# Patient Record
Sex: MALE | Race: WHITE | NOT HISPANIC OR LATINO | Employment: FULL TIME | URBAN - METROPOLITAN AREA
[De-identification: names, ages, dates, MRNs, and addresses within clinical notes are randomized per-mention and may not be internally consistent; named-entity substitution may affect disease eponyms.]

---

## 2017-01-06 ENCOUNTER — OFFICE VISIT (OUTPATIENT)
Dept: PSYCHIATRY | Facility: CLINIC | Age: 62
End: 2017-01-06
Payer: COMMERCIAL

## 2017-01-06 DIAGNOSIS — F33.2 SEVERE EPISODE OF RECURRENT MAJOR DEPRESSIVE DISORDER, WITHOUT PSYCHOTIC FEATURES: Primary | ICD-10-CM

## 2017-01-06 PROCEDURE — 90791 PSYCH DIAGNOSTIC EVALUATION: CPT | Mod: S$GLB,,, | Performed by: SOCIAL WORKER

## 2017-01-06 NOTE — PROGRESS NOTES
"Psychiatry Initial Visit (PhD/LCSW)  Diagnostic Interview - CPT 03306    Date: 1/6/2017    Site: [facility]    [place] [facility] [place]     Clinical status of patient: Outpatient    Yosef Rowe, a 61 y.o. male, for initial evaluation visit.  Met with patient.    Chief complaint/reason for encounter: depression    History of present illness: he reports most of his life he has been a content person.  Never worried about things and "type A personality"; but now unhappy for a number of years.  The BMU did not help.       Pain: in back     Symptoms:   · Mood: sad no energy does not want leave the house.   Oct 1 he lost his job per lack of work.  He did construction management.    That the market currently in his career is "almost nil".      He has placed some applications out but no interviews.   He feels he could not pass the interview to start with.  Finds it difficult to concentrate and make decisions.   He has thought of suicide "and don't dwell on it or plan on it and I wouldn't do that to my family".   He has never attempted suicide in his life".   He owns firearms.  He does not want to get rid of them.  I did suggest he did just as an extra measure since he is depressed but he does not agree or view it as necessary.  Weight is stable, self critical ( feels lost and worthless).  Sleep is of and on.  Takes sleeping agents.  He denies periods of elevated mood.  He and wife are not sexually active or at least not much.      That 3 years ago he had to start working part time.      U notes describes his circumstances as follows:   Pt reports depression and anxiety off and on for 10 yrs. When he turned 49 he realized he had been working all of his life and had nothing to show for it. He had no job that was secure and he was worried about the future. Over the 10 yrs it got much better at times when he had steady work. In the last 3.5 yrs it has been worse because he has been working part time. He has done " "construction management. He has been unemployed since October 1st. Symtoms have gotten steadily worse in the last year and worse since he is unemployed. He was able to do his work when he was last working but there is no work currently. He is not expecting this to change.       · Anxiety: worries  Restless irritable,  No muscle tension,  No full blown panic attack "close" to it he tells me, and provoked by anticipatory anxiety.     · Substance abuse: denied  · Cognitive functioning: denied  · Health behaviors: noncontributory    Psychiatric history: seeing a psychiatrist on and off for 11 years.  when he turned 49.  and he was unemployed at the time.  that at the time he got on a medication and found a job.      Medical history: see epic     Family history of psychiatric illness: not known    Social history (marriage, employment, etc.):   He has been  for a long time.  He has two children.   Raised by parents in [facility] [place].    Parents stayed together.  He has one brother two sisters.   youngest of all is pt.   Father was a calzada at the end.   He also was an .   He has two years of college.  Business. At [employer].  He did not do well.  He did well in school earlier in his life.   Wife is a manager for [employer].       Substance use:   Alcohol: one glass twice a week and has never been a problem.     Drugs: none   Tobacco: none   Caffeine: none    Current medications and drug reactions (include OTC, herbal): see medication list     Strengths and liabilities: Strength: Patient is expressive/articulate., Strength: Patient has positive support network., Strength: Patient has reasonable judgment.    Current Evaluation:     Mental Status Exam:  General Appearance:  obese   Speech: normal tone, normal pitch, normal volume      Level of Cooperation: cooperative      Thought Processes: normal and logical   Mood: depressed      Thought Content: delusions: no, hallucinations: (auditory: no, visual: " no), suicidal thoughts: (active-no, passive-yes), homicidal thoughts: (active-no, passive-no)   Affect: blunted   Orientation: Oriented x3   Memory: recent >  intact   Attention Span & Concentration: intact   Fund of General Knowledge: intact and appropriate to age and level of education   Abstract Reasoning: interpretation of similarities was abstract, interpretation of proverbs was abstract   Judgment & Insight: good, fair     Language  intact     Diagnostic Impression - Plan:       ICD-10-CM ICD-9-CM   1. Severe episode of recurrent major depressive disorder, without psychotic features F33.2 296.33       Plan:individual psychotherapy  Suggest he consider downsizing.  He lives in [place] and tells me his neighborhood is the only safe neighborhood in the city.  Consider sitting down and make a list with precise calculations of expenses and earnings.      Return to Clinic: 1 week    Length of Service (minutes): 45

## 2017-01-11 ENCOUNTER — OFFICE VISIT (OUTPATIENT)
Dept: PSYCHIATRY | Facility: CLINIC | Age: 62
End: 2017-01-11
Payer: COMMERCIAL

## 2017-01-11 VITALS
WEIGHT: 280 LBS | BODY MASS INDEX: 43.95 KG/M2 | SYSTOLIC BLOOD PRESSURE: 155 MMHG | DIASTOLIC BLOOD PRESSURE: 87 MMHG | HEIGHT: 67 IN | HEART RATE: 77 BPM

## 2017-01-11 DIAGNOSIS — G47.00 INSOMNIA, UNSPECIFIED TYPE: Primary | ICD-10-CM

## 2017-01-11 DIAGNOSIS — F41.1 GENERALIZED ANXIETY DISORDER: ICD-10-CM

## 2017-01-11 DIAGNOSIS — F33.2 SEVERE EPISODE OF RECURRENT MAJOR DEPRESSIVE DISORDER, WITHOUT PSYCHOTIC FEATURES: ICD-10-CM

## 2017-01-11 PROCEDURE — 1159F MED LIST DOCD IN RCRD: CPT | Mod: S$GLB,,, | Performed by: INTERNAL MEDICINE

## 2017-01-11 PROCEDURE — 99214 OFFICE O/P EST MOD 30 MIN: CPT | Mod: S$GLB,,, | Performed by: INTERNAL MEDICINE

## 2017-01-11 PROCEDURE — 99999 PR PBB SHADOW E&M-EST. PATIENT-LVL III: CPT | Mod: PBBFAC,,, | Performed by: INTERNAL MEDICINE

## 2017-01-11 RX ORDER — TEMAZEPAM 30 MG/1
30 CAPSULE ORAL NIGHTLY PRN
Qty: 30 CAPSULE | Refills: 0 | Status: SHIPPED | OUTPATIENT
Start: 2017-01-11 | End: 2017-02-01 | Stop reason: SDUPTHER

## 2017-01-11 NOTE — MR AVS SNAPSHOT
[facility]  [address]  [place] [address]  Phone: [phone]  Fax: [phone]                  Yosef Rowe   2017 3:30 PM   Office Visit    Description:  Male : 1955   Provider:  Hedy Dempsey MD   Department:  [facility]           Diagnoses this Visit        Comments    Insomnia, unspecified type    -  Primary            To Do List           Future Appointments        Provider Department Dept Phone    2017 8:00 AM [facility], [facility] [phone]      Goals (5 Years of Data)     None      Follow-Up and Disposition     Return in 3 weeks (on 2017).       These Medications        Disp Refills Start End    temazepam (RESTORIL) 30 mg capsule 30 capsule 0 2017     Take 1 capsule (30 mg total) by mouth nightly as needed for Insomnia. - Oral    Pharmacy: [facility] [address] [place] [address] [place], [place] [address] [address] AT [facility] [address] [address] Ph #: [phone]         [facility]     [facility] Nurse Care Line -  Assistance  Registered nurses in the [facility] provide clinical advisement, health education, appointment booking, and other advisory services.  Call for this free service at [phone].             Medications           Message regarding Medications     Verify the changes and/or additions to your medication regime listed below are the same as discussed with your clinician today.  If any of these changes or additions are incorrect, please notify your healthcare provider.        CHANGE how you are taking these medications     Start Taking Instead of    temazepam (RESTORIL) 30 mg capsule temazepam (RESTORIL) 30 mg capsule    Dosage:  Take 1 capsule (30 mg total) by mouth nightly as needed for Insomnia.     Reason for Change:  Reorder       STOP taking these medications     aripiprazole (ABILIFY) 2 MG Tab Take 1 tablet (2mg) daily x 1 week then discontinue.           Verify that  "the below list of medications is an accurate representation of the medications you are currently taking.  If none reported, the list may be blank. If incorrect, please contact your healthcare provider. Carry this list with you in case of emergency.           Current Medications     ascorbic acid (VITAMIN C) 500 MG tablet Take 500 mg by mouth once daily.    atorvastatin (LIPITOR) 10 MG tablet Take 1 tablet (10 mg total) by mouth once daily.    buPROPion (WELLBUTRIN XL) 150 MG TB24 tablet Take 1 tablet (150 mg total) by mouth once daily.    busPIRone (BUSPAR) 10 MG tablet Take one-half tablet three times daily by mouth for one week then take one tablet three times daily.    clonazePAM (KLONOPIN) 0.5 MG tablet Take 1 tablet by mouth 2 (two) times daily.    duloxetine (CYMBALTA) 60 MG capsule Take one by mouth daily for one week, then take two daily.    temazepam (RESTORIL) 30 mg capsule Take 1 capsule (30 mg total) by mouth nightly as needed for Insomnia.           Clinical Reference Information           Vital Signs - Last Recorded  Most recent update: 1/11/2017  3:25 PM by Justin Maria    BP Pulse Ht Wt BMI    (!) 155/87 77 5' 7" (1.702 m) 127 kg (280 lb) 43.85 kg/m2      Blood Pressure          Most Recent Value    BP  (!)  155/87      Allergies as of 1/11/2017     No Known Drug Allergies      Immunizations Administered on Date of Encounter - 1/11/2017     None      [facility] Sign-Up     Activating your [facility] account is as easy as 1-2-3!     1) Visit [email or web address], select Sign Up Now, enter this activation code and your date of birth, then select Next.  [email or web address]  Expires: 2/3/2017 10:31 AM      2) Create a username and password to use when you visit [facility] in the future and select a security question in case you lose your password and select Next.    3) Enter your e-mail address and click Sign Up!    Additional Information  If you have questions, please e-mail [email or web address] or call " [phone] to talk to our [facility] staff. Remember, [facility] is NOT to be used for urgent needs. For medical emergencies, dial 911.

## 2017-01-11 NOTE — PROGRESS NOTES
"OUTPATIENT PSYCHIATRY    ENCOUNTER DATE:  1/12/2017  SITE:  [facility]  REFFERAL SOURCE:  Ruperto Dexter MD  LENGTH OF SESSION:  30 minutes    CHIEF COMPLAINT   Anxiety; Depression; and Insomnia    HISTORY OF PRESENTING ILLNESS:  Yosef Rowe is a 61 y.o. male with history of anxiety and depression who presents for follow up visit after completing the BMU program on 12/20/16 (he did not find this helpful).  At last appointment, patient felt he was on too many medications that were not helping and so Abilify was decreased from 5mg daily with instructions to taper to 2mg daily and discontinue after 1 week.  Today he states that today was his last Abilify 2mg tablet.  He states that mood has been unchanged with decreased dose.  He continues to report his mood is "really bad."  He states he is "so bored to death" while he is at home during the day.  He states he just lays in bed and does not know what to do.  He describes frequent worrying (already worried about the traffic on his way home today) and catasophrophizing.  His wife leaves him chores while she is at work, but he states he struggles to complete these.  He states he can not bring himself to look for a job because he is too fearful that he would not be able to do the job.  He states he fears going places but can not explain what he is fearful of.  He does report he enjoys cooking and watching TV with his wife when she gets home from work.  The only place he can think of where he would like to go during the day is the local club where his friends are playing cards (he states he does not drink) - last went on Saturday.  He says his wife does not want him to go there.  He also does not like being around people who knew him before he was depressed because he says he is a different person now - previously "confident, outgoing, type A."  Consequently he is on edge while he is there.  His 40th wedding anniversary is coming up on " 1/21/17 and he is paralyzed with fear about what he will buy his wife.  He reports continued symptoms of severe depression and anxiety.  He denies suicidal ideation because he says he would never do that to his family.  He denies homicidal ideation, AVH, or history of donna.      Current medications:  Cymbalta 60mg daily (Crosstapered with Pristiq during BMU), Buspar 10mg twice daily (started during BMU), Wellbutrin 150mg daily (taking ~8 months), Abilify 2mg daily (on for over 1 year, tapering - last dose was today), Klonopin 0.5mg (taking ~1 year, takes at 6pm), Temazepam 30mg qHS (taking >1 year, takes at 9pm).    REVIEW OF SYSTEMS:  Complete review of systems performed covering Constitutional and Neurologic.  All systems negative.    Psych ROS covered elsewhere in note (HPI)    PAST PSYCHIATRIC, MEDICAL, AND SOCIAL HISTORY REVIEWED    MEDICATIONS:    aripiprazole (ABILIFY) 2 MG Tab, Take 1 tablet (2mg) daily x 1 week then discontinue., Disp: 7 tablet, Rfl: 0    ascorbic acid (VITAMIN C) 500 MG tablet, Take 500 mg by mouth once daily., Disp: , Rfl:     atorvastatin (LIPITOR) 10 MG tablet, Take 1 tablet (10 mg total) by mouth once daily., Disp: 30 tablet, Rfl: 11    buPROPion (WELLBUTRIN XL) 150 MG TB24 tablet, Take 1 tablet (150 mg total) by mouth once daily., Disp: 30 tablet, Rfl: 1    busPIRone (BUSPAR) 10 MG tablet, Take one-half tablet three times daily by mouth for one week then take one tablet three times daily., Disp: 90 tablet, Rfl: 1    clonazePAM (KLONOPIN) 0.5 MG tablet, Take 1 tablet by mouth 2 (two) times daily., Disp: , Rfl: 2    duloxetine (CYMBALTA) 60 MG capsule, Take one by mouth daily for one week, then take two daily., Disp: 30 capsule, Rfl: 1    temazepam (RESTORIL) 30 mg capsule, , Disp: , Rfl:     ALLERGIES:  Review of patient's allergies indicates:   Allergen Reactions    No known drug allergies      PSYCHIATRIC EXAM:  Vitals:    01/11/17 1523   BP: (!) 155/87   Pulse: 77  "  Weight: 127 kg (280 lb)   Height: 5' 7" (1.702 m)   Appearance:  Well groomed, appearing healthy and of stated age  Behavior:  Cooperative, pleasant, + psychomotor retardation  Speech:  Slow, soft speech  Mood:  "Really bad"  Affect:  Congruent with stated mood; Constricted  Thought Process:  Linear, logical, goal directed  Thought Content:  Negative for suicidal ideation, homicidal ideation, delusions or hallucinations.  Associations:  Intact  Memory:  Grossly Intact  Level of Consciousness/Orientation:  Grossly intact  Fund of Knowledge:  Good  Attention:  Good  Language:  Fluent, able to name abstract and concrete objects  Insight:  Poor  Judgment:  Fair  Psychomotor signs:  No involuntary movements or tremor  Gait:  Normal    RELEVANT LABS/STUDIES:  Reviewed 11/2016 labs.    IMPRESSION:    Yosef Rowe is a 61 y.o. male with history of anxiety and depression with continued severe symptoms.      DIAGNOSES:  SOLITARIO  MDD, recurrent, severe  Insomnia    PLAN:  · Major depressive disorder, severe:  Currently taking Cymbalta 60mg daily and Wellbutrin XL 150mg daily.  He is adamant that he does not want to increase these medications.  Today was last day of Abilify and patient has not experienced worsening symptoms - he agrees to let me know if he does.  According to previous psychiatrist, Dr. Tian, patient has been to numerous psychiatrists and tried every medication for depression and nothing has been helpful.  He states he has been severely depressed for many years and feels there is a personality component to his resistance to get better.  He states he was never responsive to behavioral activation and did not participate in therapy.  Discussed the possibility of ECT with patient today (Dr. Tian states he also discussed this with the patient in the past), however patient is resistant to this.  Encouraged him to keep next appointment with Mr. Patino for therapy - continued to reiterate that therapy is the most " important tool for him to get better.  · Generalized anxiety disorder:  Currently taking Cymbalta 60mg daily as above, Buspar 10mg BID, and Klonopin 0.5mg daily (6pm).  Will have long term goal of getting patient off of benzodiazepines and onto less addictive medications - Dr. Tian states he was also trying to reduce his use of benzodiazepines because he was concerned the patient was drinking more than he reports (he states this was told to him by the patient's wife).  Also concerned that his benzodiazepine use could be worsening his depression.  · Insomnia:  Currently taking Restoril 30mg qHS.  Will continue for now as above, however plan to decrease and discontinue over time due to above concerns.  · Alcohol use:  Currently reports he drinks 1 glass of wine per week.  Dr. Tian is concerned that he is drinking several drinks per night.  Will continue to discuss in the future.    · Patient agrees to let me know or go to the ED if symptoms worsen or if he has thoughts of suicide.    *Have faxed release of information to Dr. Tian and he plans to send complete list of medications patient has tried in the past.      RETURN TO CLINIC:  Return in 3 weeks (on 2/1/2017).

## 2017-02-01 ENCOUNTER — OFFICE VISIT (OUTPATIENT)
Dept: PSYCHIATRY | Facility: CLINIC | Age: 62
End: 2017-02-01
Payer: COMMERCIAL

## 2017-02-01 VITALS
HEART RATE: 69 BPM | HEIGHT: 67 IN | BODY MASS INDEX: 44.26 KG/M2 | WEIGHT: 282 LBS | DIASTOLIC BLOOD PRESSURE: 70 MMHG | SYSTOLIC BLOOD PRESSURE: 146 MMHG

## 2017-02-01 DIAGNOSIS — G47.00 INSOMNIA, UNSPECIFIED TYPE: ICD-10-CM

## 2017-02-01 DIAGNOSIS — F33.2 SEVERE EPISODE OF RECURRENT MAJOR DEPRESSIVE DISORDER, WITHOUT PSYCHOTIC FEATURES: ICD-10-CM

## 2017-02-01 DIAGNOSIS — F41.1 GENERALIZED ANXIETY DISORDER: Primary | ICD-10-CM

## 2017-02-01 PROCEDURE — 99214 OFFICE O/P EST MOD 30 MIN: CPT | Mod: S$GLB,,, | Performed by: INTERNAL MEDICINE

## 2017-02-01 PROCEDURE — 99999 PR PBB SHADOW E&M-EST. PATIENT-LVL III: CPT | Mod: PBBFAC,,, | Performed by: INTERNAL MEDICINE

## 2017-02-01 RX ORDER — BUPROPION HYDROCHLORIDE 150 MG/1
150 TABLET ORAL DAILY
Qty: 30 TABLET | Refills: 3 | Status: SHIPPED | OUTPATIENT
Start: 2017-02-01 | End: 2017-03-07 | Stop reason: SDUPTHER

## 2017-02-01 RX ORDER — DULOXETIN HYDROCHLORIDE 60 MG/1
60 CAPSULE, DELAYED RELEASE ORAL DAILY
Qty: 30 CAPSULE | Refills: 3 | Status: SHIPPED | OUTPATIENT
Start: 2017-02-01 | End: 2017-05-09 | Stop reason: SDUPTHER

## 2017-02-01 RX ORDER — TEMAZEPAM 30 MG/1
30 CAPSULE ORAL NIGHTLY PRN
Qty: 30 CAPSULE | Refills: 0 | Status: SHIPPED | OUTPATIENT
Start: 2017-02-01 | End: 2017-03-07 | Stop reason: SDUPTHER

## 2017-02-01 RX ORDER — CLONAZEPAM 0.5 MG/1
0.25 TABLET ORAL DAILY
Qty: 15 TABLET | Refills: 0 | Status: SHIPPED | OUTPATIENT
Start: 2017-02-01 | End: 2017-05-09

## 2017-02-01 NOTE — MR AVS SNAPSHOT
[facility]  [address]  [place] [address]  Phone: [phone]  Fax: [phone]                  Yosef Rowe   2017 10:00 AM   Office Visit    Description:  Male : 1955   Provider:  Hedy Dempsey MD   Department:  [facility]           Reason for Visit     Anxiety     Depression     Insomnia           Diagnoses this Visit        Comments    Insomnia, unspecified type                To Do List           Future Appointments        Provider Department Dept Phone    2017 8:00 AM [facility] [phone]      Goals (5 Years of Data)     None      Follow-Up and Disposition     Return in about 5 weeks (around 3/8/2017).       These Medications        Disp Refills Start End    duloxetine (CYMBALTA) 60 MG capsule 30 capsule 3 2017     Take 1 capsule (60 mg total) by mouth once daily. - Oral    [facility] [address] [facility] Ph #: [phone]       buPROPion (WELLBUTRIN XL) 150 MG TB24 tablet 30 tablet 3 2017     Take 1 tablet (150 mg total) by mouth once daily. - Oral    Pharmacy: [facility] [address] [address] AT [facility] Ph #: [phone]       temazepam (RESTORIL) 30 mg capsule 30 capsule 0 2017     Take 1 capsule (30 mg total) by mouth nightly as needed for Insomnia. - Oral    Pharmacy: [facility] [address] AT [facility] Ph #: [phone]       clonazePAM (KLONOPIN) 0.5 MG tablet 15 tablet 0 2017     Take 0.5 tablets (0.25 mg total) by mouth once daily. - Oral    Pharmacy: [facility] [address] AT [facility] Ph #: [phone]         [facility]     [facility] Care Line -  Assistance  Registered nurses in the [facility] provide clinical advisement,  health education, appointment booking, and other advisory services.  Call for this free service at [phone].             Medications           Message regarding Medications     Verify the changes and/or additions to your medication regime listed below are the same as discussed with your clinician today.  If any of these changes or additions are incorrect, please notify your healthcare provider.        CHANGE how you are taking these medications     Start Taking Instead of    duloxetine (CYMBALTA) 60 MG capsule duloxetine (CYMBALTA) 60 MG capsule    Dosage:  Take 1 capsule (60 mg total) by mouth once daily. Dosage:  Take one by mouth daily for one week, then take two daily.    Reason for Change:  Reorder     clonazePAM (KLONOPIN) 0.5 MG tablet clonazePAM (KLONOPIN) 0.5 MG tablet    Dosage:  Take 0.5 tablets (0.25 mg total) by mouth once daily. Dosage:  Take 1 tablet by mouth 2 (two) times daily.    Reason for Change:  Reorder            Verify that the below list of medications is an accurate representation of the medications you are currently taking.  If none reported, the list may be blank. If incorrect, please contact your healthcare provider. Carry this list with you in case of emergency.           Current Medications     ascorbic acid (VITAMIN C) 500 MG tablet Take 500 mg by mouth once daily.    atorvastatin (LIPITOR) 10 MG tablet Take 1 tablet (10 mg total) by mouth once daily.    buPROPion (WELLBUTRIN XL) 150 MG TB24 tablet Take 1 tablet (150 mg total) by mouth once daily.    busPIRone (BUSPAR) 10 MG tablet Take one-half tablet three times daily by mouth for one week then take one tablet three times daily.    clonazePAM (KLONOPIN) 0.5 MG tablet Take 0.5 tablets (0.25 mg total) by mouth once daily.    duloxetine (CYMBALTA) 60 MG capsule Take 1 capsule (60 mg total) by mouth once daily.    temazepam (RESTORIL) 30 mg capsule Take 1 capsule (30 mg total) by mouth nightly as needed for Insomnia.          "  Clinical Reference Information           Vital Signs - Last Recorded  Most recent update: 2/1/2017  9:58 AM by Justin Maria    BP Pulse Ht Wt BMI    (!) 146/70 69 5' 7" (1.702 m) 127.9 kg (282 lb) 44.17 kg/m2      Blood Pressure          Most Recent Value    BP  (!)  146/70      Allergies as of 2/1/2017     No Known Drug Allergies      Immunizations Administered on Date of Encounter - 2/1/2017     None      [facility] Sign-Up     Activating your [facility] account is as easy as 1-2-3!     1) Visit [email or web address], select Sign Up Now, enter this activation code and your date of birth, then select Next.  [email or web address]  Expires: 2/3/2017 10:31 AM      2) Create a username and password to use when you visit [facility] in the future and select a security question in case you lose your password and select Next.    3) Enter your e-mail address and click Sign Up!    Additional Information  If you have questions, please e-mail [email or web address] or call [phone] to talk to our [facility] staff. Remember, [facility] is NOT to be used for urgent needs. For medical emergencies, dial 911.         Instructions    Current medications:  Cymbalta (duloxetine) 60mg daily - helps depression and anxiety.  Wellbutrin (buproprion) XL 150mg daily - helps with depression.    Buspar (buspirone) 10mg twice daily - helps with anxiety.  Restoril (temazepam) 30mg before bed - treats insomnia but can make you groggy and affect your thinking.     Changes:  Reduce Klonopin (clonazepam) to 1/2 tablet (0.25mg) daily - treats anxiety but can make you groggy and affect your thinking - goal is to come off of this medication completely.       "

## 2017-02-01 NOTE — PATIENT INSTRUCTIONS
Current medications:  Cymbalta (duloxetine) 60mg daily - helps depression and anxiety.  Wellbutrin (buproprion) XL 150mg daily - helps with depression.    Buspar (buspirone) 10mg twice daily - helps with anxiety.  Restoril (temazepam) 30mg before bed - treats insomnia but can make you groggy and affect your thinking.     Changes:  Reduce Klonopin (clonazepam) to 1/2 tablet (0.25mg) daily - treats anxiety but can make you groggy and affect your thinking - goal is to come off of this medication completely.

## 2017-02-07 ENCOUNTER — OFFICE VISIT (OUTPATIENT)
Dept: PSYCHIATRY | Facility: CLINIC | Age: 62
End: 2017-02-07
Payer: COMMERCIAL

## 2017-02-07 DIAGNOSIS — F33.2 SEVERE EPISODE OF RECURRENT MAJOR DEPRESSIVE DISORDER, WITHOUT PSYCHOTIC FEATURES: Primary | ICD-10-CM

## 2017-02-07 DIAGNOSIS — F41.1 GENERALIZED ANXIETY DISORDER: ICD-10-CM

## 2017-02-07 PROCEDURE — 90791 PSYCH DIAGNOSTIC EVALUATION: CPT | Mod: S$GLB,,, | Performed by: PSYCHOLOGIST

## 2017-02-07 PROCEDURE — 99999 PR PBB SHADOW E&M-EST. PATIENT-LVL II: CPT | Mod: PBBFAC,,, | Performed by: PSYCHOLOGIST

## 2017-02-07 NOTE — PROGRESS NOTES
Psychiatry Initial Visit (PhD/LCSW)    CPT Code: 78602    Patient Name:  Yosef Rowe    Date:  02/07/2017    Site:  [facility]    Referral source:  Hedy Dempsey M.D.    Chief complaint/reason for encounter:  Psychological Evaluation    Clinical status of patient:  Outpatient    Met with:  Patient    History of present illness:  Mr. Yosef Rowe is a 61-year-old   male who is pursuing psychotherapy to improve symptoms of depression and anxiety.  Although he has experienced psychiatric problems since 2006, he noted his symptoms worsened over the last few years due to unemployment and resulting stressors.  He received psychiatric treatment for medication management from two psychiatrists until establishing care at [facility] with Dr. Hedy Dempsey.  He has tried many psychotropic medications and is currently taking Cymbalta, Buspar, Wellbutrin, Abilify, Klonopin, and Temazepam.  He reports no psychotherapy during that time but recently received treatment at [facility] in December 2016.  He notes that psychiatric treatment has not been helpful for him, and that his symptoms were most improved when he was working full-time.    He would currently like to focus on improving symptoms of depression so that he can return to full-time employment.  His family is experiencing financial strain without his income, and he is worried about losing his home and being unable to pay bills.  When asked what is motivating him in his life, he notes he is living for his wife and children.  He was provided with a values questionnaire and encouraged to generate his values and potential values-based commitments.  He was also provided with psychoeducation regarding guilt and judgmental thoughts, and was taught to reframe negative thoughts and use validation.  Mr. Rowe requested to schedule sessions every two weeks to reduce financial burden.    Pain scale:  5/10    Symptoms:  Mood:  Depression:   Reports symptoms consistent with Major Depressive Disorder, recurrent, severe.  He has had multiple depressive episodes in the past, and reports the current episode has worsened in the past two years.  In the past two weeks he reports depressed mood, anhedonia, insomnia, feelings of worthlessness and hopelessness, inappropriate and excessive guilt, difficulty concentrating, fatigue, and poor appetite.    Mariia/Hypomania:  Denied.  Anxiety:  Reports symptoms consistent with Generalized Anxiety Disorder.  He endorses excessive worries that are difficult to manage, and states his anxiety occurs nearly every day.  He worries about his financial situation, his wife and children, and inability to find a job.  He also has difficulty leaving the house due to fears that something bad may happen.  His symptoms are associated with hyperarousal.  Sleep:  Reports sleep problems for the last week in which he cannot fall asleep, and only falls asleep in the morning when he should get up.  He sleeps two hours.  Suicidal ideation:  Denied.  Non-suicidal self-injury:  Denied.  Substance abuse: Denied.    Psychiatric history:  He received psychiatric treatment for medication management from two psychiatrists until establishing care at [facility] with Dr. Hedy Dempsey.  He has tried many psychotropic medications and is currently taking Cymbalta, Buspar, Wellbutrin, Abilify, Klonopin, and Temazepam.  He reports no psychotherapy during that time but recently received treatment at [facility] in December 2016.  He denied hospitalizations for psychiatric reasons.  He notes that psychiatric treatment has not been helpful for him, and that his symptoms were most improved when he was working full-time.    Trauma history:  Denied.    Medical history:  Hyperlipidemia; Lumbar radiculopathy, Sleep apnea (treated with CPAP)    Family history of psychiatric illness:  None reported    Social history (marriage, employment, etc.):   Mr. Rowe was raised in [place] by his biological mother and father. He described his upbringing as normal.  He denied experiencing childhood abuse or trauma. Mr. Rowe attended some college for 2 years studying Business.  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  He has been  to his wife for 40 years, and they have two adult sons together. He lives with his wife. Mr. Rowe identifies as Evangelical.  He reports that finances are difficult at this time.    Current psychosocial stressors:  Finances    Report of coping skills:  Distract self with activities (household chores)    Support system:  Wife, kids, a few friends    Substance use:  Alcohol:  Consumes one glass of wine two times per week.  Drugs:  Denied.  Tobacco:  Denied.  Caffeine:  Denied.    Strengths and Liabilities:  Strength: Patient accepts guidance/feedback, Strength: Patient is expressive/articulate., Strength: Patient is motivated for change., Liability: Patient lacks coping skills.    Mental Status Exam:  General appearance:  appears stated age, neatly dressed, well groomed  Speech:  normal rate, normal tone, normal pitch, normal volume  Level of cooperation:  cooperative  Thought processes:  logical, goal-directed  Mood:  depressed  Thought content:  no illusions, no visual hallucinations, no auditory hallucinations, no delusions, no active or passive homicidal thoughts, no active or passive suicidal ideation, no obsessions, no compulsions, no violence  Affect:  appropriate  Orientation:  oriented to person, place, and date  Memory:  Recent memory:  2 of 3 objects after brief delay.    Remote memory - intact  Attention span and concentration:  spelled HOUSE forward and backwards  Fund of general knowledge: 3 of 3 recent presidents  Abstract reasoning:  similarities: abstract.  Proverbs: abstract.  Judgment and insight: fair  Language:  intact    Diagnostic impression:    ICD-10-CM ICD-9-CM   1. Severe  episode of recurrent major depressive disorder, without psychotic features F33.2 296.33   2. Generalized anxiety disorder F41.1 300.02       Plan:  Mr. Rowe will continue in psychotherapy with Carolina Day, Ph.D. to address issues related to depression and anxiety.      Length of Session:  60 minutes

## 2017-02-21 ENCOUNTER — OFFICE VISIT (OUTPATIENT)
Dept: PSYCHIATRY | Facility: CLINIC | Age: 62
End: 2017-02-21
Payer: COMMERCIAL

## 2017-02-21 ENCOUNTER — LAB VISIT (OUTPATIENT)
Dept: LAB | Facility: HOSPITAL | Age: 62
End: 2017-02-21
Attending: INTERNAL MEDICINE
Payer: COMMERCIAL

## 2017-02-21 DIAGNOSIS — E78.5 HYPERLIPIDEMIA, UNSPECIFIED HYPERLIPIDEMIA TYPE: ICD-10-CM

## 2017-02-21 DIAGNOSIS — F33.2 SEVERE EPISODE OF RECURRENT MAJOR DEPRESSIVE DISORDER, WITHOUT PSYCHOTIC FEATURES: Primary | ICD-10-CM

## 2017-02-21 LAB
ALBUMIN SERPL BCP-MCNC: 3.8 G/DL
ALP SERPL-CCNC: 59 U/L
ALT SERPL W/O P-5'-P-CCNC: 21 U/L
ANION GAP SERPL CALC-SCNC: 8 MMOL/L
AST SERPL-CCNC: 21 U/L
BILIRUB SERPL-MCNC: 0.7 MG/DL
BUN SERPL-MCNC: 14 MG/DL
CALCIUM SERPL-MCNC: 9.2 MG/DL
CHLORIDE SERPL-SCNC: 105 MMOL/L
CHOLEST/HDLC SERPL: 5.7 {RATIO}
CO2 SERPL-SCNC: 26 MMOL/L
CREAT SERPL-MCNC: 0.9 MG/DL
EST. GFR  (AFRICAN AMERICAN): >60 ML/MIN/1.73 M^2
EST. GFR  (NON AFRICAN AMERICAN): >60 ML/MIN/1.73 M^2
GLUCOSE SERPL-MCNC: 82 MG/DL
HDL/CHOLESTEROL RATIO: 17.4 %
HDLC SERPL-MCNC: 247 MG/DL
HDLC SERPL-MCNC: 43 MG/DL
LDLC SERPL CALC-MCNC: 163 MG/DL
NONHDLC SERPL-MCNC: 204 MG/DL
POTASSIUM SERPL-SCNC: 4.2 MMOL/L
PROT SERPL-MCNC: 6.8 G/DL
SODIUM SERPL-SCNC: 139 MMOL/L
TRIGL SERPL-MCNC: 205 MG/DL

## 2017-02-21 PROCEDURE — 36415 COLL VENOUS BLD VENIPUNCTURE: CPT | Mod: PO

## 2017-02-21 PROCEDURE — 80053 COMPREHEN METABOLIC PANEL: CPT

## 2017-02-21 PROCEDURE — 80061 LIPID PANEL: CPT

## 2017-02-21 PROCEDURE — 90834 PSYTX W PT 45 MINUTES: CPT | Mod: S$GLB,,, | Performed by: PSYCHOLOGIST

## 2017-02-21 NOTE — PROGRESS NOTES
Individual Psychotherapy (PhD/LCSW)    2/21/2017    Site:  [facility]         Therapeutic Intervention: Met with patient.  Outpatient - Insight oriented psychotherapy 45 min - CPT code 15207    Chief complaint/reason for encounter: depression     Interval history and content of current session:  Mr. Rowe arrived on time for his scheduled session.  He brought back the worksheets on values and commitment to the value of employment.  He ranked his values and prioritized his relationship with his wife, relationship with family, employment, and spirituality.  He engaged in values-based commitments associated with employment; however, he continues to acknowledge guilt.  When further explored, he admitted that he has been catastrophizing a worst-case scenario in which he may lose his home and his marriage in the future.  After working through this problem, he acknowledged that it appears unlikely that all of the problems he is worrying about would happen sequentially to lead to the catastrophe.  He was provided with a chart of unhelpful thoughts and alternative, more helpful thoughts.  He was encouraged to focus on the facts of the situation and what he can control.  In order to contribute to his value of having a good relationship with his wife, he was encouraged to come up with new and different commitments.  Although he was hesitant to engage in strategies outside their routine, he did admit that he would like to increase intimacy and would see what he could do differently.    Treatment plan:  · Target symptoms: depression  · Why chosen therapy is appropriate versus another modality: relevant to diagnosis, evidence based practice  · Outcome monitoring methods: self-report  · Therapeutic intervention type: insight oriented psychotherapy    Risk parameters:  Patient reports no suicidal ideation  Patient reports no homicidal ideation  Patient reports no self-injurious behavior  Patient reports no violent  behavior    Verbal deficits: None    Patient's response to intervention:  The patient's response to intervention is accepting.    Progress toward goals and other mental status changes:  The patient's progress toward goals is fair .    Diagnosis:     ICD-10-CM ICD-9-CM   1. Severe episode of recurrent major depressive disorder, without psychotic features F33.2 296.33       Plan:  individual psychotherapy    Return to clinic: 2 weeks    Length of Service (minutes): 45

## 2017-03-07 ENCOUNTER — OFFICE VISIT (OUTPATIENT)
Dept: PSYCHIATRY | Facility: CLINIC | Age: 62
End: 2017-03-07
Payer: COMMERCIAL

## 2017-03-07 VITALS
BODY MASS INDEX: 44.73 KG/M2 | HEART RATE: 66 BPM | SYSTOLIC BLOOD PRESSURE: 165 MMHG | DIASTOLIC BLOOD PRESSURE: 83 MMHG | WEIGHT: 285 LBS | HEIGHT: 67 IN

## 2017-03-07 DIAGNOSIS — F33.2 SEVERE EPISODE OF RECURRENT MAJOR DEPRESSIVE DISORDER, WITHOUT PSYCHOTIC FEATURES: Primary | ICD-10-CM

## 2017-03-07 DIAGNOSIS — F41.1 GENERALIZED ANXIETY DISORDER: ICD-10-CM

## 2017-03-07 DIAGNOSIS — G47.00 INSOMNIA, UNSPECIFIED TYPE: ICD-10-CM

## 2017-03-07 PROCEDURE — 99999 PR PBB SHADOW E&M-EST. PATIENT-LVL III: CPT | Mod: PBBFAC,,, | Performed by: INTERNAL MEDICINE

## 2017-03-07 PROCEDURE — 1160F RVW MEDS BY RX/DR IN RCRD: CPT | Mod: S$GLB,,, | Performed by: INTERNAL MEDICINE

## 2017-03-07 PROCEDURE — 99214 OFFICE O/P EST MOD 30 MIN: CPT | Mod: S$GLB,,, | Performed by: INTERNAL MEDICINE

## 2017-03-07 RX ORDER — TEMAZEPAM 7.5 MG/1
CAPSULE ORAL
Qty: 30 CAPSULE | Refills: 0 | Status: SHIPPED | OUTPATIENT
Start: 2017-03-07 | End: 2017-04-05

## 2017-03-07 RX ORDER — BUPROPION HYDROCHLORIDE 150 MG/1
150 TABLET ORAL DAILY
Qty: 30 TABLET | Refills: 3 | Status: SHIPPED | OUTPATIENT
Start: 2017-03-07 | End: 2017-07-05 | Stop reason: SDUPTHER

## 2017-03-07 NOTE — PATIENT INSTRUCTIONS
1. Reduce Klonopin to 0.25mg (1/2 tablet) at night x 5 nights, then discontinue.  2. One you are off of Klonopin, then start reducing Restoril - have provided new prescription for 7.5mg tablets of Restoril.  Take 22.5mg (3 tablets) x 5 nights, then 15mg (2 tablets) x 5 nights, then 7.5mg (1 tablet) x 5 nights, then discontinue.  3. Will plan to start Ambien at next appointment in 2 weeks.

## 2017-03-07 NOTE — MR AVS SNAPSHOT
[facility]  [address]  [facility] [address]  Phone: [phone]  Fax: [phone]                  Yosef Rowe   3/7/2017 10:30 AM   Office Visit    Description:  Male : 1955   Provider:  Hedy Dempsey MD   Department:  [facility]           Diagnoses this Visit        Comments    Severe episode of recurrent major depressive disorder, without psychotic features    -  Primary     Insomnia, unspecified type         Generalized anxiety disorder                To Do List           Goals (5 Years of Data)     None       These Medications        Disp Refills Start End    buPROPion (WELLBUTRIN XL) 150 MG TB24 tablet 30 tablet 3 3/7/2017     Take 1 tablet (150 mg total) by mouth once daily. - Oral    [facility] [address] [place] [address] [place], [place] [address] [address] [facility] Ph [facility]: [phone]       temazepam (RESTORIL) 7.5 MG Cap 30 capsule 0 3/7/2017     Take 3 tablets x 5 nights, then 2 tablets x 5 nights, then 1 tablet x 5 nights, then discontinue.    [facility] [address] [place] [address] [place], [place] [address] [facility] Ph #: [phone]         [facility]     [facility] Care [facility] -  Assistance  Registered nurses in the [facility] provide clinical advisement, health education, appointment booking, and other advisory services.  Call for this free service at [phone].             Medications           Message regarding Medications     Verify the changes and/or additions to your medication regime listed below are the same as discussed with your clinician today.  If any of these changes or additions are incorrect, please notify your healthcare provider.        CHANGE how you are taking these medications     Start Taking Instead of    temazepam (RESTORIL) 7.5 MG Cap temazepam (RESTORIL) 30 mg capsule    Dosage:  Take 3 tablets x 5 nights, then 2 tablets x 5 nights, then 1  "tablet x 5 nights, then discontinue. Dosage:  Take 1 capsule (30 mg total) by mouth nightly as needed for Insomnia.    Reason for Change:  Reorder            Verify that the below list of medications is an accurate representation of the medications you are currently taking.  If none reported, the list may be blank. If incorrect, please contact your healthcare provider. Carry this list with you in case of emergency.           Current Medications     ascorbic acid (VITAMIN C) 500 MG tablet Take 500 mg by mouth once daily.    atorvastatin (LIPITOR) 10 MG tablet Take 1 tablet (10 mg total) by mouth once daily.    buPROPion (WELLBUTRIN XL) 150 MG TB24 tablet Take 1 tablet (150 mg total) by mouth once daily.    busPIRone (BUSPAR) 10 MG tablet Take one-half tablet three times daily by mouth for one week then take one tablet three times daily.    clonazePAM (KLONOPIN) 0.5 MG tablet Take 0.5 tablets (0.25 mg total) by mouth once daily.    duloxetine (CYMBALTA) 60 MG capsule Take 1 capsule (60 mg total) by mouth once daily.    temazepam (RESTORIL) 7.5 MG Cap Take 3 tablets x 5 nights, then 2 tablets x 5 nights, then 1 tablet x 5 nights, then discontinue.           Clinical Reference Information           Your Vitals Were     BP Pulse Height Weight BMI    165/83 66 5' 7" (1.702 m) 129.3 kg (285 lb) 44.64 kg/m2      Blood Pressure          Most Recent Value    BP  (!)  165/83      Allergies as of 3/7/2017     No Known Drug Allergies      Immunizations Administered on Date of Encounter - 3/7/2017     None      [facility] Sign-Up     Activating your [facility] account is as easy as 1-2-3!     1) Visit [email or web address], select Sign Up Now, enter this activation code and your date of birth, then select Next.  [email or web address]  Expires: 4/21/2017 11:03 AM      2) Create a username and password to use when you visit [facility] in the future and select a security question in case you lose your password and select Next.    3) Enter your " e-mail address and click Sign Up!    Additional Information  If you have questions, please e-mail [email or web address] or call [phone] to talk to our [facility] staff. Remember, [facility] is NOT to be used for urgent needs. For medical emergencies, dial 911.         Instructions    1. Reduce Klonopin to 0.25mg (1/2 tablet) at night x 5 nights, then discontinue.  2. One you are off of Klonopin, then start reducing Restoril - have provided new prescription for 7.5mg tablets of Restoril.  Take 22.5mg (3 tablets) x 5 nights, then 15mg (2 tablets) x 5 nights, then 7.5mg (1 tablet) x 5 nights, then discontinue.  3. Will plan to start Ambien at next appointment in 2 weeks.         Language Assistance Services     ATTENTION: Language assistance services are available, free of charge. Please call [phone].      ATENCIÓN: Si habla christiano, tiene a mckenzie disposición servicios gratuitos de asistencia lingüística. Llame al [phone].     MORE Ý: N?u b?n nói Ti?ng Vi?t, có các d?ch v? h? tr? ngôn ng? mi?n phí dành cho b?n. G?i s? [phone].         [facility] complies with applicable Federal civil rights laws and does not discriminate on the basis of race, color, national origin, age, disability, or sex.

## 2017-03-07 NOTE — PROGRESS NOTES
"OUTPATIENT PSYCHIATRY RETURN VISIT    ENCOUNTER DATE:  3/7/2017  SITE:  [facility]  LENGTH OF SESSION:  30 minutes    CHIEF COMPLAINT:  Depression and Anxiety    HISTORY OF PRESENTING ILLNESS:  Yosef Rowe is a 61 y.o. male with history of anxiety and depression who presents for follow up appointment.  Patient was last seen 1 month ago, at which time he was continued on Cymbalta 60mg daily, Wellbutrin XL 150mg daily, Buspar 10mg BID, and Restoril 30mg qHS.  Klonopin was decreased from 0.5mg daily to 0.25mg daily.  Today patient states he has been feeling a little bit better.  He says that he is "not down as much of the time."  He states he is still having trouble occupying his time during the day, however he stays relatively busy.  He says he walks the dog, then does some housework, then goes to grab lunch, then goes to the club 3-4 days a week and works out, then cooks dinner, and then he and his wife watch TV together.  He says he has seen Dr. Day twice and feels this has been helpful.  He reports he still has trouble going to sleep.  He was supposed to reduce Klonopin to 0.25mg, however states he did this for awhile and then went back up to 0.5mg because of his trouble sleeping.  He states he could not tell much difference between the 0.25mg and the 0.5mg.  He reports he takes both the Klonopin and Restoril around 9:30pm, goes to bed around 11:30pm, and then does not fall asleep until 2 or 3am.  He has also had restless leg several nights.  He denies suicidal thoughts or psychosis.      REVIEW OF SYSTEMS:  Complete review of systems performed covering Constitutional, Neurologic.  All systems negative except for that covered in HPI.  Psych ROS covered elsewhere in note (HPI)    PAST PSYCHIATRIC, MEDICAL, AND SOCIAL HISTORY REVIEWED    MEDICATIONS:    Current Outpatient Prescriptions:     ascorbic acid (VITAMIN C) 500 MG tablet, Take 500 mg by mouth once daily., Disp: , Rfl:     " "atorvastatin (LIPITOR) 10 MG tablet, Take 1 tablet (10 mg total) by mouth once daily., Disp: 30 tablet, Rfl: 11    buPROPion (WELLBUTRIN XL) 150 MG TB24 tablet, Take 1 tablet (150 mg total) by mouth once daily., Disp: 30 tablet, Rfl: 3    busPIRone (BUSPAR) 10 MG tablet, Take one-half tablet three times daily by mouth for one week then take one tablet three times daily., Disp: 90 tablet, Rfl: 1    clonazePAM (KLONOPIN) 0.5 MG tablet, Take 0.5 tablets (0.25 mg total) by mouth once daily., Disp: 15 tablet, Rfl: 0    duloxetine (CYMBALTA) 60 MG capsule, Take 1 capsule (60 mg total) by mouth once daily., Disp: 30 capsule, Rfl: 3    temazepam (RESTORIL) 7.5 MG Cap, Take 3 tablets x 5 nights, then 2 tablets x 5 nights, then 1 tablet x 5 nights, then discontinue., Disp: 30 capsule, Rfl: 0    ALLERGIES:  Review of patient's allergies indicates:   Allergen Reactions    No known drug allergies        PSYCHIATRIC EXAM:  Vitals:    03/07/17 1040   BP: (!) 165/83   Pulse: 66   Weight: 129.3 kg (285 lb)   Height: 5' 7" (1.702 m)   Appearance:  Well groomed, appearing healthy and of stated age  Behavior:  Cooperative, pleasant, no psychomotor agitation or retardation  Speech:  Normal rate, rhythm, prosody, and volume  Mood:  "Better"  Affect:  Congruent and appropriate  Thought Process:  Linear, logical, goal directed  Thought Content:  Negative for suicidal ideation, homicidal ideation, delusions or hallucinations.  Associations:  Intact  Memory:  Grossly Intact  Level of Consciousness/Orientation:  Grossly intact  Fund of Knowledge:  Good  Attention:  Good  Language:  Fluent, able to name abstract and concrete objects  Insight:  Fair  Judgment:  Fair  Psychomotor signs:  No involuntary movements or tremor  Gait:  Normal    RELEVANT LABS/STUDIES:  Normal CMP 2/21/17.    IMPRESSION:    Yosef Rowe is a 61 y.o. male with history of anxiety and depression who presents for follow up appointment with unstable symptoms but some " improvement in mood since last appointment.    DIAGNOSES:    ICD-10-CM ICD-9-CM   1. Severe episode of recurrent major depressive disorder, without psychotic features F33.2 296.33   2. Generalized anxiety disorder F41.1 300.02   3. Insomnia, unspecified type G47.00 780.52     PLAN:  · Continue Cymbalta 60mg daily, Wellbutrin XL 150mg daily, and Buspar 10mg BID for symptoms of depression and anxiety.   · Patient is having significant insomnia and has likely developed tolerance to Restoril and Klonopin.  Will therefore plan to taper and discontinue over the next few weeks.  Ambien has been helpful in the past, so then will plan to try Ambien instead.  Instructions provided to patient include:   (1) Reduce Klonopin to 0.25mg (1/2 tablet) at night x 5 nights, then discontinue.   (2) One you are off of Klonopin, then start reducing Restoril - have provided new prescription for 7.5mg tablets of Restoril.  Take   22.5mg (3 tablets) x 5 nights, then 15mg (2 tablets) x 5 nights, then 7.5mg (1 tablet) x 5 nights, then discontinue.   (3) Will plan to start Ambien at next appointment in 2 weeks.    · Continue psychotherapy with Dr. Day.      RETURN TO CLINIC:  Return in about 2 weeks (around 3/21/2017).

## 2017-03-10 ENCOUNTER — OFFICE VISIT (OUTPATIENT)
Dept: PSYCHIATRY | Facility: CLINIC | Age: 62
End: 2017-03-10
Payer: COMMERCIAL

## 2017-03-10 DIAGNOSIS — F33.2 SEVERE EPISODE OF RECURRENT MAJOR DEPRESSIVE DISORDER, WITHOUT PSYCHOTIC FEATURES: Primary | ICD-10-CM

## 2017-03-10 DIAGNOSIS — F41.1 GENERALIZED ANXIETY DISORDER: ICD-10-CM

## 2017-03-10 PROCEDURE — 90834 PSYTX W PT 45 MINUTES: CPT | Mod: S$GLB,,, | Performed by: PSYCHOLOGIST

## 2017-03-10 NOTE — PROGRESS NOTES
"Individual Psychotherapy (PhD/LCSW)    3/10/2017    Site:  [facility]         Therapeutic Intervention: Met with patient.  Outpatient - Insight oriented psychotherapy 45 min - CPT code 53302    Chief complaint/reason for encounter: depression     Interval history and content of current session:  Mr. Rowe arrived on time for his scheduled session.  He brought back the worksheets and noted that he has been reading through them every day.  He believes he has been feeling better and other people have noticed changes.  For instance, the men at the club have made remarks that he is actually talking instead of staying silent.  He also believes he feels less burdened by self-criticism and judgments.  The other day his friend contacted him to have lunch, and then came to his house yesterday to upload his resume on three employment websites.  He continues to be worried about finding a job and future issues; however, he notes these are reduced.  He mentioned that his wife "blew up" on him last Saturday because he went to the club and she had nothing to do and no money to go anywhere.  He tried to listen to her and believed she is starting to feel better after a few days.  He reported that she kissed him on the cheek last night, which was different and pleasantly surprising for him.  He generated a list of things he could to proactively for his wife with the goal of being thoughtful (rather than making her happy) in order to increase his contribution to intimacy.  He mentioned having sleep problems and Dr. Dempsey is helping him with medications.  He was provided with education about stimulus control and sleep hygiene strategies.  He will return in three weeks to follow-up.    Treatment plan:  · Target symptoms: depression  · Why chosen therapy is appropriate versus another modality: relevant to diagnosis, evidence based practice  · Outcome monitoring methods: self-report  · Therapeutic intervention type: insight oriented " psychotherapy    Risk parameters:  Patient reports no suicidal ideation  Patient reports no homicidal ideation  Patient reports no self-injurious behavior  Patient reports no violent behavior    Verbal deficits: None    Patient's response to intervention:  The patient's response to intervention is accepting.    Progress toward goals and other mental status changes:  The patient's progress toward goals is good.    Diagnosis:     ICD-10-CM ICD-9-CM   1. Severe episode of recurrent major depressive disorder, without psychotic features F33.2 296.33   2. Generalized anxiety disorder F41.1 300.02       Plan:  individual psychotherapy    Return to clinic: 3 weeks    Length of Service (minutes): 45

## 2017-03-19 ENCOUNTER — TELEPHONE (OUTPATIENT)
Dept: INTERNAL MEDICINE | Facility: CLINIC | Age: 62
End: 2017-03-19

## 2017-03-19 NOTE — LETTER
March 20, 2017    Yosef Rowe  [address] [place] [address]       [facility]  [address] [facility] [place] [address]  Phone: [phone]  Fax: [phone] Dear Mr. Rowe:    As discussed today, labs are enclosed for your reference.  Please  Go ahead and start the rx for lipitor (generic atorvastatin ) 10mg  And work on low fat, low sugar, low alcohol diet.  Three months lab  appt is also enclosed.  Please don't hesitate to call if you need   to change date or time or location of any concerns.    Sincerely,      Petra Cohen MA for Dr Ruperto Dexter  lb

## 2017-03-19 NOTE — TELEPHONE ENCOUNTER
His lipids remain elevated and if he is taking lipitor will need to increase dose to 20mg qpm.  I thought more lab was coming trhough

## 2017-03-20 NOTE — TELEPHONE ENCOUNTER
He hasn't started lipitor yet.  Is going to get off 2 meds this week and willing to start it.  i told him i'd call [facility] to make sure they will had rx on file and ask them to fill.    We went over numbers and i left msg on [facility] recorder for rx.  i told patient i'd mail results with lab appt for 3 mos and call if any problems or concerns.  i also reminded him to do low fat, low sugar, low alcohol diet to help improve cholesterol.    Lab letter mailed with results and lab appt enclosed.  Left msg at [facility] giving rx as given in November to them.

## 2017-03-20 NOTE — TELEPHONE ENCOUNTER
left msg on cell to call us back on test results.  Mentioned dr wondering if taking lipitor regularly and if is we may have to increase.

## 2017-03-20 NOTE — TELEPHONE ENCOUNTER
----- Message from Darshana Leon sent at 3/20/2017  9:40 AM CDT -----  Contact: self/[phone]  Pt called in regards to a missed call from the office.      Please advise

## 2017-03-21 ENCOUNTER — OFFICE VISIT (OUTPATIENT)
Dept: PSYCHIATRY | Facility: CLINIC | Age: 62
End: 2017-03-21
Payer: COMMERCIAL

## 2017-03-21 VITALS
DIASTOLIC BLOOD PRESSURE: 82 MMHG | BODY MASS INDEX: 44.42 KG/M2 | HEIGHT: 67 IN | HEART RATE: 80 BPM | SYSTOLIC BLOOD PRESSURE: 171 MMHG | WEIGHT: 283 LBS

## 2017-03-21 DIAGNOSIS — F41.1 GENERALIZED ANXIETY DISORDER: ICD-10-CM

## 2017-03-21 DIAGNOSIS — F51.01 PRIMARY INSOMNIA: ICD-10-CM

## 2017-03-21 DIAGNOSIS — F33.2 SEVERE EPISODE OF RECURRENT MAJOR DEPRESSIVE DISORDER, WITHOUT PSYCHOTIC FEATURES: Primary | ICD-10-CM

## 2017-03-21 PROCEDURE — 1160F RVW MEDS BY RX/DR IN RCRD: CPT | Mod: S$GLB,,, | Performed by: INTERNAL MEDICINE

## 2017-03-21 PROCEDURE — 99999 PR PBB SHADOW E&M-EST. PATIENT-LVL III: CPT | Mod: PBBFAC,,, | Performed by: INTERNAL MEDICINE

## 2017-03-21 PROCEDURE — 99214 OFFICE O/P EST MOD 30 MIN: CPT | Mod: S$GLB,,, | Performed by: INTERNAL MEDICINE

## 2017-03-21 RX ORDER — ZOLPIDEM TARTRATE 5 MG/1
5 TABLET ORAL NIGHTLY PRN
Qty: 30 TABLET | Refills: 0 | Status: SHIPPED | OUTPATIENT
Start: 2017-03-21 | End: 2017-04-05 | Stop reason: SDUPTHER

## 2017-03-21 RX ORDER — BUSPIRONE HYDROCHLORIDE 10 MG/1
10 TABLET ORAL 2 TIMES DAILY
Qty: 60 TABLET | Refills: 3 | Status: SHIPPED | OUTPATIENT
Start: 2017-03-21 | End: 2017-07-05

## 2017-03-21 NOTE — MR AVS SNAPSHOT
[facility]  [address]  [place] [address]  Phone: [phone]  Fax: [phone]                  Yosef Rowe   3/21/2017 1:00 PM   Office Visit    Description:  Male : 1955   Provider:  Hedy Dempsey MD   Department:  [facility]           Diagnoses this Visit        Comments    Severe episode of recurrent major depressive disorder, without psychotic features    -  Primary     Generalized anxiety disorder         Primary insomnia                To Do List           Future Appointments        Provider Department Dept Phone    2017 8:00 AM [facility] [phone]      Goals (5 Years of Data)     None      Follow-Up and Disposition     Return in 2 weeks (on 2017).       These Medications        Disp Refills Start End    zolpidem (AMBIEN) 5 MG Tab 30 tablet 0 3/21/2017 2017    Take 1 tablet (5 mg total) by mouth nightly as needed. - Oral    Pharmacy: [facility] [address] [place], [place] [address] [address] [place] [facility] AT [facility] Ph #: [phone]       busPIRone (BUSPAR) 10 MG tablet 60 tablet 3 3/21/2017     Take 1 tablet (10 mg total) by mouth 2 (two) times daily. - Oral    Pharmacy: [facility] [address] [place] [address] [place] [address] [facility] AT [facility] Ph #: [phone]         [facility]     [facility] Care [facility] -  Assistance  Registered nurses in the [facility] provide clinical advisement, health education, appointment booking, and other advisory services.  Call for this free service at [phone].             Medications           Message regarding Medications     Verify the changes and/or additions to your medication regime listed below are the same as discussed with your clinician today.  If any of these changes or additions are incorrect, please notify your healthcare provider.        START taking these NEW medications        Refills     "zolpidem (AMBIEN) 5 MG Tab 0    Sig: Take 1 tablet (5 mg total) by mouth nightly as needed.    Class: Normal    Route: Oral      CHANGE how you are taking these medications     Start Taking Instead of    busPIRone (BUSPAR) 10 MG tablet busPIRone (BUSPAR) 10 MG tablet    Dosage:  Take 1 tablet (10 mg total) by mouth 2 (two) times daily. Dosage:  Take one-half tablet three times daily by mouth for one week then take one tablet three times daily.    Reason for Change:  Reorder            Verify that the below list of medications is an accurate representation of the medications you are currently taking.  If none reported, the list may be blank. If incorrect, please contact your healthcare provider. Carry this list with you in case of emergency.           Current Medications     ascorbic acid (VITAMIN C) 500 MG tablet Take 500 mg by mouth once daily.    atorvastatin (LIPITOR) 10 MG tablet Take 1 tablet (10 mg total) by mouth once daily.    buPROPion (WELLBUTRIN XL) 150 MG TB24 tablet Take 1 tablet (150 mg total) by mouth once daily.    busPIRone (BUSPAR) 10 MG tablet Take 1 tablet (10 mg total) by mouth 2 (two) times daily.    clonazePAM (KLONOPIN) 0.5 MG tablet Take 0.5 tablets (0.25 mg total) by mouth once daily.    duloxetine (CYMBALTA) 60 MG capsule Take 1 capsule (60 mg total) by mouth once daily.    temazepam (RESTORIL) 7.5 MG Cap Take 3 tablets x 5 nights, then 2 tablets x 5 nights, then 1 tablet x 5 nights, then discontinue.    zolpidem (AMBIEN) 5 MG Tab Take 1 tablet (5 mg total) by mouth nightly as needed.           Clinical Reference Information           Your Vitals Were     BP Pulse Height Weight BMI    171/82 80 5' 7" (1.702 m) 128.4 kg (283 lb) 44.32 kg/m2      Blood Pressure          Most Recent Value    BP  (!)  171/82      Allergies as of 3/21/2017     No Known Drug Allergies      Immunizations Administered on Date of Encounter - 3/21/2017     None      [facility] Sign-Up     Activating your [facility] " account is as easy as 1-2-3!     1) Visit [email or web address], select Sign Up Now, enter this activation code and your date of birth, then select Next.  [email or web address]  Expires: 4/21/2017 12:03 PM      2) Create a username and password to use when you visit [facility] in the future and select a security question in case you lose your password and select Next.    3) Enter your e-mail address and click Sign Up!    Additional Information  If you have questions, please e-mail [email or web address] or call [phone] to talk to our [facility] staff. Remember, [facility] is NOT to be used for urgent needs. For medical emergencies, dial 911.         Language Assistance Services     ATTENTION: Language assistance services are available, free of charge. Please call [phone].      ATENCIÓN: Si habla español, tiene a mckenzie disposición servicios gratuitos de asistencia lingüística. Llame al [phone].     [facility] Ý: N?u b?n nói Ti?ng Vi?t, có các d?ch v? h? tr? ngôn ng? mi?n phí dành cho b?n. G?i s? [phone].         [facility] complies with applicable Federal civil rights laws and does not discriminate on the basis of race, color, national origin, age, disability, or sex.

## 2017-03-21 NOTE — PROGRESS NOTES
"OUTPATIENT PSYCHIATRY RETURN VISIT    ENCOUNTER DATE:  3/21/2017  SITE:  [facility]  LENGTH OF SESSION:  20 minutes    CHIEF COMPLAINT:  Anxiety; Depression; and Insomnia    HISTORY OF PRESENTING ILLNESS:  Yosef Rowe is a 61 y.o. male with history of anxiety, depression, and insomnia who presents for follow up appointment.  Patient was last seen 1 month ago, at which time he was continued on Cymbalta 60mg daily, Wellbutrin XL 150mg daily, and Buspar 10mg BID.  Restoril and Klonopin were tapered as follows:  Reduce Klonopin to 0.25mg (1/2 tablet) at night x 5 nights, then discontinue.  Then start reducing Restoril to 22.5mg (3 tablets) x 5 nights, then 15mg (2 tablets) x 5 nights, then 7.5mg (1 tablet) x 5 nights, then discontinue.  Patient states the taper has gone well, and he denies symptoms of withdrawal.  He is currently taking 7.5mg qHS for the last 2 nights, so has 3 more left.  He reports both his depression and anxiety are "better."  He states he does not feel down all the time like he did before and it is easier to get out and do things.  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  He continues to have trouble sleeping - states he gets in bed at 11 or 11:30pm and does not fall asleep for several hours.  Once he is asleep, he can sleep for 4-5 hours.  He continues to see Dr. Day every 2-3 weeks and states this is going well.  He denies suicidal ideation at this time.    REVIEW OF SYSTEMS:  Complete review of systems performed covering Constitutional, Respiratory, Neurologic.  All systems negative except for that covered in HPI.  Reports compliance with CPAP.    Psych ROS covered elsewhere in note (HPI)    PAST PSYCHIATRIC, MEDICAL, AND SOCIAL HISTORY REVIEWED    MEDICATIONS:    Current Outpatient Prescriptions:     ascorbic acid (VITAMIN C) 500 MG " "tablet, Take 500 mg by mouth once daily., Disp: , Rfl:     atorvastatin (LIPITOR) 10 MG tablet, Take 1 tablet (10 mg total) by mouth once daily., Disp: 30 tablet, Rfl: 11    buPROPion (WELLBUTRIN XL) 150 MG TB24 tablet, Take 1 tablet (150 mg total) by mouth once daily., Disp: 30 tablet, Rfl: 3    busPIRone (BUSPAR) 10 MG tablet, Take 1 tablet (10 mg total) by mouth 2 (two) times daily., Disp: 60 tablet, Rfl: 3    clonazePAM (KLONOPIN) 0.5 MG tablet, Take 0.5 tablets (0.25 mg total) by mouth once daily., Disp: 15 tablet, Rfl: 0    duloxetine (CYMBALTA) 60 MG capsule, Take 1 capsule (60 mg total) by mouth once daily., Disp: 30 capsule, Rfl: 3    temazepam (RESTORIL) 7.5 MG Cap, Take 3 tablets x 5 nights, then 2 tablets x 5 nights, then 1 tablet x 5 nights, then discontinue., Disp: 30 capsule, Rfl: 0    zolpidem (AMBIEN) 5 MG Tab, Take 1 tablet (5 mg total) by mouth nightly as needed., Disp: 30 tablet, Rfl: 0    ALLERGIES:  Review of patient's allergies indicates:   Allergen Reactions    No known drug allergies      PSYCHIATRIC EXAM:  Vitals:    03/21/17 1308   BP: (!) 171/82   Pulse: 80   Weight: 128.4 kg (283 lb)   Height: 5' 7" (1.702 m)   Appearance:  Well groomed, appearing healthy and of stated age  Behavior:  Cooperative, pleasant, no psychomotor agitation or retardation  Speech:  Normal rate, rhythm, prosody, and volume  Mood:  "Better"  Affect:  Congruent and appropriate  Thought Process:  Linear, logical, goal directed  Thought Content:  Negative for suicidal ideation, homicidal ideation, delusions or hallucinations.  Associations:  Intact  Memory:  Grossly Intact  Level of Consciousness/Orientation:  Grossly intact  Fund of Knowledge:  Good  Attention:  Good  Language:  Fluent, able to name abstract and concrete objects  Insight:  Fair  Judgment:  Intact  Psychomotor signs:  No involuntary movements or tremor  Gait:  Normal    RELEVANT LABS/STUDIES:  Lab Results   Component Value Date    WBC 7.38 " 11/21/2016    HGB 15.6 11/21/2016    HCT 46.9 11/21/2016    MCV 98 11/21/2016     11/21/2016     BMP  Lab Results   Component Value Date     02/21/2017    K 4.2 02/21/2017     02/21/2017    CO2 26 02/21/2017    BUN 14 02/21/2017    CREATININE 0.9 02/21/2017    CALCIUM 9.2 02/21/2017    ANIONGAP 8 02/21/2017    ESTGFRAFRICA >60.0 02/21/2017    EGFRNONAA >60.0 02/21/2017     Lab Results   Component Value Date    TSH 1.360 11/21/2016       IMPRESSION:    Yosef Rowe is a 61 y.o. male with history of anxiety, depression, and insomnia who presents for follow up appointment with some improvements in anxiety and depression but continued severe insomnia.      DIAGNOSES:    ICD-10-CM ICD-9-CM   1. Severe episode of recurrent major depressive disorder, without psychotic features F33.2 296.33   2. Generalized anxiety disorder F41.1 300.02   3. Primary insomnia F51.01 307.42     PLAN:  · Continue Cymbalta 60mg daily, Wellbutrin XL 150mg daily, and Buspar 10mg BID.   · Patient has 3 more days left of Restoril 7.5mg qHS.  Will now start Ambien 5mg qHS.  Will bring back in 2 weeks to follow up on insomnia.   · Continue therapy with Dr. Day.      RETURN TO CLINIC:  Return in 2 weeks (on 4/5/2017).

## 2017-03-28 ENCOUNTER — OFFICE VISIT (OUTPATIENT)
Dept: PSYCHIATRY | Facility: CLINIC | Age: 62
End: 2017-03-28
Payer: COMMERCIAL

## 2017-03-28 DIAGNOSIS — F33.1 MODERATE EPISODE OF RECURRENT MAJOR DEPRESSIVE DISORDER: Primary | ICD-10-CM

## 2017-03-28 DIAGNOSIS — F41.1 GENERALIZED ANXIETY DISORDER: ICD-10-CM

## 2017-03-28 PROCEDURE — 90832 PSYTX W PT 30 MINUTES: CPT | Mod: S$GLB,,, | Performed by: PSYCHOLOGIST

## 2017-03-28 NOTE — PROGRESS NOTES
Individual Psychotherapy (PhD/LCSW)    3/28/2017    Site:  [facility]         Therapeutic Intervention: Met with patient.  Outpatient - Insight oriented psychotherapy 30 min - CPT code 35363    Chief complaint/reason for encounter: depression     Interval history and content of current session:  Mr. Rowe arrived on time for his scheduled session.  He brought back the worksheets and noted that he has been reading through them every day.  He reports that he has continued to feel better and that people continue to notice improvements in his demeanor.  He jokes more at the club and has been more involved with his family.  He did several things for his wife during the past week in order to enhance their relationship including cooking her breakfast, attending [employer] early and going to breakfast afterwards, and buying her a bouquet of flowers.  He believes she appreciated his actions and he notes her attitude is more favorable towards him.  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  He is hopeful about this potential job and his future.  He believes his symptoms are manageable and would like to return to therapy in one month.  When asked about potential challenges in the future, he noted that he will read through his worksheets, engage in activities, and try to think in a flexible and balanced manner.  He was encouraged to make an earlier appointment if he needs anything before his scheduled session.    Treatment plan:  · Target symptoms: depression  · Why chosen therapy is appropriate versus another modality: relevant to diagnosis, evidence based practice  · Outcome monitoring methods: self-report  · Therapeutic intervention type: insight oriented psychotherapy    Risk parameters:  Patient reports no suicidal ideation  Patient reports no homicidal ideation  Patient reports no self-injurious  behavior  Patient reports no violent behavior    Verbal deficits: None    Patient's response to intervention:  The patient's response to intervention is accepting.    Progress toward goals and other mental status changes:  The patient's progress toward goals is good.    Diagnosis:     ICD-10-CM ICD-9-CM   1. Moderate episode of recurrent major depressive disorder F33.1 296.32   2. Generalized anxiety disorder F41.1 300.02       Plan:  individual psychotherapy    Return to clinic: 1 month    Length of Service (minutes): 30

## 2017-04-05 ENCOUNTER — OFFICE VISIT (OUTPATIENT)
Dept: PSYCHIATRY | Facility: CLINIC | Age: 62
End: 2017-04-05
Payer: COMMERCIAL

## 2017-04-05 DIAGNOSIS — F51.01 PRIMARY INSOMNIA: ICD-10-CM

## 2017-04-05 DIAGNOSIS — F33.1 MODERATE EPISODE OF RECURRENT MAJOR DEPRESSIVE DISORDER: Primary | ICD-10-CM

## 2017-04-05 DIAGNOSIS — F41.1 GENERALIZED ANXIETY DISORDER: ICD-10-CM

## 2017-04-05 PROCEDURE — 99999 PR PBB SHADOW E&M-EST. PATIENT-LVL II: CPT | Mod: PBBFAC,,, | Performed by: INTERNAL MEDICINE

## 2017-04-05 PROCEDURE — 1160F RVW MEDS BY RX/DR IN RCRD: CPT | Mod: S$GLB,,, | Performed by: INTERNAL MEDICINE

## 2017-04-05 PROCEDURE — 99213 OFFICE O/P EST LOW 20 MIN: CPT | Mod: S$GLB,,, | Performed by: INTERNAL MEDICINE

## 2017-04-05 RX ORDER — ZOLPIDEM TARTRATE 5 MG/1
5 TABLET ORAL NIGHTLY PRN
Qty: 30 TABLET | Refills: 0 | Status: SHIPPED | OUTPATIENT
Start: 2017-04-05 | End: 2017-05-09 | Stop reason: SDUPTHER

## 2017-04-05 NOTE — PROGRESS NOTES
OUTPATIENT PSYCHIATRY RETURN VISIT    ENCOUNTER DATE:  4/5/2017  SITE:  [facility]  LENGTH OF SESSION:  15 minutes    CHIEF COMPLAINT:  Depression; Anxiety; and Insomnia    HISTORY OF PRESENTING ILLNESS:  Yosef Rowe is a 61 y.o. male with history of anxiety, depression, and insomnia who presents for follow up appointment.  Patient was last seen 2 weeks ago, at which time he was continued on Cymbalta 60mg daily, Wellbutrin XL 150mg daily, and Buspar 10mg BID.  Restoril was tapered and discontinued and he was started on Ambien 5mg qHS for sleep.  Today patient reports he is sleeping better with the Ambien.  He states he gets in bed around 11pm or 11:30pm and does not fall asleep for about 1 hour (previously was several hours).  Once he is asleep, he sleeps for about 3 hours and then the dog barks so he has to get up.  He states he is able to go back to sleep quickly and then gets up around 8am.  He states his mood is 100% better - states his depression and anxiety have significantly improved.  He has not had any suicidal thoughts.  He states people are making comments that he is a different person - laughing and joking again.  He continues to get bored around the house and does not know how to spend his time during the day.  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  They have sent him the contract, but his wife thinks its a scam.  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  Patient continues to see Dr. Day for therapy every few weeks.      REVIEW OF SYSTEMS:  Complete review of systems performed covering Constitutional, Musculoskeletal, Neurologic.  All systems negative except for that covered in HPI.  Psych ROS covered elsewhere in note (HPI)    PAST PSYCHIATRIC, MEDICAL, AND  "SOCIAL HISTORY REVIEWED    MEDICATIONS:    Current Outpatient Prescriptions:     ascorbic acid (VITAMIN C) 500 MG tablet, Take 500 mg by mouth once daily., Disp: , Rfl:     atorvastatin (LIPITOR) 10 MG tablet, Take 1 tablet (10 mg total) by mouth once daily., Disp: 30 tablet, Rfl: 11    buPROPion (WELLBUTRIN XL) 150 MG TB24 tablet, Take 1 tablet (150 mg total) by mouth once daily., Disp: 30 tablet, Rfl: 3    busPIRone (BUSPAR) 10 MG tablet, Take 1 tablet (10 mg total) by mouth 2 (two) times daily., Disp: 60 tablet, Rfl: 3    clonazePAM (KLONOPIN) 0.5 MG tablet, Take 0.5 tablets (0.25 mg total) by mouth once daily., Disp: 15 tablet, Rfl: 0    duloxetine (CYMBALTA) 60 MG capsule, Take 1 capsule (60 mg total) by mouth once daily., Disp: 30 capsule, Rfl: 3    zolpidem (AMBIEN) 5 MG Tab, Take 1 tablet (5 mg total) by mouth nightly as needed., Disp: 30 tablet, Rfl: 0    ALLERGIES:  Review of patient's allergies indicates:   Allergen Reactions    No known drug allergies      PSYCHIATRIC EXAM:  There were no vitals filed for this visit.  Appearance:  Well groomed, appearing healthy and of stated age  Behavior:  Cooperative, pleasant, no psychomotor agitation or retardation  Speech:  Normal rate, rhythm, prosody, and volume  Mood:  "100% better"  Affect:  Congruent, brighter  Thought Process:  Linear, logical, goal directed  Thought Content:  Negative for suicidal ideation, homicidal ideation, delusions or hallucinations.  Associations:  Intact  Memory:  Grossly Intact  Level of Consciousness/Orientation:  Grossly intact  Fund of Knowledge:  Good  Attention:  Good  Language:  Fluent, able to name abstract and concrete objects  Insight:  Good  Judgment:  Intact  Psychomotor signs:  No involuntary movements or tremor  Gait:  Normal    RELEVANT LABS/STUDIES:    Lab Results   Component Value Date    WBC 7.38 11/21/2016    HGB 15.6 11/21/2016    HCT 46.9 11/21/2016    MCV 98 11/21/2016     11/21/2016     BMP  Lab " Results   Component Value Date     02/21/2017    K 4.2 02/21/2017     02/21/2017    CO2 26 02/21/2017    BUN 14 02/21/2017    CREATININE 0.9 02/21/2017    CALCIUM 9.2 02/21/2017    ANIONGAP 8 02/21/2017    ESTGFRAFRICA >60.0 02/21/2017    EGFRNONAA >60.0 02/21/2017     Lab Results   Component Value Date    ALT 21 02/21/2017    AST 21 02/21/2017    ALKPHOS 59 02/21/2017    BILITOT 0.7 02/21/2017     Lab Results   Component Value Date    TSH 1.360 11/21/2016       IMPRESSION:    Yosef Rowe is a 61 y.o. male with history of anxiety, depression, and insomnia who presents for follow up appointment with significant improvements in depression and anxiety and some improvement in sleep.      DIAGNOSES:    ICD-10-CM ICD-9-CM   1. Moderate episode of recurrent major depressive disorder F33.1 296.32   2. Generalized anxiety disorder F41.1 300.02   3. Primary insomnia F51.01 307.42     PLAN:  · Continue Cymbalta 60mg daily, Wellbutrin XL 150mg daily, and Buspar 10mg BID for depression and anxiety.  · Discussed the options of continuing Ambien 5mg qHS versus increasing to 10mg qHS.  Patient's mood has declined in the past with sedating medications (i.e. Klonopin, Restoril), so discussed risks versus benefits.  Patient would like to continue Ambien 5mg qHS for now, however did discuss that he can try taking 2 tablets if sleep remains poor.    · Continue therapy with Dr. Day.     RETURN TO CLINIC:  Return in about 4 weeks (around 5/3/2017).

## 2017-04-05 NOTE — MR AVS SNAPSHOT
[facility]  [address]  [place] [address]  Phone: [phone]  Fax: [phone]                  Yosef Rowe   2017 9:30 AM   Office Visit    Description:  Male : 1955   Provider:  Hedy Dempsey MD   Department:  [facility]           Diagnoses this Visit        Comments    Primary insomnia                To Do List           Future Appointments        Provider Department Dept Phone    2017 8:00 AM [facility] [phone]      Goals (5 Years of Data)     None      Follow-Up and Disposition     Return in about 4 weeks (around 5/3/2017).       These Medications        Disp Refills Start End    zolpidem (AMBIEN) 5 MG Tab 30 tablet 0 2017 10/4/2017    Take 1 tablet (5 mg total) by mouth nightly as needed. - Oral    Pharmacy: [facility] [address] [place] [address] [place] [address] [place] AT [facility] [place] Ph #: [phone]         [facility]     [facility] Care [facility] -  Assistance  Unless otherwise directed by your provider, please contact [facility], our nurse care line that is available for  assistance.     Registered nurses in the [facility] provide: appointment scheduling, clinical advisement, health education, and other advisory services.  Call: [phone] (toll free)               Medications           Message regarding Medications     Verify the changes and/or additions to your medication regime listed below are the same as discussed with your clinician today.  If any of these changes or additions are incorrect, please notify your healthcare provider.        STOP taking these medications     temazepam (RESTORIL) 7.5 MG Cap Take 3 tablets x 5 nights, then 2 tablets x 5 nights, then 1 tablet x 5 nights, then discontinue.           Verify that the below list of medications is an accurate representation of the medications you are currently taking.  If none  reported, the list may be blank. If incorrect, please contact your healthcare provider. Carry this list with you in case of emergency.           Current Medications     ascorbic acid (VITAMIN C) 500 MG tablet Take 500 mg by mouth once daily.    atorvastatin (LIPITOR) 10 MG tablet Take 1 tablet (10 mg total) by mouth once daily.    buPROPion (WELLBUTRIN XL) 150 MG TB24 tablet Take 1 tablet (150 mg total) by mouth once daily.    busPIRone (BUSPAR) 10 MG tablet Take 1 tablet (10 mg total) by mouth 2 (two) times daily.    clonazePAM (KLONOPIN) 0.5 MG tablet Take 0.5 tablets (0.25 mg total) by mouth once daily.    duloxetine (CYMBALTA) 60 MG capsule Take 1 capsule (60 mg total) by mouth once daily.    zolpidem (AMBIEN) 5 MG Tab Take 1 tablet (5 mg total) by mouth nightly as needed.           Clinical Reference Information           Allergies as of 4/5/2017     No Known Drug Allergies      Immunizations Administered on Date of Encounter - 4/5/2017     None      [facility] Sign-Up     Activating your [facility] account is as easy as 1-2-3!     1) Visit [email or web address], select Sign Up Now, enter this activation code and your date of birth, then select Next.  [email or web address]  Expires: 4/21/2017 12:03 PM      2) Create a username and password to use when you visit [facility] in the future and select a security question in case you lose your password and select Next.    3) Enter your e-mail address and click Sign Up!    Additional Information  If you have questions, please e-mail [email or web address] or call [phone] to talk to our [facility] staff. Remember, [facility] is NOT to be used for urgent needs. For medical emergencies, dial 911.         Language Assistance Services     ATTENTION: Language assistance services are available, free of charge. Please call [phone].      ATENCIÓN: Si habla español, tiene a mckenzie disposición servicios gratuitos de asistencia lingüística. Llame al [phone].     [facility] Ý: N?u b?n nói  Ti?ng Vi?t, có các d?ch v? h? tr? ngôn ng? mi?n phí dành cho b?n. G?i s? [phone].         [facility] complies with applicable Federal civil rights laws and does not discriminate on the basis of race, color, national origin, age, disability, or sex.

## 2017-05-02 ENCOUNTER — OFFICE VISIT (OUTPATIENT)
Dept: PSYCHIATRY | Facility: CLINIC | Age: 62
End: 2017-05-02
Payer: COMMERCIAL

## 2017-05-02 DIAGNOSIS — F51.01 PRIMARY INSOMNIA: ICD-10-CM

## 2017-05-02 DIAGNOSIS — F33.42 MAJOR DEPRESSIVE DISORDER, RECURRENT EPISODE, IN FULL REMISSION: Primary | ICD-10-CM

## 2017-05-02 PROCEDURE — 90832 PSYTX W PT 30 MINUTES: CPT | Mod: S$GLB,,, | Performed by: PSYCHOLOGIST

## 2017-05-02 NOTE — PROGRESS NOTES
"Individual Psychotherapy (PhD/LCSW)    5/2/2017    Site:  [facility]         Therapeutic Intervention: Met with patient.  Outpatient - Insight oriented psychotherapy 30 min - CPT code 95100    Chief complaint/reason for encounter: depression     Interval history and content of current session:  Mr. Rowe arrived on time for his scheduled session.  He reports that he feels "excellent."  He was involved in volunteering at the [employer], which he does every year.  He applies for five to six jobs per day and his resume has been viewed by employers and head hunters.  His relationship with his wife has improved, and he plans to surprise her with vale during her birthday dinner with her girlfriends this week.  His friends have noticed that he is more outgoing, talkative, and joking.  He reports improved concentration and attention, and feels more optimistic about the future.  He denied depressed mood, anhedonia, lack of motivation, feelings of worthlessness and guilt, appetite problems, or suicidal ideation.  He does endorse lethargy due to insomnia; however, he hopes that his new mattress will improve his sleep quality.  We planned to terminate therapy after this session due to improved mood.  We discussed relapse prevention and he identified his original problems, what maintained his issues, what he learned, indicators of relapse, and how to cope with future issues.  He was encouraged to call this provider if he would like services in the future.      Treatment plan:  · Target symptoms: depression  · Why chosen therapy is appropriate versus another modality: relevant to diagnosis, evidence based practice  · Outcome monitoring methods: self-report  · Therapeutic intervention type: insight oriented psychotherapy    Risk parameters:  Patient reports no suicidal ideation  Patient reports no homicidal ideation  Patient reports no self-injurious behavior  Patient reports no violent behavior    Verbal deficits: " None    Patient's response to intervention:  The patient's response to intervention is accepting.    Progress toward goals and other mental status changes:  The patient's progress toward goals is good.    Diagnosis:     ICD-10-CM ICD-9-CM   1. Major depressive disorder, recurrent episode, in full remission F33.42 296.36   2. Primary insomnia F51.01 307.42       Plan:  individual psychotherapy    Return to clinic: as needed    Length of Service (minutes): 30

## 2017-05-08 ENCOUNTER — TELEPHONE (OUTPATIENT)
Dept: INTERNAL MEDICINE | Facility: CLINIC | Age: 62
End: 2017-05-08

## 2017-05-08 NOTE — TELEPHONE ENCOUNTER
"Spoke with patient's wife regarding recent behavior.  She reports he has been agitated and angry over the last week - "like he is on speed."  She states he is drinking heavily again and feels this is the culprit.  Last night they went out and he drank heavily and she describes it as extremely embarrassing.  She states she can not take his behavior and told him she is leaving him.  Patient has appointment tomorrow - plan to discuss his drinking and recent behavior.    "

## 2017-05-08 NOTE — TELEPHONE ENCOUNTER
----- Message from Emile Rios sent at 5/8/2017 12:37 PM CDT -----  Contact: Wife [phone]  Wife would like a call back. Wife refuse to give any information regarding a call back.    Wife can be reached at [phone]

## 2017-05-09 ENCOUNTER — OFFICE VISIT (OUTPATIENT)
Dept: PSYCHIATRY | Facility: CLINIC | Age: 62
End: 2017-05-09
Payer: COMMERCIAL

## 2017-05-09 VITALS
DIASTOLIC BLOOD PRESSURE: 87 MMHG | HEIGHT: 67 IN | SYSTOLIC BLOOD PRESSURE: 181 MMHG | WEIGHT: 292 LBS | BODY MASS INDEX: 45.83 KG/M2 | HEART RATE: 70 BPM

## 2017-05-09 DIAGNOSIS — F51.01 PRIMARY INSOMNIA: ICD-10-CM

## 2017-05-09 DIAGNOSIS — F41.1 GENERALIZED ANXIETY DISORDER: ICD-10-CM

## 2017-05-09 DIAGNOSIS — F33.1 MODERATE EPISODE OF RECURRENT MAJOR DEPRESSIVE DISORDER: Primary | ICD-10-CM

## 2017-05-09 PROCEDURE — 99214 OFFICE O/P EST MOD 30 MIN: CPT | Mod: S$GLB,,, | Performed by: INTERNAL MEDICINE

## 2017-05-09 PROCEDURE — 99999 PR PBB SHADOW E&M-EST. PATIENT-LVL III: CPT | Mod: PBBFAC,,, | Performed by: INTERNAL MEDICINE

## 2017-05-09 PROCEDURE — 1160F RVW MEDS BY RX/DR IN RCRD: CPT | Mod: S$GLB,,, | Performed by: INTERNAL MEDICINE

## 2017-05-09 RX ORDER — DULOXETIN HYDROCHLORIDE 60 MG/1
60 CAPSULE, DELAYED RELEASE ORAL DAILY
Qty: 30 CAPSULE | Refills: 3 | Status: SHIPPED | OUTPATIENT
Start: 2017-05-09 | End: 2017-07-05 | Stop reason: SDUPTHER

## 2017-05-09 RX ORDER — ZOLPIDEM TARTRATE 5 MG/1
5 TABLET ORAL NIGHTLY PRN
Qty: 30 TABLET | Refills: 1 | Status: SHIPPED | OUTPATIENT
Start: 2017-05-09 | End: 2017-07-05 | Stop reason: SDUPTHER

## 2017-05-09 NOTE — MR AVS SNAPSHOT
[facility]  [address]  [place] [address]  Phone: [phone]  Fax: [phone]                  Yosef Rowe   2017 10:30 AM   Office Visit    Description:  Male : 1955   Provider:  Hedy Kaufman MD   Department:  [facility]           Diagnoses this Visit        Comments    Moderate episode of recurrent major depressive disorder    -  Primary     Primary insomnia         Generalized anxiety disorder                To Do List           Future Appointments        Provider Department Dept Phone    2017 8:00 AM LAB, [facility] [phone]      Goals (5 Years of Data)     None      Follow-Up and Disposition     Return in about 2 months (around 2017).       These Medications        Disp Refills Start End    duloxetine (CYMBALTA) 60 MG capsule 30 capsule 3 2017     Take 1 capsule (60 mg total) by mouth once daily. - Oral    [facility] [place] [facility] [address] AT [facility] Ph #: [phone]       zolpidem (AMBIEN) 5 MG Tab 30 tablet 1 2017    Take 1 tablet (5 mg total) by mouth nightly as needed. - Oral    Pharmacy: [id number]  [place] - [address] AT [facility] Ph #: [phone]         [facility]     [facility] Nurse Care Line -  Assistance  Unless otherwise directed by your provider, please contact [facility], our nurse care line that is available for  assistance.     Registered nurses in the [facility] provide: appointment scheduling, clinical advisement, health education, and other advisory services.  Call: [phone] (toll free)               Medications           Message regarding Medications     Verify the changes and/or additions to your medication regime listed below are the same as discussed with your clinician today.  If any of these changes or additions  "are incorrect, please notify your healthcare provider.        STOP taking these medications     clonazePAM (KLONOPIN) 0.5 MG tablet Take 0.5 tablets (0.25 mg total) by mouth once daily.           Verify that the below list of medications is an accurate representation of the medications you are currently taking.  If none reported, the list may be blank. If incorrect, please contact your healthcare provider. Carry this list with you in case of emergency.           Current Medications     ascorbic acid (VITAMIN C) 500 MG tablet Take 500 mg by mouth once daily.    atorvastatin (LIPITOR) 10 MG tablet Take 1 tablet (10 mg total) by mouth once daily.    buPROPion (WELLBUTRIN XL) 150 MG TB24 tablet Take 1 tablet (150 mg total) by mouth once daily.    busPIRone (BUSPAR) 10 MG tablet Take 1 tablet (10 mg total) by mouth 2 (two) times daily.    duloxetine (CYMBALTA) 60 MG capsule Take 1 capsule (60 mg total) by mouth once daily.    zolpidem (AMBIEN) 5 MG Tab Take 1 tablet (5 mg total) by mouth nightly as needed.           Clinical Reference Information           Your Vitals Were     BP Pulse Height Weight BMI    181/87 70 5' 7" (1.702 m) 132.5 kg (292 lb) 45.73 kg/m2      Blood Pressure          Most Recent Value    BP  (!)  181/87      Allergies as of 5/9/2017     No Known Drug Allergies      Immunizations Administered on Date of Encounter - 5/9/2017     None      [facility] Sign-Up     Activating your [facility] account is as easy as 1-2-3!     1) Visit [email or web address], select Sign Up Now, enter this activation code and your date of birth, then select Next.  [email or web address]  Expires: 6/23/2017 11:14 AM      2) Create a username and password to use when you visit [facility] in the future and select a security question in case you lose your password and select Next.    3) Enter your e-mail address and click Sign Up!    Additional Information  If you have questions, please e-mail [email or web address] or call [phone] to talk " to our [facility] staff. Remember, [facility] is NOT to be used for urgent needs. For medical emergencies, dial 911.         Instructions    1. Decrease Buspar (buspirone) to 5mg (1/2 tablet) twice daily.  If doing well after 2 weeks, can discontinue medication completely.    2. Continue Cymbalta (duloxetine), Wellbutrin (buproprion), and Ambien.    3. Let me know if you are interested in couples therapy.       Language Assistance Services     ATTENTION: Language assistance services are available, free of charge. Please call [phone].      ATENCIÓN: Si habla español, tiene a mckenzie disposición servicios gratuitos de asistencia lingüística. Llame al [phone].     MORE Ý: N?u b?n nói Ti?ng Vi?t, có các d?ch v? h? tr? ngôn ng? mi?n phí dành cho b?n. G?i s? [phone].         [facility] complies with applicable Federal civil rights laws and does not discriminate on the basis of race, color, national origin, age, disability, or sex.

## 2017-05-09 NOTE — PROGRESS NOTES
"OUTPATIENT PSYCHIATRY RETURN VISIT    ENCOUNTER DATE:  5/10/2017  SITE:  [facility]  LENGTH OF SESSION:  30 minutes    CHIEF COMPLAINT:  Depression; Anxiety; and Insomnia    HISTORY OF PRESENTING ILLNESS:  Yosef Rowe is a 61 y.o. male with history of anxiety, depression, and insomnia who presents for follow up appointment.  Patient was last seen 4/5/17, at which time he was continued on Cymbalta 60mg daily, Wellbutrin XL 150mg daily, Buspar 10mg BID, and Ambien 5mg qHS (last filled 3/21/17 and 4/12/17).      HPI as told by patient:  States he is "better than expected."  "Feeling good."  "Have my old self back."  Still not working but states he has resume on 6 or 7 different job sites.  Says there have been lots of reviews of his resume.  "Seems promising that something will happen pretty soon."  States his confidence is back and friends are making comments at how much better he is looking.  Denies symptoms of depression or anxiety.  No SI.  States he is sleeping much better with Ambien 5mg qHS.  Informed patient that wife called reporting his behavior has changed over the last week - "mean and drinking too much."  He states that his wife tends to "make things up to cover her story."  He says she has "a volatile, horrible temper."  "She wants me to give up everything and have nothing"  He says she does not want him to drink or do fun things.  She only wants him to look for a job 8 hours per day.  He describes recent argument this weekend over her not wanting to have sex.  He states they have not been intimate in 3 years and she turns this around on him.  He surprised her with a special birthday - took her to lunch.  She turned down being intimate and this really hurt his feelings.  Also turned down his offer to take her out to dinner.  States they have been  40 years and have had troubles the last 15 years.  He says he does not drink during the week and then has several drinks on " "the weekend.  This last weekend he reports drinking 1 glass of wine Friday night and then 1 drink + 2 beers on Saturday, which made his wife very angry and she is threatening to leave him.  He says his wife drinks more than he does.  He states they have been to couples counseling 3 times and it was not helpful - he is not interested in trying again.      REVIEW OF SYSTEMS:  Complete review of systems performed covering Constitutional, Musculoskeletal, Neurologic.  All systems negative except for that covered in HPI.    Psych ROS covered elsewhere in note (HPI)    PAST PSYCHIATRIC, MEDICAL, AND SOCIAL HISTORY REVIEWED    MEDICATIONS:    Current Outpatient Prescriptions:     ascorbic acid (VITAMIN C) 500 MG tablet, Take 500 mg by mouth once daily., Disp: , Rfl:     atorvastatin (LIPITOR) 10 MG tablet, Take 1 tablet (10 mg total) by mouth once daily., Disp: 30 tablet, Rfl: 11    buPROPion (WELLBUTRIN XL) 150 MG TB24 tablet, Take 1 tablet (150 mg total) by mouth once daily., Disp: 30 tablet, Rfl: 3    busPIRone (BUSPAR) 10 MG tablet, Take 1 tablet (10 mg total) by mouth 2 (two) times daily., Disp: 60 tablet, Rfl: 3    duloxetine (CYMBALTA) 60 MG capsule, Take 1 capsule (60 mg total) by mouth once daily., Disp: 30 capsule, Rfl: 3    zolpidem (AMBIEN) 5 MG Tab, Take 1 tablet (5 mg total) by mouth nightly as needed., Disp: 30 tablet, Rfl: 1    ALLERGIES:  Review of patient's allergies indicates:   Allergen Reactions    No known drug allergies        PSYCHIATRIC EXAM:  Vitals:    05/09/17 1032   BP: (!) 181/87   Pulse: 70   Weight: 132.5 kg (292 lb)   Height: 5' 7" (1.702 m)   Appearance:  Well groomed, appearing healthy and of stated age  Behavior:  Cooperative, pleasant, no psychomotor agitation or retardation  Speech:  Normal rate, rhythm, prosody, and volume  Mood:  "Great"  Affect:  Congruent  Thought Process:  Linear, logical, goal directed  Thought Content:  Negative for suicidal ideation, homicidal ideation, " delusions or hallucinations.  Associations:  Intact  Memory:  Grossly Intact  Level of Consciousness/Orientation:  Grossly intact  Fund of Knowledge:  Good  Attention:  Good  Language:  Fluent, able to name abstract and concrete objects  Insight:  Fair  Judgment:  Intact  Psychomotor signs:  No involuntary movements or tremor  Gait:  Normal    RELEVANT LABS/STUDIES:    Lab Results   Component Value Date    WBC 7.38 11/21/2016    HGB 15.6 11/21/2016    HCT 46.9 11/21/2016    MCV 98 11/21/2016     11/21/2016     BMP  Lab Results   Component Value Date     02/21/2017    K 4.2 02/21/2017     02/21/2017    CO2 26 02/21/2017    BUN 14 02/21/2017    CREATININE 0.9 02/21/2017    CALCIUM 9.2 02/21/2017    ANIONGAP 8 02/21/2017    ESTGFRAFRICA >60.0 02/21/2017    EGFRNONAA >60.0 02/21/2017     Lab Results   Component Value Date    ALT 21 02/21/2017    AST 21 02/21/2017    ALKPHOS 59 02/21/2017    BILITOT 0.7 02/21/2017     Lab Results   Component Value Date    TSH 1.360 11/21/2016       IMPRESSION:    Yosef Rowe is a 61 y.o. male with history of anxiety, depression, and insomnia who presents for follow up appointment with improvement in symptoms but worsened marital stress.    DIAGNOSES:    ICD-10-CM ICD-9-CM   1. Moderate episode of recurrent major depressive disorder F33.1 296.32   2. Generalized anxiety disorder F41.1 300.02   3. Primary insomnia F51.01 307.42     PLAN:  · Mood significantly improved.  Patient would like to be on less medication.  Will continue Cymbalta 60mg daily, Wellbutrin XL 150mg daily, and Ambien 5mg qHS.  Will try reducing and then discontinuing Buspar 10mg BID - instructed him to reduce Buspar to 5mg BID x 2 weeks and then if still doing well discontinue the medication completely.    · Encouraged patient to start marriage counseling, but patient declining.      RETURN TO CLINIC:  Return in about 2 months (around 7/9/2017).

## 2017-05-09 NOTE — PATIENT INSTRUCTIONS
1. Decrease Buspar (buspirone) to 5mg (1/2 tablet) twice daily.  If doing well after 2 weeks, can discontinue medication completely.    2. Continue Cymbalta (duloxetine), Wellbutrin (buproprion), and Ambien.    3. Let me know if you are interested in couples therapy.

## 2017-06-20 DIAGNOSIS — E78.5 HYPERLIPIDEMIA, UNSPECIFIED HYPERLIPIDEMIA TYPE: Primary | ICD-10-CM

## 2017-06-20 NOTE — PROGRESS NOTES
i re entered orders and linked to todays appt but he has already left.    i re entered orders again and made appt for tomorrow. i apologized to patient for error.

## 2017-06-21 ENCOUNTER — LAB VISIT (OUTPATIENT)
Dept: LAB | Facility: HOSPITAL | Age: 62
End: 2017-06-21
Attending: INTERNAL MEDICINE
Payer: COMMERCIAL

## 2017-06-21 DIAGNOSIS — E78.5 HYPERLIPIDEMIA, UNSPECIFIED HYPERLIPIDEMIA TYPE: ICD-10-CM

## 2017-06-21 LAB
ALBUMIN SERPL BCP-MCNC: 3.6 G/DL
ALP SERPL-CCNC: 64 U/L
ALT SERPL W/O P-5'-P-CCNC: 32 U/L
ANION GAP SERPL CALC-SCNC: 10 MMOL/L
AST SERPL-CCNC: 30 U/L
BILIRUB SERPL-MCNC: 0.7 MG/DL
BUN SERPL-MCNC: 13 MG/DL
CALCIUM SERPL-MCNC: 9.4 MG/DL
CHLORIDE SERPL-SCNC: 105 MMOL/L
CHOLEST/HDLC SERPL: 4 {RATIO}
CO2 SERPL-SCNC: 24 MMOL/L
CREAT SERPL-MCNC: 0.9 MG/DL
EST. GFR  (AFRICAN AMERICAN): >60 ML/MIN/1.73 M^2
EST. GFR  (NON AFRICAN AMERICAN): >60 ML/MIN/1.73 M^2
GLUCOSE SERPL-MCNC: 96 MG/DL
HDL/CHOLESTEROL RATIO: 24.7 %
HDLC SERPL-MCNC: 190 MG/DL
HDLC SERPL-MCNC: 47 MG/DL
LDLC SERPL CALC-MCNC: 101 MG/DL
NONHDLC SERPL-MCNC: 143 MG/DL
POTASSIUM SERPL-SCNC: 4.6 MMOL/L
PROT SERPL-MCNC: 6.7 G/DL
SODIUM SERPL-SCNC: 139 MMOL/L
TRIGL SERPL-MCNC: 210 MG/DL

## 2017-06-21 PROCEDURE — 80053 COMPREHEN METABOLIC PANEL: CPT

## 2017-06-21 PROCEDURE — 36415 COLL VENOUS BLD VENIPUNCTURE: CPT | Mod: PO

## 2017-06-21 PROCEDURE — 80061 LIPID PANEL: CPT

## 2017-07-05 ENCOUNTER — OFFICE VISIT (OUTPATIENT)
Dept: PSYCHIATRY | Facility: CLINIC | Age: 62
End: 2017-07-05
Payer: COMMERCIAL

## 2017-07-05 VITALS
HEART RATE: 80 BPM | BODY MASS INDEX: 46.93 KG/M2 | WEIGHT: 299 LBS | DIASTOLIC BLOOD PRESSURE: 78 MMHG | HEIGHT: 67 IN | SYSTOLIC BLOOD PRESSURE: 168 MMHG

## 2017-07-05 DIAGNOSIS — F51.01 PRIMARY INSOMNIA: ICD-10-CM

## 2017-07-05 DIAGNOSIS — F33.1 MODERATE EPISODE OF RECURRENT MAJOR DEPRESSIVE DISORDER: Primary | ICD-10-CM

## 2017-07-05 DIAGNOSIS — F41.1 GENERALIZED ANXIETY DISORDER: ICD-10-CM

## 2017-07-05 PROCEDURE — 99999 PR PBB SHADOW E&M-EST. PATIENT-LVL III: CPT | Mod: PBBFAC,,, | Performed by: INTERNAL MEDICINE

## 2017-07-05 PROCEDURE — 99214 OFFICE O/P EST MOD 30 MIN: CPT | Mod: S$GLB,,, | Performed by: INTERNAL MEDICINE

## 2017-07-05 RX ORDER — DULOXETIN HYDROCHLORIDE 60 MG/1
60 CAPSULE, DELAYED RELEASE ORAL DAILY
Qty: 30 CAPSULE | Refills: 3 | Status: SHIPPED | OUTPATIENT
Start: 2017-07-05 | End: 2017-11-14 | Stop reason: SDUPTHER

## 2017-07-05 RX ORDER — ZOLPIDEM TARTRATE 5 MG/1
5 TABLET ORAL NIGHTLY PRN
Qty: 30 TABLET | Refills: 2 | Status: SHIPPED | OUTPATIENT
Start: 2017-07-05 | End: 2017-10-20 | Stop reason: SDUPTHER

## 2017-07-05 RX ORDER — BUPROPION HYDROCHLORIDE 150 MG/1
150 TABLET ORAL DAILY
Qty: 30 TABLET | Refills: 3 | Status: SHIPPED | OUTPATIENT
Start: 2017-07-05 | End: 2017-11-14 | Stop reason: SDUPTHER

## 2017-07-05 NOTE — PROGRESS NOTES
"OUTPATIENT PSYCHIATRY RETURN VISIT    ENCOUNTER DATE:  7/5/2017  SITE:  [facility]  LENGTH OF SESSION:  20 minutes    CHIEF COMPLAINT:  Mood    HISTORY OF PRESENTING ILLNESS:  Yosef Rowe is a 61 y.o. male with history of anxiety, depression, and insomnia who presents for follow up appointment.  Patient was last seen 5/9/17, at which time he was continued on Cymbalta 60mg daily, Wellbutrin XL 150mg daily, and Ambien 5mg qHS.  Buspar was reduced to 5mg BID x 2 weeks and then discontinued.      HPI as told by patient:  States he is doing much better.  States he has a job now - commission only but full time.  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  Feels job is going well and enjoying.  Has had job for 1 month now.  Mood has been great.  Now off Buspar completely.  Reports mood is "great."  Denies symptoms of depression, anxiety, or suicidal ideation.  Denies medication side effects.      REVIEW OF SYSTEMS:  Complete review of systems performed covering Constitutional, Musculoskeletal, Neurologic.  All systems negative except for that covered in HPI.    Psych ROS covered elsewhere in note (HPI)    PAST PSYCHIATRIC, MEDICAL, AND SOCIAL HISTORY REVIEWED    MEDICATIONS:    Current Outpatient Prescriptions:     ascorbic acid (VITAMIN C) 500 MG tablet, Take 500 mg by mouth once daily., Disp: , Rfl:     atorvastatin (LIPITOR) 10 MG tablet, Take 1 tablet (10 mg total) by mouth once daily., Disp: 30 tablet, Rfl: 11    buPROPion (WELLBUTRIN XL) 150 MG TB24 tablet, Take 1 tablet (150 mg total) by mouth once daily., Disp: 30 tablet, Rfl: 3    duloxetine (CYMBALTA) 60 MG capsule, Take 1 capsule (60 mg total) by mouth once daily., Disp: 30 capsule, Rfl: 3    zolpidem (AMBIEN) 5 MG Tab, Take 1 tablet (5 mg total) by mouth nightly as needed., Disp: 30 tablet, Rfl: 2    ALLERGIES:  Review of patient's allergies indicates:   Allergen Reactions    No known drug allergies  " "      PSYCHIATRIC EXAM:  Vitals:    07/05/17 1033   BP: (!) 168/78   Pulse: 80   Weight: 135.6 kg (299 lb)   Height: 5' 7" (1.702 m)   Appearance:  Well groomed, appearing healthy and of stated age  Behavior:  Cooperative, pleasant, no psychomotor agitation or retardation  Speech:  Normal rate, rhythm, prosody, and volume  Mood:  "Great"  Affect:  Congruent  Thought Process:  Linear, logical, goal directed  Thought Content:  Negative for suicidal ideation, homicidal ideation, delusions or hallucinations.  Associations:  Intact  Memory:  Grossly Intact  Level of Consciousness/Orientation:  Grossly intact  Fund of Knowledge:  Good  Attention:  Good  Language:  Fluent, able to name abstract and concrete objects  Insight:  Fair  Judgment:  Intact  Psychomotor signs:  No involuntary movements or tremor  Gait:  Normal    RELEVANT LABS/STUDIES:    Lab Results   Component Value Date    WBC 7.38 11/21/2016    HGB 15.6 11/21/2016    HCT 46.9 11/21/2016    MCV 98 11/21/2016     11/21/2016     BMP  Lab Results   Component Value Date     06/21/2017    K 4.6 06/21/2017     06/21/2017    CO2 24 06/21/2017    BUN 13 06/21/2017    CREATININE 0.9 06/21/2017    CALCIUM 9.4 06/21/2017    ANIONGAP 10 06/21/2017    ESTGFRAFRICA >60.0 06/21/2017    EGFRNONAA >60.0 06/21/2017     Lab Results   Component Value Date    ALT 32 06/21/2017    AST 30 06/21/2017    ALKPHOS 64 06/21/2017    BILITOT 0.7 06/21/2017     Lab Results   Component Value Date    TSH 1.360 11/21/2016       IMPRESSION:    Yosef Rowe is a 61 y.o. male with history of anxiety, depression, and insomnia who presents for follow up appointment with improvement in symptoms.    DIAGNOSES:    ICD-10-CM ICD-9-CM   1. Moderate episode of recurrent major depressive disorder F33.1 296.32   2. Generalized anxiety disorder F41.1 300.02   3. Primary insomnia F51.01 307.42     PLAN:  · Continue Cymbalta 60mg daily, Wellbutrin XL 150mg daily, and Ambien 5mg qHS.  " Symptoms well controlled at this time.  Would like patient to have several months of remission before trying to reduce medications any further.  If stable at next appointment, could consider discontinuing Wellbutrin.    · Discussed patient's high blood pressure and that both Cymbalta and Wellbutrin can contribute to this.  Discussed long term risks of hypertension.  Patient plans to schedule appointment with PCP to discuss.      RETURN TO CLINIC:  Return in about 3 months (around 10/5/2017).

## 2017-07-10 ENCOUNTER — OFFICE VISIT (OUTPATIENT)
Dept: INTERNAL MEDICINE | Facility: CLINIC | Age: 62
End: 2017-07-10
Payer: COMMERCIAL

## 2017-07-10 VITALS
BODY MASS INDEX: 45.92 KG/M2 | RESPIRATION RATE: 16 BRPM | HEIGHT: 67 IN | WEIGHT: 292.56 LBS | SYSTOLIC BLOOD PRESSURE: 145 MMHG | DIASTOLIC BLOOD PRESSURE: 76 MMHG | TEMPERATURE: 98 F | HEART RATE: 68 BPM

## 2017-07-10 DIAGNOSIS — F32.4 MAJOR DEPRESSIVE DISORDER WITH SINGLE EPISODE, IN PARTIAL REMISSION: ICD-10-CM

## 2017-07-10 DIAGNOSIS — G89.29 CHRONIC LEFT FLANK PAIN: ICD-10-CM

## 2017-07-10 DIAGNOSIS — I10 ESSENTIAL HYPERTENSION: Primary | ICD-10-CM

## 2017-07-10 DIAGNOSIS — E66.01 MORBID OBESITY DUE TO EXCESS CALORIES: ICD-10-CM

## 2017-07-10 DIAGNOSIS — R10.9 CHRONIC LEFT FLANK PAIN: ICD-10-CM

## 2017-07-10 DIAGNOSIS — F41.1 GENERALIZED ANXIETY DISORDER: ICD-10-CM

## 2017-07-10 DIAGNOSIS — R06.02 SOB (SHORTNESS OF BREATH): ICD-10-CM

## 2017-07-10 PROCEDURE — 99999 PR PBB SHADOW E&M-EST. PATIENT-LVL III: CPT | Mod: PBBFAC,,, | Performed by: INTERNAL MEDICINE

## 2017-07-10 PROCEDURE — 93010 ELECTROCARDIOGRAM REPORT: CPT | Mod: S$GLB,,, | Performed by: INTERNAL MEDICINE

## 2017-07-10 PROCEDURE — 93005 ELECTROCARDIOGRAM TRACING: CPT | Mod: S$GLB,,, | Performed by: INTERNAL MEDICINE

## 2017-07-10 PROCEDURE — 99214 OFFICE O/P EST MOD 30 MIN: CPT | Mod: S$GLB,,, | Performed by: INTERNAL MEDICINE

## 2017-07-12 ENCOUNTER — TELEPHONE (OUTPATIENT)
Dept: INTERNAL MEDICINE | Facility: CLINIC | Age: 62
End: 2017-07-12

## 2017-07-12 NOTE — TELEPHONE ENCOUNTER
----- Message from Violet Verdin sent at 7/12/2017  2:38 PM CDT -----  Contact: Self. [phone]  Patient has paper work to be filled out, would like to know best time to bring it in.   Please advise

## 2017-07-12 NOTE — TELEPHONE ENCOUNTER
Really bad weather out right now.  Told him to drop off sometime Thursday am and i'd try to get completed Thursday when dr here and call him when ready.    We moved his labs and cxr to [facility] in am. He prefers there instead of [facility].

## 2017-07-13 ENCOUNTER — HOSPITAL ENCOUNTER (OUTPATIENT)
Dept: RADIOLOGY | Facility: HOSPITAL | Age: 62
Discharge: HOME | End: 2017-07-13
Attending: INTERNAL MEDICINE
Payer: COMMERCIAL

## 2017-07-13 DIAGNOSIS — E66.01 MORBID OBESITY DUE TO EXCESS CALORIES: ICD-10-CM

## 2017-07-13 DIAGNOSIS — I10 ESSENTIAL HYPERTENSION: ICD-10-CM

## 2017-07-13 DIAGNOSIS — R06.02 SOB (SHORTNESS OF BREATH): ICD-10-CM

## 2017-07-13 PROCEDURE — 71020 XR CHEST PA AND LATERAL: CPT | Mod: 26,,, | Performed by: RADIOLOGY

## 2017-07-13 PROCEDURE — 71020 XR CHEST PA AND LATERAL: CPT | Mod: TC,PO

## 2017-07-13 NOTE — TELEPHONE ENCOUNTER
It was a form to approve him to carry a firearm.    Dr akers says since he see's  A psyciatrist , they would have to fill the form out.    I told patient above and form will be put out front for .

## 2017-07-16 DIAGNOSIS — F51.01 PRIMARY INSOMNIA: ICD-10-CM

## 2017-07-16 NOTE — PROGRESS NOTES
Subjective:       Patient ID: Yosef Rowe is a 61 y.o. male.    Chief Complaint: Follow-up (Discuss Results)  HISTORY OF PRESENT ILLNESS:  This is a 61-year-old white male patient well known   to me, who comes in to in his words discuss results study done recently.  The   patient asked for some lab to be done, which was included chemistries and   lipids, which were basically all normal except for slight elevation in his   triglyceride.  Turns out the patient is not feeling well, he has lost 15 pounds,   not really because he wants to even though he is substantially overweight,   really morbid obesity.  The patient last time I had talked to him was also not   feeling well, but apparently got better.  He is now complaining of one month of   shortness of breath.  He has a constant postnasal drip, change in taste, and has   been seeing a psychiatrist by the name of Mandeep, who is at [facility], who put   him on a change in his psychiatric program, which improved his disposition quite   a bit.  Prior to that, he had been seeing another psychiatrist, who had placed   on quite a few medications that made him indolent and socially interactive.  The   last month, he has also been grinding his teeth.  He has had a weight gain of   15 pounds.  He sweats at rest and over the last month, he has complained of left   flank pain with exercise.  He dose not have it, otherwise, he does have it when   he bends.  It either goes away or is reduced by standing straight.  He is not   having any physical problems with exercise.  The patient denies any chest pain,   cough, leg pain, fever, chills, or problems urinating.  His bowels are working   regularly.    PHYSICAL EXAMINATION:  VITAL SIGNS:  Normal including his blood pressure.  SKIN:  Shows no rash.  He has fair skin.  HEENT:  Clear including his oral cavity.  NECK:  Shows no adenopathy, thyroid enlargement, or bruit.  He has a very thick   neck.  He has large fat pads in his  supraclavicular space and a huge on abdomen.    He has no pain on percussion.  CHEST:  Clear.  There is no dullness or rub.  He has no flank pain to   percussion.  ABDOMEN:  Obese, nontender, no organomegaly.  EXTREMITIES:  On straight leg, he has no pain on his lower back.  He has intact   pulses in his feet.  He does have just trace edema in his legs.  NEUROLOGIC:  Appears normal.    IMPRESSION:  1.  Severe depression.  2.  Severe morbid obesity.  3.  Continues to smoke cigars.  4.  Flank pain, probably muscular.  5.  Hypertension, controlled.  6.  Shortness of breath.  I believe it is probably on the basis of him being   obese, smoking cigars, or being summer.  He has had an evaluation for this in   the past, which was quite extensive, but that should be revisited because he is   at risk of having a number of other problems.  I did an EKG at the time of this   visit, which was normal.    Lab is back showing negative troponin.  His CPK is 111, which is normal.    Urinalysis is normal with no blood.  I also ordered calcium score, chest x-ray,   and the chest x-ray was read out as negative.    My main concern with him is his continued severe obesity and very likely his   problems are mixture of anxiety and depression, on top of that unless some other   disorder has interfered.  I am going to be talking to him about a weight loss   program once again and will probably if he is agreeable get him in the   bariatrics weight loss program.      CARLOS A/SUSAN  dd: 07/16/2017 16:32:15 (CDT)  td: 07/17/2017 03:07:39 (CDT)  Doc ID   #[id number]  Job ID #[id number]    CC:     Dict [id number]  HPI  Review of Systems   Constitutional: Positive for diaphoresis and unexpected weight change. Negative for activity change, appetite change and fatigue.   HENT: Positive for dental problem. Negative for hearing loss, mouth sores, postnasal drip and sinus pressure.    Eyes: Negative for discharge and visual disturbance.   Respiratory: Positive for  shortness of breath. Negative for cough.    Cardiovascular: Negative for chest pain and palpitations.   Gastrointestinal: Negative for abdominal pain, blood in stool, constipation, diarrhea and nausea.   Genitourinary: Positive for flank pain. Negative for dysuria, hematuria and testicular pain.   Musculoskeletal: Positive for back pain. Negative for arthralgias, joint swelling and neck pain.   Skin: Negative for rash.   Neurological: Negative for weakness and headaches.   Psychiatric/Behavioral: Negative for agitation and sleep disturbance.       Objective:      Physical Exam   Constitutional: He is oriented to person, place, and time. He appears well-developed and well-nourished. No distress.   HENT:   Head: Normocephalic.   Right Ear: External ear normal.   Left Ear: External ear normal.   Mouth/Throat: Oropharynx is clear and moist.   Eyes: EOM are normal. Pupils are equal, round, and reactive to light. Right eye exhibits no discharge.   Neck: Normal range of motion. No JVD present. No tracheal deviation present. No thyromegaly present.   Cardiovascular: Normal rate, regular rhythm, normal heart sounds and intact distal pulses.  Exam reveals no gallop.    No murmur heard.  Pulmonary/Chest: Effort normal and breath sounds normal. He has no wheezes. He has no rales.   Abdominal: Soft. Bowel sounds are normal. He exhibits no mass. There is no tenderness.   Genitourinary: Prostate normal and penis normal. Rectal exam shows guaiac negative stool.   Musculoskeletal: Normal range of motion. He exhibits no edema.   Lymphadenopathy:     He has no cervical adenopathy.   Neurological: He is oriented to person, place, and time. He displays normal reflexes. No cranial nerve deficit. Coordination normal.   Skin: Skin is warm. No rash noted. He is not diaphoretic. No pallor.   Psychiatric: He has a normal mood and affect.       Assessment:       1. Essential hypertension    2. Morbid obesity due to excess calories    3. SOB  (shortness of breath)    4. Chronic left flank pain    5. Major depressive disorder with single episode, in partial remission    6. Generalized anxiety disorder        Plan:

## 2017-07-17 ENCOUNTER — TELEPHONE (OUTPATIENT)
Dept: INTERNAL MEDICINE | Facility: CLINIC | Age: 62
End: 2017-07-17

## 2017-07-17 RX ORDER — ZOLPIDEM TARTRATE 5 MG/1
TABLET ORAL
Qty: 30 TABLET | Refills: 0 | OUTPATIENT
Start: 2017-07-17

## 2017-07-17 NOTE — TELEPHONE ENCOUNTER
----- Message from Ruperto Dexter MD sent at 7/15/2017 12:47 AM CDT -----  The lab and cxr and ekg all look ok and perhaps should talk with his counselor and get his meds for depression rearrange to see if that clears his sense of fatigue,sob lithlessness

## 2017-07-17 NOTE — TELEPHONE ENCOUNTER
Pending ct results scheduled 7/19.  Will call him about all results when back.  Chun scoring done 7/19- see results and let me know what to tell him.  I need to call him about all results.    Thanks yanna

## 2017-07-19 ENCOUNTER — HOSPITAL ENCOUNTER (OUTPATIENT)
Dept: RADIOLOGY | Facility: HOSPITAL | Age: 62
Discharge: HOME | End: 2017-07-19
Attending: INTERNAL MEDICINE
Payer: COMMERCIAL

## 2017-07-19 ENCOUNTER — TELEPHONE (OUTPATIENT)
Dept: INTERNAL MEDICINE | Facility: CLINIC | Age: 62
End: 2017-07-19

## 2017-07-19 DIAGNOSIS — E66.01 MORBID OBESITY DUE TO EXCESS CALORIES: ICD-10-CM

## 2017-07-19 DIAGNOSIS — R06.02 SOB (SHORTNESS OF BREATH): ICD-10-CM

## 2017-07-19 DIAGNOSIS — I10 ESSENTIAL HYPERTENSION: ICD-10-CM

## 2017-07-19 PROCEDURE — 75571 CT HRT W/O DYE W/CA TEST: CPT | Mod: 26,,, | Performed by: RADIOLOGY

## 2017-07-19 PROCEDURE — 75571 CT HRT W/O DYE W/CA TEST: CPT | Mod: TC

## 2017-07-19 NOTE — TELEPHONE ENCOUNTER
I would like to see him back after I am back in August.  His calc score shows mild coronary disease and would like to talk with him about getting into bariatric program for wt loss

## 2017-07-24 NOTE — TELEPHONE ENCOUNTER
"Dr akers says  "would like to see him back after I am back in August.  His calc score shows mild coronary disease and would like to talk with him about getting into bariatric program for wt loss  "     "

## 2017-08-02 NOTE — TELEPHONE ENCOUNTER
I told him most of 's comments and we booked appt for 8/8 at 9:40.  I need tomasa to h elp me book because of computer clich.

## 2017-08-02 NOTE — TELEPHONE ENCOUNTER
----- Message from Alla Hall sent at 8/2/2017  1:15 PM CDT -----  Contact: Self Mobile: [phone]   Patient would like to get test results.  Name of test (lab, mammo, etc.):  Ct Scan and Xray  Date of test:  7/13 and  7/19  Ordering provider: Dr. Dexter  Where was the test performed:  [facility]  Comments:

## 2017-08-08 ENCOUNTER — OFFICE VISIT (OUTPATIENT)
Dept: INTERNAL MEDICINE | Facility: CLINIC | Age: 62
End: 2017-08-08
Payer: COMMERCIAL

## 2017-08-08 VITALS
BODY MASS INDEX: 46.17 KG/M2 | WEIGHT: 294.13 LBS | RESPIRATION RATE: 16 BRPM | HEART RATE: 70 BPM | HEIGHT: 67 IN | DIASTOLIC BLOOD PRESSURE: 72 MMHG | TEMPERATURE: 98 F | SYSTOLIC BLOOD PRESSURE: 135 MMHG

## 2017-08-08 DIAGNOSIS — I25.10 CORONARY ATHEROSCLEROSIS DUE TO CALCIFIED CORONARY LESION: ICD-10-CM

## 2017-08-08 DIAGNOSIS — E66.01 MORBID OBESITY DUE TO EXCESS CALORIES: Primary | ICD-10-CM

## 2017-08-08 DIAGNOSIS — I25.84 CORONARY ATHEROSCLEROSIS DUE TO CALCIFIED CORONARY LESION: ICD-10-CM

## 2017-08-08 PROCEDURE — 99214 OFFICE O/P EST MOD 30 MIN: CPT | Mod: S$GLB,,, | Performed by: INTERNAL MEDICINE

## 2017-08-08 PROCEDURE — 99999 PR PBB SHADOW E&M-EST. PATIENT-LVL III: CPT | Mod: PBBFAC,,, | Performed by: INTERNAL MEDICINE

## 2017-08-08 PROCEDURE — 3008F BODY MASS INDEX DOCD: CPT | Mod: S$GLB,,, | Performed by: INTERNAL MEDICINE

## 2017-08-10 NOTE — PROGRESS NOTES
Subjective:       Patient ID: Yosef Rowe is a 61 y.o. male.    Chief Complaint: Follow-up (discuss results)  A 61-year-old white male, well known to me, comes in with followup for poor   energy and shortness of breath.  The patient gets fatigued easily.  He has been   grossly overweight for years, has a history of drinking fairly heavily, smoking   cigars.  He has reduced both.  He also got severely depressed.  He is now much   better seeing a psychiatrist on treatment.  I had evaluated him in the past on   and off for his complaints.  At this time, he had a calcium scoring test that   was 0.  He had lab done, all of which was normal.  I have told him I think his   problem is his weight and inactivity and poor conditioning.  He may have some   lung disease from smoking, so I have advised him that he has agreed to go to the   bariatric weight loss program and if it is not successful, the next step would   be bariatric surgery.  He has not made up his mind in that regard yet.  I had   made no other changes.      CARLOS A/SUSAN  dd: 08/09/2017 22:08:48 (CDT)  td: 08/10/2017 04:50:20 (CDT)  Doc ID   #[id number]  Job ID #[id number]    CC:     [facility] [id number]  [facility]  Review of Systems   Constitutional: Positive for fatigue. Negative for activity change, appetite change and unexpected weight change.   HENT: Negative for dental problem, hearing loss, mouth sores, postnasal drip and sinus pressure.    Eyes: Negative for discharge and visual disturbance.   Respiratory: Positive for shortness of breath. Negative for cough.    Cardiovascular: Negative for chest pain and palpitations.   Gastrointestinal: Negative for abdominal pain, blood in stool, constipation, diarrhea and nausea.   Genitourinary: Negative for dysuria, hematuria and testicular pain.   Musculoskeletal: Negative for arthralgias, back pain, joint swelling and neck pain.   Skin: Negative for rash.   Neurological: Negative for weakness and headaches.   Psychiatric/Behavioral:  Positive for agitation and dysphoric mood. Negative for sleep disturbance. The patient is nervous/anxious.        Objective:      Physical Exam   Constitutional: He is oriented to person, place, and time. He appears well-developed and well-nourished.   HENT:   Head: Normocephalic.   Right Ear: External ear normal.   Left Ear: External ear normal.   Mouth/Throat: Oropharynx is clear and moist.   Eyes: EOM are normal. Pupils are equal, round, and reactive to light. Right eye exhibits no discharge.   Neck: Normal range of motion. No JVD present. No tracheal deviation present. No thyromegaly present.   Cardiovascular: Normal rate, regular rhythm, normal heart sounds and intact distal pulses.  Exam reveals no gallop.    No murmur heard.  Pulmonary/Chest: Effort normal and breath sounds normal. He has no wheezes. He has no rales.   Abdominal: Soft. Bowel sounds are normal. He exhibits no mass. There is no tenderness.   Genitourinary: Prostate normal and penis normal. Rectal exam shows guaiac negative stool.   Musculoskeletal: Normal range of motion. He exhibits no edema.   Lymphadenopathy:     He has no cervical adenopathy.   Neurological: He is oriented to person, place, and time. He displays normal reflexes. No cranial nerve deficit. Coordination normal.   Skin: Skin is warm. No rash noted. No pallor.   Psychiatric: He has a normal mood and affect.       Assessment:       1. Morbid obesity due to excess calories    2. Coronary atherosclerosis due to calcified coronary lesion        Plan:

## 2017-08-17 ENCOUNTER — OFFICE VISIT (OUTPATIENT)
Dept: CARDIOLOGY | Facility: CLINIC | Age: 62
End: 2017-08-17
Payer: COMMERCIAL

## 2017-08-17 VITALS
WEIGHT: 299.06 LBS | HEIGHT: 67 IN | DIASTOLIC BLOOD PRESSURE: 59 MMHG | HEART RATE: 64 BPM | BODY MASS INDEX: 46.94 KG/M2 | OXYGEN SATURATION: 97 % | SYSTOLIC BLOOD PRESSURE: 131 MMHG

## 2017-08-17 DIAGNOSIS — R06.02 SHORTNESS OF BREATH: ICD-10-CM

## 2017-08-17 DIAGNOSIS — Q24.5 ANOMALOUS ORIGIN OF RIGHT CORONARY ARTERY: ICD-10-CM

## 2017-08-17 DIAGNOSIS — I25.10 CORONARY ATHEROSCLEROSIS DUE TO CALCIFIED CORONARY LESION: Primary | ICD-10-CM

## 2017-08-17 DIAGNOSIS — I25.84 CORONARY ATHEROSCLEROSIS DUE TO CALCIFIED CORONARY LESION: Primary | ICD-10-CM

## 2017-08-17 DIAGNOSIS — E66.01 OBESITY, CLASS III, BMI 40-49.9 (MORBID OBESITY): ICD-10-CM

## 2017-08-17 PROBLEM — E66.813 OBESITY, CLASS III, BMI 40-49.9 (MORBID OBESITY): Status: ACTIVE | Noted: 2017-08-17

## 2017-08-17 PROCEDURE — 99999 PR PBB SHADOW E&M-EST. PATIENT-LVL III: CPT | Mod: PBBFAC,,, | Performed by: INTERNAL MEDICINE

## 2017-08-17 PROCEDURE — 99243 OFF/OP CNSLTJ NEW/EST LOW 30: CPT | Mod: S$GLB,,, | Performed by: INTERNAL MEDICINE

## 2017-08-17 RX ORDER — MULTIVITAMIN
1 TABLET ORAL DAILY
Status: ON HOLD | COMMUNITY
End: 2018-07-07

## 2017-08-17 RX ORDER — FUROSEMIDE 40 MG/1
40 TABLET ORAL DAILY
Qty: 60 TABLET | Refills: 6 | Status: SHIPPED | OUTPATIENT
Start: 2017-08-17 | End: 2017-10-12

## 2017-08-17 RX ORDER — ATORVASTATIN CALCIUM 20 MG/1
20 TABLET, FILM COATED ORAL DAILY
Qty: 30 TABLET | Refills: 11 | Status: SHIPPED | OUTPATIENT
Start: 2017-08-17 | End: 2018-12-26

## 2017-08-17 RX ORDER — POTASSIUM CHLORIDE 750 MG/1
10 TABLET, EXTENDED RELEASE ORAL ONCE
Qty: 30 TABLET | Refills: 6 | Status: SHIPPED | OUTPATIENT
Start: 2017-08-17 | End: 2017-08-17

## 2017-08-17 NOTE — PROGRESS NOTES
Subjective:     Patient ID:  Yosef Rowe is a 61 y.o. male who presents for evaluation of coronary atherosclerosis due to calcified coronary lesion      Problem List:  Anomalous coronary artery origin  Obesity  Sleep apnea 1999 - CPAP    HPI:     Yosef Rowe is being referred by Dr. Dexter.  A CT scan revealed coronary calcium score of 23.  The original of the right coronary artery was not clearly seen and appears to be intra-arterial, i.e. between the aorta and pulmonary artery.  He does not report angina.    He reports shortness of breath w exertion that is new x 2-3 months. He sleeps w CPAP.  He does not report orthopnea, PND or edema.  He has noted wheezing.    Lipids have improved markedly since last year despite not taking the atorvastatin that Dr. Dexter prescribed in 11/16. I suspect that the drop in cholesterol may have been temporary due to an illness. He had lost some weight but then gained most of it back.     Review of Systems   Constitution: Positive for malaise/fatigue. Negative for weakness, weight gain and weight loss.   Cardiovascular: Positive for dyspnea on exertion. Negative for chest pain, claudication, irregular heartbeat, leg swelling, orthopnea, palpitations, paroxysmal nocturnal dyspnea and syncope.   Respiratory: Positive for wheezing. Negative for cough and sputum production.    Musculoskeletal: Negative for falls, joint pain, muscle cramps, muscle weakness and myalgias.   Gastrointestinal: Negative for abdominal pain, heartburn and melena.   Genitourinary: Positive for nocturia. Negative for frequency and hematuria.   Neurological: Negative for dizziness, light-headedness, loss of balance and paresthesias.   Psychiatric/Behavioral: Positive for depression. The patient is nervous/anxious.        Current Outpatient Prescriptions   Medication Sig    buPROPion (WELLBUTRIN XL) 150 MG TB24 tablet Take 1 tablet (150 mg total) by mouth once daily.    duloxetine (CYMBALTA) 60 MG capsule  "Take 1 capsule (60 mg total) by mouth once daily.    multivitamin (ONE DAILY MULTIVITAMIN) per tablet Take 1 tablet by mouth once daily.    zolpidem (AMBIEN) 5 MG Tab Take 1 tablet (5 mg total) by mouth nightly as needed.     No current facility-administered medications for this visit.        Past Medical History:   Diagnosis Date    Depression     when turned 49-lost job and mother unwell at that time    Hyperlipidemia     Lumbar radiculopathy     BRETT- 2011    Sleep apnea     Urinary tract infection          Social History   Substance Use Topics    Smoking status: Never Smoker    Smokeless tobacco: Never Used    Alcohol use 6.0 oz/week     10 Glasses of wine per week      Comment: 2 glasses of Red wine with Dinner-No alcohol with drawl         Family History   Problem Relation Age of Onset    Parkinsonism Mother          Objective:     Physical Exam   Constitutional: He is oriented to person, place, and time. He appears well-developed and well-nourished.   BP (!) 131/59   Pulse 64   Ht 5' 7" (1.702 m)   Wt 135.6 kg (299 lb 0.9 oz)   SpO2 97%   BMI 46.84 kg/m²      HENT:   Head: Normocephalic and atraumatic.   Neck: No JVD present. Carotid bruit is not present.   Cardiovascular: Normal rate, regular rhythm, S1 normal and S2 normal.  Exam reveals no gallop.    No murmur heard.  Pulses:       Radial pulses are 2+ on the right side, and 2+ on the left side.        Posterior tibial pulses are 2+ on the right side, and 2+ on the left side.   Pulmonary/Chest: Effort normal. He has no wheezes. He has no rales. Chest wall is not dull to percussion.   Abdominal: Soft. There is no splenomegaly or hepatomegaly. There is no tenderness.   Musculoskeletal:        Right lower leg: He exhibits edema.        Left lower leg: He exhibits edema.   Neurological: He is alert and oriented to person, place, and time. Gait normal.   Skin: Skin is warm and dry. No bruising noted. No cyanosis. Nails show no clubbing. "   Psychiatric: He has a normal mood and affect. His speech is normal and behavior is normal. Judgment and thought content normal. Cognition and memory are normal.         Lab Results   Component Value Date    CHOL 190 06/21/2017    CHOL 247 (H) 02/21/2017    CHOL 244 (H) 11/21/2016     Lab Results   Component Value Date    HDL 47 06/21/2017    HDL 43 02/21/2017    HDL 39 (L) 11/21/2016     Lab Results   Component Value Date    LDLCALC 101.0 06/21/2017    LDLCALC 163.0 (H) 02/21/2017    LDLCALC 170.4 (H) 11/21/2016     Lab Results   Component Value Date    TRIG 210 (H) 06/21/2017    TRIG 205 (H) 02/21/2017    TRIG 173 (H) 11/21/2016     Lab Results   Component Value Date    CHOLHDL 24.7 06/21/2017    CHOLHDL 17.4 (L) 02/21/2017    CHOLHDL 16.0 (L) 11/21/2016     Lab Results   Component Value Date    ALT 32 06/21/2017     Lab Results   Component Value Date    ALT 32 06/21/2017    AST 30 06/21/2017    ALKPHOS 64 06/21/2017    BILITOT 0.7 06/21/2017      Lab Results   Component Value Date    CREATININE 0.9 06/21/2017    BUN 13 06/21/2017     06/21/2017    K 4.6 06/21/2017     06/21/2017    CO2 24 06/21/2017         Assessment:     1. Coronary atherosclerosis due to calcified coronary lesion    2. Shortness of breath    3. Obesity, Class III, BMI 40-49.9 (morbid obesity)    4. Anomalous origin of right coronary artery          Plan:       Shortness of breath  Likely heart failure.  Lasix 40 mg daily  KCl 10 meq daily  Echo.    Anomalous origin of right coronary artery  Will likely obtain a coronary CTA. Will wait until repeat BMP is obtained  In view of body habitus, I don't expect image quality to be very good, but all that needs to be clearly visible are the proximal coronary vessels and aortic root.    Cardiac PET to r/o ischemia.  RTC in 1 mo w BMP

## 2017-08-17 NOTE — Clinical Note
Ruperto, Thank you for referring Yosef Rowe  to the [facility] at [place]. I ordered a PET stress test. Also, he appears to have an anomalous right coronary artery. I don't think that is causing any symptoms.  I will wait a while before scheduling a coronary CTA. I started him on Lasix and want to wait to make sure that his renal function remains normal, before I hit him with a load of IV contrast. Edwin

## 2017-08-17 NOTE — LETTER
August 18, 2017      Ruperto Dexter MD  [address]  [place] [address]           [place] [facility]  [address]  [place] [address]  Phone: [phone]          Patient: Yoesf Rowe   MR Number: [id number]   YOB: 1955   Date of Visit: 8/17/2017       Dear Dr. Ruperto Dexter:    Thank you for referring Yosef Rowe to me for evaluation. Attached you will find relevant portions of my assessment and plan of care.    If you have questions, please do not hesitate to call me. I look forward to following Yosef Rowe along with you.    Sincerely,    Edwin Contreras MD    Enclosure  CC:  No Recipients    If you would like to receive this communication electronically, please contact [email or web address] or [phone] to request more information on [employer] access.    For providers and/or their staff who would like to refer a patient to [facility], please contact us through our one-stop-shop provider referral line, [facility], at [phone].    If you feel you have received this communication in error or would no longer like to receive these types of communications, please e-mail [email or web address]

## 2017-08-18 PROBLEM — Q24.5 ANOMALOUS ORIGIN OF RIGHT CORONARY ARTERY: Status: ACTIVE | Noted: 2017-08-18

## 2017-08-29 ENCOUNTER — CLINICAL SUPPORT (OUTPATIENT)
Dept: CARDIOLOGY | Facility: CLINIC | Age: 62
End: 2017-08-29
Payer: COMMERCIAL

## 2017-08-29 DIAGNOSIS — I25.10 CORONARY ATHEROSCLEROSIS DUE TO CALCIFIED CORONARY LESION: ICD-10-CM

## 2017-08-29 DIAGNOSIS — I25.84 CORONARY ATHEROSCLEROSIS DUE TO CALCIFIED CORONARY LESION: ICD-10-CM

## 2017-08-29 DIAGNOSIS — R06.02 SHORTNESS OF BREATH: ICD-10-CM

## 2017-08-29 LAB
DIASTOLIC DYSFUNCTION: NO
ESTIMATED PA SYSTOLIC PRESSURE: 20.98
RETIRED EF AND QEF - SEE NOTES: 65 (ref 55–65)
TRICUSPID VALVE REGURGITATION: NORMAL

## 2017-08-29 PROCEDURE — 93306 TTE W/DOPPLER COMPLETE: CPT | Mod: S$GLB,,, | Performed by: INTERNAL MEDICINE

## 2017-08-31 ENCOUNTER — TELEPHONE (OUTPATIENT)
Dept: CARDIOLOGY | Facility: CLINIC | Age: 62
End: 2017-08-31

## 2017-08-31 DIAGNOSIS — Q24.5 ANOMALOUS ORIGIN OF RIGHT CORONARY ARTERY: Primary | ICD-10-CM

## 2017-08-31 RX ORDER — METOPROLOL TARTRATE 50 MG/1
TABLET ORAL
Qty: 4 TABLET | Refills: 0 | Status: SHIPPED | OUTPATIENT
Start: 2017-08-31 | End: 2017-10-12

## 2017-08-31 NOTE — TELEPHONE ENCOUNTER
Called pt to schedule coronary CTA as ordered by . Pt states he is not interested in scheduling test at this time. Pt states he will call our office when ready to schedule.

## 2017-09-28 ENCOUNTER — CLINICAL SUPPORT (OUTPATIENT)
Dept: CARDIOLOGY | Facility: CLINIC | Age: 62
End: 2017-09-28
Payer: COMMERCIAL

## 2017-09-28 ENCOUNTER — LAB VISIT (OUTPATIENT)
Dept: LAB | Facility: HOSPITAL | Age: 62
End: 2017-09-28
Attending: INTERNAL MEDICINE
Payer: COMMERCIAL

## 2017-09-28 DIAGNOSIS — I25.10 CORONARY ATHEROSCLEROSIS DUE TO CALCIFIED CORONARY LESION: ICD-10-CM

## 2017-09-28 DIAGNOSIS — R06.02 SHORTNESS OF BREATH: ICD-10-CM

## 2017-09-28 DIAGNOSIS — E66.01 OBESITY, CLASS III, BMI 40-49.9 (MORBID OBESITY): ICD-10-CM

## 2017-09-28 DIAGNOSIS — I25.84 CORONARY ATHEROSCLEROSIS DUE TO CALCIFIED CORONARY LESION: ICD-10-CM

## 2017-09-28 LAB
ANION GAP SERPL CALC-SCNC: 9 MMOL/L
BUN SERPL-MCNC: 19 MG/DL
CALCIUM SERPL-MCNC: 9.2 MG/DL
CHLORIDE SERPL-SCNC: 106 MMOL/L
CO2 SERPL-SCNC: 27 MMOL/L
CREAT SERPL-MCNC: 0.9 MG/DL
DIASTOLIC DYSFUNCTION: NO
EST. GFR  (AFRICAN AMERICAN): >60 ML/MIN/1.73 M^2
EST. GFR  (NON AFRICAN AMERICAN): >60 ML/MIN/1.73 M^2
GLUCOSE SERPL-MCNC: 74 MG/DL
POTASSIUM SERPL-SCNC: 4.5 MMOL/L
SODIUM SERPL-SCNC: 142 MMOL/L

## 2017-09-28 PROCEDURE — 96372 THER/PROPH/DIAG INJ SC/IM: CPT | Mod: S$GLB,,, | Performed by: INTERNAL MEDICINE

## 2017-09-28 PROCEDURE — 36415 COLL VENOUS BLD VENIPUNCTURE: CPT

## 2017-09-28 PROCEDURE — 78492 MYOCRD IMG PET MLT RST&STRS: CPT | Mod: S$GLB,,, | Performed by: INTERNAL MEDICINE

## 2017-09-28 PROCEDURE — 80048 BASIC METABOLIC PNL TOTAL CA: CPT

## 2017-09-28 PROCEDURE — 93015 CV STRESS TEST SUPVJ I&R: CPT | Mod: S$GLB,,, | Performed by: INTERNAL MEDICINE

## 2017-09-28 PROCEDURE — A9555 RB82 RUBIDIUM: HCPCS | Mod: S$GLB,,, | Performed by: INTERNAL MEDICINE

## 2017-09-29 ENCOUNTER — TELEPHONE (OUTPATIENT)
Dept: CARDIOLOGY | Facility: CLINIC | Age: 62
End: 2017-09-29

## 2017-09-29 NOTE — TELEPHONE ENCOUNTER
----- Message from Edwin Contreras MD sent at 9/29/2017 12:09 AM CDT -----  Please let patient know that his/her stress test did not show any suggestion of a significant blockage.

## 2017-10-02 ENCOUNTER — TELEPHONE (OUTPATIENT)
Dept: INTERNAL MEDICINE | Facility: CLINIC | Age: 62
End: 2017-10-02

## 2017-10-02 NOTE — TELEPHONE ENCOUNTER
I was off 9/29 not sure if helen ever called him.  I left [facility] this am returning his call.    We never called him. Not sure what he needs

## 2017-10-12 ENCOUNTER — OFFICE VISIT (OUTPATIENT)
Dept: CARDIOLOGY | Facility: CLINIC | Age: 62
End: 2017-10-12
Payer: COMMERCIAL

## 2017-10-12 VITALS
BODY MASS INDEX: 46.08 KG/M2 | OXYGEN SATURATION: 96 % | HEIGHT: 67 IN | WEIGHT: 293.56 LBS | DIASTOLIC BLOOD PRESSURE: 53 MMHG | SYSTOLIC BLOOD PRESSURE: 110 MMHG | HEART RATE: 58 BPM

## 2017-10-12 DIAGNOSIS — I25.10 CORONARY ATHEROSCLEROSIS DUE TO CALCIFIED CORONARY LESION: ICD-10-CM

## 2017-10-12 DIAGNOSIS — E66.01 OBESITY, CLASS III, BMI 40-49.9 (MORBID OBESITY): ICD-10-CM

## 2017-10-12 DIAGNOSIS — G47.30 SLEEP APNEA, UNSPECIFIED TYPE: ICD-10-CM

## 2017-10-12 DIAGNOSIS — I25.84 CORONARY ATHEROSCLEROSIS DUE TO CALCIFIED CORONARY LESION: ICD-10-CM

## 2017-10-12 DIAGNOSIS — E78.2 MIXED HYPERLIPIDEMIA: ICD-10-CM

## 2017-10-12 DIAGNOSIS — Q24.5 ANOMALOUS ORIGIN OF RIGHT CORONARY ARTERY: ICD-10-CM

## 2017-10-12 DIAGNOSIS — R06.02 SHORTNESS OF BREATH: Primary | ICD-10-CM

## 2017-10-12 PROBLEM — E78.5 HYPERLIPIDEMIA: Status: ACTIVE | Noted: 2017-10-12

## 2017-10-12 PROCEDURE — 99214 OFFICE O/P EST MOD 30 MIN: CPT | Mod: S$GLB,,, | Performed by: NURSE PRACTITIONER

## 2017-10-12 PROCEDURE — 99999 PR PBB SHADOW E&M-EST. PATIENT-LVL III: CPT | Mod: PBBFAC,,, | Performed by: NURSE PRACTITIONER

## 2017-10-12 RX ORDER — ASPIRIN 81 MG/1
81 TABLET ORAL DAILY
Qty: 30 TABLET | Refills: 11 | Status: ON HOLD
Start: 2017-10-12 | End: 2018-06-22 | Stop reason: HOSPADM

## 2017-10-12 NOTE — PATIENT INSTRUCTIONS
"Schedule CTA scan.  Schedule pulmonary function studies.  Can stop taking furosemide ("lasix" or "fluid pill"). Call clinic in 2 weeks to let us know how you are feeling and what your blood pressures are.  Start taking aspirin 81mg ("baby aspirin") daily.  Continue atorvastatin ("lipitor") 20mg daily.  Patient has been encouraged to exercise (walking) a minimum of 30 minutes daily, 5 times per week.       "

## 2017-10-12 NOTE — PROGRESS NOTES
Mr. Rowe is a patient of Dr. Contreras and was last seen in [facility] 8/29/2017.      Subjective:   Patient ID:  Yosef Rowe is a 62 y.o. 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 Rowe is a 61yo male with anomalous coronary artery origin (intra-arterial), CACS of 23, obesity, and sleep apnea here for follow up. He has been complaining of persistent BINGHAM for about 6 months. He continues to feel BINGHAM after about 100 yards of walking. Mr. Rowe denies chest pain with exertion or at rest, palpitations, dizziness, syncope, claudication, PND, or orthopnea. He has some mild leg edema. He is currently taking atorvastatin 20mg and lasix 40mg daily. He is not taking aspirin.  on 6/21/2017. Triglycerides are elevated at 210. Creatinine is 0.9 on 9/28/2017. Hepatic transaminases are within normal limits. He says he does not regularly exercise and says is not considerably active. He has IVORY and uses his CPAP nightly.     Recent Cardiac Tests:    PET Stress (9/28/2017):  CONCLUSIONS: NO CLINICALLY SIGNIFICANT STRESS INDUCED PERFUSION DEFECTS as assessed with PET perfusion imaging.  1. There is no evidence of a discrete hemodynamically significant coronary stenosis.   2. Although no clinically significant stress defect is seen, there is reduced coronary flow reserve. These results suggest mild endothelial dysfunction due to mild, diffuse, non-obstructive coronary atherosclerosis without clinically significant,   localized perfusion defects.   3. Resting wall motion is physiologic. Stress wall motion is physiologic.   4. LV function is normal at rest and stress.  (normal is >= 51%)  5. The ventricular volumes are normal at rest and stress.   6. The extracardiac distribution of radioactivity is normal.   7. There was no previous study  "available to compare.    2D Echo (8/29/2017):  CONCLUSIONS     1 - Normal left ventricular systolic function (EF 60-65%).     2 - Normal left ventricular diastolic function.     3 - Normal right ventricular systolic function .     Current Outpatient Prescriptions   Medication Sig    buPROPion (WELLBUTRIN XL) 150 MG TB24 tablet Take 1 tablet (150 mg total) by mouth once daily.    duloxetine (CYMBALTA) 60 MG capsule Take 1 capsule (60 mg total) by mouth once daily.    furosemide (LASIX) 40 MG tablet Take 1 tablet (40 mg total) by mouth once daily.    multivitamin (ONE DAILY MULTIVITAMIN) per tablet Take 1 tablet by mouth once daily.    zolpidem (AMBIEN) 5 MG Tab Take 1 tablet (5 mg total) by mouth nightly as needed.    atorvastatin (LIPITOR) 20 MG tablet Take 1 tablet (20 mg total) by mouth once daily.     No current facility-administered medications for this visit.      Review of Systems   Constitution: Positive for malaise/fatigue and weight gain.   Eyes: Negative for blurred vision.   Cardiovascular: Positive for dyspnea on exertion. Negative for chest pain, claudication, irregular heartbeat, leg swelling, orthopnea, palpitations, paroxysmal nocturnal dyspnea and syncope.   Respiratory: Positive for shortness of breath and wheezing.    Hematologic/Lymphatic: Negative for bleeding problem.   Skin: Negative for rash.   Musculoskeletal: Negative for myalgias.   Gastrointestinal: Negative for abdominal pain, constipation, diarrhea and nausea.   Genitourinary: Positive for nocturia. Negative for hematuria.   Neurological: Negative for headaches, loss of balance and numbness.   Psychiatric/Behavioral: Negative for altered mental status.   Allergic/Immunologic: Negative for persistent infections.     Objective:        BP (!) 110/53   Pulse (!) 58   Ht 5' 7" (1.702 m)   Wt 133.2 kg (293 lb 8.7 oz)   SpO2 96%   BMI 45.98 kg/m²     Physical Exam   Constitutional: He is oriented to person, place, and time. He " appears well-developed and well-nourished.   HENT:   Head: Normocephalic.   Nose: Nose normal.   Eyes: Pupils are equal, round, and reactive to light.   Neck: No JVD present. Carotid bruit is not present.   Cardiovascular: Normal rate, regular rhythm, S1 normal and S2 normal.  Exam reveals no gallop.    No murmur heard.  Pulses:       Carotid pulses are 2+ on the right side, and 2+ on the left side.       Radial pulses are 2+ on the right side, and 2+ on the left side.        Dorsalis pedis pulses are 2+ on the right side, and 2+ on the left side.   Pulmonary/Chest: Breath sounds normal. No respiratory distress.   Abdominal: Soft. Bowel sounds are normal. He exhibits no distension. There is no tenderness.   Musculoskeletal: Normal range of motion. He exhibits edema (bilateral lower leg edema +1 pitting).   Neurological: He is alert and oriented to person, place, and time.   Skin: Skin is warm and dry. No erythema.   Psychiatric: He has a normal mood and affect. His speech is normal and behavior is normal.   Nursing note and vitals reviewed.    Lab Results   Component Value Date     09/28/2017    K 4.5 09/28/2017     09/28/2017    CO2 27 09/28/2017    BUN 19 09/28/2017    CREATININE 0.9 09/28/2017    GLU 74 09/28/2017    AST 30 06/21/2017    ALT 32 06/21/2017    ALBUMIN 3.6 06/21/2017    PROT 6.7 06/21/2017    BILITOT 0.7 06/21/2017    WBC 7.38 11/21/2016    HGB 15.6 11/21/2016    HCT 46.9 11/21/2016    MCV 98 11/21/2016     11/21/2016    TSH 1.360 11/21/2016    CHOL 190 06/21/2017    HDL 47 06/21/2017    LDLCALC 101.0 06/21/2017    TRIG 210 (H) 06/21/2017              Test(s) Reviewed  I have reviewed the following in detail:  [x] Stress test   [] Angiography   [x] Echocardiogram   [x] Labs   [] Other:       Assessment:         1. Shortness of breath. PET stress negative. Echo shows normal systolic and diastolic function. Patient is sedentary. Recommend regular exercise for cardiac conditioning.  "Will also obtain PFTs to rule out obstructive lung disease.       2. Coronary atherosclerosis due to calcified coronary lesion. Per CTA. Will add ASA to regimen. Discussed importance of daily statin use.     3. Anomalous origin of right coronary artery. Will obtain contrast CTA to evaluate origin of RCA.     4. Obesity, Class III, BMI 40-49.9 (morbid obesity). Discussed importance of daily exercise.     5. Sleep apnea, unspecified type. Compliant with nightly CPAP.     6. Mixed hyperlipidemia. . Trig. 210. On atorvastatin 20mg. Discussed importance of medication compliance.      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    Diagnoses and all orders for this visit:    Shortness of breath  -     Complete PFT w/ bronchodilator; Future; Expected date: 10/12/2017  -     LUNG VOLUMES; Future; Expected date: 10/12/2017    Coronary atherosclerosis due to calcified coronary lesion  -     aspirin (ECOTRIN) 81 MG EC tablet; Take 1 tablet (81 mg total) by mouth once daily.    Anomalous origin of right coronary artery    Obesity, Class III, BMI 40-49.9 (morbid obesity)    Sleep apnea, unspecified type    Mixed hyperlipidemia    Schedule CTA scan.  Schedule pulmonary function studies.  Can stop taking furosemide ("lasix" or "fluid pill"). Call clinic in 2 weeks to let us know how you are feeling and what your blood pressures are.  Start taking aspirin 81mg ("baby aspirin") daily.  Continue atorvastatin ("lipitor") 20mg daily.  Patient has been encouraged to exercise (walking) a minimum of 30 minutes daily, 5 times per week.     Return if symptoms worsen or fail to improve.    ------------------------------------------------------------------    SIENNA Burks, NP-C  Consult Cardiology  "

## 2017-10-16 ENCOUNTER — HOSPITAL ENCOUNTER (OUTPATIENT)
Dept: PULMONOLOGY | Facility: CLINIC | Age: 62
Discharge: HOME | End: 2017-10-16
Payer: COMMERCIAL

## 2017-10-16 DIAGNOSIS — R06.02 SHORTNESS OF BREATH: ICD-10-CM

## 2017-10-16 LAB
POST FEV1 FVC: 0.81
POST FEV1: 1.9
POST FVC: 2.34
PRE FEV1 FVC: 78
PRE FEV1: 1.97
PRE FVC: 2.54
PREDICTED FEV1 FVC: 80
PREDICTED FEV1: 2.97
PREDICTED FVC: 3.69

## 2017-10-16 PROCEDURE — 94727 GAS DIL/WSHOT DETER LNG VOL: CPT | Mod: 51,S$GLB,, | Performed by: INTERNAL MEDICINE

## 2017-10-16 PROCEDURE — 94729 DIFFUSING CAPACITY: CPT | Mod: S$GLB,,, | Performed by: INTERNAL MEDICINE

## 2017-10-16 PROCEDURE — 94060 EVALUATION OF WHEEZING: CPT | Mod: S$GLB,,, | Performed by: INTERNAL MEDICINE

## 2017-10-20 DIAGNOSIS — F51.01 PRIMARY INSOMNIA: ICD-10-CM

## 2017-10-23 ENCOUNTER — TELEPHONE (OUTPATIENT)
Dept: INTERNAL MEDICINE | Facility: CLINIC | Age: 62
End: 2017-10-23

## 2017-10-23 RX ORDER — ZOLPIDEM TARTRATE 5 MG/1
TABLET ORAL
Qty: 30 TABLET | Refills: 0 | Status: SHIPPED | OUTPATIENT
Start: 2017-10-23 | End: 2017-11-14

## 2017-10-25 ENCOUNTER — TELEPHONE (OUTPATIENT)
Dept: CARDIOLOGY | Facility: CLINIC | Age: 62
End: 2017-10-25

## 2017-10-25 NOTE — TELEPHONE ENCOUNTER
Appointment for coronary CTA cancelled by pt. Called pt to reschedule appointment. Pt states he is not interested in rescheduling appointment at this time.

## 2017-11-06 ENCOUNTER — TELEPHONE (OUTPATIENT)
Dept: INTERNAL MEDICINE | Facility: CLINIC | Age: 62
End: 2017-11-06

## 2017-11-06 NOTE — TELEPHONE ENCOUNTER
----- Message from Yulia Boss sent at 11/6/2017  1:06 PM CST -----  Contact: call pt at [phone]  Patient would like to get test results.  Name of test (lab, mammo, etc.):  Pulmonary functions    Date of test:  10/16/17  Ordering provider:   Where was the test performed:    Comments:

## 2017-11-07 ENCOUNTER — TELEPHONE (OUTPATIENT)
Dept: CARDIOLOGY | Facility: CLINIC | Age: 62
End: 2017-11-07

## 2017-11-07 ENCOUNTER — TELEPHONE (OUTPATIENT)
Dept: PSYCHIATRY | Facility: CLINIC | Age: 62
End: 2017-11-07

## 2017-11-07 NOTE — TELEPHONE ENCOUNTER
----- Message from Edwin Contreras MD sent at 11/7/2017 12:03 PM CST -----  The lung test showed mildly reduced flows but there was no improvement after using an inhaler. Also if you look at the report in detail it states that there was some restriction. That is from being overweight not from lung disease.

## 2017-11-07 NOTE — TELEPHONE ENCOUNTER
Cardiology dr cain ordered the test.    I sent his staff a message to advise results once they are back.  Not showing in chart yet.

## 2017-11-14 ENCOUNTER — OFFICE VISIT (OUTPATIENT)
Dept: PSYCHIATRY | Facility: CLINIC | Age: 62
End: 2017-11-14
Payer: COMMERCIAL

## 2017-11-14 DIAGNOSIS — F33.1 MODERATE EPISODE OF RECURRENT MAJOR DEPRESSIVE DISORDER: ICD-10-CM

## 2017-11-14 DIAGNOSIS — F41.1 GENERALIZED ANXIETY DISORDER: ICD-10-CM

## 2017-11-14 PROCEDURE — 99214 OFFICE O/P EST MOD 30 MIN: CPT | Mod: S$GLB,,, | Performed by: PSYCHIATRY & NEUROLOGY

## 2017-11-14 PROCEDURE — 99999 PR PBB SHADOW E&M-EST. PATIENT-LVL I: CPT | Mod: PBBFAC,,, | Performed by: PSYCHIATRY & NEUROLOGY

## 2017-11-14 RX ORDER — DULOXETIN HYDROCHLORIDE 60 MG/1
60 CAPSULE, DELAYED RELEASE ORAL DAILY
Qty: 30 CAPSULE | Refills: 3 | Status: SHIPPED | OUTPATIENT
Start: 2017-11-14 | End: 2018-04-04 | Stop reason: SDUPTHER

## 2017-11-14 RX ORDER — BUPROPION HYDROCHLORIDE 150 MG/1
150 TABLET ORAL DAILY
Qty: 30 TABLET | Refills: 3 | Status: SHIPPED | OUTPATIENT
Start: 2017-11-14 | End: 2018-01-31

## 2017-11-14 NOTE — PROGRESS NOTES
Outpatient Psychiatry Follow-Up Visit (MD/NP)    11/14/2017    Clinical Status of Patient:  Outpatient (Ambulatory)    Chief Complaint:  Yosef Rowe is a 62 y.o. male who presents today for follow-up of depression and anxiety.  Met with patient.      Interval History and Content of Current Session:  Interim Events/Subjective Report/Content of Current Session: This is Dr. Kaufman's patient.  He continues to do well.  He is now only Cymbalta and Wellbutrin.  He stopped Ambien because it didn't work.  He is using an OTC preparation.  We reviewed good sleep practices.  He has sleep apnes and uses his CPAP regularly.  He is in good spirits today.  His new tracey is going well.    Psychotherapy:  · Target symptoms: depression, anxiety   · Why chosen therapy is appropriate versus another modality: relevant to diagnosis  · Outcome monitoring methods: self-report, observation  · Therapeutic intervention type: supportive psychotherapy  · Topics discussed/themes: work stress, building skills sets for symptom management, symptom recognition  · The patient's response to the intervention is accepting. The patient's progress toward treatment goals is good.   · Duration of intervention: 10 minutes.    Review of Systems   · PSYCHIATRIC: Pertinant items are noted in the narrative.    Past Medical, Family and Social History: The patient's past medical, family and social history have been reviewed and updated as appropriate within the electronic medical record - see encounter notes.    Compliance: yes    Side effects: None    Risk Parameters:  Patient reports no suicidal ideation  Patient reports no homicidal ideation  Patient reports no self-injurious behavior  Patient reports no violent behavior    Exam (detailed: at least 9 elements; comprehensive: all 15 elements)   Constitutional  Vitals:  Most recent vital signs, dated less than 90 days prior to this appointment, were reviewed.   There were no vitals filed for this visit.      General:  unremarkable, age appropriate     Musculoskeletal  Muscle Strength/Tone:  no tremor   Gait & Station:  non-ataxic     Psychiatric  Speech:  no latency; no press   Mood & Affect:  euthymic  congruent and appropriate   Thought Process:  normal and logical   Associations:  intact   Thought Content:  normal, no suicidality, no homicidality, delusions, or paranoia   Insight:  intact   Judgement: behavior is adequate to circumstances   Orientation:  grossly intact   Memory: intact for content of interview   Language: grossly intact   Attention Span & Concentration:  able to focus   Fund of Knowledge:  intact and appropriate to age and level of education     Assessment and Diagnosis   Status/Progress: Based on the examination today, the patient's problem(s) is/are well controlled.  New problems have not been presented today.   Co-morbidities are not complicating management of the primary condition.  There are no active rule-out diagnoses for this patient at this time.     General Impression: Doing well--depression in remission      ICD-10-CM ICD-9-CM   1. Moderate episode of recurrent major depressive disorder F33.1 296.32   2. Generalized anxiety disorder F41.1 300.02       Intervention/Counseling/Treatment Plan   · Medication Management: Continue current medications.      Return to Clinic: 3 months- follow up with Dr. Kaufman.  Contact me PRN in meantime.

## 2017-11-20 ENCOUNTER — TELEPHONE (OUTPATIENT)
Dept: BARIATRICS | Facility: CLINIC | Age: 62
End: 2017-11-20

## 2017-11-20 NOTE — PROGRESS NOTES
"Subjective:       Patient ID: Yosef Rowe is a 62 y.o. male.    Chief Complaint: No chief complaint on file.    CC: "I can't lose any weight"    Current attempts at weight loss: New pt to me, referred by Ruperto Dexter MD  [address] [facility] [address]  [place] [address] , with Patient Active Problem List:     Acute UTI     ED (erectile dysfunction) of organic origin     Knee pain     Insomnia     Jock itch     Penile lesion     Sleep apnea     Macrocytosis     Elevated BP     Osteoarthritis, knee     Depression     LBP (low back pain)     Urinary outflow obstruction     Urgency incontinence     BPH (benign prostatic hypertrophy) with urinary obstruction     Moderate episode of recurrent major depressive disorder     Generalized anxiety disorder     Coronary atherosclerosis due to calcified coronary lesion     Shortness of breath     Obesity, Class III, BMI 40-49.9 (morbid obesity)     Anomalous origin of right coronary artery     Hyperlipidemia     IVORY     Does not exercise 2/2 to SOB. Feels he eats healthy. Gets a lot of indigestion, belching. .     Previous diet attempts:  Years ago, did sugar busters. Felt it was hard to follow while working.     History of medication for loss: denies.   checked today     Heaviest weight: 290s#.    Lightest weight: 160#      Goal weight: 200#          Typical eating patterns: Works in [employer]. Lives with wife. She cooks.   Breakfast: Skips. Weekends: Abdullahi or ham and eggs.     Lunch: 6 inch po-boy or grilled chicken salad or sushi. Weekends: similar.     Dinner: chicken and veg or broccoli, cauliflower, beans and rice, fish or shrimp with veg. Steak with baked potato and veg. Weekend: Similar    Snacks: Nuts    Beverages:water, iced tea- unsweetened+ 2 glasses wine nightly. Occasional old fashoin or vodka    Willingness to change: 7/10    EKG:EKG Conclusions:    1. The EKG portion of this study is negative for ischemia at a peak heart rate of 55 bpm (35% of " "predicted).   2. Blood pressure remained stable throughout the protocol  (Presenting BP: 120/87 Peak BP: 111/74).   3. No significant arrhythmias were present.   4. There were no symptoms of chest discomfort or significant dyspnea throughout the protocol  Echo:  CONCLUSIONS: NO CLINICALLY SIGNIFICANT STRESS INDUCED PERFUSION DEFECTS as assessed with PET perfusion imaging.  1. There is no evidence of a discrete hemodynamically significant coronary stenosis.   2. Although no clinically significant stress defect is seen, there is reduced coronary flow reserve. These results suggest mild endothelial dysfunction due to mild, diffuse, non-obstructive coronary atherosclerosis without clinically significant,   localized perfusion defects.   3. Resting wall motion is physiologic. Stress wall motion is physiologic.   4. LV function is normal at rest and stress.  (normal is >= 51%)  5. The ventricular volumes are normal at rest and stress.   6. The extracardiac distribution of radioactivity is normal.   7. There was no previous study available to compare      BMR: [id number]      Review of Systems   Constitutional: Negative for chills and fever.   Respiratory: Positive for apnea and shortness of breath.         Wears CPAP nightly   Cardiovascular: Positive for leg swelling. Negative for chest pain.   Gastrointestinal: Negative for constipation and diarrhea.        + indigestion   Genitourinary: Negative for difficulty urinating and dysuria.   Musculoskeletal: Positive for arthralgias and back pain.   Neurological: Negative for dizziness and light-headedness.   Psychiatric/Behavioral: Negative for dysphoric mood. The patient is not nervous/anxious.        Objective:     /70   Pulse 60   Ht 5' 7" (1.702 m)   Wt 132.3 kg (291 lb 10.7 oz)   BMI 45.68 kg/m²     Physical Exam   Constitutional: He is oriented to person, place, and time. He appears well-developed. No distress.   Morbidly obese     HENT:   Head: Normocephalic and " atraumatic.   Mouth/Throat: No oropharyngeal exudate.   Eyes: Pupils are equal, round, and reactive to light. No scleral icterus.   Neck: Normal range of motion. Neck supple. No thyromegaly present.   Cardiovascular: Normal rate, regular rhythm and normal heart sounds.  Exam reveals no gallop and no friction rub.    No murmur heard.  Pulmonary/Chest: Effort normal and breath sounds normal. No respiratory distress. He has no wheezes.   Abdominal: Soft. Bowel sounds are normal. He exhibits no distension. There is no tenderness.   Musculoskeletal: Normal range of motion. He exhibits no edema.   Neurological: He is alert and oriented to person, place, and time.   Skin: Skin is warm and dry.   Psychiatric: He has a normal mood and affect. His behavior is normal. Judgment normal.   Vitals reviewed.      Assessment:       1. BINGHAM (dyspnea on exertion)    2. Sleep apnea, unspecified type    3. Diastasis recti    4. Morbid obesity with BMI of 45.0-49.9, adult        Plan:         1. BINGHAM (dyspnea on exertion)  Expect improvement with weight loss. Graded exercise. Perceived rate of exertion discussed.     2. Sleep apnea, unspecified type  Discussed importance of compliance with treatment, and that IVORY does require getting closer to normal BMI range to see improvement that some other co-morbidities. Continue with CPAP.      3. Diastasis recti  Diastasis recti exercises given.     4. Morbid obesity with BMI of 45.0-49.9, adult  Meal ideas, healthy all day and holiday tips given    Exercise 20 min 3 times a week.    Limit alcohol to 4 drinks per week.     Decrease portions by 1/4    Add protein at breakfast.     Patient was informed that topiramate is used for migraine prevention and seizures. Weight loss is a common side effect that is well documented. He understands this. He was informed of the potential side effects such as serious and possibly fatal rash in which case the medication should be discontinued immediately.  Paresthesias, forgetfulness, fatigue, kidney stones, GI symptoms, and changes in lab values such as electrolytes, blood counts and kidney function.

## 2017-11-21 ENCOUNTER — INITIAL CONSULT (OUTPATIENT)
Dept: BARIATRICS | Facility: CLINIC | Age: 62
End: 2017-11-21
Payer: COMMERCIAL

## 2017-11-21 VITALS
WEIGHT: 291.69 LBS | DIASTOLIC BLOOD PRESSURE: 70 MMHG | HEART RATE: 60 BPM | BODY MASS INDEX: 45.78 KG/M2 | SYSTOLIC BLOOD PRESSURE: 124 MMHG | HEIGHT: 67 IN

## 2017-11-21 DIAGNOSIS — E66.01 MORBID OBESITY WITH BMI OF 45.0-49.9, ADULT: ICD-10-CM

## 2017-11-21 DIAGNOSIS — G47.30 SLEEP APNEA, UNSPECIFIED TYPE: ICD-10-CM

## 2017-11-21 DIAGNOSIS — M62.08 DIASTASIS RECTI: ICD-10-CM

## 2017-11-21 DIAGNOSIS — R06.09 DOE (DYSPNEA ON EXERTION): ICD-10-CM

## 2017-11-21 PROCEDURE — 99999 PR PBB SHADOW E&M-EST. PATIENT-LVL III: CPT | Mod: PBBFAC,,, | Performed by: INTERNAL MEDICINE

## 2017-11-21 PROCEDURE — 99244 OFF/OP CNSLTJ NEW/EST MOD 40: CPT | Mod: S$GLB,,, | Performed by: INTERNAL MEDICINE

## 2017-11-21 RX ORDER — TOPIRAMATE 50 MG/1
50 CAPSULE, EXTENDED RELEASE ORAL DAILY
Qty: 30 CAPSULE | Refills: 2 | Status: SHIPPED | OUTPATIENT
Start: 2017-11-21 | End: 2018-02-20

## 2017-11-21 NOTE — LETTER
November 29, 2017      Ruperto Dexter MD  [address] [facility] [place] [address]           [facility]  [address]  [facility] [address]  Phone: [phone]  Fax: [phone]          Patient: Yosef Rowe   MR Number: [id number]   YOB: 1955   Date of Visit: 11/21/2017       Dear Dr. Ruperto Dexter:    Thank you for referring Yosef Rowe to me for evaluation. Attached you will find relevant portions of my assessment and plan of care.    If you have questions, please do not hesitate to call me. I look forward to following Yosef Rowe along with you.    Sincerely,    Lizzie Fried MD    Enclosure  CC:  No Recipients    If you would like to receive this communication electronically, please contact [email or web address] or [phone] to request more information on Chat& (ChatAnd) Link access.    For providers and/or their staff who would like to refer a patient to [facility], please contact us through our one-stop-shop provider referral line, [facility] , at [phone].    If you feel you have received this communication in error or would no longer like to receive these types of communications, please e-mail [email or web address]

## 2017-11-21 NOTE — PATIENT INSTRUCTIONS
Patient was informed that topiramate is used for migraine prevention and seizures. Weight loss is a common side effect that is well documented. He understands this. He was informed of the potential side effects such as serious and possibly fatal rash in which case the medication should be discontinued immediately. Paresthesias, forgetfulness, fatigue, kidney stones, GI symptoms, and changes in lab values such as electrolytes, blood counts and kidney function.    Exercise 20 min 3 times a week.    Limit alcohol to 4 drinks per week.     Decrease portions by 1/4    Add protein at breakfast.     Meal Ideas for Regular Bariatric Diet  *Recipes and products available at www.bariatriceating.com      Breakfast: (15-20g protein)    - Egg white omelet: 2 egg whites or ½ cup Egg Beaters. (Optional proteins: cheese, shrimp, black beans, chicken, sliced turkey) (Optional veggies: tomatoes, salsa, spinach, mushrooms, onions, green peppers, or small slice avocado)     - Egg and sausage: 1 egg or ¼ cup Egg Beaters (any variety), with 1 jory or 2 links of Turkey sausage or Veggie breakfast sausage (Eko USA or Hazel Mail)    - Crust-less breakfast quiche: To make a glass pie dish, mix 4oz part skim Ricotta, 1 cup skim milk, and 2 eggs as your base. Add protein: shredded cheese, sliced lean ham or turkey, turkey rasheed/sausage. Add veggies: tomato, onion, green onion, mushroom, green pepper, spinach, etc.    - Yogurt parfait: Mix 1 - 6oz container Dannon Light N Fit vanilla yogurt, with ¼ cup Kashi Go Lean cereal    - Cottage cheese and fruit: ½ cup part-skim cottage cheese or ricotta cheese topped with fresh fruit or sugar free preserves     - Connie Luz's Vanilla Egg custard* (add 2 Tbsp instant coffee granules to make Cappuccino Custard*)    - Hi-Protein café latte (skim milk, decaf coffee, 1 scoop protein powder). Optional to add Sugar free syrup or extract flavoring.    Lunch: (20-30g protein)    - ½ cup Black bean soup  (Homemade or Progresso), with ¼ cup shredded low-fat cheese. Top with chopped tomato or fresh salsa.     - Lean deli turkey breast and low-fat sliced cheese, mustard or light rodriguez to moisten, rolled up together, or wrapped in a Ben lettuce leaf    - Chicken salad made from dinner leftovers, moisten with low-fat salad dressing or light rodriguez. Also try leftover salmon, shrimp, tuna or boiled eggs. Serve ½ cup over dark green salad    - Fat-free canned refried beans, topped with ¼ cup shredded low-fat cheese. Top with chopped tomato or fresh salsa.     - Greek salad: Top mixed greens with 1-2oz grilled chicken, tomatoes, red onions, 2-3 kalamata olives, and sprinkle lightly with feta cheese. Spritz with Balsamic vinegar to taste.     - Crust-less lunch quiche: To make a glass pie dish, mix 4oz part skim Ricotta, 1 cup skim milk, and 2 eggs as your base. Add protein: shredded cheese, sliced lean ham or turkey, shrimp, chicken. Add veggies: tomato, onion, green onion, mushroom, green pepper, spinach, artichoke, broccoli, etc.    - Pizza bake: tomato sauce, low-fat shredded mozzarella and turkey pepperoni or Vincentian rasheed. Add any veggies.    - Cucumber crab bites: Spread ¼ cup crab dip (lump crabmeat + light cream cheese and green onions) over sliced cucumber.     - Chicken with light spinach and artichoke dip*: Puree in : 6oz cooked and drained spinach, 2 cloves garlic, 1 can cannelloni beans, ½ cup chopped green onions, 1 can drained artichoke hearts (not marinated in oil), lemon juice and basil. Mix in 2oz chopped up chicken.    Supper: (20-30g protein)    - Serve grilled fish over dark green salad tossed with low-fat dressing, served with grilled asparagus marks     - Rotisserie chicken salad: served with sliced strawberries, walnuts, fat-free feta cheese crumbles and 1 tbsp Bonds Own Light Raspberry Banner Vinaigrette    - Shrimp cocktail: Dip cold boiled shrimp in homemade low-sugar cocktail  sauce (1/2 cup Amadou One Carb ketchup, 2 tbsp horseradish, 1/4 tsp hot sauce, 1 tsp Worcestershire sauce, 1 tbsp freshly-squeezed lemon juice). Serve with dark green salad, walnuts, and crumbled blue cheese drizzled with olive oil and Balsamic vinegar    - Tuna Melt: Spread tuna salad onto 2 thick slices of tomato. Top with low-fat cheese and broil until cheese is melted. May also be made with chicken salad of shrimp salad. [place] with different types of cheeses.    - Homemade low-fat Chili using extra lean ground beef or ground turkey. Top with shredded cheese and salsa as desired. May add dollop fat-free sour cream if desired    - Dinner Omelet with shrimp or chicken and onion, green peppers and chives.    - No noodle lasagna: Use sliced zucchini or eggplant in place of noodles.  Layer with part skim ricotta cheese and low sugar meat sauce (use very lean ground beef or ground turkey).    - Mexican chicken bake: Bake chunks of chicken breast or thigh with taco seasoning, Pace brand enchilada sauce, green onions and low-fat cheese. Serve with ¼ cup black beans or fat free refried beans topped with chopped tomatoes or salsa.    - Kadi frozen meatballs, simmered in Classico Marinara sauce. Different flavors of salsa or spaghetti sauce create different dishes! Sprinkle with parmesan cheese. Serve with grilled or steamed veggies, or a dark green salad.    - Simmer boneless skinless chicken thigh chunks in Classico Marinara sauce or roasted salsa until tender with chopped onion, bell pepper, garlic, mushrooms, spinach, etc.     - Hamburger, without the bun, dressed the way you like. Served with grilled or steamed veggies.    - Eggplant parmesan: Bake slices of eggplant at 350 degrees for 15 minutes. Layer tomato sauce, sliced eggplant and low-fat mozzarella cheese in a baking dish and cover with foil. Bake 30-40 more minutes or until bubbly. Uncover and bake at 400 degrees for about 15 more minutes, or until  top is slightly crisp.    - Fish tacos: grilled/baked white fish, wrapped in Ben lettuce leaf, topped with salsa, shredded low-fat cheese, and light coleslaw.    Snacks: (100-200 calories; >5g protein)    - 1 low-fat cheese stick with 8 cherry tomatoes or 1 serving fresh fruit  - 4 thin slices fat-free turkey breast and 1 slice low-fat cheese  - 4 thin slices fat-free honey ham with wedge of melon  - 1/4 cup unsalted nuts with ½ cup fruit  - 6-oz container Dannon Light n Fit vanilla yogurt, topped with 1oz unsalted nuts         - apple, celery or baby carrots spread with 2 Tbsp natural peanut butter or almond butter   - apple slices with 1 oz slice low-fat cheese  - celery, cucumber, bell pepper or baby carrots dipped in ¼ cup hummus bean spread or light spinach and artichoke dip (*recipe in lunch section)  - 100 calorie bag microwave light popcorn with 3 tbsp grated parmesan cheese  - Rodri Links Beef Steak - 14g protein! (similar to beef jerky)  - 2 wedges [place] - Light Herb & Garlic Cheese with sliced cucumber or green bell pepper  - 1/2 cup low-fat cottage cheese with ¼ cup fruit or ¼ cup salsa  - RTD Protein drinks: Atkins, Low Carb Slim Fast, EAS light, Muscle Milk Light, etc.  - Homemade Protein drinks: GNC Soy95, Isopure, Nectar, UNJURY, Whey Gourmet, etc. Mix 1 scoop powder with 8oz skim/1% milk or light soymilk.  - Protein bars: Atkins, EAS, Pure Protein, Think Thin, Detour, etc. Must have 0-4 grams sugar - Read the label.    Takeout Options: No more than twice/week  Deli - Salads (no pasta or rice), meats, cheeses. Roasted chicken. Lox (salmon)    Mexican - Platters which don't include tortillas, chips, or rice. Go easy on the beans. Example: Fajitas without the tortillas. Ask the  not to bring chips to the table if they are too tempting.    Greek - Meat or fish and vegetable, but no bread or rice. Including hummus, baba ganoush, etc, is OK. Most sit-down Greek restaurants can provide  you with cucumber slices for dipping instead of rikki bread.    Fast Food (Avoid as much as possible) - Salads (no croutons and limit salad dressing to 2 tbsp), grilled chicken sandwich without the bun and ask for no rodriguez. Leonoras low fat chili or Taco Bell pintos and cheese.    BBQ - The meats are fine if you ask for sauces on the side, but most of the traditional side dishes are loaded with carbs. Abner slaw, baked beans and BBQ sauce are typically made with sugar.    Chinese - Nothing deep-fried, no rice or noodles. Many Chinese sauces have starch and sugar in them, so you'll have to use your judgement. If you find that these sauces trigger cravings, or cause Dumping, you can ask for the sauce to be made without sugar or just use soy sauce.       Eating well to be healthy and lose weight does not have to be hard. It also does not have to be time consuming or expensive. There a lots of ways you can work in healthy choices into your day. Many of these are easy, quick and even family friendly!    Homemade hazelnut au lait  Brew your favorite brand of hazelnut flavored coffee (Community makes a good one). Microwave 1/2 cup of milk that fits your eating plan (whole, skim or sugar-free almond milk can all work). Add half to 1 oz sugar free hazelnut syrup.     Quick and easy breakfast  1-2 boiled eggs or mini-frittatas with a tangerine. The boiled eggs and mini-frittatas can both be made ahead and last for up to 4 days in the refrigerator. Bonus if you portion them out in ready to go containers or zipper bags.     Breakfast Egg Muffins with Rasheed and Spinach  Makes 12 muffins  Ingredients    6 eggs  ¼ cup milk  ¼ teaspoon salt  2 cups grated cheddar cheese  3/4 cup spinach, cooked and drained (about 8 oz fresh spinach)  6 rasheed slices, cooked, drained of fat, and chopped  1/2 cup grated Parmesan cheese (optional)    Instructions      Preheat oven to 350 degrees. Use a regular 12-cup muffin pan. Spray the muffin pan with  non-stick cooking spray.  In a large bowl, beat eggs until smooth. Add milk, salt, Cheddar cheese and mix. Stir spinach, cooked rasheed into the egg mixture. Ladle the egg mixture into greased muffin cups ¾ full.  Top each muffin cup with grated Parmesan cheese.  Bake for 25 minutes. Remove from the oven, let the muffins cool for 30 minutes before removing them from the pan.      Be a brown bagger! When you make dinner, plan for an extra helping. When you serve your plate for dinner, serve an additional helping into a container that you can take with you the next day. If you don't have a refrigerator available during the day, an insulated lunch bag and ice packs will help you safely store you lunch.     Cold Brewed Iced tea. Fill a pitcher with 64 oz filtered water. Add either 4 regular tea bags of your choice or a large iced tea bag. Refrigerate over night then remove the tea bags. The tea will not be bitter and is super flavorful. Get creative! Try combinations like green tea and hibiscus tea or black tea with lemon zinger. Add orange or lemon slices for even more flavor.     Snack wisely. Protein filled snacks will fill you up, allowing you to get by with fewer calories. String cheese, pork skins (chicharrones), turkey pepperoni, or celery with cream cheese will all fit the bill.       Ditch the take out. Turkey tacos (with or without a low carb tortilla), burgers (without the bun), or fun stir fries are all quick and easy. The whole family will be happy, and you can save calories and money.      Orange Chicken Stir constantino with asparagus   Makes 6 servings  Ingredients:    1.5 lbs boneless skinless chicken breast/tenders, diced into 1-inch pieces  1 Tbsp extra virgin olive or avocado oil, divided  2 lb asparagus, end portions trimmed and remainder diced into 1 1/2-inch pieces  1 small yellow onion, sliced into thin strips  8 oz button mushrooms, sliced  1 Tbsp peeled and finely grated fresh sarah  4 cloves garlic,  minced  1/2 cup low-sodium chicken broth  Juice of 2 fresh oranges  2 Tbsp low sodium soy sauce  2 Tbsp cornstarch  Sea salt and freshly ground black pepper    Directions:    In a 12-inch non-stick wok, heat 1/2 oil over moderately high heat. Once oil is hot, add diced chicken and season lightly with salt and pepper. Sauté until cooked through, tossing occasionally, about 5-6 minutes.  Place chicken on a large plate and set aside. Return wok, reduce to medium-high heat, add remaining oil.  Once oil is hot, add asparagus, yellow onion and mushrooms, and sauté until tender-crisp, about 4 - 5 minutes, adding in garlic and sarah during the last 1 minute of sautéing.  Meanwhile, in a mixing bowl whisk together chicken broth, orange juice, soy sauce and cornstarch until well blended.  Pour chicken broth mixture into skillet with veggies, season with salt and pepper to taste, and bring mixture to a light boil, stirring constantly. Allow mixture to gently boil, stirring constantly, until thickened, about 1 minute.  Toss chicken into mixture and serve immediately over cauliflower rice or Shirataki noodles.      Skinny Chicken Tortilla Soup  Makes 7 servings    2 teaspoons olive oil  1 cup onion, chopped (about 1 small)  2 cups celery, sliced (about 4 medium stalks)  4 garlic cloves, minced  4 medium tomatoes, chopped  2 cups water  4 cups low-sodium organic chicken broth  3 cups chopped and/or shredded rotisserie chicken, skinless  2 cups sliced carrots (about 3 medium)  1 teaspoon dried oregano leaves  2 teaspoons chili powder  1 teaspoon garlic powder  2 teaspoons cumin  ½ teaspoon cayenne pepper (add less or omit, if you don't want a spicy soup)  ½ teaspoon sea salt + more to taste  ½ teaspoon pepper + more to taste    Directions:   Put all ingredients into a large crock pot. Cook on low for 5-6 hours.     Optional garnish with chopped avocado, chopped fresh cilantro, crumbled Cotija cheese, sour cream, Greek yogurt,  your favorite hot sauce.           Vegan Avocado Banana Chocolate Pudding  Makes 4 servings  Ingredients    1 1/2 ripe avocados  2 ripe bananas  6 tbsp raw cacao powder or unsweetened cocoa powder  2-3 tbsp maple syrup (or calorie free sweetener)  1/4 cup almond milk  Instructions    Blend everything together in a  until the consistency is smooth and velvety. Taste and see if more sweetener is needed and stir to make sure everything is evenly mixed. Blend a second time if needed.  Top with banana slices, raw cacao nibs, almond butter, or any other toppings and enjoy!    Helpful tips to survive the holidays:  - Dont save yourself for the meal: Make sure you continue to eat high protein small meals every 3-4 hours to ensure to do not become over-hungry. Avoid temptation by not showing up to a holiday party or gathering hungry.   - Plan ahead. Bring a dish to a party if you know there may not be an appropriate option.   - Choose sugar-free drinks: Stick to water or other sugar-free beverages and make sure you are getting 6-8 cups of fluid each day (but not with meals!). Avoid alcohol, carbonated beverages, and high-fat/high-sugar beverages like hot chocolate and eggnog. Try sugar-free hot cocoa made with skim milk or water, or sugar-free spiced tea to add some holiday flair to your beverage (see sugar-free mulled cider recipe below)  - Take your time: Eat mindfully. Dont graze on food throughout the day. Sit down to enjoy your small meals. Chew slowly and thoroughly. Cut your food into small pieces before eating.  - Listen to your body: Stop eating as soon as you feel full. Do not feel pressured to try certain (or all) foods or to eat all of the food on your plate. Listen to your hunger cues.   - REMEMBER: Make your holidays about spending time with family and friends instead of focusing gatherings around food.  - Keep up your exercise routine: Make sure you continue to get regular exercise throughout  the holiday season. Encourage friends and family to be active by taking a walk together after a meal, to look at holiday lights, or to window-shop.    Good Holiday Meal Options:  - Roasted Turkey, NO skin. Use low sodium broth instead of gravy.   - Stuffed Bell Peppers made WITHOUT bread crumbs or Rice. Try using parmesan cheese instead  - Gumbo, NO rice. Try picking out mostly the meat/seafood and vegetables with little broth.   - Green Bean Casserole made with 98% fat free cream of mushroom soup and crushed almonds/pecans instead of fried onions  - Side salad w/ low fat dressing. Try a different kind of salad maybe use Kale or spinach.   - Roasted non-starchy vegetables like brussel sprouts, broccoli, green beans, zucchini, butternut squash, cauliflower  - Cauliflower Mash (steam or roast cauliflower, puree w/ low fat cheese, dash of fat free milk and 2-3 sprays of I cant believe its not butter spray. Add garlic powder and black pepper to season). Use Low sodium broth instead of gravy.   - Try Loaded Cauliflower Mash (Make cauliflower like above cauliflower mash. Top with diced turkey rasheed, ¼ cup low fat cheddar cheese and bake @ 350* F for 5-10 minutes, until cheese is melted. Top with minced chives, black pepper and garlic to taste).   - Homemade cranberry sauce using Splenda or another alternative sweetener. Boil fresh cranberries and add splenda to taste. Boil until cranberries break open and then simmer until it reaches the consistency you want (less time for more watery sauce and simmer for longer to create a thicker sauce).   - Deviled eggs: make using low fat rodriguez, mustard, DILL relish (not sweet relish).   - Vegetable tray w/ Greek yogurt Ranch Dip. Mix 1 packet of hidden valley ranch dip mix w/ 16 oz low fat plain greek yogurt.     Good Holiday Dessert Options:  - High protein Pumpkin Cheesecake (see recipe below)  - Pumpkin Whip (see recipe below)  - Quest Apple Pie or Cinnamon Roll flavored protein  bar (warm in microwave for 10-15 seconds)  - Eggnog Protein shake (see recipe below)  - Fresh fruit w/ low fat cheese  - Sugar-free Jello Parfaits. Layer Red and Green sugar-free jello in cups and top w/ 2 tbsp Sugar-free cool-whip    Pumpkin Cheesecake    8 ounces fat free cream cheese, softened   2 scoops of vanilla protein powder (<4 g sugar per serving)   ¼ tsp Fine salt   2 eggs, at room temperature   1/3 cup fat free sour cream  1/3 cup fat free half and half  1 15 -ounce can pure pumpkin puree   1 tablespoon pumpkin pie spice, plus more for dusting   Unsalted nuts, crushed  *Add splenda to taste    Directions     1. Preheat the oven to 300 degrees F. Line 18 muffin cups with paper liners. Sprinkle 1 tsp crushed unsalted nuts at the bottom of each of muffin cup liner.     2. In a large bowl, beat the cream cheese, vanilla protein powder and 1/4 teaspoon fine salt on medium-high speed until smooth and creamy, 2 to 3 minutes. Scrape down the sides, reduce speed to low and beat in the eggs, 1 at a time, until combined. Beat in 1/3 cup fat free sour cream and fat free half and half. Stir in the pumpkin puree and pumpkin pie spice until smooth. Divide evenly among cookie-lined paper cups, filling almost all the way to the top.     3. Bake until the filling is just set, 40 to 45 minutes. A sharp knife inserted into the center will come out moist, but clean. Cool completely in tins on a wire rack. Refrigerate until cold, 4 hours, or overnight. Top with a dusting of pumpkin pie spice.    Recipe altered from the following recipe: http://[email or web address]-network-[email or web address]-pumpkin-cheesecakes-recipe.print.html?oc=linkback    Pumpkin Whip    Box of sugar-free vanilla pudding  Can of pumpkin puree  Pumpkin Pie spice (sprinkle to taste)  ½ cup of sugar-free Cool Whip    Directions:  Make sugar-free pudding according to package directions using fat free or 1% milk. Stir in pumpkin and cool whip. Add  pumpkin pie spice to taste.     Egg Nog Protein shake    8 oz skim or 1% milk  1 scoop vanilla protein powder  1 tbsp sugar-free vanilla pudding mix  ½ tsp butter flavor extract  ½ tsp rum extract  ½ tsp cinnamon     Shake together or blend with ice and serve.     Sugar-Free Mulled Cider    3 oz diet cran-apple juice  6 oz water  1 packet sugar-free apple cider mix  ½ tsp apple pie spice  ½ tsp butter flavor extract  1 tbsp Sugar-free Syrup    Mix together. Warm if needed and serve w/ orange wedge and cinnamon stick.         .Exercises for diastasis recti    Start by performing these rehab exercises daily for at least 4-6 weeks before resuming traditional abdominal exercises like crunches.     1. Abdominal Drawing In    When getting in and out of bed, lifting or carrying or getting up from a chair, pull in your stomach as if you were trying to fit into a tight pair of jeans. You should be able to hold this abdominal muscle contraction and breathe normally for 10 seconds. This exercise will safely start to engage and strengthen your abdominal muscles without compromising them.    2. Belly Breathing    Lie flat on your back on a mat. Take a deep inhale, allowing your belly and then your chest to fully inflate. Then, slowly and forcefully exhale, drawing your abdominal wall in as if you had a corset on as you do so.    3. Heel Slides With Alternating Arms    Lie flat on your back on a mat, with your knees bent and feet flat on the floor. Straighten your arms and raise them directly over your shoulders. Exhale, and slowly extend one leg out in front of you, letting it hover a few inches above the floor, and simultaneously extend the opposite arm back above the head, just off of the floor. Inhale and slowly return to start. Repeat on the opposite side. Work to keep your hips and core stable through the entire movement.    4. Quadruped Abdominal    Get on all fours (hands under your shoulders and knees under hips) and  pull your shoulders wide and away from your ears to form a flat back. From here, take a slow, deep inhale, allowing your abdominal wall to relax and expand toward the floor. Then exhale, drawing the muscles up and in while maintaining a flat back.

## 2017-11-21 NOTE — LETTER
[facility]  [address]  [facility] [address]  Phone: [phone]  Fax: [phone] November 29, 2017      Ruperto Dexter MD  [address] [facility]  [place] [address]    Patient: Yosef Rowe   MR Number: [id number]   YOB: 1955   Date of Visit: 11/21/2017     Dear Dr. Dexter:    Thank you for referring Yosef Rowe to me for evaluation. Below are the relevant portions of my assessment and plan of care.    ASSESSMENT:   1. BINGHAM (dyspnea on exertion)    2. Sleep apnea, unspecified type    3. Diastasis recti    4. Morbid obesity with BMI of 45.0-49.9, adult      PLAN:  1. BINGHAM (dyspnea on exertion) - Expect improvement with weight loss. Graded exercise. Perceived rate of exertion discussed.      2. Sleep apnea, unspecified type - Discussed importance of compliance with treatment, and that IVORY does require getting closer to normal BMI range to see improvement that some other co-morbidities. Continue with CPAP.    3. Diastasis recti - Diastasis recti exercises given.      4. Morbid obesity with BMI of 45.0-49.9, adult - Meal ideas, healthy all day and holiday tips given  Exercise 20 min 3 times a week.  Limit alcohol to 4 drinks per week.   Decrease portions by 1/4  Add protein at breakfast  .   Patient was informed that Topiramate is used for migraine prevention and seizures. Weight loss is a common side effect that is well documented. He understands this. He was informed of the potential side effects such as serious and possibly fatal rash in which case the medication should be discontinued immediately. Paresthesias, forgetfulness, fatigue, kidney stones, GI symptoms, and changes in lab values such as electrolytes, blood counts and kidney function.    If you have questions, please do not hesitate to call me. I look forward to following Yosef along with you.    Sincerely,      Lizzie Fried MD   Medical Weight Loss   [facility]     AF/kimberly

## 2017-12-04 ENCOUNTER — TELEPHONE (OUTPATIENT)
Dept: INTERNAL MEDICINE | Facility: CLINIC | Age: 62
End: 2017-12-04

## 2017-12-05 NOTE — TELEPHONE ENCOUNTER
----- Message from Shira Mcrae MA sent at 11/7/2017  1:20 PM CST -----  Contact: pt  [phone]    Pt would like to discuss his medication. Says he has been unable to contact provider and has left several messages.

## 2017-12-05 NOTE — TELEPHONE ENCOUNTER
Please call to let him know Dr Kaufman is out until January on maternity leave thanks    Dr Shields in Psych has seen him

## 2017-12-28 ENCOUNTER — TELEPHONE (OUTPATIENT)
Dept: INTERNAL MEDICINE | Facility: CLINIC | Age: 62
End: 2017-12-28

## 2018-01-09 DIAGNOSIS — F51.01 PRIMARY INSOMNIA: ICD-10-CM

## 2018-01-09 RX ORDER — ZOLPIDEM TARTRATE 5 MG/1
5 TABLET ORAL NIGHTLY PRN
Status: CANCELLED | OUTPATIENT
Start: 2018-01-09 | End: 2018-07-10

## 2018-01-09 RX ORDER — ZOLPIDEM TARTRATE 5 MG/1
5 TABLET ORAL NIGHTLY PRN
Qty: 30 TABLET | Refills: 0 | Status: CANCELLED | OUTPATIENT
Start: 2018-01-09

## 2018-01-09 NOTE — TELEPHONE ENCOUNTER
Please call in refill for this patient's Ambien since I can not do it electronically right now.  Thanks!    Ambien 5mg   Take 1 tablet qHS PRN insomnia.  Dispense: 30  Refills: 0

## 2018-01-11 DIAGNOSIS — F51.01 PRIMARY INSOMNIA: ICD-10-CM

## 2018-01-11 RX ORDER — ZOLPIDEM TARTRATE 5 MG/1
5 TABLET ORAL NIGHTLY PRN
Qty: 30 TABLET | Refills: 0 | Status: SHIPPED | OUTPATIENT
Start: 2018-01-11 | End: 2018-01-11 | Stop reason: SDUPTHER

## 2018-01-11 RX ORDER — ZOLPIDEM TARTRATE 5 MG/1
5 TABLET ORAL NIGHTLY PRN
Qty: 30 TABLET | Refills: 0 | Status: SHIPPED | OUTPATIENT
Start: 2018-01-11 | End: 2018-01-31 | Stop reason: SDUPTHER

## 2018-01-31 ENCOUNTER — OFFICE VISIT (OUTPATIENT)
Dept: PSYCHIATRY | Facility: CLINIC | Age: 63
End: 2018-01-31
Payer: COMMERCIAL

## 2018-01-31 VITALS
HEART RATE: 69 BPM | SYSTOLIC BLOOD PRESSURE: 172 MMHG | WEIGHT: 295 LBS | BODY MASS INDEX: 46.3 KG/M2 | HEIGHT: 67 IN | DIASTOLIC BLOOD PRESSURE: 87 MMHG

## 2018-01-31 DIAGNOSIS — F51.01 PRIMARY INSOMNIA: ICD-10-CM

## 2018-01-31 DIAGNOSIS — F41.1 GENERALIZED ANXIETY DISORDER: ICD-10-CM

## 2018-01-31 DIAGNOSIS — F33.42 RECURRENT MAJOR DEPRESSIVE DISORDER, IN FULL REMISSION: Primary | ICD-10-CM

## 2018-01-31 DIAGNOSIS — Z63.0 MARITAL STRESS: ICD-10-CM

## 2018-01-31 PROCEDURE — 90833 PSYTX W PT W E/M 30 MIN: CPT | Mod: S$GLB,,, | Performed by: INTERNAL MEDICINE

## 2018-01-31 PROCEDURE — 3008F BODY MASS INDEX DOCD: CPT | Mod: S$GLB,,, | Performed by: INTERNAL MEDICINE

## 2018-01-31 PROCEDURE — 99214 OFFICE O/P EST MOD 30 MIN: CPT | Mod: S$GLB,,, | Performed by: INTERNAL MEDICINE

## 2018-01-31 PROCEDURE — 99999 PR PBB SHADOW E&M-EST. PATIENT-LVL III: CPT | Mod: PBBFAC,,, | Performed by: INTERNAL MEDICINE

## 2018-01-31 RX ORDER — ZOLPIDEM TARTRATE 5 MG/1
5 TABLET ORAL NIGHTLY PRN
Qty: 30 TABLET | Refills: 1 | Status: SHIPPED | OUTPATIENT
Start: 2018-01-31 | End: 2018-04-04 | Stop reason: SDUPTHER

## 2018-01-31 SDOH — SOCIAL DETERMINANTS OF HEALTH (SDOH): PROBLEMS IN RELATIONSHIP WITH SPOUSE OR PARTNER: Z63.0

## 2018-01-31 NOTE — PROGRESS NOTES
"OUTPATIENT PSYCHIATRY RETURN VISIT    ENCOUNTER DATE:  1/31/2018  SITE:  [facility]  LENGTH OF SESSION:  22 minutes    CHIEF COMPLAINT:  Insomnia and Family Problem    HISTORY OF PRESENTING ILLNESS:  Yosef Rowe is a 62 y.o. male with history of MDD, SOLITARIO, and insomnia who presents for follow up appointment.  Patient was last seen 7/517, at which time he was continued on Cymbalta 60mg daily, Wellbutrin XL 150mg daily, and Ambien 5mg qHS.  He saw Dr. Shields on 11/14/17 and no medications were changed.      HPI as told by patient:  States he has been "pretty good."  Denies symptoms of depression or anxiety.  Says he is sleeping well most nights with Ambien.  Denies medical side effects.  Attributes improvement in mood to "getting off of so many medications."  Still working although changed jobs - now working for [employer] x 2 month.  Reports continued stress with wife - she would like to do marriage counseling but he is resistant.  Says they have done counseling twice in the past (3-4 years ago) and it was not helpful.      Medication side effects:  No  Medication compliance:  Yes    Psychotherapy:  · Target symptoms: work stress, marital stress  · Why chosen therapy is appropriate versus another modality: relevant to diagnosis  · Outcome monitoring methods: self-report, observation  · Therapeutic intervention type: supportive psychotherapy  · Topics discussed/themes: relationships difficulties, work stress  · The patient's response to the intervention is accepting. The patient's progress toward treatment goals is fair.   · Duration of intervention: 18 minutes.    PSYCHIATRIC REVIEW OF SYSTEMS:  Trouble with sleep:  Occasional, improved with Ambien  Appetite changes:  Denies  Weight changes:  Gain  Lack of energy:  Denies  Anhedonia:  Denies  Somatic symptoms:  Denies  Libido:  Not discussed  Anxiety/panic:  Denies  Guilty/hopeless:  Denies  Self-injurious behavior/risky behavior:  " "Denies  Any drugs:  Denies  Alcohol:  Occasional    MEDICAL REVIEW OF SYSTEMS:  Complete review of systems performed covering Constitutional, Musculoskeletal, Neurologic.  All systems negative except for that covered in HPI.    PAST PSYCHIATRIC, MEDICAL, AND SOCIAL HISTORY REVIEWED  The patient's past medical, family and social history have been reviewed and updated as appropriate within the electronic medical record - see encounter notes.    MEDICATIONS:    Current Outpatient Prescriptions:     aspirin (ECOTRIN) 81 MG EC tablet, Take 1 tablet (81 mg total) by mouth once daily., Disp: 30 tablet, Rfl: 11    atorvastatin (LIPITOR) 20 MG tablet, Take 1 tablet (20 mg total) by mouth once daily., Disp: 30 tablet, Rfl: 11    DULoxetine (CYMBALTA) 60 MG capsule, Take 1 capsule (60 mg total) by mouth once daily., Disp: 30 capsule, Rfl: 3    multivitamin (ONE DAILY MULTIVITAMIN) per tablet, Take 1 tablet by mouth once daily., Disp: , Rfl:     topiramate (TROKENDI XR) 50 mg Cp24, Take 50 mg by mouth once daily., Disp: 30 capsule, Rfl: 2    zolpidem (AMBIEN) 5 MG Tab, Take 1 tablet (5 mg total) by mouth nightly as needed., Disp: 30 tablet, Rfl: 1    ALLERGIES:  Review of patient's allergies indicates:   Allergen Reactions    No known drug allergies      PSYCHIATRIC EXAM:  Vitals:    01/31/18 1001 01/31/18 1046   BP: (!) 187/96 (!) 172/87   Pulse: 75 69   Weight: 133.8 kg (295 lb)    Height: 5' 7" (1.702 m)    Appearance:  Well groomed, appearing healthy and of stated age  Behavior:  Cooperative, pleasant, no psychomotor agitation or retardation  Speech:  Normal rate, rhythm, prosody, and volume  Mood:  "Pretty good"  Affect:  Congruent  Thought Process:  Linear, logical, goal directed  Thought Content:  Negative for suicidal ideation, homicidal ideation, delusions or hallucinations.  Associations:  Intact  Memory:  Grossly Intact  Level of Consciousness/Orientation:  Grossly intact  Fund of Knowledge:  Good  Attention:  " Good  Language:  Fluent, able to name abstract and concrete objects  Insight:  Fair  Judgment:  Intact  Psychomotor signs:  No involuntary movements or tremor  Gait:  Normal    RELEVANT LABS/STUDIES:    Lab Results   Component Value Date    WBC 7.38 11/21/2016    HGB 15.6 11/21/2016    HCT 46.9 11/21/2016    MCV 98 11/21/2016     11/21/2016     BMP  Lab Results   Component Value Date     09/28/2017    K 4.5 09/28/2017     09/28/2017    CO2 27 09/28/2017    BUN 19 09/28/2017    CREATININE 0.9 09/28/2017    CALCIUM 9.2 09/28/2017    ANIONGAP 9 09/28/2017    ESTGFRAFRICA >60.0 09/28/2017    EGFRNONAA >60.0 09/28/2017     Lab Results   Component Value Date    ALT 32 06/21/2017    AST 30 06/21/2017    ALKPHOS 64 06/21/2017    BILITOT 0.7 06/21/2017     Lab Results   Component Value Date    TSH 1.360 11/21/2016       IMPRESSION:    Yosef Rowe is a 62 y.o. male with history of anxiety, depression, and insomnia who presents for follow up appointment.    Status/Progress:  Based on the examination today, the patient's problem(s) is/are improved.  New problems have not been presented today.    Risk Parameters:  Patient reports no suicidal ideation  Patient reports no homicidal ideation  Patient reports no self-injurious behavior  Patient reports no violent behavior    DIAGNOSES:    ICD-10-CM ICD-9-CM   1. Recurrent major depressive disorder, in full remission F33.42 296.36   2. Generalized anxiety disorder F41.1 300.02   3. Primary insomnia F51.01 307.42   4. Marital stress Z63.0 V61.10     PLAN:  · Patient is now in remission from depression and anxiety.  He would like to continue to come off of some medications.  Will stop Wellbutrin and continue Cymbalta 60mg daily for depression and anxiety.  Will continue Ambien 5mg qHS PRN insomnia.  · Patient's BP significantly elevated today, however it has been normal at other recent appointments.  Will monitor closely.  Patient aware that both Cymbalta and  Wellbutrin can increase blood pressure.    · Discussed recommendation of marriage counseling - patient will call if they decide to schedule an appointment.      RETURN TO CLINIC:  Follow-up in about 2 months (around 3/31/2018).

## 2018-02-08 ENCOUNTER — TELEPHONE (OUTPATIENT)
Dept: INTERNAL MEDICINE | Facility: CLINIC | Age: 63
End: 2018-02-08

## 2018-02-08 NOTE — TELEPHONE ENCOUNTER
Spoke with patient regarding symptoms since stopping Wellbutrin.  Has mild headache and stomach upset.  Wondering what he should do.  Discussed options of giving it a few more days to see if symptoms start improving since he has already been off the medication for almost a week at this point.  Also discussed the option of restarting the Wellbutrin.  Patient wants to give it a few more days, and then if he is still feeling the same way he will restart the Wellbutrin XL 150mg daily.

## 2018-02-08 NOTE — TELEPHONE ENCOUNTER
----- Message from Erica Lei sent at 2/7/2018 11:26 AM CST -----  Contact: pt   Pt stated he saw you last week and had him to stop one of his meds.   Stated he's having a very bad time.   Pt cb #   [phone]

## 2018-02-09 DIAGNOSIS — F51.01 PRIMARY INSOMNIA: ICD-10-CM

## 2018-02-09 RX ORDER — ZOLPIDEM TARTRATE 5 MG/1
TABLET ORAL
Qty: 30 TABLET | Refills: 0 | OUTPATIENT
Start: 2018-02-09

## 2018-02-20 ENCOUNTER — OFFICE VISIT (OUTPATIENT)
Dept: BARIATRICS | Facility: CLINIC | Age: 63
End: 2018-02-20
Payer: COMMERCIAL

## 2018-02-20 VITALS
DIASTOLIC BLOOD PRESSURE: 70 MMHG | HEART RATE: 68 BPM | HEIGHT: 67 IN | WEIGHT: 293 LBS | SYSTOLIC BLOOD PRESSURE: 130 MMHG | BODY MASS INDEX: 45.99 KG/M2

## 2018-02-20 DIAGNOSIS — E66.01 MORBID OBESITY WITH BMI OF 45.0-49.9, ADULT: ICD-10-CM

## 2018-02-20 DIAGNOSIS — F33.1 MODERATE EPISODE OF RECURRENT MAJOR DEPRESSIVE DISORDER: ICD-10-CM

## 2018-02-20 PROCEDURE — 3008F BODY MASS INDEX DOCD: CPT | Mod: S$GLB,,, | Performed by: INTERNAL MEDICINE

## 2018-02-20 PROCEDURE — 99999 PR PBB SHADOW E&M-EST. PATIENT-LVL III: CPT | Mod: PBBFAC,,, | Performed by: INTERNAL MEDICINE

## 2018-02-20 PROCEDURE — 99213 OFFICE O/P EST LOW 20 MIN: CPT | Mod: S$GLB,,, | Performed by: INTERNAL MEDICINE

## 2018-02-20 RX ORDER — DIETHYLPROPION HYDROCHLORIDE 75 MG/1
75 TABLET, EXTENDED RELEASE ORAL DAILY
Qty: 30 TABLET | Refills: 2 | Status: SHIPPED | OUTPATIENT
Start: 2018-02-20 | End: 2018-05-29

## 2018-02-20 NOTE — PATIENT INSTRUCTIONS
Patient warned of common side effects of diethylpropion including anxiety, insomnia, palpitations and increased blood pressure. It was also explained that it is for short-term usage along with diet and exercise, and that stopping the medication without making lifestyle changes will result in regain of weight. Patient states understanding.    Weight loss medications are controlled substances.  They require routine follow up. Prescription or pills that are lost or destroyed will not be replaced.     Exercise 30 min 3 times a week.     3 meals a day made up of the following:  Unlimited green vegetables, tomatoes, mushrooms, spaghetti squash, cauliflower, meat, poultry, seafood, eggs and hard cheeses.   Milk and plain yogurt  Dressings, seasonings, condiments, etc should have less than 2 g sugars.   beans or nuts can have 1 x a day.   1-2 servings of citrus fruits, berries, pineapple or melon a day (1/2 cup)  Avoid fried foods    No grains, rice, pasta, potatoes, bread, corn, peas, oatmeal, grits, tortillas, crackers, chips    No soda, sweet tea, juices or lemonade    [email or web address] for recipes. Moderate carb intake      Dinner in Under 30 minutes and 400 calories.     Baked Chicken breast with Broccoli Cheese Casserole  2-3 chicken breast (approximately 6-8 oz each)    2 tsp each garlic and herb Mrs. Dash seasoning   2 tsp your favorite creole seasoning  2 tablespoons of balsamic vinegar  2 - 10 oz packages of frozen broccoli  1 egg   ½ cup skim milk  ½ tsp paprika   ½ tsp cayenne pepper   1 can fat free cream of mushroom soup.   1 .5 cups of shredded cheddar cheese    Pre-heat oven to 450 degrees. Toss 2-3 chicken breast (approximately 6-8 oz each) in 2 tsp each garlic and herb Mrs. Dash seasoning, 2 tsp your favorite creole seasoning, and 2 tablespoons of balsamic vinegar. This can also be done up to 24 hours ahead of time, and the chicken can be left in the refrigerator to marinate. Place on a baking sheet  sprayed with non-stick cooking spray and bake on 450 degrees for 10 min, then reduce heat to 350 degrees and cook an addition 10-15 minutes until chicken is cooked through.   While chicken is baking, microwave safe dish, thaw 2 - 10 oz packages of frozen broccoli. Drain any excess water, season with salt, pepper, all-purpose seasoning of your choice. In another bowl, whisk together 1 egg, ½ cup skim milk, ½ tsp paprika, ½ tsp cayenne pepper and 1 can fat free cream of mushroom soup. Combine broccoli, soup mixture, 1 cup of shredded cheddar cheese in baking dish sprayed with cooking spray. Top with remaining cheese. Bake in the oven with chicken for about 20 min or until set cheese is melted and vincent.     Makes 4 servings. 389 calories per serving.10 g fat, 13 g carbs, 54g protein     Sheet Pan [place] Shrimp  1 pound large shrimp, shelled, peeled and deveined.   2 tablespoon olive oil  The zest and the juice of 1 lime  ½ tsp chili powder  ½-1 tsp cayenne pepper  1 tsp cumin  ½ tsp dry oregano  1 red bell pepper  1 green bell pepper  1 red onion  2 cups mushrooms, quartered  1 avocado  Whole elba lettuce leaves  Preheat oven to 375 degrees.  In a bowl combine shrimp, lime juice, lime zest, cumin, cayenne pepper, chili powder, and a pinch of salt. Set aside.   Slice peppers and onions into thin strips. Toss in 1 tablespoon of olive oil. Sprinkle with salt and pepper and arrange in a single layer over 1/3 of a large sheet pan  Toss mushrooms with remaining olive oil, oregano, salt and pepper. Arrange in single layer on sheet pan. Place sheet pan in oven for about 15-20 minutes until vegetables are softened, cooked about ¾ of the way through. Remove the sheet pan from oven.  Change setting on oven to low broil.  If needed, rearrange vegetables to make room for shrimp. Arrange the shrimp in a single layer on the sheet pan and return pan to oven. After 5 minutes, flip shrimp. Cook 3-5 additional minutes, or until   Shrimp are opaque.   Serve shrimp and cooked vegetables on lettuce leaves with sliced avocado.   Makes 4 servings. Calories per servin; 23g fat, 19 g carbohydrates, 27g protein.    Zoodles Primavera with Seasoned Ricotta  8 cups zucchini noodles. These can be purchased already prepared, or you can make them on your own with whole zucchini and a vegetable spiralizer.   1.5 cups cherry tomatoes, quartered  2 cups sliced mushrooms  1 cup chopped fresh broccoli  1 cup frozen peas and carrots  2 cloves garlic, finely chopped  16 oz part skim ricotta cheese  2 oz Neufchatel cream cream cheese, cut into small cubes  Juice and zest of 1 lemon  1 pinch red pepper flakes    2 tsp Italian seasoning  4-5 leaves fresh basil, thinly sliced  1 tablespoon of olive oil  Parmesan cheese for topping (optional)     In a bowl, combine ricotta cheese, 1 tsp Italian seasoning, ½ teaspoon lemon zest. Season with salt and pepper to taste. Mix well. Fold in half of fresh basil. Set aside.   Heat a large skillet to medium high. Add olive oil. Sautee mushrooms until starting to become tender. Season them with salt and pepper while cooking.  Add broccoli and peas and carrots. Reduce heat to medium. Cover pan and cook for 4-5 minutes until broccoli is tender and peas and carrots are warmed through. Add Neufchatel cheese, Italian seasoning, red pepper flakes, 1 tsp lemon zest and lemon juice. Stir until a smooth sauce is formed. Add zucchini noodles (zoodles) to skillet and toss in sauce, then add cherry tomatoes.  Separate zoodle and vegetables into 4 equal portions. Serve each with a ¼ cup serving of seasoned ricotta cheese. Sprinkle with grated parmesan cheese or fresh basil,  if desired.   Makes 4 servings. Calories per servin calories, 32 g carbs, 33 g protein, 18 g fat

## 2018-02-20 NOTE — PROGRESS NOTES
"Subjective:       Patient ID: Yosef Rowe is a 62 y.o. male.    Chief Complaint: Follow-up    Pt here today for follow up . Has gained 1 lbs. Started topiramate and advised him to decrease portions and ETOH, and increase protein. He states he lost his diet instructions and he just stopped drinking a week ago. Has given up bread in this past week. He has not been taking trokendi. States it upset his stomach when he took it for 4 days.       Review of Systems   Constitutional: Negative for chills and fever.   Respiratory: Positive for apnea and shortness of breath.         Wears CPAP nightly   Cardiovascular: Positive for leg swelling. Negative for chest pain.   Gastrointestinal: Negative for constipation and diarrhea.        + indigestion   Genitourinary: Negative for difficulty urinating and dysuria.   Musculoskeletal: Positive for arthralgias and back pain.   Neurological: Negative for dizziness and light-headedness.   Psychiatric/Behavioral: Negative for dysphoric mood. The patient is not nervous/anxious.        Objective:     /70   Pulse 68   Ht 5' 7" (1.702 m)   Wt 132.9 kg (292 lb 15.9 oz)   BMI 45.89 kg/m²     Physical Exam   Constitutional: He is oriented to person, place, and time. He appears well-developed. No distress.   Morbidly obese     HENT:   Head: Normocephalic and atraumatic.   Eyes: Pupils are equal, round, and reactive to light. No scleral icterus.   Neck: Normal range of motion. Neck supple.   Cardiovascular: Normal rate, regular rhythm and normal heart sounds.  Exam reveals no gallop and no friction rub.    No murmur heard.  Pulmonary/Chest: Effort normal and breath sounds normal. No respiratory distress. He has no wheezes.   Musculoskeletal: Normal range of motion. He exhibits no edema.   Neurological: He is alert and oriented to person, place, and time.   Skin: Skin is warm and dry.   Psychiatric: He has a normal mood and affect. His behavior is normal. Judgment normal.   Vitals " reviewed.      Assessment:       1. Moderate episode of recurrent major depressive disorder    2. Morbid obesity with BMI of 45.0-49.9, adult        Plan:           1. Moderate episode of recurrent major depressive disorder  Watch for and report mood changes with weight loss medications.       2. Morbid obesity with BMI of 45.0-49.9, adult        Patient warned of common side effects of diethylpropion including anxiety, insomnia, palpitations and increased blood pressure. It was also explained that it is for short-term usage along with diet and exercise, and that stopping the medication without making lifestyle changes will result in regain of weight. Patient states understanding.    Weight loss medications are controlled substances.  They require routine follow up. Prescription or pills that are lost or destroyed will not be replaced.     Exercise 30 min 3 times a week.     3 meals a day made up of the following:  Unlimited green vegetables, tomatoes, mushrooms, spaghetti squash, cauliflower, meat, poultry, seafood, eggs and hard cheeses.   Milk and plain yogurt  Dressings, seasonings, condiments, etc should have less than 2 g sugars.   beans or nuts can have 1 x a day.   1-2 servings of citrus fruits, berries, pineapple or melon a day (1/2 cup)  Avoid fried foods    No grains, rice, pasta, potatoes, bread, corn, peas, oatmeal, grits, tortillas, crackers, chips    No soda, sweet tea, juices or lemonade    [email or web address] for recipes. Moderate carb intake      Dinner in Under 30 minutes and 400 calories.     Baked Chicken breast with Broccoli Cheese Casserole  2-3 chicken breast (approximately 6-8 oz each)    2 tsp each garlic and herb Mrs. Dash seasoning   2 tsp your favorite creole seasoning  2 tablespoons of balsamic vinegar  2 - 10 oz packages of frozen broccoli  1 egg   ½ cup skim milk  ½ tsp paprika   ½ tsp cayenne pepper   1 can fat free cream of mushroom soup.   1 .5 cups of shredded cheddar  cheese    Pre-heat oven to 450 degrees. Toss 2-3 chicken breast (approximately 6-8 oz each) in 2 tsp each garlic and herb Mrs. Dash seasoning, 2 tsp your favorite creole seasoning, and 2 tablespoons of balsamic vinegar. This can also be done up to 24 hours ahead of time, and the chicken can be left in the refrigerator to marinate. Place on a baking sheet sprayed with non-stick cooking spray and bake on 450 degrees for 10 min, then reduce heat to 350 degrees and cook an addition 10-15 minutes until chicken is cooked through.   While chicken is baking, microwave safe dish, thaw 2 - 10 oz packages of frozen broccoli. Drain any excess water, season with salt, pepper, all-purpose seasoning of your choice. In another bowl, whisk together 1 egg, ½ cup skim milk, ½ tsp paprika, ½ tsp cayenne pepper and 1 can fat free cream of mushroom soup. Combine broccoli, soup mixture, 1 cup of shredded cheddar cheese in baking dish sprayed with cooking spray. Top with remaining cheese. Bake in the oven with chicken for about 20 min or until set cheese is melted and vincent.     Makes 4 servings. 389 calories per serving.10 g fat, 13 g carbs, 54g protein     Sheet Pan [place] Shrimp  1 pound large shrimp, shelled, peeled and deveined.   2 tablespoon olive oil  The zest and the juice of 1 lime  ½ tsp chili powder  ½-1 tsp cayenne pepper  1 tsp cumin  ½ tsp dry oregano  1 red bell pepper  1 green bell pepper  1 red onion  2 cups mushrooms, quartered  1 avocado  Whole elba lettuce leaves  Preheat oven to 375 degrees.  In a bowl combine shrimp, lime juice, lime zest, cumin, cayenne pepper, chili powder, and a pinch of salt. Set aside.   Slice peppers and onions into thin strips. Toss in 1 tablespoon of olive oil. Sprinkle with salt and pepper and arrange in a single layer over 1/3 of a large sheet pan  Toss mushrooms with remaining olive oil, oregano, salt and pepper. Arrange in single layer on sheet pan. Place sheet pan in oven for about  15-20 minutes until vegetables are softened, cooked about ¾ of the way through. Remove the sheet pan from oven.  Change setting on oven to low broil.  If needed, rearrange vegetables to make room for shrimp. Arrange the shrimp in a single layer on the sheet pan and return pan to oven. After 5 minutes, flip shrimp. Cook 3-5 additional minutes, or until  Shrimp are opaque.   Serve shrimp and cooked vegetables on lettuce leaves with sliced avocado.   Makes 4 servings. Calories per servin; 23g fat, 19 g carbohydrates, 27g protein.    Zoodles Primavera with Seasoned Ricotta  8 cups zucchini noodles. These can be purchased already prepared, or you can make them on your own with whole zucchini and a vegetable spiralizer.   1.5 cups cherry tomatoes, quartered  2 cups sliced mushrooms  1 cup chopped fresh broccoli  1 cup frozen peas and carrots  2 cloves garlic, finely chopped  16 oz part skim ricotta cheese  2 oz Neufchatel cream cream cheese, cut into small cubes  Juice and zest of 1 lemon  1 pinch red pepper flakes    2 tsp Italian seasoning  4-5 leaves fresh basil, thinly sliced  1 tablespoon of olive oil  Parmesan cheese for topping (optional)     In a bowl, combine ricotta cheese, 1 tsp Italian seasoning, ½ teaspoon lemon zest. Season with salt and pepper to taste. Mix well. Fold in half of fresh basil. Set aside.   Heat a large skillet to medium high. Add olive oil. Sautee mushrooms until starting to become tender. Season them with salt and pepper while cooking.  Add broccoli and peas and carrots. Reduce heat to medium. Cover pan and cook for 4-5 minutes until broccoli is tender and peas and carrots are warmed through. Add Neufchatel cheese, Italian seasoning, red pepper flakes, 1 tsp lemon zest and lemon juice. Stir until a smooth sauce is formed. Add zucchini noodles (zoodles) to skillet and toss in sauce, then add cherry tomatoes.  Separate zoodle and vegetables into 4 equal portions. Serve each with a ¼ cup  serving of seasoned ricotta cheese. Sprinkle with grated parmesan cheese or fresh basil,  if desired.   Makes 4 servings. Calories per servin calories, 32 g carbs, 33 g protein, 18 g fat              30 min recipes given    Exercise 20 min 3 times a week.    Limit alcohol to 4 drinks per week.     Decrease portions by 1/4    Add protein at breakfast.     Patient was informed that topiramate is used for migraine prevention and seizures. Weight loss is a common side effect that is well documented. He understands this. He was informed of the potential side effects such as serious and possibly fatal rash in which case the medication should be discontinued immediately. Paresthesias, forgetfulness, fatigue, kidney stones, GI symptoms, and changes in lab values such as electrolytes, blood counts and kidney function.

## 2018-04-04 ENCOUNTER — OFFICE VISIT (OUTPATIENT)
Dept: PSYCHIATRY | Facility: CLINIC | Age: 63
End: 2018-04-04
Payer: COMMERCIAL

## 2018-04-04 VITALS
HEIGHT: 67 IN | DIASTOLIC BLOOD PRESSURE: 77 MMHG | SYSTOLIC BLOOD PRESSURE: 163 MMHG | WEIGHT: 291.88 LBS | BODY MASS INDEX: 45.81 KG/M2 | HEART RATE: 83 BPM

## 2018-04-04 DIAGNOSIS — Z63.0 MARITAL STRESS: ICD-10-CM

## 2018-04-04 DIAGNOSIS — F41.1 GENERALIZED ANXIETY DISORDER: ICD-10-CM

## 2018-04-04 DIAGNOSIS — F33.42 RECURRENT MAJOR DEPRESSIVE DISORDER, IN FULL REMISSION: Primary | ICD-10-CM

## 2018-04-04 DIAGNOSIS — F51.01 PRIMARY INSOMNIA: ICD-10-CM

## 2018-04-04 PROCEDURE — 3078F DIAST BP <80 MM HG: CPT | Mod: CPTII,S$GLB,, | Performed by: INTERNAL MEDICINE

## 2018-04-04 PROCEDURE — 99999 PR PBB SHADOW E&M-EST. PATIENT-LVL III: CPT | Mod: PBBFAC,,, | Performed by: INTERNAL MEDICINE

## 2018-04-04 PROCEDURE — 3077F SYST BP >= 140 MM HG: CPT | Mod: CPTII,S$GLB,, | Performed by: INTERNAL MEDICINE

## 2018-04-04 PROCEDURE — 99213 OFFICE O/P EST LOW 20 MIN: CPT | Mod: S$GLB,,, | Performed by: INTERNAL MEDICINE

## 2018-04-04 RX ORDER — DULOXETIN HYDROCHLORIDE 60 MG/1
60 CAPSULE, DELAYED RELEASE ORAL DAILY
Qty: 30 CAPSULE | Refills: 5 | Status: SHIPPED | OUTPATIENT
Start: 2018-04-04 | End: 2018-11-23 | Stop reason: SDUPTHER

## 2018-04-04 RX ORDER — ZOLPIDEM TARTRATE 5 MG/1
5 TABLET ORAL NIGHTLY PRN
Qty: 30 TABLET | Refills: 5 | Status: SHIPPED | OUTPATIENT
Start: 2018-04-04 | End: 2018-09-10 | Stop reason: ALTCHOICE

## 2018-04-04 SDOH — SOCIAL DETERMINANTS OF HEALTH (SDOH): PROBLEMS IN RELATIONSHIP WITH SPOUSE OR PARTNER: Z63.0

## 2018-04-04 NOTE — Clinical Note
[place],  I can't remember if I already asked you about this.  This is a patient you used to see for depression and anxiety.  He wants to see you and bring his wife to the appointment for some marriage counseling (he said even if it is just for one visit)  He said y'all had talked about this.  I told him I would reach out to you to ask if this is a possibility or if he needs to find a marriage counselor.  Thanks! Hedy Kaufman

## 2018-04-04 NOTE — PROGRESS NOTES
OUTPATIENT PSYCHIATRY RETURN VISIT    ENCOUNTER DATE:  4/7/2018  SITE:  [facility]  LENGTH OF SESSION:  20 minutes    CHIEF COMPLAINT:  Mood    HISTORY OF PRESENTING ILLNESS:  Yosef Rowe is a 62 y.o. male with history of MDD, SOLITARIO, and insomnia who presents for follow up appointment.  Patient was last seen 1/31/18, at which time Wellbutrin was stopped and he was continued on Cymbalta 60mg daily and Ambien 5mg qHS PRN insomnia.  He called on 2/8/18 with worsening mood and withdrawal symptoms of headache and stomach upset.  We discussed that if this continues he should restart the Wellbutrin XL 150mg daily.      HPI as told by patient:  Says that the withdrawal symptoms subsided and he is now off of Wellbutrin.  Reports mood has remained good since off of Wellbutrin.  Denies symptoms of depression or anxiety.  Continues to need Ambien for chronic insomnia.  Says work is going well.  Marriage is still biggest stressor.  Would like to try marriage counseling - wondering if he could see Dr. Day since he saw her in the past for individual counseling.  Denies SI or HI.    Medication side effects:  No  Medication compliance:  Yes    PSYCHIATRIC REVIEW OF SYSTEMS:  Trouble with sleep:  Improved with Ambien  Appetite changes:  Denies  Weight changes:  Gain  Lack of energy:  Denies  Anhedonia:  Denies  Somatic symptoms:  Denies  Libido:  Not discussed  Anxiety/panic:  Denies  Guilty/hopeless:  Denies  Self-injurious behavior/risky behavior:  Denies  Any drugs:  Denies  Alcohol:  Occasional    MEDICAL REVIEW OF SYSTEMS:  Complete review of systems performed covering Constitutional, Musculoskeletal, Neurologic.  All systems negative except for that covered in HPI.    PAST PSYCHIATRIC, MEDICAL, AND SOCIAL HISTORY REVIEWED  The patient's past medical, family and social history have been reviewed and updated as appropriate within the electronic medical record - see encounter  "notes.    MEDICATIONS:    Current Outpatient Prescriptions:     aspirin (ECOTRIN) 81 MG EC tablet, Take 1 tablet (81 mg total) by mouth once daily., Disp: 30 tablet, Rfl: 11    atorvastatin (LIPITOR) 20 MG tablet, Take 1 tablet (20 mg total) by mouth once daily., Disp: 30 tablet, Rfl: 11    diethylpropion 75 mg TbSR, Take 75 mg by mouth once daily., Disp: 30 tablet, Rfl: 2    DULoxetine (CYMBALTA) 60 MG capsule, Take 1 capsule (60 mg total) by mouth once daily., Disp: 30 capsule, Rfl: 5    multivitamin (ONE DAILY MULTIVITAMIN) per tablet, Take 1 tablet by mouth once daily., Disp: , Rfl:     zolpidem (AMBIEN) 5 MG Tab, Take 1 tablet (5 mg total) by mouth nightly as needed., Disp: 30 tablet, Rfl: 5    ALLERGIES:  Review of patient's allergies indicates:   Allergen Reactions    No known drug allergies      PSYCHIATRIC EXAM:  Vitals:    04/04/18 0928   BP: (!) 163/77   Pulse: 83   Weight: 132.4 kg (291 lb 14.2 oz)   Height: 5' 7" (1.702 m)   Appearance:  Well groomed, appearing healthy and of stated age  Behavior:  Cooperative, pleasant, no psychomotor agitation or retardation  Speech:  Normal rate, rhythm, prosody, and volume  Mood:  "Great"  Affect:  Congruent  Thought Process:  Linear, logical, goal directed  Thought Content:  Negative for suicidal ideation, homicidal ideation, delusions or hallucinations.  Associations:  Intact  Memory:  Grossly Intact  Level of Consciousness/Orientation:  Grossly intact  Fund of Knowledge:  Good  Attention:  Good  Language:  Fluent, able to name abstract and concrete objects  Insight:  Fair  Judgment:  Intact  Psychomotor signs:  No involuntary movements or tremor  Gait:  Normal    RELEVANT LABS/STUDIES:    Lab Results   Component Value Date    WBC 7.38 11/21/2016    HGB 15.6 11/21/2016    HCT 46.9 11/21/2016    MCV 98 11/21/2016     11/21/2016     BMP  Lab Results   Component Value Date     09/28/2017    K 4.5 09/28/2017     09/28/2017    CO2 27 " 09/28/2017    BUN 19 09/28/2017    CREATININE 0.9 09/28/2017    CALCIUM 9.2 09/28/2017    ANIONGAP 9 09/28/2017    ESTGFRAFRICA >60.0 09/28/2017    EGFRNONAA >60.0 09/28/2017     Lab Results   Component Value Date    ALT 32 06/21/2017    AST 30 06/21/2017    ALKPHOS 64 06/21/2017    BILITOT 0.7 06/21/2017     Lab Results   Component Value Date    TSH 1.360 11/21/2016       IMPRESSION:    Yosef Rowe is a 62 y.o. male with history of anxiety, depression, and insomnia who presents for follow up appointment.    Status/Progress:  Based on the examination today, the patient's problem(s) is/are improved.  New problems have not been presented today.    Risk Parameters:  Patient reports no suicidal ideation  Patient reports no homicidal ideation  Patient reports no self-injurious behavior  Patient reports no violent behavior    DIAGNOSES:    ICD-10-CM ICD-9-CM   1. Recurrent major depressive disorder, in full remission F33.42 296.36   2. Generalized anxiety disorder F41.1 300.02   3. Primary insomnia F51.01 307.42   4. Marital stress Z63.0 V61.10     PLAN:  · Patient is now in remission from depression and anxiety.    · Will continue Cymbalta 60mg daily for mood.  · Will continue Ambien 5mg qHS PRN for insomnia.  · Encouraged patient to schedule appointment with PCP for blood pressure.  He says he has a BP medication at home that he is supposed to take but has not been taking recently - he plans to restart it.    · Discussed recommendation of marriage counseling - he has contact number to schedule.    RETURN TO CLINIC:  Follow-up in about 6 months (around 10/4/2018).

## 2018-04-09 ENCOUNTER — TELEPHONE (OUTPATIENT)
Dept: PSYCHIATRY | Facility: CLINIC | Age: 63
End: 2018-04-09

## 2018-04-09 NOTE — TELEPHONE ENCOUNTER
Mr. Rowe told his psychiatrist Dr. Kaufman that he would like to schedule a therapy session with he and his wife.  I told him that I would be happy to see him and his wife for several sessions, but that I am primarily his therapist and would be unable to do marital counseling because of the multiple role.  We made a plan for them to attend two to three sessions with me, but will refer them to another provider if they require long-term marital counseling.  He will talk to his wife about scheduling an appointment.

## 2018-04-12 DIAGNOSIS — F51.01 PRIMARY INSOMNIA: ICD-10-CM

## 2018-04-12 RX ORDER — ZOLPIDEM TARTRATE 5 MG/1
TABLET ORAL
Qty: 30 TABLET | Refills: 0 | OUTPATIENT
Start: 2018-04-12

## 2018-04-30 ENCOUNTER — OFFICE VISIT (OUTPATIENT)
Dept: PSYCHIATRY | Facility: CLINIC | Age: 63
End: 2018-04-30
Payer: COMMERCIAL

## 2018-04-30 DIAGNOSIS — F33.42 RECURRENT MAJOR DEPRESSIVE DISORDER, IN FULL REMISSION: ICD-10-CM

## 2018-04-30 DIAGNOSIS — F41.1 GENERALIZED ANXIETY DISORDER: Primary | ICD-10-CM

## 2018-04-30 PROCEDURE — 90834 PSYTX W PT 45 MINUTES: CPT | Mod: S$GLB,,, | Performed by: PSYCHOLOGIST

## 2018-04-30 NOTE — Clinical Note
Tucker Plasencia - do you have room for couple's therapy?  Mr. Rowe is a patient of mine and Dr. Kaufman's who has made significant progress in reducing depression.  However, he and his wife are experiencing problems with his substance use, lack of intimacy, lack of trust and honesty, and defensiveness.  They could benefit GREATLY from couple's therapy.  She came to his session today and they are very interested in treatment and willing to attend.  I think you would do great with them, and used some similar approaches in session today.  Let me know if you can take them on!   [place]

## 2018-04-30 NOTE — PROGRESS NOTES
Individual Psychotherapy (PhD/LCSW)    4/30/2018    Site:  [facility]         Therapeutic Intervention: Met with patient and spouse.  Outpatient - Insight oriented psychotherapy 45 min - CPT code 24911    Chief complaint/reason for encounter: depression     Interval history and content of current session:  Yosef arrived on time for his scheduled session.  His wife, Narda, was also present for the session.  They would like to pursue marriage counseling.  As I previously mentioned to Yosef and Dr. Kaufman, I am unable to provide marriage counseling in addition to individual therapy for Yosef.  However, Narda may present at Yosef's appointments until they get set up with a couple's therapist in the department.  Although they both acknowledge Yosef's current progress vs his past depression, they had multiple grievances against one another for current and past behavior.  They were encouraged to work on interpersonal effectiveness and avoid aggressive or extreme language.  They were provided with handouts on validation and effective communication (assertiveness, taking ownership of thoughts/feelings, and active listening).  They had considerable difficulty with this and both demonstrated defensiveness and blaming.  They were prompted to each describe their values for the relationship and the kind of partners they want to be.  They were both tearful during this exercise and admitted they want to have a loving, trusting, intimate relationship.  They were encouraged to focus on one thing at a time and work on compromise/negotiation.  We worked on her issue of his substance use and his issue of her affection.  They were prompted to negotiate what actions they can work on prior to the next session.  He agreed to limit alcohol to two drinks at the club and two drinks at home.  She agreed to greet him with a hug and kiss when he comes home, and to cuddle with him for five minutes prior to bedtime.  They were  encouraged to hug and kiss at the end of session, and also encouraged to practice kind communication with each other (like with compliments) to facilitate open communication and reduce defensiveness.  I will place a referral to Dr. Duong for couple's therapy.    Treatment plan:  · Target symptoms: depression  · Why chosen therapy is appropriate versus another modality: relevant to diagnosis, evidence based practice  · Outcome monitoring methods: self-report  · Therapeutic intervention type: insight oriented psychotherapy    Risk parameters:  Patient reports no suicidal ideation  Patient reports no homicidal ideation  Patient reports no self-injurious behavior  Patient reports no violent behavior    Verbal deficits: None    Patient's response to intervention:  The patient's response to intervention is accepting.    Progress toward goals and other mental status changes:  The patient's progress toward goals is good.    Diagnosis:     ICD-10-CM ICD-9-CM   1. Generalized anxiety disorder F41.1 300.02   2. Recurrent major depressive disorder, in full remission F33.42 296.36       Plan:  individual psychotherapy    Return to clinic: as needed    Length of Service (minutes): 45

## 2018-05-08 ENCOUNTER — TELEPHONE (OUTPATIENT)
Dept: ORTHOPEDICS | Facility: CLINIC | Age: 63
End: 2018-05-08

## 2018-05-08 NOTE — TELEPHONE ENCOUNTER
----- Message from Margarita Ventura sent at 5/8/2018 10:07 AM CDT -----  Contact: Self [phone]  Patient is requesting a call back to see if he can be worked in before the first available which was 6.25.18. I did also add him to the wait list.  Patient thinks he has a torn meniscus in his left knee now.      Patient was seen previously for his right knee.  Patient reports the pain used to be intermittent and is now fairly constant.    Patient may be reached at [phone].    Thank you.  JADEN

## 2018-05-14 ENCOUNTER — TELEPHONE (OUTPATIENT)
Dept: PSYCHIATRY | Facility: CLINIC | Age: 63
End: 2018-05-14

## 2018-05-14 ENCOUNTER — OFFICE VISIT (OUTPATIENT)
Dept: ORTHOPEDICS | Facility: CLINIC | Age: 63
End: 2018-05-14
Payer: COMMERCIAL

## 2018-05-14 ENCOUNTER — TELEPHONE (OUTPATIENT)
Dept: ORTHOPEDICS | Facility: CLINIC | Age: 63
End: 2018-05-14

## 2018-05-14 ENCOUNTER — HOSPITAL ENCOUNTER (OUTPATIENT)
Dept: RADIOLOGY | Facility: HOSPITAL | Age: 63
Discharge: HOME | End: 2018-05-14
Attending: ORTHOPAEDIC SURGERY
Payer: COMMERCIAL

## 2018-05-14 VITALS — HEIGHT: 67 IN | BODY MASS INDEX: 44.62 KG/M2 | WEIGHT: 284.31 LBS

## 2018-05-14 DIAGNOSIS — Z96.651 STATUS POST TOTAL RIGHT KNEE REPLACEMENT: ICD-10-CM

## 2018-05-14 DIAGNOSIS — M17.12 PRIMARY OSTEOARTHRITIS OF LEFT KNEE: ICD-10-CM

## 2018-05-14 DIAGNOSIS — M17.12 PRIMARY OSTEOARTHRITIS OF LEFT KNEE: Primary | ICD-10-CM

## 2018-05-14 PROCEDURE — 99203 OFFICE O/P NEW LOW 30 MIN: CPT | Mod: S$GLB,,, | Performed by: ORTHOPAEDIC SURGERY

## 2018-05-14 PROCEDURE — 73564 X-RAY EXAM KNEE 4 OR MORE: CPT | Mod: 26,50,, | Performed by: RADIOLOGY

## 2018-05-14 PROCEDURE — 3008F BODY MASS INDEX DOCD: CPT | Mod: CPTII,S$GLB,, | Performed by: ORTHOPAEDIC SURGERY

## 2018-05-14 PROCEDURE — 73564 X-RAY EXAM KNEE 4 OR MORE: CPT | Mod: TC,50

## 2018-05-14 PROCEDURE — 99999 PR PBB SHADOW E&M-EST. PATIENT-LVL III: CPT | Mod: PBBFAC,,, | Performed by: ORTHOPAEDIC SURGERY

## 2018-05-14 NOTE — TELEPHONE ENCOUNTER
"Mr. Rowe's wife, Bertha, called due to Mr. Rowe's behavior ("he is out of control").  The following information is based on her reports.  He has been spending excessive money, like $30,000 on a truck.  He was suspended from the [employer] for his behavior due to seven complaints (they have been members over 30 years).  He has been drinking bourbon and smoking cigars until 3:00 am.  He has been "screaming and cursing" at her when he is intoxicated.  She felt "unsafe for the first time" last night due to his behavior.  Mr. Rowe requested a divorce from her and she is talking to a  today.  His sons attempted to talk to him and he has cut them out.      Mrs. Rowe was placed on hold and I consulted with ABU providers about the situation regarding recommendations for Mrs. Rowe.  I encouraged Mrs. Rowe to seek [facility] resources for support in dealing with addiction.  I also encouraged her to call the police if she believes that she, others, or Mr. Rowe are in danger.  She was encouraged to call 911 or take Mr. Rowe to a hospital if she believes he is in medical danger.  She was reminded that Mr. Rowe must be motivated to work on this disease, and that it will take medical and psychiatric care to help him do so.  She was encouraged to take care of herself and understand that she cannot control his behaviors.  She noted that her children and friends tell her to do the same actions, but she has had difficulty because she loves him.  She was encouraged to call back if she needs anything in the future.  "

## 2018-05-14 NOTE — PROGRESS NOTES
CHIEF COMPLAINT: Left knee pain.                                                          HISTORY OF PRESENT ILLNESS:  The patient is a 62 y.o. male  who presents  for evaluation of his left knee pain. He is s/p right TKR which is functioning well.    Pain Duration: several years but increased over the last 7 months  Pain Quality: burning  Pain Context:worsening  Pain Timing: constant  Pain Location:medial  Pain Severity: moderate  Modifying Factors: NSAID's no relief  Associated Signs and Symptoms:none    He  presents for further treatment recommendations.    He denies radicular pain or low back pain.  He denies distal paresthesias, lower extremity edema, or calf pain concerning for vascular claudication.  He has no history of discreet prior trauma.                                                                                                                       PAST MEDICAL HISTORY:    Past Medical History:   Diagnosis Date    Depression     when turned 49-lost job and mother unwell at that time    Hyperlipidemia     Lumbar radiculopathy     BRETT- 2011    Sleep apnea     Urinary tract infection                                                                                                             PAST SURGICAL HISTORY:    Past Surgical History:   Procedure Laterality Date    JOINT REPLACEMENT      knee arthroscopicc surgeries      Right knee- 2007  and Left knee- 2008    KNEE SURGERY      Left wrist Surgery      2000 for a torn ligament from [place]                                                                                                               SOCIAL HISTORY:  Reviewed per EPIC history for tobacco or alcohol use and he   is an active  62 y.o.  male                                                                             FAMILY MEDICAL HISTORY:  family history includes Parkinsonism in his mother.                                                                                                                                                          PHYSICAL EXAMINATION:                                                        GENERAL:  A well-developed, well-nourished 62 y.o. male who is alert and       oriented in no acute distress.      Gait: He  walks with a normal gait.                   EXTREMITIES:  Examination of lower extremities reveals there is no visible mass or deformity.    Left knee:  ROM 5-115    Ligamentously stable to varus/valgus stress. Medial pain with varus stress.    Anterior and posterior drawers negative.    No pain over pes bursa.    No warmth    No erythema    Effusion Yes    Right knee:  ROM 0-120 - well healed anterior incision.    Ligamentously stable to varus/valgus stress.    No pain over pes bursa.    No warmth    No erythema    Effusion No    The skin over both lower extremities is normal and unremarkable.  He has a  painless range of motion of the hips and ankles bilaterally.   Sensation is intact in both lower extremities.    There are no motor deficits in the lower extremities bilaterally.   Pedal pulses are palpable distally bilaterally.    He has no calf tenderness to palpation nor edema.    AP standing, Merchant's, and lateral radiographs of the knees were ordered and personally reviewed with the patient today.  Radiographs show advanced DJD with joint space narrowing, sclerosis, and osteophytes.  This is almost exclusively medial.                                                                               IMPRESSION: Osteoarthritis left knee.                             The conservative options including NSAIDs, activity modification, physical therapy, corticosteroid injection, and viscosupplimentation were discussed.  He is looking into surgical options.  The surgical process of partial and total knee replacement was discussed in detail with the patient including a detailed discussion of the procedures themself (including visual model, x-ray review, and  literature review). The typical perioperative and post-operative course was discussed and perioperative risks were discussed to the patient's satisfaction.  Risks and complications discussed included but were not limited to the risks of anesthetic complications, infection, bleeding, wound healing complications, aseptic loosening, instability, limb length inequality, neurologic dysfunction including numbness,  DVT, pulmonary embolism, perioperative medical risks (cardiac, pulmonary, renal, neurologic), and death and the patient elects to proceed.   I have discussed anticoagulation with aspirin and coumadin and in low risk patients I have recommended aspirin twice a day.   Given his clinical and radiographic findings I believe the patient to be a suitable candidate for partial knee replacement (MAKOplasty). We will initiate pre-operative medical evaluation and clearance and set a provisional date for surgical intervention according to the patient's schedule on 6/22/18 for left knee MAKOplasty.

## 2018-05-14 NOTE — TELEPHONE ENCOUNTER
Spoke to pt and he requested his PT order to be fax to [facility] at [phone]. Order faxed per pt request.            ----- Message from Tri Cole sent at 5/14/2018  1:47 PM CDT -----  Contact: Self/ [phone]  Patient would like information sent over to his e-mail for physical therapy. The patient would like a location outside of [facility]. The patient e-mail is [email or web address]

## 2018-05-15 ENCOUNTER — ANESTHESIA EVENT (OUTPATIENT)
Dept: SURGERY | Facility: HOSPITAL | Age: 63
End: 2018-05-15
Payer: COMMERCIAL

## 2018-05-15 DIAGNOSIS — M79.606 PAIN OF LOWER EXTREMITY, UNSPECIFIED LATERALITY: Primary | ICD-10-CM

## 2018-05-15 NOTE — PRE ADMISSION SCREENING
Anesthesia Assessment: Preoperative EQUATION    Planned Procedure: Procedure(s) (LRB):  REPLACEMENT-KNEE WITH NAVIGATION (Left)  Requested Anesthesia Type:Regional  Surgeon: Orlin Jordan MD  Service: Orthopedics  Known or anticipated Date of Surgery:6/22/2018        Plan:    Testing:  Hematology Profile, BMP and PT/INR   Pre-anesthesia  visit       Visit focus: possible regional anesthesia and/or nerve block      Consultation: Perioperative Hospitalist     Mariama Garcia RN 05/15/2018

## 2018-05-15 NOTE — ANESTHESIA PREPROCEDURE EVALUATION
Anesthesia Assessment: Preoperative EQUATION    Planned Procedure: Procedure(s) (LRB):  REPLACEMENT-KNEE WITH NAVIGATION (Left)  Requested Anesthesia Type:Regional  Surgeon: Orlin Jordan MD  Service: Orthopedics  Known or anticipated Date of Surgery:6/22/2018        Plan:    Testing:  Hematology Profile, BMP and PT/INR   Pre-anesthesia  visit       Visit focus: possible regional anesthesia and/or nerve block      Consultation:IM Perioperative Hospitalist     Mariama Garcia RN 05/15/2018                                                                                                                  05/15/2018  Yosef Rowe is a 62 y.o., male.    Anesthesia Evaluation         Review of Systems  Anesthesia Hx:  No problems with previous Anesthesia History of prior surgery of interest to airway management or planning: Denies Family Hx of Anesthesia complications.   Denies Personal Hx of Anesthesia complications.   Social:  Tobacco Use:  of cigar for 20 years, for twice a week pack-years, Smoking Cessation discussion. Alcohol Use: Pt consumes 2 glasses a day,  alcohol use is current   Hematology/Oncology:     Oncology Normal    -- Anemia:   Cardiovascular:   hyperlipidemia BINGHAM Walks on treadmill, play golf, denies chest pain Coronary Artery Disease: CT scoring 2017-mild. 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 Denies Liver Disease.    Musculoskeletal:  Musculoskeletal General/Symptoms: Functional capacity is ambulatory without assistance.  Joint Disease:  Arthritis, Osteoarthritis  Lumbar Spine Disorders, Lumbar Disc Disease, Radiculopathy   Neurological:   Denies CVA. Denies Seizures.  Pain , onset  is chronic , location of knee , quality of aching/dull , severity is a 8 , precipitating factors are walking , alleviating factors are rest. Osteoarthritis    Endocrine:  Endocrine Normal    Dermatological:  Skin Normal    Psych:   anxiety depression          Physical Exam  General:  Morbid Obesity    Airway/Jaw/Neck:  Airway Findings: Mouth Opening: Normal Tongue: Normal  General Airway Assessment: Adult  Jaw/Neck Findings:  Neck ROM: Normal ROM  Neck Findings:  Short Neck, Girth Increased      Dental:  Dental Findings: In tact, molar caps        Mental Status:  Mental Status Findings:  Cooperative         Labs sodium 147    Discharge disposition: home with wife Narda [phone]    Please refer to , Internal Medicine, perioperative risk assessment and recommendations.     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 Choudhury:  Dr Contreras is out of town but on reviewing the chart,  his stress test was negative and Echo unremarkable. Does not appear crucial  that the CTA  be done pre op              Anesthesia Plan  Type of Anesthesia, risks & benefits discussed:  Anesthesia Type:  general, spinal, CSE  Patient's Preference:   Intra-op Monitoring Plan:   Intra-op Monitoring Plan Comments:   Post Op Pain Control Plan:   Post Op Pain Control Plan Comments:   Induction:   IV  Beta Blocker:  Patient is not currently on a Beta-Blocker (No further documentation required).       Informed Consent: Patient understands risks and agrees with Anesthesia plan.  Questions answered. Anesthesia consent signed with patient.  ASA Score: 3     Day of Surgery Review of History & Physical:    H&P update referred to the surgeon.         Ready For Surgery From Anesthesia Perspective.

## 2018-05-21 ENCOUNTER — OFFICE VISIT (OUTPATIENT)
Dept: PSYCHIATRY | Facility: CLINIC | Age: 63
End: 2018-05-21
Payer: COMMERCIAL

## 2018-05-21 DIAGNOSIS — F41.1 GENERALIZED ANXIETY DISORDER: Primary | ICD-10-CM

## 2018-05-21 DIAGNOSIS — F33.41 MAJOR DEPRESSIVE DISORDER, RECURRENT EPISODE, IN PARTIAL REMISSION: ICD-10-CM

## 2018-05-21 DIAGNOSIS — Z63.0 MARITAL PROBLEMS: ICD-10-CM

## 2018-05-21 PROCEDURE — 90834 PSYTX W PT 45 MINUTES: CPT | Mod: S$GLB,,, | Performed by: PSYCHOLOGIST

## 2018-05-21 SDOH — SOCIAL DETERMINANTS OF HEALTH (SDOH): PROBLEMS IN RELATIONSHIP WITH SPOUSE OR PARTNER: Z63.0

## 2018-05-21 NOTE — Clinical Note
Just FYI - he and his wife are pursuing separation and divorce.  She reports he has severe alcohol and substance abuse issues, which he completely denies.  I told them that substance abuse usually requires inpatient detoxification, intensive outpatient, and outpatient resources - and that if either one feels there is a danger to themselves or others then they should call the police for assistance or go to the ED.  I plan to continue with Yosef and hope to build insight so that he can seek additional treatment, but he is not willing or insightful at this time.  I have referred his wife to [facility] so hopefully she can see her.  Just wanted to keep you updated!   Thanks,  JR

## 2018-05-21 NOTE — PROGRESS NOTES
Individual Psychotherapy (PhD/LCSW)    5/21/2018    Site:  [facility]         Therapeutic Intervention: Met with patient and spouse.  Outpatient - Insight oriented psychotherapy 45 min - CPT code 41027    Chief complaint/reason for encounter: depression     Interval history and content of current session:  Yosef arrived on time for his scheduled session.  His wife, Narda, was also present for the session.  They have decided to pursue separation and divorce.  They were encouraged to focus on validation and mindfulness of the present in this session, but had difficulty due to arguments about their situation and past.  We discussed topics such as separation, divorce, debt, household items, their children, and involvement of .  Narda has a  and plans to file for divorce, and Yosef (although he does not want to involve ) was encouraged to consider doing the same since she will continue with a .  Narda again mentioned Yosef's substance abuse issues, which Yosef denied.  I informed them both that regardless of what is true or not, alcohol and substance abuse should be treated with medical supervision and usually starts with inpatient detoxification followed by intensive outpatient treatment and outpatient services.  I also informed them that they both need to call a  or the police if they feel the other is a danger to themselves or others.  They both understood this information.  Yosef expressed a desire to continue in individual therapy, and I will refer Narda to an individual therapist as well.  They were encouraged to contact me if they need to come for another session together to promote validation and mindfulness in a structured setting, but were reminded that I could not do anything related to divorce proceedings as that is a legal issue.    Treatment plan:  · Target symptoms: depression  · Why chosen therapy is appropriate versus another modality: relevant to diagnosis,  evidence based practice  · Outcome monitoring methods: self-report  · Therapeutic intervention type: insight oriented psychotherapy    Risk parameters:  Patient reports no suicidal ideation  Patient reports no homicidal ideation  Patient reports no self-injurious behavior  Patient reports no violent behavior    Verbal deficits: None    Patient's response to intervention:  The patient's response to intervention is accepting.    Progress toward goals and other mental status changes:  The patient's progress toward goals is good.    Diagnosis:     ICD-10-CM ICD-9-CM   1. Generalized anxiety disorder F41.1 300.02   2. Major depressive disorder, recurrent episode, in partial remission F33.41 296.35   3. Marital problems Z63.0 V61.10   R/O Alcohol Use Disorder    Plan:  individual psychotherapy    Return to clinic: as needed    Length of Service (minutes): 45

## 2018-05-29 ENCOUNTER — HOSPITAL ENCOUNTER (OUTPATIENT)
Dept: PREADMISSION TESTING | Facility: HOSPITAL | Age: 63
Discharge: HOME | End: 2018-05-29
Attending: ANESTHESIOLOGY
Payer: COMMERCIAL

## 2018-05-29 ENCOUNTER — OFFICE VISIT (OUTPATIENT)
Dept: BARIATRICS | Facility: CLINIC | Age: 63
End: 2018-05-29
Payer: COMMERCIAL

## 2018-05-29 ENCOUNTER — INITIAL CONSULT (OUTPATIENT)
Dept: INTERNAL MEDICINE | Facility: CLINIC | Age: 63
End: 2018-05-29
Payer: COMMERCIAL

## 2018-05-29 VITALS
SYSTOLIC BLOOD PRESSURE: 140 MMHG | TEMPERATURE: 98 F | BODY MASS INDEX: 44.67 KG/M2 | WEIGHT: 284.63 LBS | BODY MASS INDEX: 44.29 KG/M2 | HEART RATE: 62 BPM | WEIGHT: 282.19 LBS | SYSTOLIC BLOOD PRESSURE: 155 MMHG | DIASTOLIC BLOOD PRESSURE: 80 MMHG | HEIGHT: 67 IN | HEIGHT: 67 IN | OXYGEN SATURATION: 95 % | DIASTOLIC BLOOD PRESSURE: 79 MMHG | HEART RATE: 58 BPM

## 2018-05-29 DIAGNOSIS — E87.0 HYPERNATREMIA: ICD-10-CM

## 2018-05-29 DIAGNOSIS — E78.5 HYPERLIPIDEMIA, UNSPECIFIED HYPERLIPIDEMIA TYPE: ICD-10-CM

## 2018-05-29 DIAGNOSIS — I25.10 CORONARY ATHEROSCLEROSIS DUE TO CALCIFIED CORONARY LESION: ICD-10-CM

## 2018-05-29 DIAGNOSIS — I25.84 CORONARY ATHEROSCLEROSIS DUE TO CALCIFIED CORONARY LESION: ICD-10-CM

## 2018-05-29 DIAGNOSIS — E66.01 OBESITY, CLASS III, BMI 40-49.9 (MORBID OBESITY): ICD-10-CM

## 2018-05-29 DIAGNOSIS — E66.01 MORBID OBESITY WITH BMI OF 40.0-44.9, ADULT: Primary | ICD-10-CM

## 2018-05-29 DIAGNOSIS — F33.41 MAJOR DEPRESSIVE DISORDER, RECURRENT EPISODE, IN PARTIAL REMISSION: ICD-10-CM

## 2018-05-29 DIAGNOSIS — Z01.818 PREOP EXAMINATION: Primary | ICD-10-CM

## 2018-05-29 DIAGNOSIS — G47.00 INSOMNIA, UNSPECIFIED TYPE: ICD-10-CM

## 2018-05-29 DIAGNOSIS — G47.30 SLEEP APNEA, UNSPECIFIED TYPE: ICD-10-CM

## 2018-05-29 DIAGNOSIS — R60.9 EDEMA, UNSPECIFIED TYPE: ICD-10-CM

## 2018-05-29 DIAGNOSIS — N52.9 ED (ERECTILE DYSFUNCTION) OF ORGANIC ORIGIN: ICD-10-CM

## 2018-05-29 DIAGNOSIS — R06.02 SHORTNESS OF BREATH: ICD-10-CM

## 2018-05-29 DIAGNOSIS — Z72.0 TOBACCO ABUSE: ICD-10-CM

## 2018-05-29 DIAGNOSIS — I83.93 VARICOSE VEINS OF BOTH LOWER EXTREMITIES: ICD-10-CM

## 2018-05-29 PROCEDURE — 99999 PR PBB SHADOW E&M-EST. PATIENT-LVL III: CPT | Mod: PBBFAC,,, | Performed by: INTERNAL MEDICINE

## 2018-05-29 PROCEDURE — 99213 OFFICE O/P EST LOW 20 MIN: CPT | Mod: S$GLB,,, | Performed by: INTERNAL MEDICINE

## 2018-05-29 PROCEDURE — 3079F DIAST BP 80-89 MM HG: CPT | Mod: CPTII,S$GLB,, | Performed by: INTERNAL MEDICINE

## 2018-05-29 PROCEDURE — 99999 PR PBB SHADOW E&M-EST. PATIENT-LVL I: CPT | Mod: PBBFAC,,, | Performed by: HOSPITALIST

## 2018-05-29 PROCEDURE — 99244 OFF/OP CNSLTJ NEW/EST MOD 40: CPT | Mod: S$GLB,,, | Performed by: HOSPITALIST

## 2018-05-29 PROCEDURE — 3008F BODY MASS INDEX DOCD: CPT | Mod: CPTII,S$GLB,, | Performed by: INTERNAL MEDICINE

## 2018-05-29 PROCEDURE — 3077F SYST BP >= 140 MM HG: CPT | Mod: CPTII,S$GLB,, | Performed by: INTERNAL MEDICINE

## 2018-05-29 RX ORDER — IBUPROFEN 200 MG
200 TABLET ORAL
Status: ON HOLD | COMMUNITY
End: 2018-06-22 | Stop reason: HOSPADM

## 2018-05-29 RX ORDER — PHENTERMINE HYDROCHLORIDE 37.5 MG/1
37.5 TABLET ORAL
Qty: 30 TABLET | Refills: 2 | Status: ON HOLD | OUTPATIENT
Start: 2018-05-29 | End: 2018-06-22 | Stop reason: HOSPADM

## 2018-05-29 NOTE — OUTPATIENT SUBJECTIVE & OBJECTIVE
Outpatient Subjective & Objective     Chief complaint-Preoperative evaluation, Perioperative Medical management, complication reduction plan     Active cardiac conditions- none    Revised cardiac risk index predictors- none    Functional capacity -Examples of physical activity, as noted ,  can take 1 flight of stairs----- He can undertake all the above activities without  chest pain,chest tightness, dizziness,lightheadedness making his exercise tolerance more  than 4 Mets.   Winded on exertion, not new ,stable over time     Review of Systems   Constitutional: Negative for chills and fever.   HENT:        Sleep apnea   Eyes:        No unusual vision changes   Respiratory:          Cough with clear  phlegm   No change in color , consistency, amount of phlegm   No Hemoptysis   Cardiovascular:        As noted   Gastrointestinal:        Bowels- Regular  No overt GI/ blood losses   Endocrine:        Prednisone use > 20 mg daily for 3 weeks- None   Genitourinary: Negative for dysuria.        No urinary hesitancy    Musculoskeletal:        As above      Skin: Negative for rash.   Neurological: Negative for syncope.        No unilateral weakness   Hematological:          Aspirin use for preventive reasons    Psychiatric/Behavioral:          No SI/HI     No vascular stenting   No past medical history pertinent negatives.  Past Surgical History:   Procedure Laterality Date    JOINT REPLACEMENT      knee arthroscopicc surgeries      Right knee- 2007  and Left knee- 2008    KNEE SURGERY      Left wrist Surgery      2000 for a torn ligament from [place]       No anesthesia, bleeding, cardiac problems with previous surgeries/procedures.  Medications and Allergies reviewed in epic.   FH- No anesthesia,bleeding  thrombosis , early onset heart disease in family   Moving out of the  house this week  Help available post op     Physical Exam   HENT:   Head: Normocephalic.     Physical Exam   HENT:   Head: Normocephalic.      Constitutional- Vitals - There is no height or weight on file to calculate BMI., There were no vitals filed for this visit.  General appearance-  Sleepy during evaluation   Driving safety discussed Conscious,Coherent  Eyes- No conjunctival icterus,pupils  round  and reactive to light   ENT-Oral cavity- moist  , Hearing grossly normal   Neck- No thyromegaly ,Trachea -central, No jugular venous distension,   No Carotid Bruit   Cardiovascular -Heart Sounds- Normal  and  no murmur   , No gallop rhythm   Respiratory - Normal Respiratory Effort, Normal breath sounds,  no wheeze  and  no forced expiratory wheeze    Peripheral pitting pedal edema-- mild,  varicose veins right lower extremity  and  varicose veins left lower extremity, no calf pain   Gastrointestinal -Soft abdomen, No palpable masses, Non Tender,Liver,Spleen not palpable. No-- free fluid and shifting dullness  Musculoskeletal- No finger Clubbing. Strength grossly normal   Lymphatic-No Palpable cervical, axillary,Inguinal lymphadenopathy   Psychiatric - normal effect,Orientation  Rt Dorsalis pedis pulses-palpable    Lt Dorsalis pedis pulses- palpable   Rt Posterior tibial pulses -palpable   Left posterior tibial pulses -palpable   Miscellaneous -  no asterixis,  no dupuytren's contracture and  no renal bruit  There were no vitals taken for this visit.      Investigations  Lab and Imaging have been reviewed in epic.    Review of Medicine tests    MRI June 2015     Multilevel spondylosis resulting in mild canal narrowing at L2-3 and L3-4 with variable degrees of mild to moderate neural foraminal narrowing most significant at the L4-5 and L5-S1 levels    EKG- I had independently reviewed the EKG from--7/10/2017   It was reported to be showing     Normal sinus rhythm  Normal ECG  When compared with ECG of 22-FEB-2011 08:25,  No significant change was found    Aug 2017       1 - Normal left ventricular systolic function (EF 60-65%).     2 - Normal left  ventricular diastolic function.     3 - Normal right ventricular systolic function .   estimated PA systolic pressure is 21 mmHg    Review of clinical lab tests:  Lab Results   Component Value Date    CREATININE 0.9 09/28/2017    HGB 15.6 11/21/2016     11/21/2016           Review of old records- Was done and information gathered regards to events leading to surgery and health conditions of significance in the perioperative period.    Outpatient Subjective & Objective

## 2018-05-29 NOTE — PROGRESS NOTES
[facility] - Pre Op Consult  Progress Note    Patient Name: Yosef Rowe  MRN: [id number]  Date of Evaluation- 06/14/2018  PCP- Ruperto Dexter MD    Future cases for Yosef Rowe [[id number]]     Case ID Status Date Time Paul Procedure Provider Location    [id number] [facility] 6/22/2018 11:50  REPLACEMENT-KNEE WITH NAVIGATION Orlin Jordan MD [[id number]] [facility] OR 2ND FLR      Left     HPI:  History of present illness- I had the pleasure of meeting this pleasant 62 y.o. gentleman in the pre op clinic prior to his elective Orthopedic surgery. The patient is new to me .     I have obtained the history by speaking to the patient and by reviewing the electronic health records.    Events leading up to surgery / History of presenting illness -    He has been troubled with severe  Left knee  pain for 6 months . Pain increases with activity and decreases with resting.      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 Airflow not improved after bronchodilator. Moderate restriction.  Diffusion capacity is mildly decreased. Oximetry is normal  He believes it is weight related       Obesity, Class III, BMI 40-49.9 (morbid obesity) 08/17/2017         Generalized anxiety disorder 12/28/2016    Major depressive disorder, recurrent episode, in partial remission      Going through divorce   Has come to terms with it  I suggest monitoring the sodium as  SIADH from Cymbalta use and hypersecretion of ADH associated with surgery can reduce sodium in the perioperative period           BPH (benign prostatic hypertrophy) with urinary obstruction 07/20/2015                   Sleep apnea 11/08/2013                        Insomnia 02/19/2013     Having trouble falling asleep   Long standing problem       ED (erectile dysfunction) of organic origin 01/14/2013     Per patient , not a problem   Sexually active            Subjective/ Objective:          Chief complaint-Preoperative evaluation, Perioperative Medical management, complication reduction plan     Active cardiac conditions- none    Revised cardiac risk index predictors- none    Functional capacity -Examples of physical activity, as noted ,  can take 1 flight of stairs----- He can undertake all the above activities without  chest pain,chest tightness, dizziness,lightheadedness making his exercise tolerance more  than 4 Mets.   Winded on exertion, not new ,stable over time     Review of Systems   Constitutional: Negative for chills and fever.   HENT:        Sleep apnea   Eyes:        No unusual vision changes   Respiratory:          Cough with clear  phlegm   No change in color , consistency, amount of phlegm   No Hemoptysis   Cardiovascular:        As noted   Gastrointestinal:        Bowels- Regular  No overt GI/ blood losses   Endocrine:        Prednisone use > 20 mg daily for 3 weeks- None   Genitourinary: Negative for dysuria.        No urinary hesitancy    Musculoskeletal:        As above      Skin: Negative for rash.   Neurological: Negative for syncope.        No unilateral weakness   Hematological:          Aspirin use for preventive reasons    Psychiatric/Behavioral:          No SI/HI     No vascular stenting   No past medical history pertinent negatives.  Past Surgical History:   Procedure Laterality Date    JOINT REPLACEMENT      knee arthroscopicc surgeries      Right knee- 2007  and Left  knee- 2008    KNEE SURGERY      Left wrist Surgery      2000 for a torn ligament from [place]       No anesthesia, bleeding, cardiac problems with previous surgeries/procedures.  Medications and Allergies reviewed in epic.   FH- No anesthesia,bleeding  thrombosis , early onset heart disease in family   Moving out of the  house this week  Help available post op     Physical Exam   HENT:   Head: Normocephalic.     Physical Exam   HENT:   Head: Normocephalic.     Constitutional- Vitals - There is no height or weight on file to calculate BMI., There were no vitals filed for this visit.  General appearance-  Sleepy during evaluation   Driving safety discussed Conscious,Coherent  Eyes- No conjunctival icterus,pupils  round  and reactive to light   ENT-Oral cavity- moist  , Hearing grossly normal   Neck- No thyromegaly ,Trachea -central, No jugular venous distension,   No Carotid Bruit   Cardiovascular -Heart Sounds- Normal  and  no murmur   , No gallop rhythm   Respiratory - Normal Respiratory Effort, Normal breath sounds,  no wheeze  and  no forced expiratory wheeze    Peripheral pitting pedal edema-- mild,  varicose veins right lower extremity  and  varicose veins left lower extremity, no calf pain   Gastrointestinal -Soft abdomen, No palpable masses, Non Tender,Liver,Spleen not palpable. No-- free fluid and shifting dullness  Musculoskeletal- No finger Clubbing. Strength grossly normal   Lymphatic-No Palpable cervical, axillary,Inguinal lymphadenopathy   Psychiatric - normal effect,Orientation  Rt Dorsalis pedis pulses-palpable    Lt Dorsalis pedis pulses- palpable   Rt Posterior tibial pulses -palpable   Left posterior tibial pulses -palpable   Miscellaneous -  no asterixis,  no dupuytren's contracture and  no renal bruit  There were no vitals taken for this visit.      Investigations  Lab and Imaging have been reviewed in [facility].    Review of Medicine tests    MRI June 2015     Multilevel spondylosis resulting in mild  canal narrowing at L2-3 and L3-4 with variable degrees of mild to moderate neural foraminal narrowing most significant at the L4-5 and L5-S1 levels    EKG- I had independently reviewed the EKG from--7/10/2017   It was reported to be showing     Normal sinus rhythm  Normal ECG  When compared with ECG of 22-FEB-2011 08:25,  No significant change was found    Aug 2017       1 - Normal left ventricular systolic function (EF 60-65%).     2 - Normal left ventricular diastolic function.     3 - Normal right ventricular systolic function .   estimated PA systolic pressure is 21 mmHg    Review of clinical lab tests:  Lab Results   Component Value Date    CREATININE 0.9 09/28/2017    HGB 15.6 11/21/2016     11/21/2016           Review of old records- Was done and information gathered regards to events leading to surgery and health conditions of significance in the perioperative period.        Preoperative cardiac risk assessment-  The patient does not have any active cardiac conditions . Revised cardiac risk index predictors- 0----.Functional capacity is more than 4 Mets. He will be undergoing a Orthopedic procedure that carries a intermediate risk     The estimated risk of the rate of adverse cardiac outcomes  0.4%    No further cardiac work up is indicated prior to proceeding with the surgery          American Society of Anesthesiologists Physical status classification ( ASA ) class: 3     Postoperative pulmonary complication risk assessment:      ARISCAT ( Canet) risk index- risk class -  Low, if duration of surgery is under 3 hours, intermediate, if duration of surgery is over 3 hours          Assessment/Plan:     Sleep apnea  Sleep apnea .Uses CPAP .Suggested bringing for hospital use . Informed the risk of worsening sleep apnea in the perioperative period and suggest using CPAP use any time in 24 hrs ( day or night )for planned sleep  Avoidance of weight gain and alcoholic beverages discussed since all of these  can worsen IVORY      I suggestcaution with usage of medication that can cause respiratory suppression in the perioperative period            Obesity, Class III, BMI 40-49.9 (morbid obesity)  Not known to have  Diabetes Mellitus, fatty liver   On Diethylpropion since 2018 which is helping reduce the appetite , lost 12 pounds     BPH (benign prostatic hypertrophy) with urinary obstruction  No problem with hesitancy  No night time frequency   Increased risk of post operative urinary retention    Hyperlipidemia  HLD-I  suggest continuation of statin during the entire perioperative period.    Coronary atherosclerosis due to calcified coronary lesion  Walks on a treadmill   Lifts weights  Plays golf      No chest pain, chest tightness     Elevated BP  Home readings 130/80    Tobacco abuse   I suggested to consider stopping  smoking tobacco for its benefits in the eriberto operative period and in the long term    I  Informed about risk of wound healing problem ,infection,lung complications,thrombosis with tobacco use     Suggested quitting     Varicose veins of both lower extremities  Increased risk of thrombosis in the eriberto operative period     Edema  Edema- I suggested avoidance of added salt,avoidance of NSAID's ( Except ASA 81 mg ) , unless advised or ordered and suggested Limb elevation and gallo hose use    Hypernatremia  Suggest hydration , monitoring         Preventive perioperative care    Thromboembolic prophylaxis:  His risk factors for thrombosis include tobacco use, morbid obesity, surgical procedure and age.I suggest  thromboembolic prophylaxis ( mechanical/pharmacological, weighing the risk benefits of pharmacological agent use considering eriberto procedural bleeding )  during the perioperative period.I suggested being active in the post operative period.      Postoperative pulmonary complication prophylaxis-Risk factors for post operative pulmonary complications include sleep apnea, morbid obesity   "ASA class >2- I suggest incentive spirometry use, early ambulation and end tidal carbon dioxide monitoring  , oral care , head end of bed elevation      Renal complication prophylaxis- I suggest keeping him well hydrated .I suggested drinking 2 litre's of water a day      Surgical site Infection Prophylaxis-I  suggest appropriate antibiotic for Prophylaxis against Surgical site infections      In view of enlarged prostate  the patient  is at risk of postoperative urinary retention.  I suggest avoidance / minimizing the of  Benzodiazepines,Anticholinergic medication,antihistamines ( Benadryl) , if possible in the perioperative period. I suggest using the minimum possible use of opioids for the minimum period of time in the perioperative period. Benadryl avoidance suggested      This visit was focused on Preoperative evaluation, Perioperative Medical management, complication reduction plans. I suggest that the patient follows up with primary care or relevant sub specialists for ongoing health care.    I appreciate the opportunity to be involved in this patients care. Please feel free to contact me if there were any questions about this consultation.    Patient is optimized     Patient  was instructed to call and update me about any changes to health, changes to medication, office visits ,testing out side of the erib[facility] operative care center , hospitalizations between now and surgery     Rani Reeves MD  Perioperative Medicine  [facility]   Pager [phone]  ---  5/29- 18 24   BP (!) 155/79 Comment: lt  Pulse (!) 58   Temp 97.7 °F (36.5 °C) (Oral)   Ht 5' 7" (1.702 m)   Wt 129.1 kg (284 lb 9.6 oz)   SpO2 95%   BMI 44.57 kg/m²     ARISCAT ( Canet) risk index- risk class -  Low, if duration of surgery is under 2 hours, intermediate, if duration of surgery is over 2 hours   ---  5/31- 15 15     Addendum to exam   Breast exam was done due to sore nipple   No breast lumps felt bilaterally "   ----  6/14- 16 55      Lab from 6/14 - Hb 13.8, HCT 41.3  MCV long standing elevation   BMP- hypernatremia- sodium of 147   Anemia likely from NSAID use   Called to discuss labs   Left a message   Suggested 1 week pre op Advil, Meloxicam hold , follow up about anemia  Hydration for hypernatremia   ---  6/21- 14 27     Called to follow up   Doing good    Staying hydrated   Lost 15 pounds over 1.5 months    ,No changes to Medication, Health   Breathing much better since loosing weight   Follow up about lab abnormalities suggested

## 2018-05-29 NOTE — ASSESSMENT & PLAN NOTE
Sleep apnea .Uses CPAP .Suggested bringing for hospital use . Informed the risk of worsening sleep apnea in the perioperative period and suggest using CPAP use any time in 24 hrs ( day or night )for planned sleep  Avoidance of weight gain and alcoholic beverages discussed since all of these can worsen IVORY      I suggestcaution with usage of medication that can cause respiratory suppression in the perioperative period

## 2018-05-29 NOTE — PATIENT INSTRUCTIONS
"Patient warned of common side effects of phentermine including anxiety, insomnia, palpitations and increased blood pressure. It was also explained that it is for short-term usage along with diet and exercise, and that stopping the medication without making lifestyle changes will result in regain of weight. Patient states understanding.     Weight loss medications are controlled substances.  They require routine follow up. Prescription or pills that are lost or destroyed will not be replaced.       Start phentermine with 1/2 pill a day for at least 1 week to see if that will control your appetite.  Go up to a full pill when needed.     Hold the phentermine 2 days before surgery. Can resume after you are eating normal diet and having regular bowel movements.       3 meals a day made up of the following:  Unlimited green vegetables, tomatoes, mushrooms, spaghetti squash, cauliflower, meat, poultry, seafood, eggs and hard cheeses.   Milk and plain yogurt  Dressings, seasonings, condiments, etc should have less than 2 g sugars.   Beans (1-1.5 cups) or nuts (1/4 cup) can have 1 x a day.   1-2 servings of citrus fruits, berries, pineapple or melon a day (1/2 cup)  Avoid fried foods    No grains, rice, pasta, potatoes, bread, corn, peas, oatmeal, grits, tortillas, crackers, chips    No soda, sweet tea, juices or lemonade    [email or web address] for recipes. Moderate carb intake      Spring Recipes:    Hayti Shake:     Ingredients  ½ cup low fat cottage cheese  ¼ cup vanilla protein powder  1/8 tsp mint extract  2-3 packets of stevia or artificial sweetener of choice  5-10 ice cubes (more or less depending on how thick you would like it)  4 oz of water  2-3 drops of green food coloring OR a small handful of spinach to make it green    Put all ingredients in  and  until desired consistency.      "Unstuffed" Cabbage Rolls:    Ingredients:  1 1/2 to 2 pounds lean ground beef or turkey  1 large onion, chopped  1 " clove garlic, minced  1 small cabbage, chopped  2 cans (14.5 ounces each) diced tomatoes  1 can (8 ounces) tomato sauce  ¼ - ½ cup water depending on desired consistency  1 teaspoon ground black pepper, salt to taste    Spray large skillet with nonstick cookspray. Heat pan on medium heat. Sauté the onions until tender, and then add the ground beef (or turkey) and cook until the meat is browned.    Add the garlic, cook an additional minute before adding the remaining ingredients. Cover, reduce the heat and simmer about 25 minutes (or until the cabbage is  fork tender)        Not so Moya's Pie:    Ingredients  2 (or more) pounds of lean ground beef, or turkey  2 heads of cauliflower or 3 bags of frozen cauliflower, chopped  1 bag of frozen mixed veggies          (NO Corn, Peas or Potatoes!)  1-2 onions  1 egg  1 teaspoon each of basil, garlic powder, pepper, oregano and a little cayenne  4-5 sprays of I cant believe its not butter spray  4 ounces of fat free cream cheese (optional)  Low fat Cheese to top (optional)    Roast cauliflower in oven on 350* F for about 15-20 minutes, until browned and fork tender. (begin edgar meat while cauliflower is cooking). Place cooked cauliflower in . Add 4 ounces of fat free cream cheese, 4-5 sprays of I cant believe its not butter SPRAY, and spices and puree until creamy.     While cauliflower is roasting, brown meat in large skillet and season to taste. Set aside. Sauté diced onion in skillet until somewhat soft. Set aside with meat. Pour mixed veggies in the skillet to heat on low heat.     Mix the meat, onions, mixed veggies, raw egg and any additional seasonings and put in bottom of 9x13 baking dish. Spread mashed cauliflower mixture over it until smooth.    Bake at 350 for approximately 30 minutes. Add cheese and bake 5 additional minutes (optional). Serve warm (or reheat later).    Crock Pot Balsamic Pork Tenderloin:    Ingredients:  1 tsp dried thyme  1 tsp  ground pepper  ½ tsp salt  3 tbsp dried minced onion  2 tsp dried basil  ¼ cup low sodium broth  ½ cup balsamic vinegar  2 cloves garlic, minced  2 lbs Pork Tenderloin    Mix first 5 ingredients together. Rub pork tenderloin with dry seasoning mixture.  In a large sauté pan, heat ¼ cup balsamic vinegar and garlic on medium heat. Add pork to sauté ibrahim and sear, edgar all sides. Add low sodium broth, 1 tbsp at a time to keep tenderloin from sticking or use nonstick cookspray.     Transfer meat to crock pot. Pour remaining balsamic vinegar into sauté pan and continue  to stir for a few minutes to deglaze the pan. Pour juices over the top of the tenderloin in crockpot. Cook on high for 3 hours or until meat thermometer says 170*. Let rest 5-10 minutes and then slice.       Side Dish: Steamed carrots w/ garlic and sarah    Ingredients:  2 garlic cloves, minced  1 lb baby carrots  5-6 sprays of I cant believe its not butter SPRAY  1 tsp minced peeled fresh sarah  1 tbsp chopped fresh cilantro  ½ tsp grated lime rind  1 tbsp fresh lime juice   ¼ tsp salt    Prepare garlic; let stand 10 minutes. Steam carrots, covered in pot, about 10 minutes or until tender.     In a nonstick skillet over medium heat, spray pan w/ I cant believe its not butter SPRAY. Add garlic and sarah and cook 1 minute, stirring constantly. Remove from  heat; stir in carrots, cilantro and remaining ingredients.         Side Dish: Green Bean Almondine    Ingredients:  1 pound fresh green beans, trimmed  1 tbsp wendy vinegar  1 ½ tsp water  1 tsp Armando Mustard  ¼ tsp salt  ¼ tsp freshly ground black pepper  1 tbsp sliced almonds, toasted    Cook green beans in boiling water for 4 minutes or until crisp-tender. Drain and plunge beans into ice water. Drain well. Pat beans dry w/ paper towel.     Combine vinegar, water, mustard, salt and pepper in a medium bowl. Add beans to vinegar mixture; toss to coat well. Sprinkle with toasted  almonds.      How to Cook the Perfect Hard Boiled Egg:    Steam in water: Fill a large pot with 1 inch of water. Place steamer insert inside, cover, and bring to a boil over high heat. Add eggs to steamer basket, cover, and continue cooking 12 minute. If serving cold, immediately place eggs in a bowl of ice water and allow to cool for at least 15 minutes before peeling under cool running water. Store in the refrigerator for up to 5 days.    OR    Purchase Dash Go Rapid Egg Boiler: available @ [employer], [employer], [employer], [employer] for ~$15-$20      Classic Egg Salad Recipe:    Ingredients:  8 hard cooked eggs, peeled and coarsely chopped  4-6 tbsp of low fat or fat free rodriguez  2 tbsp celery, chopped  2 tsp delmy mustard  Dash of cayenne pepper (for spice)  Black pepper, salt to taste    In a medium bowl, combine rodriguez, celery, mustard and cayenne pepper with chopped eggs. Season w/ pepper and salt. Serve over Lettuce leaves.     Get Creative! Add chopped turkey rasheed and tomato and serve on lettuce leaves for an egg-cellent BLT egg salad or mix lean diced ham, low fat cheddar and tomatoes for  egg salad    Tuna Deviled Eggs:    Ingredients:  12 hard boiled eggs  1 can of tuna packed in water  1 rib celery, diced  ¼ cup low fat rodriguez  1 tsp mustard  Garlic powder, black pepper to taste  Dash of paprika (for finish)    Cut eggs in half lengthwise. Remove yolk and mash in bowl. In separate bowl, mix tuna, celery, low fat rodriguez, mustard and seasonings.  Stir in egg yolks until blended. Stuff eggs and sprinkle dash of paprika      Rasheed Jalapeno Deviled Eggs:    Ingredients:  12 hard boiled eggs, peeled  ½ cup low fat rodriguez  1 ½ tsp rice vinegar  ¾ tsp ground mustard  ½ packet splenda  2 jalapenos, seeded and chopped, 6 pieces turkey rasheed, cooked crisp and crumbled  Dash of paprika (for finish)    Cut eggs in half lengthwise. Remove yolk and mash in bowl. Add rodriguez, vinegar, spices and Splenda until well  combined. Mix in jalapenos and rasheed. Stuff eggs and sprinkle w/ dash of paprika      Sugar-Free High Protein Brownie Recipe:    Ingredients:  2 cups of pureed zucchini  4 oz fat free cream cheese  1 large egg  2 scoops chocolate protein powder  1 tbsp pure vanilla extract  1/3 cup unsweetened cocoa powder    ¼ tsp salt  2 tbsp chopped nuts  *8-9 packets of splenda or artificial sweetener of choice    Preheat oven to 350* F.     Line an 8x8 pan w/ parchment paper or spray with nonstick cookspray.     In a medium bowl, blend the fat free cream cheese with egg until creamy. Add chocolate protein powder and cocoa powder until creamy. Add vanilla extract. Stir in pureed zucchini and salt.     The batter will be thick, but not dry. If batter seems dry, add 1-2 tbsp water. Fold in Nuts. Pour batter in prepared pan.     Bake for 30 minutes. Allow to cool completely. Cut into squares and chill to semi-set.     *best if eaten within 2 days but may last 3-4 days in fridge.         Lemon Whip:    Ingredients:  Small box of sugar-free vanilla instant pudding mix  1 small packet of crystal light lemonade mix  2 cups skim milk or fat free fairlife milk  Sugar-free, fat free cool whip     Make sugar-free pudding using 2 cups skim milk according to package. Stir in 1 small packet of crystal light lemonade mix.  Fold in a dollop of sugar-free, fat free cool whip and stir to combine.     Home-made Sugar-free Pudding Pops:    Ingredients:  1 small pkg of sugar-free instant pudding mix (flavor of choice!)  2 cups fat free milk     Beat ingredients with whisk for 2 minutes. Pour into 6 paper or plastic cups or into a popsicle mold. Insert wooden pop stick in center of each cup.     Freeze for 5 hours or until firm. Peel off paper or pull out of popsicle mold.     Variations: add sugar-free syrups to change up the flavor, such as sugar-free chocolate pudding + sugar free raspberry syrup = chocolate raspberry fudgesicle.

## 2018-05-29 NOTE — DISCHARGE INSTRUCTIONS
Your surgery has been scheduled for:__________________________________________    You should report to:  ____[facility], located on the [place] side of the first floor of the           [facility] ([phone])  ____[facility], located on the [facility] side of the            Second floor of the [facility] ([phone])  ____3rd Floor [facility] located on the [facility] side of the [facility] [phone]  Please Note   - Tell your doctor if you take Aspirin, products containing Aspirin, herbal medications  or blood thinners, such as Coumadin, Ticlid, or Plavix.  (Consult your provider regarding holding or stopping before surgery).  - Arrange for someone to drive you home following surgery.  You will not be allowed to leave the surgical facility alone or drive yourself home following sedation and anesthesia.  Before Surgery  - Stop taking all herbal medications 14days prior to surgery  - No Motrin/Advil (Ibuprofen) 7 days before surgery  - No Aleve (Naproxen) 7 days before surgery  - Stop Taking Asprin, products containing Asprin _____days before surgery  - Stop taking blood thinners_______days before surgery  - Refrain from drinking alcoholic beverages for 24hours before and after surgery  - Stop or limit smoking _________days before surgery  Night before Surgery  - DO NOT EAT OR DRINK ANYTHING AFTER MIDNIGHT, INCLUDING GUM, HARD CANDY, MINTS, OR CHEWING TOBACCO.  - Take a shower or bath (shower is recommended).  Bathe with Hibiclens soap or an antibacterial soap from the neck down.  If not supplied by your surgeon, hibiclens soap will need to be purchased over the counter in pharmacy.  Rinse soap off thoroughly.  - Shampoo your hair with your regular shampoo  The Day of Surgery  - Take another bath or shower with hibiclens or any antibacterial soap, to reduce the chance of infection.  - Take heart and blood  pressure medications with a small sip of water, as advised by the perioperative team.  - Do not take fluid pills  - You may brush your teeth and rinse your mouth, but do not swall any additional water.   - Do not apply perfumes, powder, body lotions or deodorant on the day of surgery.  - Nail polish should be removed.  - Do not wear makeup or moisturizer  - Wear comfortable clothes, such as a button front shirt and loose fitting pants.  - Leave all jewelry, including body piercings, and valuables at home.    - Bring any devices you will neeed after surgery such as crutches or canes.  - If you have sleep apnea, please bring your CPAP machine  In the event that your physical condition changes including the onset of a cold or respiratory illness, or if you have to delay or cancel your surgery, please notify your surgeon.Anesthesia: Regional Anesthesia    Youre scheduled for surgery. During surgery, youll receive medicine called anesthesia to keep you comfortable and pain-free. Your surgeon has decided that youll receive regional anesthesia. This sheet tells you what to expect with this type of anesthesia.  What is regional anesthesia?  Regional anesthesia numbs one region of your body. The anesthesia may be given around nerves or into veins in your arms, neck, or legs (nerve block or Greg block). Or it may be sent into the spinal fluid (spinal anesthesia) or into the space just outside the spinal fluid (epidural anesthesia). You may also be given sedatives to help you relax.  Nerve block or Zemple block  A small area of the body, such as an arm or leg, can be numbed using a nerve block or Greg block.  · Nerve block. During a nerve block, your skin is numbed. A needle is then inserted near nerves that serve the area to be numbed. Anesthetic is sent through the needle.  · IV regional or Greg block. For this type of block, an IV line is put into a vein. The blood flow to the area to be numbed is blocked for a short time.  Anesthetic is sent through the IV.  Spinal anesthesia  Spinal anesthesia numbs your body from about the waist down.  · Anesthetic is injected into the spinal fluid. This is a substance that surrounds the spinal cord in your spinal column. The anesthetic blocks pain traveling from the body to the brain.  · To receive the anesthetic, your skin is numbed at the injection site on your back.  · A needle is then inserted into the spinal space. Anesthetic is sent into the spinal fluid through the needle.  Epidural anesthesia  Epidural anesthesia is most commonly used during childbirth and may also be used after surgical procedures of the chest, belly, and legs.  · Anesthetic is injected into the epidural space. This is just outside the dural sac which contains the spinal fluid.  · To receive the anesthetic, your skin is numbed at the injection site on your back.  · A needle is then inserted into the epidural space. Anesthetic is sent into the epidural space through the needle.  · A small flexible catheter may be attached to the needle and left in place. This allows for continuous injections or infusions of anesthetic.  Anesthesia tools and medicines that might be near you during your procedure  · Local anesthetic. This medicine is given through a needle numbs one region of your body.  · Electrocardiography leads (electrodes). These are used to record your heart rate and rhythm.  · Blood pressure cuff. A cuff is placed on your arm to keep track of your blood pressure.  · Pulse oximeter. This small clip is placed on the end of the finger. It measures your blood oxygen level.  · Sedatives. These medicines may be given through an IV. They help to relax you and keep you comfortable. You may stay awake or sleep lightly.  · Oxygen. You may be given oxygen through a facemask.  Risks and possible complications  Regional anesthesia carries some risks. These include:  · Nausea and vomiting  · Headache  · Backache  · Decreased blood  pressure  · Allergic reaction to the anesthetic  · Ongoing numbness (rare)  · Irregular heartbeat (rare)  · Cardiac arrest (rare)   Date Last Reviewed: 12/1/2016  © 2781-7564 [employer]. [address] [place] [address], [place] [address]. All rights reserved. This information is not intended as a substitute for professional medical care. Always follow your healthcare professional's instructions.

## 2018-05-29 NOTE — ASSESSMENT & PLAN NOTE
Edema- I suggested avoidance of added salt,avoidance of NSAID's ( Except ASA 81 mg ) , unless advised or ordered and suggested Limb elevation and gallo hose use

## 2018-05-29 NOTE — ASSESSMENT & PLAN NOTE
No problem with hesitancy  No night time frequency   Increased risk of post operative urinary retention

## 2018-05-29 NOTE — ASSESSMENT & PLAN NOTE
I suggested to consider stopping  smoking tobacco for its benefits in the eriberto operative period and in the long term    I  Informed about risk of wound healing problem ,infection,lung complications,thrombosis with tobacco use     Suggested quitting

## 2018-05-29 NOTE — LETTER
May 29, 2018      Rhonda G Leopold, MD  [address]  [place] [address]           [facility] Pre [facility]  [address] [facility] [address]  Phone: [phone]          Patient: Yosef Rowe   MR Number: [id number]   YOB: 1955   Date of Visit: 5/29/2018       Dear Dr. Rhonda G Leopold:    Thank you for referring Yosef Rowe to me for evaluation. Attached you will find relevant portions of my assessment and plan of care.    If you have questions, please do not hesitate to call me. I look forward to following Yosef Rowe along with you.    Sincerely,    Rani Reeves MD    Enclosure  CC:  No Recipients    If you would like to receive this communication electronically, please contact [email or web address] or [phone] to request more information on [employer] Link access.    For providers and/or their staff who would like to refer a patient to [facility], please contact us through our one-stop-shop provider referral line, [facility], at [phone].    If you feel you have received this communication in error or would no longer like to receive these types of communications, please e-mail [email or web address]

## 2018-05-29 NOTE — ASSESSMENT & PLAN NOTE
Not known to have  Diabetes Mellitus, fatty liver   On Diethylpropion since 2018 which is helping reduce the appetite , lost 12 pounds

## 2018-05-29 NOTE — HPI
History of present illness- I had the pleasure of meeting this pleasant 62 y.o. gentleman in the pre op clinic prior to his elective Orthopedic surgery. The patient is new to me .     I have obtained the history by speaking to the patient and by reviewing the electronic health records.    Events leading up to surgery / History of presenting illness -    He has been troubled with severe  Left knee  pain for 6 months . Pain increases with activity and decreases with resting.      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 Airflow not improved after bronchodilator. Moderate restriction.  Diffusion capacity is mildly decreased. Oximetry is normal  He believes it is weight related       Obesity, Class III, BMI 40-49.9 (morbid obesity) 08/17/2017         Generalized anxiety disorder 12/28/2016    Major depressive disorder, recurrent episode, in partial remission      Going through divorce   Has come to terms with it  I suggest monitoring the sodium as SIADH from Cymbalta use and hypersecretion of ADH associated with surgery can reduce sodium in the perioperative period           BPH (benign prostatic hypertrophy) with urinary obstruction 07/20/2015                   Sleep apnea 11/08/2013                        Insomnia 02/19/2013     Having trouble falling asleep   Long standing problem       ED (erectile  dysfunction) of organic origin 01/14/2013     Per patient , not a problem   Sexually active

## 2018-05-29 NOTE — PROGRESS NOTES
"Subjective:       Patient ID: Yosef Rowe is a 62 y.o. male.    Chief Complaint: Follow-up    Pt here today for follow up . Has lost 10 lbs. Has been on diethylpropion x 3 months. Last filled 4/1/18, in , but he does state he has filled 3 times. He is doing some exercise. He is going to be getting a knee replacement.       Review of Systems   Constitutional: Negative for chills and fever.   Respiratory: Positive for apnea and shortness of breath.         Wears CPAP nightly   Cardiovascular: Positive for leg swelling. Negative for chest pain.   Gastrointestinal: Negative for constipation and diarrhea.        + indigestion   Genitourinary: Negative for difficulty urinating and dysuria.   Musculoskeletal: Positive for arthralgias and back pain.   Neurological: Negative for dizziness and light-headedness.   Psychiatric/Behavioral: Negative for dysphoric mood. The patient is not nervous/anxious.        Objective:     BP (!) 140/80   Pulse 62   Ht 5' 7" (1.702 m)   Wt 128 kg (282 lb 3 oz)   BMI 44.20 kg/m²     Physical Exam   Constitutional: He is oriented to person, place, and time. He appears well-developed. No distress.   Morbidly obese     HENT:   Head: Normocephalic and atraumatic.   Eyes: Pupils are equal, round, and reactive to light. No scleral icterus.   Neck: Normal range of motion. Neck supple.   Cardiovascular: Normal rate, regular rhythm and normal heart sounds.  Exam reveals no gallop and no friction rub.    No murmur heard.  Pulmonary/Chest: Effort normal and breath sounds normal. No respiratory distress. He has no wheezes.   Musculoskeletal: Normal range of motion. He exhibits no edema.   Neurological: He is alert and oriented to person, place, and time.   Skin: Skin is warm and dry.   Psychiatric: He has a normal mood and affect. His behavior is normal. Judgment normal.   Vitals reviewed.      Assessment:       1. Morbid obesity with BMI of 40.0-44.9, adult        Plan:           Yosef was seen " today for follow-up.    Diagnoses and all orders for this visit:    Morbid obesity with BMI of 40.0-44.9, adult    Other orders  -     phentermine (ADIPEX-P) 37.5 mg tablet; Take 1 tablet (37.5 mg total) by mouth before breakfast.          Patient warned of common side effects of phentermine including anxiety, insomnia, palpitations and increased blood pressure. It was also explained that it is for short-term usage along with diet and exercise, and that stopping the medication without making lifestyle changes will result in regain of weight. Patient states understanding.     Weight loss medications are controlled substances.  They require routine follow up. Prescription or pills that are lost or destroyed will not be replaced.       Start phentermine with 1/2 pill a day for at least 1 week to see if that will control your appetite.  Go up to a full pill when needed.     Hold the phentermine 2 days before surgery. Can resume after you are eating normal diet and having regular bowel movements.           3 meals a day made up of the following:  Unlimited green vegetables, tomatoes, mushrooms, spaghetti squash, cauliflower, meat, poultry, seafood, eggs and hard cheeses.   Milk and plain yogurt  Dressings, seasonings, condiments, etc should have less than 2 g sugars.   Beans (1-1.5 cups) or nuts (1/4 cup) can have 1 x a day.   1-2 servings of citrus fruits, berries, pineapple or melon a day (1/2 cup)  Avoid fried foods    No grains, rice, pasta, potatoes, bread, corn, peas, oatmeal, grits, tortillas, crackers, chips    No soda, sweet tea, juices or lemonade    [email or web address].dietdoctor.com for recipes. Moderate carb intake    Spring recipes given    Exercise 20 min 3 times a week.    Limit alcohol to 4 drinks per week.     Decrease portions by 1/4    Add protein at breakfast.     Patient was informed that topiramate is used for migraine prevention and seizures. Weight loss is a common side effect that is well documented. He  understands this. He was informed of the potential side effects such as serious and possibly fatal rash in which case the medication should be discontinued immediately. Paresthesias, forgetfulness, fatigue, kidney stones, GI symptoms, and changes in lab values such as electrolytes, blood counts and kidney function.

## 2018-06-11 ENCOUNTER — OFFICE VISIT (OUTPATIENT)
Dept: PSYCHIATRY | Facility: CLINIC | Age: 63
End: 2018-06-11
Payer: COMMERCIAL

## 2018-06-11 DIAGNOSIS — Z63.5 MARITAL CONFLICT INVOLVING DIVORCE: ICD-10-CM

## 2018-06-11 DIAGNOSIS — F33.41 MAJOR DEPRESSIVE DISORDER, RECURRENT EPISODE, IN PARTIAL REMISSION: Primary | ICD-10-CM

## 2018-06-11 DIAGNOSIS — F41.1 GENERALIZED ANXIETY DISORDER: ICD-10-CM

## 2018-06-11 PROCEDURE — 90834 PSYTX W PT 45 MINUTES: CPT | Mod: S$GLB,,, | Performed by: PSYCHOLOGIST

## 2018-06-11 SDOH — SOCIAL DETERMINANTS OF HEALTH (SDOH): DISRUPTION OF FAMILY BY SEPARATION AND DIVORCE: Z63.5

## 2018-06-11 NOTE — PROGRESS NOTES
"Individual Psychotherapy (PhD/LCSW)    6/11/2018    Site:  [facility]         Therapeutic Intervention: Met with patient.  Outpatient - Insight oriented psychotherapy 45 min - CPT code 73466    Chief complaint/reason for encounter: depression     Interval history and content of current session:  Yosef arrived on time for his scheduled session.  His wife, Narda, filed for divorce.  He hired an  to manage the divorce and moved out of their home.  Yosef acknowledged having a difficult time moving forward past anger and defensiveness that is triggered by his wife and family.  He believes that his wife has been "brainwashing" their children, and he discussed that his sons are not talking to him.  He showed text conversations between he and his son and he and his wife because he felt they were unfair.  While he was validated in some of their messages with lack of skill, he was gently informed that his messages could also be more skillful.  He noted that he "can't help but retaliate" when he is treated poorly by them.  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  He reported the  withdrew her request for his resignation and gave him "one last chance."  He maintains that the complaints are unfounded.  When asked about alcohol use, Yosef reports that he has only drank two drinks per day in accordance with discussion from two sessions ago.  He was encouraged to stick to this limit, as it is recommended by the AMA that men limit their alcohol use to two per day to " "reduce alcohol-related problems.  Yosef notes that he is doing "all that I can do" to manage these situations, and would like to continue in therapy to work on interpersonal effectiveness with Narda and to help him move forward from the past and reduce anger and defensiveness.  We will work communication skills in the next session.    Treatment plan:  · Target symptoms: depression  · Why chosen therapy is appropriate versus another modality: relevant to diagnosis, evidence based practice  · Outcome monitoring methods: self-report  · Therapeutic intervention type: insight oriented psychotherapy    Risk parameters:  Patient reports no suicidal ideation  Patient reports no homicidal ideation  Patient reports no self-injurious behavior  Patient reports no violent behavior    Verbal deficits: None    Patient's response to intervention:  The patient's response to intervention is accepting.    Progress toward goals and other mental status changes:  The patient's progress toward goals is good.    Diagnosis:     ICD-10-CM ICD-9-CM   1. Major depressive disorder, recurrent episode, in partial remission F33.41 296.35   2. Generalized anxiety disorder F41.1 300.02   3. Marital conflict involving divorce Z63.5 V61.03   R/O Alcohol Use Disorder    Plan:  individual psychotherapy    Return to clinic: 1 month    Length of Service (minutes): 45  "

## 2018-06-13 ENCOUNTER — TELEPHONE (OUTPATIENT)
Dept: INTERNAL MEDICINE | Facility: CLINIC | Age: 63
End: 2018-06-13

## 2018-06-13 NOTE — TELEPHONE ENCOUNTER
Has back pain going down into leg.  Has testing scheduled tomorrow am and leaving on cruise at 2pm tomorrow.    Says you have known him long enough that you should be able to send a muscle relaxer or something to pharmacy to help him with this.  He wanted me to call you on cell phone.    rx ok?  Pharmacy verified in chart is correct.    Thanks yanna

## 2018-06-13 NOTE — TELEPHONE ENCOUNTER
----- Message from Manjula Moran sent at 6/13/2018  3:01 PM CDT -----  Contact: Pt [phone]  Patient would like a call back in regards to him saying that he have sciatic nerve damage. He said that he is having pains in his right buttocks going down his right leg.

## 2018-06-14 ENCOUNTER — HOSPITAL ENCOUNTER (OUTPATIENT)
Dept: RADIOLOGY | Facility: HOSPITAL | Age: 63
Discharge: HOME | End: 2018-06-14
Attending: ORTHOPAEDIC SURGERY
Payer: COMMERCIAL

## 2018-06-14 ENCOUNTER — OFFICE VISIT (OUTPATIENT)
Dept: ORTHOPEDICS | Facility: CLINIC | Age: 63
End: 2018-06-14
Payer: COMMERCIAL

## 2018-06-14 VITALS
SYSTOLIC BLOOD PRESSURE: 151 MMHG | RESPIRATION RATE: 20 BRPM | HEART RATE: 68 BPM | DIASTOLIC BLOOD PRESSURE: 78 MMHG | WEIGHT: 284.38 LBS | TEMPERATURE: 98 F | BODY MASS INDEX: 44.54 KG/M2

## 2018-06-14 DIAGNOSIS — M17.12 PRIMARY OSTEOARTHRITIS OF LEFT KNEE: ICD-10-CM

## 2018-06-14 DIAGNOSIS — M17.12 PRIMARY OSTEOARTHRITIS OF LEFT KNEE: Primary | ICD-10-CM

## 2018-06-14 PROBLEM — E87.0 HYPERNATREMIA: Status: ACTIVE | Noted: 2018-06-14

## 2018-06-14 PROCEDURE — 99999 PR PBB SHADOW E&M-EST. PATIENT-LVL III: CPT | Mod: PBBFAC,,, | Performed by: NURSE PRACTITIONER

## 2018-06-14 PROCEDURE — 99499 UNLISTED E&M SERVICE: CPT | Mod: S$GLB,,, | Performed by: NURSE PRACTITIONER

## 2018-06-14 PROCEDURE — 73700 CT LOWER EXTREMITY W/O DYE: CPT | Mod: 26,LT,, | Performed by: RADIOLOGY

## 2018-06-14 PROCEDURE — 73700 CT LOWER EXTREMITY W/O DYE: CPT | Mod: TC,LT

## 2018-06-14 RX ORDER — MUPIROCIN 20 MG/G
OINTMENT TOPICAL
Status: CANCELLED | OUTPATIENT
Start: 2018-06-14

## 2018-06-14 RX ORDER — MELOXICAM 15 MG/1
15 TABLET ORAL DAILY
Qty: 30 TABLET | Refills: 1 | Status: ON HOLD | OUTPATIENT
Start: 2018-06-14 | End: 2018-06-22 | Stop reason: HOSPADM

## 2018-06-14 RX ORDER — SODIUM CHLORIDE 9 MG/ML
INJECTION, SOLUTION INTRAVENOUS CONTINUOUS
Status: CANCELLED | OUTPATIENT
Start: 2018-06-14

## 2018-06-14 NOTE — PROGRESS NOTES
Yosef Rowe is a 62 y.o. year old here today for a pre-operative visit in preparation for a Left knee tino arthroplasty to be performed by  Dr. Jordan on 6/22/18.  he was last seen and treated in the clinic on 5/14/2018. he will be medically optimized by the pre op center. There has been no significant change in medical status since last visit. No fever, chills, malaise, or unexplained weight change.      Allergies, Medications, past medical and surgical history reviewed.    Focused examination performed.    Patient declined to see Dr. Jordan today in clinic. All questions answered. Patient encouraged to call with questions. Contact information given.     Pre, eriberto, and post operative procedures and expectations discussed. Questions were answered. Yosef Rowe has been educated and is ready to proceed with surgery. Approximately 30 minutes was spent discussing surgical outcomes, plans, procedures pre, eriberto, and post operative expections and care.  Surgical consent signed.    Yosef Rowe will contact us if there are any questions, concerns, or changes in medical status prior to surgery.

## 2018-06-14 NOTE — H&P
CC: Left knee pain    Yosef Rowe is a 62 y.o. male with a history of Left medial knee pain. Pain is worse with activity and weight bearing.  Patient has experienced interference of activities of daily living due to decreased range of motion and an increase in joint pain and swelling.  Patient has failed non-operative treatment including NSAIDs, corticosteroid injections, viscosupplement injections, and activity modification.  Yosef Rowe currently ambulates independently .     Past Medical History:   Diagnosis Date    Anxiety     Arthritis     Depression     when turned 49-lost job and mother unwell at that time    Hyperlipidemia     Lumbar radiculopathy     BRETT- 2011    Sleep apnea     Urinary tract infection        Past Surgical History:   Procedure Laterality Date    JOINT REPLACEMENT      knee arthroscopicc surgeries      Right knee- 2007  and Left knee- 2008    KNEE SURGERY      Left wrist Surgery      2000 for a torn ligament from [place]       Family History   Problem Relation Age of Onset    Parkinsonism Mother        Review of patient's allergies indicates:   Allergen Reactions    No known drug allergies          Current Outpatient Prescriptions:     aspirin (ECOTRIN) 81 MG EC tablet, Take 1 tablet (81 mg total) by mouth once daily. (Patient taking differently: Take 81 mg by mouth every evening. ), Disp: 30 tablet, Rfl: 11    atorvastatin (LIPITOR) 20 MG tablet, Take 1 tablet (20 mg total) by mouth once daily. (Patient taking differently: Take 20 mg by mouth every morning. ), Disp: 30 tablet, Rfl: 11    DULoxetine (CYMBALTA) 60 MG capsule, Take 1 capsule (60 mg total) by mouth once daily. (Patient taking differently: Take 60 mg by mouth every morning. ), Disp: 30 capsule, Rfl: 5    ibuprofen (ADVIL,MOTRIN) 200 MG tablet, Take 200 mg by mouth as needed for Pain., Disp: , Rfl:     meloxicam (MOBIC) 15 MG tablet, Take 1 tablet (15 mg total) by mouth once daily., Disp: 30 tablet, Rfl: 1     multivitamin (ONE DAILY MULTIVITAMIN) per tablet, Take 1 tablet by mouth once daily., Disp: , Rfl:     phentermine (ADIPEX-P) 37.5 mg tablet, Take 1 tablet (37.5 mg total) by mouth before breakfast., Disp: 30 tablet, Rfl: 2    zolpidem (AMBIEN) 5 MG Tab, Take 1 tablet (5 mg total) by mouth nightly as needed., Disp: 30 tablet, Rfl: 5    Review of Systems:  Constitutional: no fever or chills  Eyes: no visual changes  ENT: no nasal congestion or sore throat  Respiratory: no cough or shortness of breath  Cardiovascular: no chest pain or palpitations  Gastrointestinal: no nausea or vomiting, tolerating diet  Genitourinary: no hematuria or dysuria  Integument/Breast: no rash or pruritis  Hematologic/Lymphatic: no easy bruising or lymphadenopathy  Musculoskeletal: positive for c/o left knee pain medial and worse with activity  Neurological: no seizures or tremors  Behavioral/Psych: no auditory or visual hallucinations  Endocrine: no heat or cold intolerance    PE:  BP (!) 151/78   Pulse 68   Temp 98.1 °F (36.7 °C)   Resp 20   Wt 129 kg (284 lb 6.3 oz)   BMI 44.54 kg/m²   General: Pleasant, cooperative, NAD   Gait: antalgic  HEENT: NCAT, sclera nonicteric   Lungs: Respirations clear bilaterally; equal and unlabored.   CV: S1S2; 2+ bilateral upper and lower extremity pulses.   Skin: Intact throughout with no rashes, erythema, or lesions  Extremities: No LE edema,  no erythema or warmth of the skin in either lower extremity.    Left knee exam:  Knee Range of Motion:normal, pain with passive range of motion  Effusion:none  Condition of skin:intact  Location of tenderness:Medial joint line   Strength:normal  Stability: stable to testing    Hip Examination:normal    Radiographs: Radiographs reveal Progressive narrowing of the left medial tibiofemoral joint space is present.  Left knee suprapatellar joint effusion    Knee Alignment: normal    Diagnosis: osteoarthritis Left knee    Plan: Left total knee arthroplasty    Due  to the serious nature of total joint infection and the high prevalence of community acquired MRSA, vancomycin will be used perioperatively.

## 2018-06-19 ENCOUNTER — TELEPHONE (OUTPATIENT)
Dept: INTERNAL MEDICINE | Facility: CLINIC | Age: 63
End: 2018-06-19

## 2018-06-19 NOTE — TELEPHONE ENCOUNTER
"----- Message from Edwin Contreras MD sent at 8/31/2017  1:10 PM CDT -----  Joel Rose. Not sure if Mr. Rowe will follow through w the plan that I outlined. I had not noted the possible anomalous cor artery when he visited w me until after he left. When I called him to tell him that he should have a contrast CT of the heart, he asked why we had not been able to figure out "after all the tests that you and I had done" whether or not he had blockages. He has not done the PET stress yet and the coronary CTA has not been scheduled.    Homeyar  "

## 2018-06-21 ENCOUNTER — TELEPHONE (OUTPATIENT)
Dept: CARDIOLOGY | Facility: CLINIC | Age: 63
End: 2018-06-21

## 2018-06-21 NOTE — TELEPHONE ENCOUNTER
It appears patient was scheduled for CTA, but then cancelled (declined to do test at time).  Please advise.

## 2018-06-21 NOTE — TELEPHONE ENCOUNTER
----- Message from Mariama Garcia RN sent at 6/21/2018 10:40 AM CDT -----  Dr. Contreras,    Anesthesia was reviewing the patient's chart for surgery. Internal medicine saved a telephone note on of yours 6/19 from 2017, requesting that the patient have a stress test and a CT CTA.  It looks like the patient had the stress test but not the CTA.  Does this need to be done before the surgery 6/22?  If so, I will notify Dr. Jordan.    Please advise,    Mariama Garcia RN  [facility]  [phone]

## 2018-06-22 ENCOUNTER — ANESTHESIA (OUTPATIENT)
Dept: SURGERY | Facility: HOSPITAL | Age: 63
End: 2018-06-22
Payer: COMMERCIAL

## 2018-06-22 ENCOUNTER — HOSPITAL ENCOUNTER (OUTPATIENT)
Facility: HOSPITAL | Age: 63
Discharge: HOME | End: 2018-06-23
Attending: ORTHOPAEDIC SURGERY | Admitting: ORTHOPAEDIC SURGERY
Payer: COMMERCIAL

## 2018-06-22 DIAGNOSIS — M17.12 PRIMARY OSTEOARTHRITIS OF LEFT KNEE: Primary | ICD-10-CM

## 2018-06-22 PROCEDURE — 97165 OT EVAL LOW COMPLEX 30 MIN: CPT

## 2018-06-22 PROCEDURE — 97161 PT EVAL LOW COMPLEX 20 MIN: CPT

## 2018-06-22 PROCEDURE — 27201423 OPTIME MED/SURG SUP & DEVICES STERILE SUPPLY: Performed by: ORTHOPAEDIC SURGERY

## 2018-06-22 PROCEDURE — 63600175 PHARM REV CODE 636 W HCPCS: Performed by: STUDENT IN AN ORGANIZED HEALTH CARE EDUCATION/TRAINING PROGRAM

## 2018-06-22 PROCEDURE — 25000003 PHARM REV CODE 250: Performed by: STUDENT IN AN ORGANIZED HEALTH CARE EDUCATION/TRAINING PROGRAM

## 2018-06-22 PROCEDURE — C1776 JOINT DEVICE (IMPLANTABLE): HCPCS | Performed by: ORTHOPAEDIC SURGERY

## 2018-06-22 PROCEDURE — 27437 REVISE KNEECAP: CPT | Mod: 59,LT,, | Performed by: ORTHOPAEDIC SURGERY

## 2018-06-22 PROCEDURE — 27000221 HC OXYGEN, UP TO 24 HOURS

## 2018-06-22 PROCEDURE — 36000711: Performed by: ORTHOPAEDIC SURGERY

## 2018-06-22 PROCEDURE — C1713 ANCHOR/SCREW BN/BN,TIS/BN: HCPCS | Performed by: ORTHOPAEDIC SURGERY

## 2018-06-22 PROCEDURE — C1769 GUIDE WIRE: HCPCS | Performed by: ORTHOPAEDIC SURGERY

## 2018-06-22 PROCEDURE — 37000009 HC ANESTHESIA EA ADD 15 MINS: Performed by: ORTHOPAEDIC SURGERY

## 2018-06-22 PROCEDURE — 25000003 PHARM REV CODE 250: Performed by: NURSE ANESTHETIST, CERTIFIED REGISTERED

## 2018-06-22 PROCEDURE — 63600175 PHARM REV CODE 636 W HCPCS

## 2018-06-22 PROCEDURE — A4216 STERILE WATER/SALINE, 10 ML: HCPCS | Performed by: NURSE ANESTHETIST, CERTIFIED REGISTERED

## 2018-06-22 PROCEDURE — 76942 ECHO GUIDE FOR BIOPSY: CPT | Mod: 26,,, | Performed by: ANESTHESIOLOGY

## 2018-06-22 PROCEDURE — 71000039 HC RECOVERY, EACH ADD'L HOUR: Performed by: ORTHOPAEDIC SURGERY

## 2018-06-22 PROCEDURE — 36000710: Performed by: ORTHOPAEDIC SURGERY

## 2018-06-22 PROCEDURE — 71000033 HC RECOVERY, INTIAL HOUR: Performed by: ORTHOPAEDIC SURGERY

## 2018-06-22 PROCEDURE — 63600175 PHARM REV CODE 636 W HCPCS: Performed by: NURSE ANESTHETIST, CERTIFIED REGISTERED

## 2018-06-22 PROCEDURE — 63600175 PHARM REV CODE 636 W HCPCS: Performed by: NURSE PRACTITIONER

## 2018-06-22 PROCEDURE — 20985 CPTR-ASST DIR MS PX: CPT | Mod: 51,,, | Performed by: ORTHOPAEDIC SURGERY

## 2018-06-22 PROCEDURE — 64447 NJX AA&/STRD FEMORAL NRV IMG: CPT | Performed by: ANESTHESIOLOGY

## 2018-06-22 PROCEDURE — 94761 N-INVAS EAR/PLS OXIMETRY MLT: CPT

## 2018-06-22 PROCEDURE — 25000003 PHARM REV CODE 250: Performed by: NURSE PRACTITIONER

## 2018-06-22 PROCEDURE — 97530 THERAPEUTIC ACTIVITIES: CPT

## 2018-06-22 PROCEDURE — D9220A PRA ANESTHESIA: Mod: ANES,,, | Performed by: ANESTHESIOLOGY

## 2018-06-22 PROCEDURE — 63600175 PHARM REV CODE 636 W HCPCS: Performed by: ANESTHESIOLOGY

## 2018-06-22 PROCEDURE — 97116 GAIT TRAINING THERAPY: CPT

## 2018-06-22 PROCEDURE — D9220A PRA ANESTHESIA: Mod: CRNA,,, | Performed by: NURSE ANESTHETIST, CERTIFIED REGISTERED

## 2018-06-22 PROCEDURE — 37000008 HC ANESTHESIA 1ST 15 MINUTES: Performed by: ORTHOPAEDIC SURGERY

## 2018-06-22 PROCEDURE — 27446 REVISION OF KNEE JOINT: CPT | Mod: LT,,, | Performed by: ORTHOPAEDIC SURGERY

## 2018-06-22 PROCEDURE — 99900035 HC TECH TIME PER 15 MIN (STAT)

## 2018-06-22 DEVICE — CEMENT BONE W/GENT SIMPLEX HV: Type: IMPLANTABLE DEVICE | Site: KNEE | Status: FUNCTIONAL

## 2018-06-22 DEVICE — IMPLANTABLE DEVICE: Type: IMPLANTABLE DEVICE | Site: KNEE | Status: FUNCTIONAL

## 2018-06-22 RX ORDER — LACTULOSE 10 G/15ML
20 SOLUTION ORAL EVERY 6 HOURS PRN
Status: DISCONTINUED | OUTPATIENT
Start: 2018-06-22 | End: 2018-06-23 | Stop reason: HOSPADM

## 2018-06-22 RX ORDER — PREGABALIN 50 MG/1
150 CAPSULE ORAL NIGHTLY
Status: DISCONTINUED | OUTPATIENT
Start: 2018-06-22 | End: 2018-06-23 | Stop reason: HOSPADM

## 2018-06-22 RX ORDER — FENTANYL CITRATE 50 UG/ML
200 INJECTION, SOLUTION INTRAMUSCULAR; INTRAVENOUS ONCE
Status: COMPLETED | OUTPATIENT
Start: 2018-06-22 | End: 2018-06-22

## 2018-06-22 RX ORDER — AMOXICILLIN 250 MG
1 CAPSULE ORAL 2 TIMES DAILY
Status: DISCONTINUED | OUTPATIENT
Start: 2018-06-22 | End: 2018-06-23 | Stop reason: HOSPADM

## 2018-06-22 RX ORDER — FENTANYL CITRATE 50 UG/ML
25 INJECTION, SOLUTION INTRAMUSCULAR; INTRAVENOUS EVERY 5 MIN PRN
Status: DISCONTINUED | OUTPATIENT
Start: 2018-06-22 | End: 2018-06-22 | Stop reason: HOSPADM

## 2018-06-22 RX ORDER — ACETAMINOPHEN 10 MG/ML
1000 INJECTION, SOLUTION INTRAVENOUS ONCE
Status: COMPLETED | OUTPATIENT
Start: 2018-06-22 | End: 2018-06-22

## 2018-06-22 RX ORDER — DIPHENHYDRAMINE HYDROCHLORIDE 50 MG/ML
INJECTION INTRAMUSCULAR; INTRAVENOUS
Status: DISCONTINUED | OUTPATIENT
Start: 2018-06-22 | End: 2018-06-22

## 2018-06-22 RX ORDER — SODIUM CHLORIDE 9 MG/ML
INJECTION, SOLUTION INTRAVENOUS CONTINUOUS
Status: ACTIVE | OUTPATIENT
Start: 2018-06-22 | End: 2018-06-23

## 2018-06-22 RX ORDER — MUPIROCIN 20 MG/G
OINTMENT TOPICAL
Status: DISCONTINUED | OUTPATIENT
Start: 2018-06-22 | End: 2018-06-22 | Stop reason: HOSPADM

## 2018-06-22 RX ORDER — ONDANSETRON HYDROCHLORIDE 8 MG/1
8 TABLET, FILM COATED ORAL EVERY 8 HOURS PRN
Qty: 60 TABLET | Refills: 0 | Status: ON HOLD | OUTPATIENT
Start: 2018-06-22 | End: 2018-07-07

## 2018-06-22 RX ORDER — MIDAZOLAM HYDROCHLORIDE 1 MG/ML
INJECTION, SOLUTION INTRAMUSCULAR; INTRAVENOUS
Status: DISCONTINUED | OUTPATIENT
Start: 2018-06-22 | End: 2018-06-22

## 2018-06-22 RX ORDER — MELOXICAM 7.5 MG/1
15 TABLET ORAL DAILY
Status: DISCONTINUED | OUTPATIENT
Start: 2018-06-23 | End: 2018-06-22

## 2018-06-22 RX ORDER — DOCUSATE SODIUM 100 MG/1
100 CAPSULE, LIQUID FILLED ORAL 2 TIMES DAILY
Qty: 60 CAPSULE | Refills: 0 | Status: ON HOLD | OUTPATIENT
Start: 2018-06-22 | End: 2018-07-07

## 2018-06-22 RX ORDER — ASPIRIN 81 MG/1
81 TABLET ORAL 2 TIMES DAILY
Qty: 60 TABLET | Refills: 0 | Status: ON HOLD | OUTPATIENT
Start: 2018-06-22 | End: 2018-07-07

## 2018-06-22 RX ORDER — DULOXETIN HYDROCHLORIDE 60 MG/1
60 CAPSULE, DELAYED RELEASE ORAL DAILY
Status: DISCONTINUED | OUTPATIENT
Start: 2018-06-23 | End: 2018-06-23 | Stop reason: HOSPADM

## 2018-06-22 RX ORDER — MUPIROCIN 20 MG/G
1 OINTMENT TOPICAL 2 TIMES DAILY
Status: DISCONTINUED | OUTPATIENT
Start: 2018-06-22 | End: 2018-06-23 | Stop reason: HOSPADM

## 2018-06-22 RX ORDER — FENTANYL CITRATE 50 UG/ML
INJECTION, SOLUTION INTRAMUSCULAR; INTRAVENOUS
Status: COMPLETED
Start: 2018-06-22 | End: 2018-06-22

## 2018-06-22 RX ORDER — SODIUM CHLORIDE 0.9 % (FLUSH) 0.9 %
3 SYRINGE (ML) INJECTION EVERY 8 HOURS
Status: DISCONTINUED | OUTPATIENT
Start: 2018-06-23 | End: 2018-06-23 | Stop reason: HOSPADM

## 2018-06-22 RX ORDER — PROPOFOL 10 MG/ML
VIAL (ML) INTRAVENOUS CONTINUOUS PRN
Status: DISCONTINUED | OUTPATIENT
Start: 2018-06-22 | End: 2018-06-22

## 2018-06-22 RX ORDER — OXYCODONE HYDROCHLORIDE 5 MG/1
10 TABLET ORAL
Status: DISCONTINUED | OUTPATIENT
Start: 2018-06-22 | End: 2018-06-23 | Stop reason: HOSPADM

## 2018-06-22 RX ORDER — OXYCODONE AND ACETAMINOPHEN 10; 325 MG/1; MG/1
1 TABLET ORAL EVERY 4 HOURS PRN
Qty: 45 TABLET | Refills: 0 | Status: ON HOLD | OUTPATIENT
Start: 2018-06-22 | End: 2018-07-07 | Stop reason: HOSPADM

## 2018-06-22 RX ORDER — KETAMINE HCL IN 0.9 % NACL 50 MG/5 ML
SYRINGE (ML) INTRAVENOUS
Status: DISCONTINUED | OUTPATIENT
Start: 2018-06-22 | End: 2018-06-22

## 2018-06-22 RX ORDER — ONDANSETRON 2 MG/ML
4 INJECTION INTRAMUSCULAR; INTRAVENOUS EVERY 12 HOURS PRN
Status: DISCONTINUED | OUTPATIENT
Start: 2018-06-22 | End: 2018-06-23 | Stop reason: HOSPADM

## 2018-06-22 RX ORDER — SODIUM CHLORIDE 0.9 % (FLUSH) 0.9 %
3 SYRINGE (ML) INJECTION
Status: DISCONTINUED | OUTPATIENT
Start: 2018-06-22 | End: 2018-06-23 | Stop reason: HOSPADM

## 2018-06-22 RX ORDER — NALOXONE HCL 0.4 MG/ML
0.02 VIAL (ML) INJECTION
Status: DISCONTINUED | OUTPATIENT
Start: 2018-06-22 | End: 2018-06-23 | Stop reason: HOSPADM

## 2018-06-22 RX ORDER — POLYETHYLENE GLYCOL 3350 17 G/17G
17 POWDER, FOR SOLUTION ORAL DAILY
Status: DISCONTINUED | OUTPATIENT
Start: 2018-06-22 | End: 2018-06-23 | Stop reason: HOSPADM

## 2018-06-22 RX ORDER — SODIUM CHLORIDE 9 MG/ML
INJECTION, SOLUTION INTRAVENOUS CONTINUOUS PRN
Status: DISCONTINUED | OUTPATIENT
Start: 2018-06-22 | End: 2018-06-22

## 2018-06-22 RX ORDER — ACETAMINOPHEN 500 MG
1000 TABLET ORAL EVERY 6 HOURS
Status: DISCONTINUED | OUTPATIENT
Start: 2018-06-22 | End: 2018-06-23 | Stop reason: HOSPADM

## 2018-06-22 RX ORDER — MORPHINE SULFATE 2 MG/ML
2 INJECTION, SOLUTION INTRAMUSCULAR; INTRAVENOUS
Status: DISCONTINUED | OUTPATIENT
Start: 2018-06-22 | End: 2018-06-23 | Stop reason: HOSPADM

## 2018-06-22 RX ORDER — MIDAZOLAM HYDROCHLORIDE 1 MG/ML
0.5 INJECTION INTRAMUSCULAR; INTRAVENOUS
Status: DISCONTINUED | OUTPATIENT
Start: 2018-06-22 | End: 2018-06-22 | Stop reason: HOSPADM

## 2018-06-22 RX ORDER — BISACODYL 10 MG
10 SUPPOSITORY, RECTAL RECTAL DAILY PRN
Status: DISCONTINUED | OUTPATIENT
Start: 2018-06-22 | End: 2018-06-23 | Stop reason: HOSPADM

## 2018-06-22 RX ORDER — CEFAZOLIN SODIUM 1 G/3ML
2 INJECTION, POWDER, FOR SOLUTION INTRAMUSCULAR; INTRAVENOUS
Status: COMPLETED | OUTPATIENT
Start: 2018-06-22 | End: 2018-06-23

## 2018-06-22 RX ORDER — ATORVASTATIN CALCIUM 20 MG/1
20 TABLET, FILM COATED ORAL DAILY
Status: DISCONTINUED | OUTPATIENT
Start: 2018-06-23 | End: 2018-06-23 | Stop reason: HOSPADM

## 2018-06-22 RX ORDER — NAPROXEN SODIUM 220 MG/1
81 TABLET, FILM COATED ORAL 2 TIMES DAILY
Status: DISCONTINUED | OUTPATIENT
Start: 2018-06-22 | End: 2018-06-23 | Stop reason: HOSPADM

## 2018-06-22 RX ORDER — SODIUM CHLORIDE 9 MG/ML
INJECTION, SOLUTION INTRAVENOUS CONTINUOUS
Status: DISCONTINUED | OUTPATIENT
Start: 2018-06-22 | End: 2018-06-23 | Stop reason: HOSPADM

## 2018-06-22 RX ORDER — DEXMEDETOMIDINE HYDROCHLORIDE 100 UG/ML
INJECTION, SOLUTION INTRAVENOUS
Status: DISCONTINUED | OUTPATIENT
Start: 2018-06-22 | End: 2018-06-22

## 2018-06-22 RX ORDER — VANCOMYCIN HYDROCHLORIDE
1500
Status: COMPLETED | OUTPATIENT
Start: 2018-06-22 | End: 2018-06-22

## 2018-06-22 RX ORDER — FAMOTIDINE 20 MG/1
20 TABLET, FILM COATED ORAL 2 TIMES DAILY
Status: DISCONTINUED | OUTPATIENT
Start: 2018-06-22 | End: 2018-06-23 | Stop reason: HOSPADM

## 2018-06-22 RX ORDER — OXYCODONE HYDROCHLORIDE 5 MG/1
5 TABLET ORAL
Status: DISCONTINUED | OUTPATIENT
Start: 2018-06-22 | End: 2018-06-23 | Stop reason: HOSPADM

## 2018-06-22 RX ORDER — DEXAMETHASONE SODIUM PHOSPHATE 4 MG/ML
INJECTION, SOLUTION INTRA-ARTICULAR; INTRALESIONAL; INTRAMUSCULAR; INTRAVENOUS; SOFT TISSUE
Status: DISCONTINUED | OUTPATIENT
Start: 2018-06-22 | End: 2018-06-22

## 2018-06-22 RX ADMIN — MIDAZOLAM HYDROCHLORIDE 1 MG: 1 INJECTION, SOLUTION INTRAMUSCULAR; INTRAVENOUS at 11:06

## 2018-06-22 RX ADMIN — Medication 10 MG: at 10:06

## 2018-06-22 RX ADMIN — OXYCODONE HYDROCHLORIDE 10 MG: 5 TABLET ORAL at 05:06

## 2018-06-22 RX ADMIN — MIDAZOLAM HYDROCHLORIDE 2 MG: 1 INJECTION, SOLUTION INTRAMUSCULAR; INTRAVENOUS at 10:06

## 2018-06-22 RX ADMIN — OXYCODONE HYDROCHLORIDE 10 MG: 5 TABLET ORAL at 09:06

## 2018-06-22 RX ADMIN — DEXMEDETOMIDINE HYDROCHLORIDE 4 MCG: 100 INJECTION, SOLUTION, CONCENTRATE INTRAVENOUS at 10:06

## 2018-06-22 RX ADMIN — Medication 10 MG: at 11:06

## 2018-06-22 RX ADMIN — DEXMEDETOMIDINE HYDROCHLORIDE 4 MCG: 100 INJECTION, SOLUTION, CONCENTRATE INTRAVENOUS at 11:06

## 2018-06-22 RX ADMIN — SODIUM CHLORIDE: 0.9 INJECTION, SOLUTION INTRAVENOUS at 10:06

## 2018-06-22 RX ADMIN — MUPIROCIN: 20 OINTMENT TOPICAL at 08:06

## 2018-06-22 RX ADMIN — DEXTROSE 3 G: 50 INJECTION, SOLUTION INTRAVENOUS at 10:06

## 2018-06-22 RX ADMIN — FAMOTIDINE 20 MG: 20 TABLET ORAL at 09:06

## 2018-06-22 RX ADMIN — CEFAZOLIN 2 G: 330 INJECTION, POWDER, FOR SOLUTION INTRAMUSCULAR; INTRAVENOUS at 05:06

## 2018-06-22 RX ADMIN — FENTANYL CITRATE 25 MCG: 50 INJECTION, SOLUTION INTRAMUSCULAR; INTRAVENOUS at 01:06

## 2018-06-22 RX ADMIN — PREGABALIN 150 MG: 50 CAPSULE ORAL at 09:06

## 2018-06-22 RX ADMIN — DEXMEDETOMIDINE HYDROCHLORIDE 0.5 MCG/KG/HR: 100 INJECTION, SOLUTION, CONCENTRATE INTRAVENOUS at 10:06

## 2018-06-22 RX ADMIN — STANDARDIZED SENNA CONCENTRATE AND DOCUSATE SODIUM 1 TABLET: 8.6; 5 TABLET, FILM COATED ORAL at 09:06

## 2018-06-22 RX ADMIN — MIDAZOLAM HYDROCHLORIDE 2 MG: 1 INJECTION, SOLUTION INTRAMUSCULAR; INTRAVENOUS at 09:06

## 2018-06-22 RX ADMIN — DIPHENHYDRAMINE HYDROCHLORIDE 12.5 MG: 50 INJECTION, SOLUTION INTRAMUSCULAR; INTRAVENOUS at 11:06

## 2018-06-22 RX ADMIN — ACETAMINOPHEN 1000 MG: 500 TABLET ORAL at 05:06

## 2018-06-22 RX ADMIN — SODIUM CHLORIDE: 0.9 INJECTION, SOLUTION INTRAVENOUS at 01:06

## 2018-06-22 RX ADMIN — DIPHENHYDRAMINE HYDROCHLORIDE 12.5 MG: 50 INJECTION, SOLUTION INTRAMUSCULAR; INTRAVENOUS at 12:06

## 2018-06-22 RX ADMIN — PROPOFOL 25 MCG/KG/MIN: 10 INJECTION, EMULSION INTRAVENOUS at 11:06

## 2018-06-22 RX ADMIN — SODIUM CHLORIDE: 0.9 INJECTION, SOLUTION INTRAVENOUS at 07:06

## 2018-06-22 RX ADMIN — OXYCODONE HYDROCHLORIDE 10 MG: 5 TABLET ORAL at 01:06

## 2018-06-22 RX ADMIN — DEXAMETHASONE SODIUM PHOSPHATE 8 MG: 4 INJECTION, SOLUTION INTRAMUSCULAR; INTRAVENOUS at 10:06

## 2018-06-22 RX ADMIN — SODIUM CHLORIDE: 0.9 INJECTION, SOLUTION INTRAVENOUS at 08:06

## 2018-06-22 RX ADMIN — FENTANYL CITRATE 25 MCG: 50 INJECTION INTRAMUSCULAR; INTRAVENOUS at 01:06

## 2018-06-22 RX ADMIN — MIDAZOLAM HYDROCHLORIDE 1 MG: 1 INJECTION, SOLUTION INTRAMUSCULAR; INTRAVENOUS at 10:06

## 2018-06-22 RX ADMIN — POLYETHYLENE GLYCOL 3350 17 G: 17 POWDER, FOR SOLUTION ORAL at 01:06

## 2018-06-22 RX ADMIN — ACETAMINOPHEN 1000 MG: 10 INJECTION, SOLUTION INTRAVENOUS at 01:06

## 2018-06-22 RX ADMIN — ASPIRIN 81 MG CHEWABLE TABLET 81 MG: 81 TABLET CHEWABLE at 05:06

## 2018-06-22 RX ADMIN — VANCOMYCIN HYDROCHLORIDE 1500 MG: 10 INJECTION, POWDER, LYOPHILIZED, FOR SOLUTION INTRAVENOUS at 08:06

## 2018-06-22 RX ADMIN — SODIUM CHLORIDE: 0.9 INJECTION, SOLUTION INTRAVENOUS at 11:06

## 2018-06-22 RX ADMIN — FENTANYL CITRATE 50 MCG: 50 INJECTION INTRAMUSCULAR; INTRAVENOUS at 09:06

## 2018-06-22 NOTE — PT/OT/SLP EVAL
Physical Therapy Evaluation    Patient Name:  Yosef Rowe   MRN:  [id number]    Recommendations:     Discharge recommendations: Home Health PT  Barriers to discharge: Inaccessible home environment  Equipment recommendations: rolling walker, bedside commode and shower chair    Assessment:     Yosef oRwe is a 62 y.o. male admitted with a medical diagnosis of L UKA.  He presents with the below impairments/functional limitations.  Pt tolerated evaluation well today and is motivated to do well with recent joint replacement, however pt was lethargic and drowsy during session and required redirection at times.  Pt is a good candidate for skilled PT services to address the below deficits and to increase functional independence.      Rehab Prognosis: good; patient would benefit from acute skilled PT services to address these deficits and reach maximum level of function.      Rehab Identified impairments/functional limitations:   weakness, impaired endurance, impaired sensation, impaired self care skills, impaired functional mobilty, gait instability, impaired balance, decreased coordination, decreased lower extremity function, decreased safety awareness, pain, decreased ROM, impaired skin, edema, orthopedic precautions    Recent Surgery: Procedure(s) (LRB):  REPLACEMENT-KNEE WITH NAVIGATION-- unicondylar-- medial (Left) Day of Surgery    Plan:     During this hospitalization, patient to be seen BID to address the above listed problems via gait training, therapeutic activity, therapeutic exercise, and neuromuscular re-education   Plan of Care Reviewed with: patient    Subjective     Communicated with RN prior to session.  Patient found supine upon PT entry, agreeable to evaluation.      Chief Complaint: lethargy  Patient comments/goals: none stated    Pain/Comfort:  Pain level prior: 2/10 in L knee  Pain level post: 2/10 in L knee    Patients cultural, spiritual, Methodist conflicts given the current situation: none  stated    Living Environment:  Pt lives with his wife in 2SH w/ 2STE; R HR. Pt reports he is able to live on 1st floor if necessary.   Previous level of function: PTA, pt reports being independent w/ ADLs and functional mobility   Equipment Owned: standard walker and crutches - not using PTA  Assistance Upon Discharge: Pt reports his wife is able to assist him upon d/c from hospital.    Objective:     Patient found supine in bed with blood pressure cuff, telemetry, perineural catheter, polar ice, peripheral IV, pulse ox, and FCD.     General Precautions: Standard, fall  Orthopedic Precautions:  LLE weight bearing as tolerated  Braces: none     Physical Exam:  Postural examination/scapula alignment: WFL    Skin integrity: Visible skin intact  Edema: Mild LLE    Sensation:   Intact    Lower Extremity Range of Motion:  Right Lower Extremity: WFL  Left Lower Extremity: WFL except knee    Lower Extremity Strength:  Right Lower Extremity: WFL  Left Lower Extremity: WFL except knee    Functional Mobility:    Bed Mobility:    Supine to sit: CGA  Sit to supine: CGA    Transfers:    Sit<>Stand: CGA with SW    Ambulation:    Pt ambulated 3 steps forward and backward and 1-2 sidesteps with SW and CGA.  Pt educated on 3 point gait pattern.  Pt able to verbalize and demonstrate understanding.       AM-PAC 6 CLICK MOBILITY  Total Score: 18     Therapeutic Activities and Exercises:  · Pt educated on:   · Role of PT, safety with functional mobility.   · DME and discharge needs  · Importance of OOB activity  · Weightbearing precautions  · Gait sequencing     Patient left supine in bed with all lines intact and RN notified.    GOALS:    Physical Therapy Goals        Problem: Physical Therapy Goal    Goal Priority Disciplines Outcome Goal Variances Interventions   Physical Therapy Goal     PT/OT, PT Ongoing (interventions implemented as appropriate)     Description:  Goals to be met by: 6/27/2018     Patient will increase functional  independence with mobility by performing:    -Supine to sit with Supervision  -Sit to supine with Supervision   -Sit to stand transfer with Stand-by Assistance  -Gait  x 150 feet with Stand-by Assistance using Rolling Walker.   -Ascend/descend 4 stairs with right handrail with Contact Guard Assistance    -Lower extremity exercise program x 30 reps per handout, with independence                           History:     Past Medical History:   Diagnosis Date    Anxiety     Arthritis     Depression     when turned 49-lost job and mother unwell at that time    Hyperlipidemia     Lumbar radiculopathy     BRETT- 2011    Sleep apnea     Urinary tract infection        Past Surgical History:   Procedure Laterality Date    JOINT REPLACEMENT      knee arthroscopicc surgeries      Right knee- 2007  and Left knee- 2008    KNEE SURGERY      Left wrist Surgery      2000 for a torn ligament from [place]       Time Tracking:     PT Received On: 06/22/18  PT Start Time: 1455     PT Stop Time: 1515  PT Total Time (min): 20 min     Billable Minutes: Evaluation 12; Gait Training 8      Ron Prasad, PT, DPT  6/22/2018   [phone]

## 2018-06-22 NOTE — ANESTHESIA PROCEDURE NOTES
CSE    Patient location during procedure: OR  Start time: 6/22/2018 10:30 AM  Timeout: 6/22/2018 10:25 AM  End time: 6/22/2018 10:40 AM  Staffing  Anesthesiologist: ALEXANDRO COYLE  Performed: anesthesiologist   Preanesthetic Checklist  Completed: patient identified, site marked, surgical consent, pre-op evaluation, timeout performed, IV checked, risks and benefits discussed and monitors and equipment checked  CSE  Patient position: sitting  Prep: ChloraPrep and site prepped and draped  Patient monitoring: heart rate, cardiac monitor, continuous pulse ox, continuous capnometry and frequent blood pressure checks  Approach: midline  Spinal Needle  Needle type: Bang   Needle gauge: 25 G  Needle length: 5 in  Epidural Needle  Injection technique: XAVIER air  Needle type: Tuohy   Needle gauge: 17 G  Needle length: 3.5 in  Needle insertion depth: 10 cm  Catheter  Catheter type: springwJumio  Catheter size: 19 G  Catheter at skin depth: 13 cm  Assessment  Sensory level: T9   Dermatomal levels determined by pinch or prick  Intrathecal Medications:  Bolus administered: 2.5 mL of 2.0 mepivacaine  administered: primary anesthetic mcg of

## 2018-06-22 NOTE — PLAN OF CARE
Problem: Physical Therapy Goal  Goal: Physical Therapy Goal  Goals to be met by: 6/27/2018     Patient will increase functional independence with mobility by performing:    -Supine to sit with Supervision  -Sit to supine with Supervision   -Sit to stand transfer with Stand-by Assistance  -Gait  x 150 feet with Stand-by Assistance using Rolling Walker.   -Ascend/descend 4 stairs with right handrail with Contact Guard Assistance    -Lower extremity exercise program x 30 reps per handout, with independence         Outcome: Ongoing (interventions implemented as appropriate)  PT eval complete and POC initiated.

## 2018-06-22 NOTE — ANESTHESIA POSTPROCEDURE EVALUATION
"Anesthesia Post Evaluation    Patient: Yosef Rowe    Procedure(s) Performed: Procedure(s) (LRB):  REPLACEMENT-KNEE WITH NAVIGATION-- unicondylar-- medial (Left)    Final Anesthesia Type: CSE  Patient location during evaluation: PACU  Patient participation: Yes- Able to Participate  Level of consciousness: awake and alert  Post-procedure vital signs: reviewed and stable  Pain management: adequate  Airway patency: patent  PONV status at discharge: No PONV  Anesthetic complications: no      Cardiovascular status: blood pressure returned to baseline  Respiratory status: unassisted  Hydration status: euvolemic  Follow-up not needed.        Visit Vitals  BP (!) 150/88   Pulse 63   Temp 36.4 °C (97.5 °F) (Axillary)   Resp 16   Ht 5' 7" (1.702 m)   Wt 127 kg (280 lb)   SpO2 95%   BMI 43.85 kg/m²       Pain/Ha Score: Pain Assessment Performed: Yes (6/22/2018  1:33 PM)  Presence of Pain: complains of pain/discomfort (6/22/2018  1:33 PM)  Pain Rating Prior to Med Admin: 9 (6/22/2018  1:33 PM)  Pain Rating Post Med Admin: 0 (6/22/2018  1:00 PM)  Ha Score: 9 (6/22/2018  1:33 PM)      "

## 2018-06-22 NOTE — ANESTHESIA PROCEDURE NOTES
Adductor Canal Single Injection Block    Patient location during procedure: pre-op   Block not for primary anesthetic.  Reason for block: at surgeon's request and post-op pain management   Post-op Pain Location: left knee   Start time: 6/22/2018 9:07 AM  Timeout: 6/22/2018 9:05 AM   End time: 6/22/2018 9:12 AM  Staffing  Anesthesiologist: JOHNNY CEJA  Resident/CRNA: DEMETRIS GORMAN  Performed: resident/CRNA   Preanesthetic Checklist  Completed: patient identified, site marked, surgical consent, pre-op evaluation, timeout performed, IV checked, risks and benefits discussed and monitors and equipment checked  Peripheral Block  Patient position: supine  Prep: ChloraPrep  Patient monitoring: heart rate, cardiac monitor, continuous pulse ox, continuous capnometry and frequent blood pressure checks  Block type: adductor canal  Laterality: left  Injection technique: single shot  Needle  Needle type: Stimuplex   Needle gauge: 21 G  Needle length: 4 in  Needle localization: anatomical landmarks and ultrasound guidance   -ultrasound image captured on disc.  Assessment  Injection assessment: negative aspiration, negative parasthesia and local visualized surrounding nerve  Paresthesia pain: none  Heart rate change: no  Slow fractionated injection: yes  Medications:  Bolus administered: 20 mL of 0.375 bupivacaine  Epinephrine added: 3.75 mcg/mL (1/300,000)  Additional Notes  VSS.  [facility] RN monitoring vitals throughout procedure.  Patient tolerated procedure well.

## 2018-06-22 NOTE — TRANSFER OF CARE
"Anesthesia Transfer of Care Note    Patient: Yosef Rowe    Procedure(s) Performed: Procedure(s) (LRB):  REPLACEMENT-KNEE WITH NAVIGATION-- unicondylar-- medial (Left)    Patient location: PACU    Anesthesia Type: general    Transport from OR: Transported from OR on room air with adequate spontaneous ventilation    Post pain: adequate analgesia    Post assessment: no apparent anesthetic complications and tolerated procedure well    Post vital signs: stable    Level of consciousness: responds to stimulation    Nausea/Vomiting: no nausea/vomiting    Complications: none    Transfer of care protocol was followed      Last vitals:   Visit Vitals  BP (!) 159/79 (BP Location: Right arm, Patient Position: Lying)   Pulse 65   Temp 36.7 °C (98.1 °F) (Oral)   Resp 20   Ht 5' 7" (1.702 m)   Wt 127 kg (280 lb)   SpO2 99%   BMI 43.85 kg/m²     "

## 2018-06-22 NOTE — OP NOTE
DATE OF PROCEDURE: 06/22/2018    PREOPERATIVE DIAGNOSIS: Osteoarthritis left knee.     POSTOPERATIVE DIAGNOSIS: Osteoarthritis left knee.     PROCEDURE: 1) Unicompartmental left knee replacement -- medial compartment   (MAKOplasty) (CPT #17387)   2) Patelloplasty (arthroplasty patella without prosthesis) left knee (CPT#84053)   3) Computer assisted surgical navigation (CPT #97254)    SURGEON: Orlin Jordan M.D.     ASSISTANT: Que    ANESTHESIA: spinal     ESTIMATED BLOOD LOSS: 100 mL.     Complications: None    Specimen: None    INDICATIONS: The patient is a 62 y.o. male with a longstanding history of left knee pain. Radiographs revealed advanced medial compartment arthritis of the left knee.  He had failed extensive conservative treatment at this point. Treatment options were discussed including unicompartmental versus total knee replacement. He elected to proceed with the unicompartmental knee replacement. Risks and complications were discussed including, but not limited to the risks of anesthetic complications, infections, wound healing complications, DVT, pulmonary embolism, death, continued pain, stiffness and need for further surgery among others and he elected to proceed. Perioperative medical risks were also discussed.     COMPONENTS USED: EVELIO Restoris with a size 4 left medial femoral component, 5 left medial tibial component, 5 x 8 mm tibial insert.     DESCRIPTION OF PROCEDURE: The patient was taken to the Operating Room where general anesthesia was administered by the Anesthesia Department. He was then placed in the supine position. All superficial neurovascular structures were well padded. The left lower extremity was then sterilely prepped and draped in a normal fashion.     An 8 cm longitudinal incision was made just medial to the patellar border from the superior border of the patella extending distally approximately 1.5 cm distal to the joint line. Subcutaneous tissue was dissected with  electrocautery down to the joint capsule and synovium which were then incised. The proximal medial tibial plateau was then subperiosteally exposed using electrocautery. The anterior horn of the medial meniscus was then removed at this time.     The tibial and femoral check points were then placed in the standard fashion. The tracking arrays were then placed by placing 2 parallel Schanz pins in both the tibia and the femur. The tibial pins were placed anteromedially approximately 5 cm distal to the tibial tubercle. The femoral pins were placed directly anterior approximately 5 cm proximal to the superior pole of the patella.     After confirming the check points, the hip was rotated to assess the center of rotation of the hip.     The femoral and tibial surfaces were then mapped in the standard fashion for MAKOplasty. Once surface and cartilage mapping was complete, the knee was placed through range of motion while applying gentle valgus stress and the flexion-extension tension curve was created.     The intraoperative planning was then performed to optimize the flexion-extension graph mapping. Once this was completed, the TrudevO unit was placed and secured and check points on the robotic arm were confirmed. The femoral preparation using the bur was first performed in the standard fashion and once the femoral preparation was complete, tibial preparation was performed. Once completion of both femoral and tibial preparation was done, the tibial baseplate trial was placed, impacting the keel into place. This was confirmed to be flush with the prepared surface and the planned surface. The femoral trial was then impacted as well. Satisfactory transition between the femoral component and the articular cartilage in the anterior femur was noted. The 8 mm trial implant was then placed. The knee was placed through a range of motion and extension and varus position were checked in full extension and throughout the range of  motion was noted to be satisfactory.     At this time, all trial components were removed. The tibial and femoral surfaces were thoroughly irrigated in a pulse lavage fashion and dried, and one batch of cement was mixed. The tibial component was impacted in position and held in place as any excess cement was removed using cement removal tools. Intraoperative navigation was used to document full seating of the prosthesis. The femoral component was then cemented in position, impacted, any excess cement was removed using the cement removal tools. The trial tibial liner was placed while the cement polymerized.  Any residual soft tissue impingement or soft tissue debris was removed and the final tibial polyethylene was then locked into position.     At this point a patelloplasty was performed by removing the medial, inferior, and superior patellar osteophytes along with the corresponding femoral osteophytes which may impinge on the prosthesis.  This was performed with a small rongeur and osteotomes under direct visualization.  The capsular attachmets to the patella were then cauterized with electrocautery.  Once this was completed the knee was placed through range of motion to ensure no residual osteophyte impingement.    The wound was then thoroughly irrigated. The check points were removed along with the Schanz pins for the tibial and femoral arrays.  The joint capsule was then infiltrated with 0.5% Marcaine with epinephrine.    The capsule and parapatellar retinaculum were then closed with interrupted figure-of-eight sutures of #0 Vicryl, subcutaneous tissue was closed with interrupted inverted stitches of #3-0 Vicryl.   Skin was approximated using skin staples. Sterile dressing was applied and the patient was returned to the Postanesthesia Care Unit in stable condition.    As the attending surgeon I was physically present for the key/critical portions of the procedure.

## 2018-06-22 NOTE — PLAN OF CARE
Problem: Patient Care Overview  Goal: Plan of Care Review  Outcome: Ongoing (interventions implemented as appropriate)  Vital signs stable. Afebrile. Drowsy but oriented, following commands and opening eyes spontaneously. Pain controlled with PRN meds. Denies nausea. Surgical dressing remains CDI. IV fluids at ordered rate. Tolerating PO fluids. Pt awaiting transfer to [facility]

## 2018-06-22 NOTE — PLAN OF CARE
[facility]    HOME HEALTH ORDERS  FACE TO FACE ENCOUNTER    Patient Name: Yosef Rowe  YOB: 1955    PCP: Ruperto Dexter MD   PCP Address: [address] [facility] [address] [place]  PCP Phone Number: [phone]  PCP Fax: [phone]    Encounter Date: 06/22/2018    Admit to Home Health    Diagnoses:  Active Hospital Problems    Diagnosis  POA    *Primary osteoarthritis of left knee [M17.12]  Yes      Resolved Hospital Problems    Diagnosis Date Resolved POA   No resolved problems to display.       Future Appointments  Date Time Provider Department Center   7/17/2018 1:00 PM Carolina Day, PhD [facility] PSYCHOL [facility]   9/5/2018 10:15 AM Lizzie Fried MD [facility] BARIR[facility]KA [place]     Follow-up Information     Follow up In 2 weeks.    Why:  For wound re-check                   I have seen and examined this patient face to face today. My clinical findings that support the need for the home health skilled services and home bound status are the following:  Weakness/numbness causing balance and gait disturbance due to Joint Replacement making it taxing to leave home.    Allergies:  Review of patient's allergies indicates:   Allergen Reactions    No known drug allergies        Diet: regular diet    Activities: activity as tolerated    Home Health Admitting Clinician:   SN/PT to complete comprehensive assessment including routine vital signs. Instruct on disease process and s/s of complications to report to MD. Follow specific home health arthoplasty protocol. Review/verify medication list sent home with the patient at time of discharge  and instruct patient/caregiver as needed. If coumadin ordered, coumadin clinic to manage INR with INR draws 2x per week with a goal to maintain INR between 1.8 and 2.2. Frequency may be adjusted depending on start of care date.    Notify MD if SBP > 160 or < 90; DBP > 90 or < 50; HR > 120 or < 50; Temp > 101    Home Medical  Equipment:  Walker, 3-1 bedside commode, transfer tub bench    CONSULTS:    Physical Therapy may admit if patient not on coumadin, PT to perform comprehensive assessment if performing admit visit and generate therapy plan of care. Evaluate for home safety and equipment needs; Establish/upgrade home exercise program. Perform/instruct on therapeutic exercises, gait training, transfer training, and Range of Motion.      OTHER: (only select if patient needs other therapy disciplines)  Occupational Therapy to evaluate and treat. Evaluate home environment for safety and equipment needs. Perform/Instruct on transfers, ADL training, ROM, and therapeutic exercises.    MISCELLANEOUS CARE:  Routine Skin for Bedridden Patients: Instruct patient/caregiver to apply moisture barrier cream to all skin folds and wet areas in perineal area daily and after baths and all bowel movements.    WOUND CARE ORDERS:  Assess Surgical Incision/DSRG each TX  Aquacel AG drsg applied post-op leave on 14 days post op. Call MD if any drainage reaches border to border of drsg horizontally, s/s of infection, temp >101, induration, swelling or redness.  If dressing is removed per MD order, then apply island dressing, change/teach caregiver to perform daily dressing change if island dressing present.    Medications: Review discharge medications with patient and family and provide education.      Current Discharge Medication List      START taking these medications    Details   docusate sodium 100 mg capsule Take 100 mg by mouth 2 (two) times daily.  Qty: 60 tablet, Refills: 0      ondansetron (ZOFRAN) 8 MG tablet Take 1 tablet (8 mg total) by mouth every 8 (eight) hours as needed for Nausea.  Qty: 60 tablet, Refills: 0      oxyCODONE-acetaminophen (PERCOCET)  mg per tablet Take 1 tablet by mouth every 4 (four) hours as needed for Pain.  Qty: 45 tablet, Refills: 0         CONTINUE these medications which have CHANGED    Details   aspirin (ECOTRIN)  81 MG EC tablet Take 1 tablet (81 mg total) by mouth 2 (two) times daily.  Qty: 60 tablet, Refills: 0         CONTINUE these medications which have NOT CHANGED    Details   atorvastatin (LIPITOR) 20 MG tablet Take 1 tablet (20 mg total) by mouth once daily.  Qty: 30 tablet, Refills: 11      DULoxetine (CYMBALTA) 60 MG capsule Take 1 capsule (60 mg total) by mouth once daily.  Qty: 30 capsule, Refills: 5    Associated Diagnoses: Recurrent major depressive disorder, in full remission; Generalized anxiety disorder      multivitamin (ONE DAILY MULTIVITAMIN) per tablet Take 1 tablet by mouth once daily.      zolpidem (AMBIEN) 5 MG Tab Take 1 tablet (5 mg total) by mouth nightly as needed.  Qty: 30 tablet, Refills: 5    Associated Diagnoses: Primary insomnia         STOP taking these medications       ibuprofen (ADVIL,MOTRIN) 200 MG tablet Comments:   Reason for Stopping:         meloxicam (MOBIC) 15 MG tablet Comments:   Reason for Stopping:         phentermine (ADIPEX-P) 37.5 mg tablet Comments:   Reason for Stopping:               I certify that this patient is confined to his home and needs intermittent skilled nursing care, physical therapy and occupational therapy.

## 2018-06-22 NOTE — PT/OT/SLP EVAL
Occupational Therapy   Evaluation    Name: Yosef Rowe  MRN: [id number]  Admitting Diagnosis:  Primary osteoarthritis of left knee Day of Surgery    Recommendations:     Discharge recommendations: Home w/ HH    Barriers to discharge: Inaccessible home environment    Equipment recommendations: rolling walker, bedside commode and shower chair    History:     Occupational Profile:  Living Environment: Pt lives with his wife in 2SH w/ 2STE; R HR. Pt reports he is able to live on 1st floor if necessary.   Previous level of function: PTA, pt reports being independent w/ ADLs and functional mobility   Equipment Owned: standard walker and crutches - not using PTA  Assistance Upon Discharge: Pt reports his wife is able to assist him upon d/c from hospital.    Past Medical History:   Diagnosis Date    Anxiety     Arthritis     Depression     when turned 49-lost job and mother unwell at that time    Hyperlipidemia     Lumbar radiculopathy     BRETT- 2011    Sleep apnea     Urinary tract infection        Past Surgical History:   Procedure Laterality Date    JOINT REPLACEMENT      knee arthroscopicc surgeries      Right knee- 2007  and Left knee- 2008    KNEE SURGERY      Left wrist Surgery      2000 for a torn ligament from Golf       Subjective     Communicated with: RN prior to session.    Pt agreeable to Evaluation.    Pain/Comfort:  Pain level: 3/10 in L knee    Objective:     Pt found supine in bed with blood pressure cuff, platt catheter, telemetry, PNC, PCEA, pulse ox, Peripheral IV and FCD.    Orthopedic Precautions: LLE weight bearing as tolerated    Occupational Performance:    Bed Mobility:    Supine to sit: CGA  Sit to supine: CGA    Transfers:    Sit<>Stand: CGA, SW    Ambulation:    Pt ambulated 3 steps forward, backward, and side stepped to HOB w/ CGA and SW - no signs of LOB or SOB noted.     Activities of Daily Living:  UE dressing: Maximum Assistance to kiera gown around back  LE dressing: Total  Assistance to kiera socks  Pt educated on LE dressing techniques and need for AD with LE dressing      Patient left supine in bed with all lines intact and RN notified.    [facility] 6 Click: Self-care  [facility] Total Score: 16    Education:    Assessment:     Yosef Rowe is a 62 y.o. male with a medical diagnosis of Primary osteoarthritis of left knee.  He presents with decreased function of L LE impeding his ability to perform ADLs and functional mobility and would benefit from OT services to maximize functional (I) and safety.    Performance deficits affecting function are weakness, impaired endurance, impaired self care skills, impaired functional mobilty, gait instability, impaired balance, decreased lower extremity function, decreased safety awareness, pain, impaired skin, decreased ROM, edema, orthopedic precautions.    Rehab Prognosis:  Good    Plan:     Patient to be seen QD to address the above listed problems via self-care/home management, therapeutic activities, therapeutic exercises    Plan of Care Reviewed with: patient    This Plan of care has been discussed with the patient who was involved in its development and understands and is in agreement with the identified goals and treatment plan    GOALS:    Occupational Therapy Goals        Problem: Occupational Therapy Goal    Goal Priority Disciplines Outcome Interventions   Occupational Therapy Goal     OT, PT/OT Ongoing (interventions implemented as appropriate)    Description:  Goals to be met by: 7 days    Patient will increase functional independence with ADLs by performing:    UE Dressing with Supervision.  LE Dressing with Supervision with AD as needed.  Grooming while standing with Supervision.  Toileting from bedside commode with Supervision for hygiene and clothing management.   Stand pivot transfers with Supervision.  Toilet transfer to bedside commode with Supervision.                         Time Tracking:     OT Received On: 6/22/2018  OT Start  Time: 1455  OT Stop Time: 1515   OT Total Time: 20 minutes     Billable Minutes:  Evaluation 12 minutes  Therapeutic Activity 8 minutes    Milagro Ramos OT  6/22/2018

## 2018-06-23 VITALS
OXYGEN SATURATION: 97 % | TEMPERATURE: 97 F | WEIGHT: 280 LBS | RESPIRATION RATE: 18 BRPM | BODY MASS INDEX: 43.95 KG/M2 | SYSTOLIC BLOOD PRESSURE: 158 MMHG | HEART RATE: 62 BPM | DIASTOLIC BLOOD PRESSURE: 79 MMHG | HEIGHT: 67 IN

## 2018-06-23 PROCEDURE — A4216 STERILE WATER/SALINE, 10 ML: HCPCS | Performed by: STUDENT IN AN ORGANIZED HEALTH CARE EDUCATION/TRAINING PROGRAM

## 2018-06-23 PROCEDURE — 25000003 PHARM REV CODE 250: Performed by: STUDENT IN AN ORGANIZED HEALTH CARE EDUCATION/TRAINING PROGRAM

## 2018-06-23 PROCEDURE — 94761 N-INVAS EAR/PLS OXIMETRY MLT: CPT

## 2018-06-23 PROCEDURE — 99900035 HC TECH TIME PER 15 MIN (STAT)

## 2018-06-23 PROCEDURE — 97110 THERAPEUTIC EXERCISES: CPT

## 2018-06-23 PROCEDURE — 63600175 PHARM REV CODE 636 W HCPCS: Performed by: STUDENT IN AN ORGANIZED HEALTH CARE EDUCATION/TRAINING PROGRAM

## 2018-06-23 PROCEDURE — 97116 GAIT TRAINING THERAPY: CPT

## 2018-06-23 PROCEDURE — 27000221 HC OXYGEN, UP TO 24 HOURS

## 2018-06-23 PROCEDURE — 27000190 HC CPAP FULL FACE MASK W/VALVE

## 2018-06-23 PROCEDURE — 94660 CPAP INITIATION&MGMT: CPT

## 2018-06-23 PROCEDURE — 97535 SELF CARE MNGMENT TRAINING: CPT

## 2018-06-23 RX ADMIN — SODIUM CHLORIDE, PRESERVATIVE FREE 3 ML: 5 INJECTION INTRAVENOUS at 06:06

## 2018-06-23 RX ADMIN — OXYCODONE HYDROCHLORIDE 5 MG: 5 TABLET ORAL at 10:06

## 2018-06-23 RX ADMIN — STANDARDIZED SENNA CONCENTRATE AND DOCUSATE SODIUM 1 TABLET: 8.6; 5 TABLET, FILM COATED ORAL at 09:06

## 2018-06-23 RX ADMIN — ATORVASTATIN CALCIUM 20 MG: 20 TABLET, FILM COATED ORAL at 09:06

## 2018-06-23 RX ADMIN — CEFAZOLIN 2 G: 330 INJECTION, POWDER, FOR SOLUTION INTRAMUSCULAR; INTRAVENOUS at 06:06

## 2018-06-23 RX ADMIN — ACETAMINOPHEN 1000 MG: 500 TABLET ORAL at 01:06

## 2018-06-23 RX ADMIN — FAMOTIDINE 20 MG: 20 TABLET ORAL at 09:06

## 2018-06-23 RX ADMIN — ACETAMINOPHEN 1000 MG: 500 TABLET ORAL at 06:06

## 2018-06-23 RX ADMIN — ASPIRIN 81 MG CHEWABLE TABLET 81 MG: 81 TABLET CHEWABLE at 09:06

## 2018-06-23 RX ADMIN — DULOXETINE HYDROCHLORIDE 60 MG: 60 CAPSULE, DELAYED RELEASE ORAL at 09:06

## 2018-06-23 RX ADMIN — POLYETHYLENE GLYCOL 3350 17 G: 17 POWDER, FOR SOLUTION ORAL at 09:06

## 2018-06-23 NOTE — NURSING
Pt discharged home, awaiting ride. Aaox4. Knee drsg clean, intact. Polar ice send home with Pt. D/C instructions given --verbalized understanding. Pt to follow up with his PCP for elevated B/P per MD instruction.

## 2018-06-23 NOTE — ASSESSMENT & PLAN NOTE
62 y.o. male POD1 s/p L UKA    Pain control: per APS  PT/OT: WBAT LLE  DVT PPx: ASA 81 BID, FCDs at all times when not ambulating  Abx: postop Ancef    Dispo: f/u PT recs,  HOME AFTER PT

## 2018-06-23 NOTE — SUBJECTIVE & OBJECTIVE
"Principal Problem:Primary osteoarthritis of left knee    Principal Orthopedic Problem: Same    Interval History: Patient seen and examined at bedside.  No acute events overnight.  Pain controlled.        Review of patient's allergies indicates:   Allergen Reactions    No known drug allergies        Current Facility-Administered Medications   Medication    0.9%  NaCl infusion    0.9%  NaCl infusion    acetaminophen tablet 1,000 mg    aspirin chewable tablet 81 mg    atorvastatin tablet 20 mg    bisacodyl suppository 10 mg    DULoxetine DR capsule 60 mg    famotidine tablet 20 mg    lactulose 20 gram/30 mL solution Soln 20 g    morphine injection 2 mg    morphine injection 2 mg    mupirocin 2 % ointment 1 g    naloxone 0.4 mg/mL injection 0.02 mg    ondansetron injection 4 mg    oxyCODONE immediate release tablet 10 mg    oxyCODONE immediate release tablet 5 mg    polyethylene glycol packet 17 g    pregabalin capsule 150 mg    senna-docusate 8.6-50 mg per tablet 1 tablet    sodium chloride 0.9% flush 3 mL    sodium chloride 0.9% flush 3 mL     Objective:     Vital Signs (Most Recent):  Temp: 97.5 °F (36.4 °C) (06/23/18 0613)  Pulse: 85 (06/23/18 0613)  Resp: 18 (06/23/18 0613)  BP: 124/65 (06/23/18 0613)  SpO2: 98 % (06/23/18 0613) Vital Signs (24h Range):  Temp:  [96.1 °F (35.6 °C)-98.1 °F (36.7 °C)] 97.5 °F (36.4 °C)  Pulse:  [58-85] 85  Resp:  [12-26] 18  SpO2:  [92 %-100 %] 98 %  BP: (119-179)/(60-95) 124/65     Weight: 127 kg (280 lb)  Height: 5' 7" (170.2 cm)  Body mass index is 43.85 kg/m².      Intake/Output Summary (Last 24 hours) at 06/23/18 0733  Last data filed at 06/22/18 1906   Gross per 24 hour   Intake             2824 ml   Output              700 ml   Net             2124 ml       Ortho/SPM Exam    AAOx4  NAD  RRR  No increased WOB  Aquacel c/d/i  SILT and motor intact T/SP/DP  WWP extremities  FCDs in place and functioning      Significant Labs: All pertinent labs within the " past 24 hours have been reviewed.    Significant Imaging: I have reviewed and interpreted all pertinent imaging results/findings.

## 2018-06-23 NOTE — PROGRESS NOTES
"[facility]  Orthopedics  Progress Note    Patient Name: Yosef Rowe  MRN: [id number]  Admission Date: 6/22/2018  Hospital Length of Stay: 0 days  Attending Provider: Orlin Jordan MD  Primary Care Provider: Ruperto Dexter MD  Follow-up For: Procedure(s) (LRB):  REPLACEMENT-KNEE WITH NAVIGATION-- unicondylar-- medial (Left)    Post-Operative Day: 1 Day Post-Op  Subjective:     Principal Problem:Primary osteoarthritis of left knee    Principal Orthopedic Problem: Same    Interval History: Patient seen and examined at bedside.  No acute events overnight.  Pain controlled.        Review of patient's allergies indicates:   Allergen Reactions    No known drug allergies        Current Facility-Administered Medications   Medication    0.9%  NaCl infusion    0.9%  NaCl infusion    acetaminophen tablet 1,000 mg    aspirin chewable tablet 81 mg    atorvastatin tablet 20 mg    bisacodyl suppository 10 mg    DULoxetine DR capsule 60 mg    famotidine tablet 20 mg    lactulose 20 gram/30 mL solution Soln 20 g    morphine injection 2 mg    morphine injection 2 mg    mupirocin 2 % ointment 1 g    naloxone 0.4 mg/mL injection 0.02 mg    ondansetron injection 4 mg    oxyCODONE immediate release tablet 10 mg    oxyCODONE immediate release tablet 5 mg    polyethylene glycol packet 17 g    pregabalin capsule 150 mg    senna-docusate 8.6-50 mg per tablet 1 tablet    sodium chloride 0.9% flush 3 mL    sodium chloride 0.9% flush 3 mL     Objective:     Vital Signs (Most Recent):  Temp: 97.5 °F (36.4 °C) (06/23/18 0613)  Pulse: 85 (06/23/18 0613)  Resp: 18 (06/23/18 0613)  BP: 124/65 (06/23/18 0613)  SpO2: 98 % (06/23/18 0613) Vital Signs (24h Range):  Temp:  [96.1 °F (35.6 °C)-98.1 °F (36.7 °C)] 97.5 °F (36.4 °C)  Pulse:  [58-85] 85  Resp:  [12-26] 18  SpO2:  [92 %-100 %] 98 %  BP: (119-179)/(60-95) 124/65     Weight: 127 kg (280 lb)  Height: 5' 7" (170.2 cm)  Body mass index is 43.85 " kg/m².      Intake/Output Summary (Last 24 hours) at 06/23/18 0733  Last data filed at 06/22/18 1906   Gross per 24 hour   Intake             2824 ml   Output              700 ml   Net             2124 ml       Ortho/SPM Exam    AAOx4  NAD  RRR  No increased WOB  Aquacel c/d/i  SILT and motor intact T/SP/DP  WWP extremities  FCDs in place and functioning      Significant Labs: All pertinent labs within the past 24 hours have been reviewed.    Significant Imaging: I have reviewed and interpreted all pertinent imaging results/findings.    Assessment/Plan:     * Primary osteoarthritis of left knee    62 y.o. male POD1 s/p L UKA    Pain control: per APS  PT/OT: WBAT LLE  DVT PPx: ASA 81 BID, FCDs at all times when not ambulating  Abx: postop Ancef    Dispo: f/u PT recs,  HOME AFTER PT                Helio Grey MD  Orthopedics  [facility]

## 2018-06-23 NOTE — PT/OT/SLP PROGRESS
Physical Therapy Treatment    Patient Name:  Yosef Rowe   MRN:  [id number]    Recommendations:     Discharge Recommendations:  home with home health   Discharge Equipment Recommendations: bedside commode, shower chair   Barriers to discharge: None    Assessment:     Yosef Rowe is a 62 y.o. male admitted with a medical diagnosis of Primary osteoarthritis of left knee.  He presents with the following impairments/functional limitations:  impaired skin, pain, impaired functional mobilty, impaired balance, weakness, decreased lower extremity function, orthopedic precautions limiting overall functional mobility. Good tolerance to PT session. Demonstrates understanding of LE HEP to be performed. No LOB or gait instability noted with hallway ambulation using standard walker. To benefit from continued skilled PT intervention to address deficits.    Rehab Prognosis:  Good; patient would benefit from acute skilled PT services to address these deficits and reach maximum level of function.      Recent Surgery: Procedure(s) (LRB):  REPLACEMENT-KNEE WITH NAVIGATION-- unicondylar-- medial (Left) 1 Day Post-Op    Plan:     During this hospitalization, patient to be seen daily to address the above listed problems via gait training, therapeutic activities, therapeutic exercises  · Plan of Care Expires:  07/23/18   Plan of Care Reviewed with: patient    Subjective     Communicated with RN prior to session.  Patient found seated in chair upon PT entry to room, agreeable to treatment.      Chief Complaint: pain  Patient comments/goals: return home   Pain/Comfort:  · Pain Rating 1: 3/10 (L knee)  · Pain Addressed 2: Distraction  · Pain Rating Post-Intervention 2: 3/10    Patients cultural, spiritual, Denominational conflicts given the current situation: none stated    Objective:     Patient found with: telemetry, pulse ox (continuous)     General Precautions: Standard, fall   Orthopedic Precautions:LLE weight bearing as tolerated    Braces: N/A     Functional Mobility:  · Bed Mobility:  Supine to Sit: modified independence  · Sit to Supine: modified independence  · Transfers:  Sit to Stand:  modified independence with standard walker  · Bed to Chair: modified independence with  Standard walker  using  Stand Pivot  · Gait: [id number] using SW with supervision. Demonstrates step-to gait pattern using SW. No LOB or gait instability noted  · Balance: no LOB with hallway ambulation using SW      AM-PAC 6 CLICK MOBILITY  Turning over in bed (including adjusting bedclothes, sheets and blankets)?: 4  Sitting down on and standing up from a chair with arms (e.g., wheelchair, bedside commode, etc.): 4  Moving from lying on back to sitting on the side of the bed?: 4  Moving to and from a bed to a chair (including a wheelchair)?: 4  Need to walk in hospital room?: 4  Climbing 3-5 steps with a railing?: 3  Total Score: 23       Therapeutic Activities and Exercises:   Patient educated on:   - role of PT/POC    - safety with all functional mobility   - transfer training   - gait training with standard walker   - LE HEP protocol   - appropriate use of ice   - importance of participation with therapy services   - safes to ambulate with standard walker at this time.    Verbalized understanding of all education provided.    LE therex performed x 10 reps each:   - ankle pumps   - QS   - GS   - SAQ   - heel slides   - hip abduction   - SLR   - LAQ    Assist provided as needed. Rest breaks taken as needed.  Additional education provided on safety with car transfer techniques. Verbalizes understanding.    Patient left up in chair with all lines intact, call button in reach and polar ice donned..    GOALS:    Physical Therapy Goals        Problem: Physical Therapy Goal    Goal Priority Disciplines Outcome Goal Variances Interventions   Physical Therapy Goal     PT/OT, PT Ongoing (interventions implemented as appropriate)     Description:  Goals to be met by: 6/27/2018      Patient will increase functional independence with mobility by performing:    -Supine to sit with Supervision - met  -Sit to supine with Supervision - met  -Sit to stand transfer with Stand-by Assistance - met  -Gait  x 150 feet with Stand-by Assistance using Rolling Walker. - Met  -Ascend/descend 4 stairs with right handrail with Contact Guard Assistance    -Lower extremity exercise program x 30 reps per handout, with independence                            Time Tracking:     PT Received On: 06/23/18  PT Start Time: 0826     PT Stop Time: 0851  PT Total Time (min): 25 min     Billable Minutes: Gait Training 15 and Therapeutic Exercise 8    Treatment Type: Treatment  PT/PTA: PT           Ruperto Mon III, PT  06/23/2018

## 2018-06-23 NOTE — PT/OT/SLP PROGRESS
Occupational Therapy   Treatment    Name: Yosef Rowe  MRN: [id number]  Admitting Diagnosis:  Primary osteoarthritis of left knee  1 Day Post-Op    Recommendations:     Discharge Recommendations: home health OT  Discharge Equipment Recommendations:  bedside commode, shower chair  Barriers to discharge:  None    Subjective     Communicated with: RN prior to session.  Pain/Comfort:  · Pain Rating 1: 3/10    Patients cultural, spiritual, Catholic conflicts given the current situation: None    Objective:     Patient found with: CPAP, telemetry, pulse ox (continuous)    General Precautions: Standard, fall   Orthopedic Precautions:LLE weight bearing as tolerated   Braces: N/A     Occupational Performance:    Bed Mobility:    · Patient completed Supine to Sit with stand by assistance     Functional Mobility/Transfers:  · Patient completed Sit <> Stand Transfer with stand by assistance  with  rolling walker   · Patient completed Bed <> Chair Transfer using Stand Pivot technique with stand by assistance with rolling walker  · Patient completed Toilet Transfer Stand Pivot technique with stand by assistance with  bedside commode and rolling walker  · Functional Mobility: Pt was able to walk to bathroom c SBA and RW.    Activities of Daily Living:  · Grooming: modified independence to wash hands while standing at sink.  · UB Dressing: modified independence to don shirt.  · LB Dressing: modified independence to don underwear, pants, and shoes.  · Toileting: modified independence for toilet hygiene.    Patient left up in chair with all lines intact, call button in reach and RN notified    AMPA 6 Click:  AMPA Total Score: 24    Treatment & Education:  Educated pt on bathing and car T/F's.  Education:    Assessment:     Yosef Rowe is a 62 y.o. male with a medical diagnosis of Primary osteoarthritis of left knee.  He was able to perform UB/LB dressing, grooming, and toilet hygiene c mod I.  Able to perform supine/sit,  sit/stand, bed/chair, and toilet T/F c SBA and RW.  Pt is progressing well.  Performance deficits affecting function are impaired self care skills, impaired functional mobilty, orthopedic precautions.      Rehab Prognosis:  Good; patient would benefit from acute skilled OT services to address these deficits and reach maximum level of function.       Plan:     Patient to be seen daily to address the above listed problems via self-care/home management, therapeutic activities, therapeutic exercises  · Plan of Care Expires:    · Plan of Care Reviewed with: patient    This Plan of care has been discussed with the patient who was involved in its development and understands and is in agreement with the identified goals and treatment plan    GOALS:    Occupational Therapy Goals        Problem: Occupational Therapy Goal    Goal Priority Disciplines Outcome Interventions   Occupational Therapy Goal     OT, PT/OT Ongoing (interventions implemented as appropriate)    Description:  Goals to be met by: 7 days    Patient will increase functional independence with ADLs by performing:    UE Dressing with Supervision.  LE Dressing with Supervision with AD as needed.  Grooming while standing with Supervision.  Toileting from bedside commode with Supervision for hygiene and clothing management.   Stand pivot transfers with Supervision.  Toilet transfer to bedside commode with Supervision.                         Time Tracking:     OT Date of Treatment: 06/23/18  OT Start Time: 0800  OT Stop Time: 0829  OT Total Time (min): 29 min    Billable Minutes:Self Care/Home Management 29    DENNY Han  6/23/2018

## 2018-06-23 NOTE — PLAN OF CARE
Pt being discharged home with [facility].  Put a call in to on call nurse.  Pt will be going to a different address, [address] [place]  [address].  Pt states that he has a BSC and walker with wheels at home.  Pt also states that his family is providing transportation home.  Pt ready to discharge when ride arrives.

## 2018-06-23 NOTE — PLAN OF CARE
Problem: Physical Therapy Goal  Goal: Physical Therapy Goal  Goals to be met by: 6/27/2018     Patient will increase functional independence with mobility by performing:    -Supine to sit with Supervision - met  -Sit to supine with Supervision - met  -Sit to stand transfer with Stand-by Assistance - met  -Gait  x 150 feet with Stand-by Assistance using Rolling Walker. - Met  -Ascend/descend 4 stairs with right handrail with Contact Guard Assistance    -Lower extremity exercise program x 30 reps per handout, with independence          Outcome: Ongoing (interventions implemented as appropriate)  Goals updated to reflect progress. Remain appropriate to improve functional mobility.    Ruperto Mon III, DPT, PT  6/23/2018

## 2018-06-24 NOTE — PT/OT/SLP DISCHARGE
Physical Therapy Discharge Summary    Name: Yosef Rowe  MRN: [id number]   Principal Problem: Primary osteoarthritis of left knee     Patient Discharged from acute Physical Therapy on 6/23/18.  Please refer to prior PT noted date on 6/23/18 for functional status.     Assessment:     Patient appropriate for care in another setting.    Objective:     GOALS:    Physical Therapy Goals        Problem: Physical Therapy Goal    Goal Priority Disciplines Outcome Goal Variances Interventions   Physical Therapy Goal     PT/OT, PT Ongoing (interventions implemented as appropriate)     Description:  Goals to be met by: 6/27/2018     Patient will increase functional independence with mobility by performing:    -Supine to sit with Supervision - met  -Sit to supine with Supervision - met  -Sit to stand transfer with Stand-by Assistance - met  -Gait  x 150 feet with Stand-by Assistance using Rolling Walker. - Met  -Ascend/descend 4 stairs with right handrail with Contact Guard Assistance    -Lower extremity exercise program x 30 reps per handout, with independence                            Reasons for Discontinuation of Therapy Services  Transfer to alternate level of care.      Plan:     Patient Discharged to: Home with Home Health Service.    Ruperto Mon III, PT  6/24/2018

## 2018-06-25 ENCOUNTER — TELEPHONE (OUTPATIENT)
Dept: INTERNAL MEDICINE | Facility: CLINIC | Age: 63
End: 2018-06-25

## 2018-06-25 NOTE — TELEPHONE ENCOUNTER
Had a partial knee replacement on 6/22 and is home now with home health/therapy.    162/80  At home today.  .  Nurse reports he is not on oxycodone given at discharge and not using his walker.  152/89 at discharge in hospital.  Is not on any hypertension meds.    Need anything for this?

## 2018-06-25 NOTE — DISCHARGE SUMMARY
[facility]  Orthopedics  Discharge Summary      Patient Name: Yosef Rowe  MRN: [id number]  Admission Date: 6/22/2018  Hospital Length of Stay: 0 days  Discharge Date and Time: 6/23/2018  1:31 PM  Attending Physician: No att. providers found   Discharging Provider: Helio Grey MD  Primary Care Provider: Ruperto Dexter MD    HPI: Yosef Rowe is a 62 y.o. male with a history of Left medial knee pain. Pain is worse with activity and weight bearing.  Patient has experienced interference of activities of daily living due to decreased range of motion and an increase in joint pain and swelling.  Patient has failed non-operative treatment including NSAIDs, corticosteroid injections, viscosupplement injections, and activity modification.  Yosef Rowe currently ambulates independently .     Procedure(s) (LRB):  REPLACEMENT-KNEE WITH NAVIGATION-- unicondylar-- medial (Left)      Hospital Course: Patient presented for above procedure.  Tolerated it well and was discharged home POD1 after voiding, tolerating diet, ambulating, pain controlled.  Discharge instructions, follow-up appointment, and med rec are below.          Significant Diagnostic Studies: Labs: CBC No results for input(s): WBC, HGB, HCT, PLT in the last 48 hours.    Pending Diagnostic Studies:     None        Final Active Diagnoses:    Diagnosis Date Noted POA    PRINCIPAL PROBLEM:  Primary osteoarthritis of left knee [M17.12] 06/22/2018 Yes      Problems Resolved During this Admission:    Diagnosis Date Noted Date Resolved POA      Discharged Condition: good    Disposition: Home or Self Care    Follow Up:  Follow-up Information     Follow up In 2 weeks.    Why:  For wound re-check               Patient Instructions:     Call MD for:  temperature >100.4     Call MD for:  persistent nausea and vomiting or diarrhea     Call MD for:  severe uncontrolled pain     Call MD for:  redness, tenderness, or signs of infection (pain, swelling,  redness, odor or green/yellow discharge around incision site)     Call MD for:  difficulty breathing or increased cough     Call MD for:  severe persistent headache     Call MD for:  worsening rash     Call MD for:  persistent dizziness, light-headedness, or visual disturbances     Call MD for:  increased confusion or weakness     Leave dressing on - Keep it clean, dry, and intact until clinic visit       Medications:  Reconciled Home Medications:      Medication List      START taking these medications    ondansetron 8 MG tablet  Commonly known as:  ZOFRAN  Take 1 tablet (8 mg total) by mouth every 8 (eight) hours as needed for Nausea.     oxyCODONE-acetaminophen  mg per tablet  Commonly known as:  PERCOCET  Take 1 tablet by mouth every 4 (four) hours as needed for Pain.     STOOL SOFTENER 100 MG capsule  Generic drug:  docusate sodium  Take 1 capsule (100 mg total) by mouth 2 (two) times daily.        CHANGE how you take these medications    aspirin 81 MG EC tablet  Commonly known as:  ECOTRIN  Take 1 tablet (81 mg total) by mouth 2 (two) times daily.  What changed:  when to take this     atorvastatin 20 MG tablet  Commonly known as:  LIPITOR  Take 1 tablet (20 mg total) by mouth once daily.  What changed:  when to take this     DULoxetine 60 MG capsule  Commonly known as:  CYMBALTA  Take 1 capsule (60 mg total) by mouth once daily.  What changed:  when to take this        CONTINUE taking these medications    ONE DAILY MULTIVITAMIN per tablet  Generic drug:  multivitamin  Take 1 tablet by mouth once daily.     zolpidem 5 MG Tab  Commonly known as:  AMBIEN  Take 1 tablet (5 mg total) by mouth nightly as needed.        STOP taking these medications    ibuprofen 200 MG tablet  Commonly known as:  ADVIL,MOTRIN     meloxicam 15 MG tablet  Commonly known as:  MOBIC     phentermine 37.5 mg tablet  Commonly known as:  ADIPEX-P            Helio Grey MD  Orthopedics  [facility]

## 2018-06-25 NOTE — TELEPHONE ENCOUNTER
He has not had HBP at the time that I saw him last which is 10 mo ago and he just had surgery.  I did not see him before the surgery thought he was told to come in for clearance.  I think that at this point pain may be reason for his HBP and it should be monitored closely and if it stays up we can try him on small dose of losartan

## 2018-06-25 NOTE — TELEPHONE ENCOUNTER
----- Message from Miguel Melchor sent at 6/25/2018 12:31 PM CDT -----  Contact: Marta ([facility]) [phone]  Pt was admitted yesterday and his B/P was high 162/80, pt has no B/P medication and it been running high. Marta will like to inform the doctor of this matter.

## 2018-06-26 ENCOUNTER — TELEPHONE (OUTPATIENT)
Dept: ORTHOPEDICS | Facility: CLINIC | Age: 63
End: 2018-06-26

## 2018-06-26 ENCOUNTER — OFFICE VISIT (OUTPATIENT)
Dept: ORTHOPEDICS | Facility: CLINIC | Age: 63
End: 2018-06-26
Payer: COMMERCIAL

## 2018-06-26 DIAGNOSIS — Z51.89 VISIT FOR WOUND CHECK: Primary | ICD-10-CM

## 2018-06-26 PROCEDURE — 99999 PR PBB SHADOW E&M-EST. PATIENT-LVL II: CPT | Mod: PBBFAC,,, | Performed by: NURSE PRACTITIONER

## 2018-06-26 PROCEDURE — 99024 POSTOP FOLLOW-UP VISIT: CPT | Mod: S$GLB,,, | Performed by: NURSE PRACTITIONER

## 2018-06-26 NOTE — PROGRESS NOTES
Pt called with c/o drainage from the left knee while eating at a restaurant. He had a partial knee replacement last week. He inquired about going to another clinic that was closer to him, but there is nowhere available other than here. He drove himself to the clinic to have the drainage evaluated. Dr. Jordan went in the room as I was with another patient and he removed the aquacell dressing. The incision was clean, dry, and intact. A new aquacell dressing was applied and he will f/u as scheduled or sooner as needed.

## 2018-06-26 NOTE — TELEPHONE ENCOUNTER
----- Message from Jorge Francisco sent at 6/26/2018  1:38 PM CDT -----  Contact: self      ----- Message -----  From: Mona Muniz  Sent: 6/26/2018  12:52 PM  To: Julian Ordaz S Staff    Pt called stating that he had knee replacement surgery this past Friday and now he has blood running from his knee bandage down the side of his leg. Pt would like an immediate call back from Darshana and could be reached at [phone]

## 2018-06-27 ENCOUNTER — TELEPHONE (OUTPATIENT)
Dept: INTERNAL MEDICINE | Facility: CLINIC | Age: 63
End: 2018-06-27

## 2018-06-27 NOTE — TELEPHONE ENCOUNTER
Monday dr akers told home health nurse to monitor pressure and let us know if doesn't improve.  I left msg on his voicemail  With this information.

## 2018-06-27 NOTE — TELEPHONE ENCOUNTER
----- Message from Laurie Verdin sent at 6/27/2018 12:25 PM CDT -----  Contact: Pt [phone]  Pt would like a call back in regards to a BP medication being called in. Please call pt and advise.    Pharmacy: [facility] [address] [place] [address] LAURA BESS [facility] [address] AT [facility] [address] [phone] (Phone)  [phone] (Fax)

## 2018-06-28 ENCOUNTER — TELEPHONE (OUTPATIENT)
Dept: PHARMACY | Facility: CLINIC | Age: 63
End: 2018-06-28

## 2018-06-28 ENCOUNTER — TELEPHONE (OUTPATIENT)
Dept: INTERNAL MEDICINE | Facility: CLINIC | Age: 63
End: 2018-06-28

## 2018-06-28 RX ORDER — LOSARTAN POTASSIUM 25 MG/1
25 TABLET ORAL DAILY
Qty: 90 TABLET | Refills: 3 | Status: SHIPPED | OUTPATIENT
Start: 2018-06-28 | End: 2018-12-26

## 2018-06-28 NOTE — TELEPHONE ENCOUNTER
I have sent him losartan low dose for the HBP since it is minimal and may well be connected with pain

## 2018-06-28 NOTE — TELEPHONE ENCOUNTER
Patient reached on cell.  Home number removed from record.    I told him dr akers would like him to get a pressure cuff and check in am and if pressure over 150/90 he would like him to take the losartan.  We are hoping as his pain improves, his pressure will too and medication/losartan will not be needed.    Patient expressed understanding.

## 2018-06-28 NOTE — TELEPHONE ENCOUNTER
NORMAN  Fax received from home health today.  Pressure readings 6/27 941am  150/94 p72  Sitting/ right arm.    (message Tuesday stated pressure then was 162/80    When discharged 152/89)

## 2018-06-28 NOTE — PROGRESS NOTES
The PA for Mr. Yosef Rowe's Percocet was approved on 6/23/18. It was sold to the patient on 6/23/18 when he was discharged.

## 2018-06-29 ENCOUNTER — OFFICE VISIT (OUTPATIENT)
Dept: ORTHOPEDICS | Facility: CLINIC | Age: 63
End: 2018-06-29
Payer: COMMERCIAL

## 2018-06-29 ENCOUNTER — TELEPHONE (OUTPATIENT)
Dept: ORTHOPEDICS | Facility: CLINIC | Age: 63
End: 2018-06-29

## 2018-06-29 VITALS — WEIGHT: 280 LBS | HEIGHT: 67 IN | BODY MASS INDEX: 43.95 KG/M2

## 2018-06-29 DIAGNOSIS — Z51.89 VISIT FOR WOUND CHECK: Primary | ICD-10-CM

## 2018-06-29 PROCEDURE — 99999 PR PBB SHADOW E&M-EST. PATIENT-LVL III: CPT | Mod: PBBFAC,,, | Performed by: NURSE PRACTITIONER

## 2018-06-29 PROCEDURE — 99024 POSTOP FOLLOW-UP VISIT: CPT | Mod: S$GLB,,, | Performed by: NURSE PRACTITIONER

## 2018-06-29 NOTE — PROGRESS NOTES
Pt returns with drainage from his incision. He has 2-3+ edema to his left leg 1 week following medial hemiarthroplasty. Dressing removed and the top of the incision is noted to be draining minimal serous drainage. Steri strips applied over the proximal incision and new mepilex dressing was placed over the incision. He will f/u next week as scheduled or sooner as needed.

## 2018-06-29 NOTE — TELEPHONE ENCOUNTER
----- Message from Sara Lepe sent at 6/29/2018  8:27 AM CDT -----  Contact: self@home  Patient stated he is having left knee drainage from surgery patient would like to be seen today please call patient.

## 2018-07-02 ENCOUNTER — TELEPHONE (OUTPATIENT)
Dept: ORTHOPEDICS | Facility: CLINIC | Age: 63
End: 2018-07-02

## 2018-07-02 NOTE — TELEPHONE ENCOUNTER
Patient called to c/o sudden onset of severe knee pain following physical therapy. He has not had pain since surgery. He has been very active and has even been driving since just after surgery. He reports that he is having pain behind the knee and in the thigh. He also reports that he is shaking with pain. He is upset about not being able to get in touch with orthopedics directly, so I gave him my extension so that he can call me as needed. I advised him to take a pain pill which he states he did 10 minutes ago. I advised him to lay down with his leg elevated with the ice machine on and allow the pain pill to work. He is concerned about blood clot, so I told him about the s/s of blood clot- pain behind the calf with erythema and swelling.

## 2018-07-03 ENCOUNTER — SURGERY (OUTPATIENT)
Age: 63
End: 2018-07-03

## 2018-07-03 ENCOUNTER — HOSPITAL ENCOUNTER (INPATIENT)
Facility: HOSPITAL | Age: 63
LOS: 2 days | Discharge: HOME HEALTH | DRG: 464 | End: 2018-07-05
Attending: FAMILY MEDICINE | Admitting: ORTHOPAEDIC SURGERY
Payer: COMMERCIAL

## 2018-07-03 ENCOUNTER — ANESTHESIA (OUTPATIENT)
Dept: SURGERY | Facility: HOSPITAL | Age: 63
DRG: 464 | End: 2018-07-03
Payer: COMMERCIAL

## 2018-07-03 ENCOUNTER — TELEPHONE (OUTPATIENT)
Dept: ORTHOPEDICS | Facility: CLINIC | Age: 63
End: 2018-07-03

## 2018-07-03 ENCOUNTER — ANESTHESIA EVENT (OUTPATIENT)
Dept: SURGERY | Facility: HOSPITAL | Age: 63
DRG: 464 | End: 2018-07-03
Payer: COMMERCIAL

## 2018-07-03 DIAGNOSIS — T84.59XA INFECTED PROSTHETIC KNEE JOINT, INITIAL ENCOUNTER: ICD-10-CM

## 2018-07-03 DIAGNOSIS — T84.54XA INFECTION ASSOCIATED WITH INTERNAL LEFT KNEE PROSTHESIS, INITIAL ENCOUNTER: ICD-10-CM

## 2018-07-03 DIAGNOSIS — Z96.659 INFECTED PROSTHETIC KNEE JOINT, INITIAL ENCOUNTER: ICD-10-CM

## 2018-07-03 DIAGNOSIS — M79.605 LEG PAIN, POSTERIOR, LEFT: ICD-10-CM

## 2018-07-03 DIAGNOSIS — M79.89 LEFT LEG SWELLING: Primary | ICD-10-CM

## 2018-07-03 DIAGNOSIS — Z98.890 H/O KNEE SURGERY: ICD-10-CM

## 2018-07-03 LAB
ABO + RH BLD: NORMAL
ALBUMIN SERPL BCP-MCNC: 3.1 G/DL
ALP SERPL-CCNC: 58 U/L
ALT SERPL W/O P-5'-P-CCNC: 30 U/L
ANION GAP SERPL CALC-SCNC: 10 MMOL/L
AST SERPL-CCNC: 28 U/L
BASOPHILS # BLD AUTO: 0.06 K/UL
BASOPHILS NFR BLD: 0.3 %
BILIRUB SERPL-MCNC: 1.3 MG/DL
BLD GP AB SCN CELLS X3 SERPL QL: NORMAL
BUN SERPL-MCNC: 22 MG/DL
CALCIUM SERPL-MCNC: 9.1 MG/DL
CHLORIDE SERPL-SCNC: 100 MMOL/L
CO2 SERPL-SCNC: 24 MMOL/L
CREAT SERPL-MCNC: 0.9 MG/DL
CRP SERPL-MCNC: 284 MG/L
DIFFERENTIAL METHOD: ABNORMAL
EOSINOPHIL # BLD AUTO: 0.1 K/UL
EOSINOPHIL NFR BLD: 0.3 %
ERYTHROCYTE [DISTWIDTH] IN BLOOD BY AUTOMATED COUNT: 13.1 %
ERYTHROCYTE [SEDIMENTATION RATE] IN BLOOD BY WESTERGREN METHOD: 40 MM/HR
EST. GFR  (AFRICAN AMERICAN): >60 ML/MIN/1.73 M^2
EST. GFR  (NON AFRICAN AMERICAN): >60 ML/MIN/1.73 M^2
GLUCOSE SERPL-MCNC: 100 MG/DL
GRAM STN SPEC: NORMAL
GRAM STN SPEC: NORMAL
HCT VFR BLD AUTO: 37.7 %
HGB BLD-MCNC: 12.9 G/DL
IMM GRANULOCYTES # BLD AUTO: 0.24 K/UL
IMM GRANULOCYTES NFR BLD AUTO: 1.2 %
LACTATE SERPL-SCNC: 1.9 MMOL/L
LYMPHOCYTES # BLD AUTO: 0.8 K/UL
LYMPHOCYTES NFR BLD: 3.6 %
MAGNESIUM SERPL-MCNC: 1.8 MG/DL
MCH RBC QN AUTO: 34.7 PG
MCHC RBC AUTO-ENTMCNC: 34.2 G/DL
MCV RBC AUTO: 101 FL
MONOCYTES # BLD AUTO: 1.2 K/UL
MONOCYTES NFR BLD: 5.7 %
NEUTROPHILS # BLD AUTO: 18.5 K/UL
NEUTROPHILS NFR BLD: 88.9 %
NRBC BLD-RTO: 0 /100 WBC
PHOSPHATE SERPL-MCNC: 2.9 MG/DL
PLATELET # BLD AUTO: 210 K/UL
PMV BLD AUTO: 9.6 FL
POTASSIUM SERPL-SCNC: 4.5 MMOL/L
PROCALCITONIN SERPL IA-MCNC: 3.05 NG/ML
PROT SERPL-MCNC: 6.4 G/DL
RBC # BLD AUTO: 3.72 M/UL
SODIUM SERPL-SCNC: 134 MMOL/L
WBC # BLD AUTO: 20.77 K/UL

## 2018-07-03 PROCEDURE — 80053 COMPREHEN METABOLIC PANEL: CPT

## 2018-07-03 PROCEDURE — D9220A PRA ANESTHESIA: Mod: ANES,,, | Performed by: ANESTHESIOLOGY

## 2018-07-03 PROCEDURE — 37000008 HC ANESTHESIA 1ST 15 MINUTES: Performed by: ORTHOPAEDIC SURGERY

## 2018-07-03 PROCEDURE — 63600175 PHARM REV CODE 636 W HCPCS: Performed by: STUDENT IN AN ORGANIZED HEALTH CARE EDUCATION/TRAINING PROGRAM

## 2018-07-03 PROCEDURE — 71000039 HC RECOVERY, EACH ADD'L HOUR: Performed by: ORTHOPAEDIC SURGERY

## 2018-07-03 PROCEDURE — 63600175 PHARM REV CODE 636 W HCPCS: Performed by: ORTHOPAEDIC SURGERY

## 2018-07-03 PROCEDURE — 99285 EMERGENCY DEPT VISIT HI MDM: CPT | Mod: ,,, | Performed by: PHYSICIAN ASSISTANT

## 2018-07-03 PROCEDURE — 11000001 HC ACUTE MED/SURG PRIVATE ROOM

## 2018-07-03 PROCEDURE — C1776 JOINT DEVICE (IMPLANTABLE): HCPCS | Performed by: ORTHOPAEDIC SURGERY

## 2018-07-03 PROCEDURE — 0SPD09Z REMOVAL OF LINER FROM LEFT KNEE JOINT, OPEN APPROACH: ICD-10-PCS | Performed by: ORTHOPAEDIC SURGERY

## 2018-07-03 PROCEDURE — 25000003 PHARM REV CODE 250: Performed by: STUDENT IN AN ORGANIZED HEALTH CARE EDUCATION/TRAINING PROGRAM

## 2018-07-03 PROCEDURE — 25000003 PHARM REV CODE 250: Performed by: NURSE ANESTHETIST, CERTIFIED REGISTERED

## 2018-07-03 PROCEDURE — 27486 REVISE/REPLACE KNEE JOINT: CPT | Mod: 78,52,LT, | Performed by: ORTHOPAEDIC SURGERY

## 2018-07-03 PROCEDURE — 27200750 HC INSULATED NEEDLE/ STIMUPLEX: Performed by: STUDENT IN AN ORGANIZED HEALTH CARE EDUCATION/TRAINING PROGRAM

## 2018-07-03 PROCEDURE — 37000009 HC ANESTHESIA EA ADD 15 MINS: Performed by: ORTHOPAEDIC SURGERY

## 2018-07-03 PROCEDURE — 85651 RBC SED RATE NONAUTOMATED: CPT

## 2018-07-03 PROCEDURE — 87116 MYCOBACTERIA CULTURE: CPT

## 2018-07-03 PROCEDURE — 36000707: Performed by: ORTHOPAEDIC SURGERY

## 2018-07-03 PROCEDURE — 63600175 PHARM REV CODE 636 W HCPCS: Performed by: ANESTHESIOLOGY

## 2018-07-03 PROCEDURE — 27000221 HC OXYGEN, UP TO 24 HOURS

## 2018-07-03 PROCEDURE — 25000242 PHARM REV CODE 250 ALT 637 W/ HCPCS: Performed by: NURSE ANESTHETIST, CERTIFIED REGISTERED

## 2018-07-03 PROCEDURE — 63600175 PHARM REV CODE 636 W HCPCS: Performed by: NURSE ANESTHETIST, CERTIFIED REGISTERED

## 2018-07-03 PROCEDURE — 83605 ASSAY OF LACTIC ACID: CPT

## 2018-07-03 PROCEDURE — 64447 NJX AA&/STRD FEMORAL NRV IMG: CPT | Performed by: ANESTHESIOLOGY

## 2018-07-03 PROCEDURE — 84145 PROCALCITONIN (PCT): CPT

## 2018-07-03 PROCEDURE — 87185 SC STD ENZYME DETCJ PER NZM: CPT

## 2018-07-03 PROCEDURE — 94761 N-INVAS EAR/PLS OXIMETRY MLT: CPT

## 2018-07-03 PROCEDURE — 25000003 PHARM REV CODE 250

## 2018-07-03 PROCEDURE — 87102 FUNGUS ISOLATION CULTURE: CPT

## 2018-07-03 PROCEDURE — 87075 CULTR BACTERIA EXCEPT BLOOD: CPT

## 2018-07-03 PROCEDURE — 0SBD0ZZ EXCISION OF LEFT KNEE JOINT, OPEN APPROACH: ICD-10-PCS | Performed by: ORTHOPAEDIC SURGERY

## 2018-07-03 PROCEDURE — 63600175 PHARM REV CODE 636 W HCPCS

## 2018-07-03 PROCEDURE — 36000706: Performed by: ORTHOPAEDIC SURGERY

## 2018-07-03 PROCEDURE — 84100 ASSAY OF PHOSPHORUS: CPT

## 2018-07-03 PROCEDURE — 87015 SPECIMEN INFECT AGNT CONCNTJ: CPT

## 2018-07-03 PROCEDURE — 76942 ECHO GUIDE FOR BIOPSY: CPT | Mod: 26,,, | Performed by: ANESTHESIOLOGY

## 2018-07-03 PROCEDURE — 83735 ASSAY OF MAGNESIUM: CPT

## 2018-07-03 PROCEDURE — 99900035 HC TECH TIME PER 15 MIN (STAT)

## 2018-07-03 PROCEDURE — 99285 EMERGENCY DEPT VISIT HI MDM: CPT

## 2018-07-03 PROCEDURE — 94660 CPAP INITIATION&MGMT: CPT

## 2018-07-03 PROCEDURE — 27000190 HC CPAP FULL FACE MASK W/VALVE

## 2018-07-03 PROCEDURE — 71000033 HC RECOVERY, INTIAL HOUR: Performed by: ORTHOPAEDIC SURGERY

## 2018-07-03 PROCEDURE — 86850 RBC ANTIBODY SCREEN: CPT

## 2018-07-03 PROCEDURE — 0SUD09C SUPPLEMENT LEFT KNEE JOINT WITH LINER, PATELLAR SURFACE, OPEN APPROACH: ICD-10-PCS | Performed by: ORTHOPAEDIC SURGERY

## 2018-07-03 PROCEDURE — 87040 BLOOD CULTURE FOR BACTERIA: CPT | Mod: 59

## 2018-07-03 PROCEDURE — 87070 CULTURE OTHR SPECIMN AEROBIC: CPT

## 2018-07-03 PROCEDURE — 87206 SMEAR FLUORESCENT/ACID STAI: CPT

## 2018-07-03 PROCEDURE — 87205 SMEAR GRAM STAIN: CPT

## 2018-07-03 PROCEDURE — 27201423 OPTIME MED/SURG SUP & DEVICES STERILE SUPPLY: Performed by: ORTHOPAEDIC SURGERY

## 2018-07-03 PROCEDURE — 86140 C-REACTIVE PROTEIN: CPT

## 2018-07-03 PROCEDURE — D9220A PRA ANESTHESIA: Mod: CRNA,,, | Performed by: NURSE ANESTHETIST, CERTIFIED REGISTERED

## 2018-07-03 DEVICE — TIBIAL ONLAY INSRT MCK SZ5 8MM: Type: IMPLANTABLE DEVICE | Site: KNEE | Status: FUNCTIONAL

## 2018-07-03 RX ORDER — OXYCODONE HYDROCHLORIDE 5 MG/1
15 TABLET ORAL
Status: DISCONTINUED | OUTPATIENT
Start: 2018-07-03 | End: 2018-07-05 | Stop reason: HOSPADM

## 2018-07-03 RX ORDER — FENTANYL CITRATE 50 UG/ML
INJECTION, SOLUTION INTRAMUSCULAR; INTRAVENOUS
Status: DISCONTINUED | OUTPATIENT
Start: 2018-07-03 | End: 2018-07-03

## 2018-07-03 RX ORDER — ALBUTEROL SULFATE 90 UG/1
AEROSOL, METERED RESPIRATORY (INHALATION)
Status: DISCONTINUED | OUTPATIENT
Start: 2018-07-03 | End: 2018-07-03

## 2018-07-03 RX ORDER — OXYCODONE HYDROCHLORIDE 5 MG/1
10 TABLET ORAL
Status: DISCONTINUED | OUTPATIENT
Start: 2018-07-03 | End: 2018-07-05 | Stop reason: HOSPADM

## 2018-07-03 RX ORDER — ROPIVACAINE HYDROCHLORIDE 2 MG/ML
INJECTION, SOLUTION EPIDURAL; INFILTRATION; PERINEURAL
Status: DISCONTINUED
Start: 2018-07-03 | End: 2018-07-03 | Stop reason: WASHOUT

## 2018-07-03 RX ORDER — MIDAZOLAM HYDROCHLORIDE 1 MG/ML
0.5 INJECTION INTRAMUSCULAR; INTRAVENOUS
Status: DISCONTINUED | OUTPATIENT
Start: 2018-07-03 | End: 2018-07-03 | Stop reason: HOSPADM

## 2018-07-03 RX ORDER — HYDROMORPHONE HYDROCHLORIDE 1 MG/ML
0.2 INJECTION, SOLUTION INTRAMUSCULAR; INTRAVENOUS; SUBCUTANEOUS EVERY 5 MIN PRN
Status: DISCONTINUED | OUTPATIENT
Start: 2018-07-03 | End: 2018-07-03 | Stop reason: HOSPADM

## 2018-07-03 RX ORDER — ATORVASTATIN CALCIUM 20 MG/1
20 TABLET, FILM COATED ORAL EVERY MORNING
Status: DISCONTINUED | OUTPATIENT
Start: 2018-07-04 | End: 2018-07-05 | Stop reason: HOSPADM

## 2018-07-03 RX ORDER — POLYETHYLENE GLYCOL 3350 17 G/17G
17 POWDER, FOR SOLUTION ORAL DAILY
Status: DISCONTINUED | OUTPATIENT
Start: 2018-07-04 | End: 2018-07-05 | Stop reason: HOSPADM

## 2018-07-03 RX ORDER — SODIUM CHLORIDE 9 MG/ML
INJECTION, SOLUTION INTRAVENOUS CONTINUOUS PRN
Status: DISCONTINUED | OUTPATIENT
Start: 2018-07-03 | End: 2018-07-03

## 2018-07-03 RX ORDER — FENTANYL CITRATE 50 UG/ML
INJECTION, SOLUTION INTRAMUSCULAR; INTRAVENOUS
Status: COMPLETED
Start: 2018-07-03 | End: 2018-07-03

## 2018-07-03 RX ORDER — GLYCOPYRROLATE 0.2 MG/ML
INJECTION INTRAMUSCULAR; INTRAVENOUS
Status: DISCONTINUED | OUTPATIENT
Start: 2018-07-03 | End: 2018-07-03

## 2018-07-03 RX ORDER — NEOSTIGMINE METHYLSULFATE 1 MG/ML
INJECTION, SOLUTION INTRAVENOUS
Status: DISCONTINUED | OUTPATIENT
Start: 2018-07-03 | End: 2018-07-03

## 2018-07-03 RX ORDER — CELECOXIB 200 MG/1
400 CAPSULE ORAL ONCE
Status: COMPLETED | OUTPATIENT
Start: 2018-07-03 | End: 2018-07-03

## 2018-07-03 RX ORDER — AMOXICILLIN 250 MG
1 CAPSULE ORAL 2 TIMES DAILY
Status: DISCONTINUED | OUTPATIENT
Start: 2018-07-03 | End: 2018-07-05 | Stop reason: HOSPADM

## 2018-07-03 RX ORDER — ACETAMINOPHEN 10 MG/ML
1000 INJECTION, SOLUTION INTRAVENOUS ONCE
Status: COMPLETED | OUTPATIENT
Start: 2018-07-03 | End: 2018-07-03

## 2018-07-03 RX ORDER — DOCUSATE SODIUM 100 MG/1
100 CAPSULE, LIQUID FILLED ORAL 2 TIMES DAILY
Status: DISCONTINUED | OUTPATIENT
Start: 2018-07-03 | End: 2018-07-03 | Stop reason: SDUPTHER

## 2018-07-03 RX ORDER — MORPHINE SULFATE 2 MG/ML
2 INJECTION, SOLUTION INTRAMUSCULAR; INTRAVENOUS
Status: DISCONTINUED | OUTPATIENT
Start: 2018-07-03 | End: 2018-07-05 | Stop reason: HOSPADM

## 2018-07-03 RX ORDER — LIDOCAINE HCL/PF 100 MG/5ML
SYRINGE (ML) INTRAVENOUS
Status: DISCONTINUED | OUTPATIENT
Start: 2018-07-03 | End: 2018-07-03

## 2018-07-03 RX ORDER — FENTANYL CITRATE 50 UG/ML
25 INJECTION, SOLUTION INTRAMUSCULAR; INTRAVENOUS EVERY 5 MIN PRN
Status: DISCONTINUED | OUTPATIENT
Start: 2018-07-03 | End: 2018-07-03 | Stop reason: HOSPADM

## 2018-07-03 RX ORDER — SODIUM CHLORIDE 9 MG/ML
INJECTION, SOLUTION INTRAVENOUS CONTINUOUS
Status: ACTIVE | OUTPATIENT
Start: 2018-07-03 | End: 2018-07-04

## 2018-07-03 RX ORDER — LACTULOSE 10 G/15ML
20 SOLUTION ORAL EVERY 6 HOURS PRN
Status: DISCONTINUED | OUTPATIENT
Start: 2018-07-03 | End: 2018-07-05 | Stop reason: HOSPADM

## 2018-07-03 RX ORDER — FAMOTIDINE 20 MG/1
20 TABLET, FILM COATED ORAL 2 TIMES DAILY
Status: DISCONTINUED | OUTPATIENT
Start: 2018-07-03 | End: 2018-07-05 | Stop reason: HOSPADM

## 2018-07-03 RX ORDER — ASPIRIN 81 MG/1
81 TABLET ORAL 2 TIMES DAILY
Status: DISCONTINUED | OUTPATIENT
Start: 2018-07-03 | End: 2018-07-05 | Stop reason: HOSPADM

## 2018-07-03 RX ORDER — PROPOFOL 10 MG/ML
VIAL (ML) INTRAVENOUS
Status: DISCONTINUED | OUTPATIENT
Start: 2018-07-03 | End: 2018-07-03

## 2018-07-03 RX ORDER — CELECOXIB 200 MG/1
200 CAPSULE ORAL DAILY
Status: DISCONTINUED | OUTPATIENT
Start: 2018-07-04 | End: 2018-07-05 | Stop reason: HOSPADM

## 2018-07-03 RX ORDER — SODIUM CHLORIDE 0.9 % (FLUSH) 0.9 %
3 SYRINGE (ML) INJECTION EVERY 8 HOURS
Status: DISCONTINUED | OUTPATIENT
Start: 2018-07-04 | End: 2018-07-05 | Stop reason: HOSPADM

## 2018-07-03 RX ORDER — MUPIROCIN 20 MG/G
1 OINTMENT TOPICAL 2 TIMES DAILY
Status: DISCONTINUED | OUTPATIENT
Start: 2018-07-03 | End: 2018-07-05 | Stop reason: HOSPADM

## 2018-07-03 RX ORDER — ZOLPIDEM TARTRATE 5 MG/1
5 TABLET ORAL NIGHTLY PRN
Status: DISCONTINUED | OUTPATIENT
Start: 2018-07-03 | End: 2018-07-05 | Stop reason: HOSPADM

## 2018-07-03 RX ORDER — BISACODYL 10 MG
10 SUPPOSITORY, RECTAL RECTAL DAILY PRN
Status: DISCONTINUED | OUTPATIENT
Start: 2018-07-03 | End: 2018-07-05 | Stop reason: HOSPADM

## 2018-07-03 RX ORDER — ONDANSETRON 2 MG/ML
4 INJECTION INTRAMUSCULAR; INTRAVENOUS EVERY 12 HOURS PRN
Status: DISCONTINUED | OUTPATIENT
Start: 2018-07-03 | End: 2018-07-05 | Stop reason: HOSPADM

## 2018-07-03 RX ORDER — OXYCODONE HYDROCHLORIDE 5 MG/1
5 TABLET ORAL
Status: DISCONTINUED | OUTPATIENT
Start: 2018-07-03 | End: 2018-07-05 | Stop reason: HOSPADM

## 2018-07-03 RX ORDER — ROCURONIUM BROMIDE 10 MG/ML
INJECTION, SOLUTION INTRAVENOUS
Status: DISCONTINUED | OUTPATIENT
Start: 2018-07-03 | End: 2018-07-03

## 2018-07-03 RX ORDER — DULOXETIN HYDROCHLORIDE 60 MG/1
60 CAPSULE, DELAYED RELEASE ORAL EVERY MORNING
Status: DISCONTINUED | OUTPATIENT
Start: 2018-07-04 | End: 2018-07-05 | Stop reason: HOSPADM

## 2018-07-03 RX ORDER — MIDAZOLAM HYDROCHLORIDE 1 MG/ML
INJECTION, SOLUTION INTRAMUSCULAR; INTRAVENOUS
Status: DISCONTINUED | OUTPATIENT
Start: 2018-07-03 | End: 2018-07-03

## 2018-07-03 RX ORDER — KETAMINE HYDROCHLORIDE 10 MG/ML
INJECTION, SOLUTION INTRAMUSCULAR; INTRAVENOUS
Status: DISCONTINUED | OUTPATIENT
Start: 2018-07-03 | End: 2018-07-03

## 2018-07-03 RX ORDER — VANCOMYCIN HYDROCHLORIDE 1 G/20ML
INJECTION, POWDER, LYOPHILIZED, FOR SOLUTION INTRAVENOUS
Status: DISCONTINUED | OUTPATIENT
Start: 2018-07-03 | End: 2018-07-03 | Stop reason: HOSPADM

## 2018-07-03 RX ORDER — CEFAZOLIN SODIUM 1 G/3ML
2 INJECTION, POWDER, FOR SOLUTION INTRAMUSCULAR; INTRAVENOUS
Status: DISCONTINUED | OUTPATIENT
Start: 2018-07-03 | End: 2018-07-04

## 2018-07-03 RX ORDER — SODIUM CHLORIDE 0.9 % (FLUSH) 0.9 %
3 SYRINGE (ML) INJECTION
Status: DISCONTINUED | OUTPATIENT
Start: 2018-07-03 | End: 2018-07-03 | Stop reason: HOSPADM

## 2018-07-03 RX ORDER — SUCCINYLCHOLINE CHLORIDE 20 MG/ML
INJECTION INTRAMUSCULAR; INTRAVENOUS
Status: DISCONTINUED | OUTPATIENT
Start: 2018-07-03 | End: 2018-07-03

## 2018-07-03 RX ORDER — CEFAZOLIN SODIUM 1 G/3ML
INJECTION, POWDER, FOR SOLUTION INTRAMUSCULAR; INTRAVENOUS
Status: DISCONTINUED | OUTPATIENT
Start: 2018-07-03 | End: 2018-07-03

## 2018-07-03 RX ORDER — OXYCODONE HYDROCHLORIDE 5 MG/1
TABLET ORAL
Status: COMPLETED
Start: 2018-07-03 | End: 2018-07-03

## 2018-07-03 RX ADMIN — OXYCODONE HYDROCHLORIDE 10 MG: 5 TABLET ORAL at 06:07

## 2018-07-03 RX ADMIN — VANCOMYCIN HYDROCHLORIDE 3 G: 1 INJECTION, POWDER, LYOPHILIZED, FOR SOLUTION INTRAVENOUS at 03:07

## 2018-07-03 RX ADMIN — PROPOFOL 200 MG: 10 INJECTION, EMULSION INTRAVENOUS at 03:07

## 2018-07-03 RX ADMIN — CELECOXIB 400 MG: 200 CAPSULE ORAL at 06:07

## 2018-07-03 RX ADMIN — FENTANYL CITRATE 50 MCG: 50 INJECTION INTRAMUSCULAR; INTRAVENOUS at 02:07

## 2018-07-03 RX ADMIN — SODIUM CHLORIDE: 0.9 INJECTION, SOLUTION INTRAVENOUS at 02:07

## 2018-07-03 RX ADMIN — VANCOMYCIN HYDROCHLORIDE 1500 MG: 1 INJECTION, POWDER, LYOPHILIZED, FOR SOLUTION INTRAVENOUS at 03:07

## 2018-07-03 RX ADMIN — ALBUTEROL SULFATE 2 PUFF: 90 AEROSOL, METERED RESPIRATORY (INHALATION) at 05:07

## 2018-07-03 RX ADMIN — LIDOCAINE HYDROCHLORIDE 50 MG: 20 INJECTION, SOLUTION INTRAVENOUS at 03:07

## 2018-07-03 RX ADMIN — MUPIROCIN 1 G: 20 OINTMENT TOPICAL at 10:07

## 2018-07-03 RX ADMIN — FENTANYL CITRATE 25 MCG: 50 INJECTION INTRAMUSCULAR; INTRAVENOUS at 07:07

## 2018-07-03 RX ADMIN — CEFAZOLIN 3 G: 330 INJECTION, POWDER, FOR SOLUTION INTRAMUSCULAR; INTRAVENOUS at 03:07

## 2018-07-03 RX ADMIN — OXYCODONE HYDROCHLORIDE 15 MG: 5 TABLET ORAL at 09:07

## 2018-07-03 RX ADMIN — SODIUM CHLORIDE: 0.9 INJECTION, SOLUTION INTRAVENOUS at 06:07

## 2018-07-03 RX ADMIN — ROCURONIUM BROMIDE 10 MG: 10 INJECTION, SOLUTION INTRAVENOUS at 03:07

## 2018-07-03 RX ADMIN — FENTANYL CITRATE 50 MCG: 50 INJECTION, SOLUTION INTRAMUSCULAR; INTRAVENOUS at 03:07

## 2018-07-03 RX ADMIN — ZOLPIDEM TARTRATE 5 MG: 5 TABLET ORAL at 10:07

## 2018-07-03 RX ADMIN — KETAMINE HYDROCHLORIDE 20 MG: 10 INJECTION, SOLUTION INTRAMUSCULAR; INTRAVENOUS at 04:07

## 2018-07-03 RX ADMIN — KETAMINE HYDROCHLORIDE 30 MG: 10 INJECTION, SOLUTION INTRAMUSCULAR; INTRAVENOUS at 03:07

## 2018-07-03 RX ADMIN — ASPIRIN 81 MG: 81 TABLET, COATED ORAL at 09:07

## 2018-07-03 RX ADMIN — HYDROMORPHONE HYDROCHLORIDE 0.2 MG: 1 INJECTION, SOLUTION INTRAMUSCULAR; INTRAVENOUS; SUBCUTANEOUS at 05:07

## 2018-07-03 RX ADMIN — MIDAZOLAM HYDROCHLORIDE 2 MG: 1 INJECTION, SOLUTION INTRAMUSCULAR; INTRAVENOUS at 02:07

## 2018-07-03 RX ADMIN — FENTANYL CITRATE 100 MCG: 50 INJECTION, SOLUTION INTRAMUSCULAR; INTRAVENOUS at 03:07

## 2018-07-03 RX ADMIN — GLYCOPYRROLATE 0.5 MG: 0.2 INJECTION, SOLUTION INTRAMUSCULAR; INTRAVENOUS at 05:07

## 2018-07-03 RX ADMIN — NEOSTIGMINE METHYLSULFATE 5 MG: 1 INJECTION INTRAVENOUS at 05:07

## 2018-07-03 RX ADMIN — SUCCINYLCHOLINE CHLORIDE 200 MG: 20 INJECTION, SOLUTION INTRAMUSCULAR; INTRAVENOUS at 03:07

## 2018-07-03 RX ADMIN — FENTANYL CITRATE 50 MCG: 50 INJECTION, SOLUTION INTRAMUSCULAR; INTRAVENOUS at 04:07

## 2018-07-03 RX ADMIN — SENNOSIDES AND DOCUSATE SODIUM 1 TABLET: 8.6; 5 TABLET ORAL at 09:07

## 2018-07-03 RX ADMIN — HYDROMORPHONE HYDROCHLORIDE 0.2 MG: 1 INJECTION, SOLUTION INTRAMUSCULAR; INTRAVENOUS; SUBCUTANEOUS at 06:07

## 2018-07-03 RX ADMIN — CEFAZOLIN 2 G: 1 INJECTION, POWDER, FOR SOLUTION INTRAMUSCULAR; INTRAVENOUS at 09:07

## 2018-07-03 RX ADMIN — ACETAMINOPHEN 1000 MG: 10 INJECTION, SOLUTION INTRAVENOUS at 06:07

## 2018-07-03 RX ADMIN — SODIUM CHLORIDE, SODIUM GLUCONATE, SODIUM ACETATE, POTASSIUM CHLORIDE, MAGNESIUM CHLORIDE, SODIUM PHOSPHATE, DIBASIC, AND POTASSIUM PHOSPHATE: .53; .5; .37; .037; .03; .012; .00082 INJECTION, SOLUTION INTRAVENOUS at 03:07

## 2018-07-03 RX ADMIN — FAMOTIDINE 20 MG: 20 TABLET ORAL at 09:07

## 2018-07-03 RX ADMIN — ROCURONIUM BROMIDE 40 MG: 10 INJECTION, SOLUTION INTRAVENOUS at 03:07

## 2018-07-03 NOTE — ANESTHESIA POSTPROCEDURE EVALUATION
"Anesthesia Post Evaluation    Patient: Yosef Rowe    Procedure(s) Performed: Procedure(s) (LRB):  IRRIGATION AND DEBRIDEMENT, LOWER EXTREMITY, poly exchange left uni knee replacement. Stryler. 9L NS (Left)    Final Anesthesia Type: general  Patient location during evaluation: PACU  Patient participation: Yes- Able to Participate  Level of consciousness: awake and alert  Post-procedure vital signs: reviewed and stable  Pain management: adequate  Airway patency: patent  PONV status at discharge: No PONV  Anesthetic complications: no      Cardiovascular status: blood pressure returned to baseline  Respiratory status: unassisted, room air and spontaneous ventilation  Hydration status: euvolemic  Follow-up not needed.        Visit Vitals  BP (!) 177/93 (BP Location: Left arm, Patient Position: Lying)   Pulse (!) 112   Temp 36.6 °C (97.9 °F) (Temporal)   Resp 20   Ht 5' 7" (1.702 m)   Wt 127 kg (280 lb)   SpO2 97%   BMI 43.85 kg/m²       Pain/Ha Score: Pain Assessment Performed: Yes (7/3/2018  1:46 PM)  Presence of Pain: complains of pain/discomfort (7/3/2018  1:46 PM)  Pain Rating Prior to Med Admin: 0 (7/3/2018  2:10 PM)  Ha Score: 9 (7/3/2018  2:45 PM)      "

## 2018-07-03 NOTE — TELEPHONE ENCOUNTER
Ortho Telephone Triage Call  [phone]  Caller: Klarissa Fu,RN/[facility] reporting Maricarmen Valdez PT, presently, with pt. Reports proximal area of L knee incision is  dehisced and oozing pus-like drainage. LLE swollen from mid-thigh to toes. States pt denies fever.   HX: L Knee unicondylar - medial  replacement 6/22/18  Resolution: ALEXEI Vickers NP notified. Spoke with DENISE ValdezPT/pt and pt instructed per ALEXEI Vickers NP  to come to [facility] ED for wound check and c/o pain. Maricarmen ValdezPT to apply new dsg to L knee incision. Pt states will arrange  ride to ED. Pt instructed to contact Ortho Triage when leaving for ED. Ortho Triage contact number provided. Pt states understanding.

## 2018-07-03 NOTE — BRIEF OP NOTE
BRIEF OP NOTE    Preop Dx: Infected left medial unicondylar knee replacement    Postop Dx: Infected left medial unicondylar knee replacement    Procedure: 1.  Arthrotomy with irrigation and debridement/synovectomy left knee    2.  Revision of left medial unicondylar knee polyethylene    Surgeon: Ruperto Olivarez M.D.    Asst:  Helio Grey M.D    Anesthesia: GETA    EBL:  100cc    IVF:  1500cc crystalloid    Implants: [place] 5x8 polyethylene    Specimens: Cultures    Findings: Purulence.  Some necrotic tissue excised sharply.      Dispo:  To PACU extubated/stable     Dict#  [id number]

## 2018-07-03 NOTE — TRANSFER OF CARE
"Anesthesia Transfer of Care Note    Patient: Yosef Rowe    Procedure(s) Performed: Procedure(s) (LRB):  IRRIGATION AND DEBRIDEMENT, LOWER EXTREMITY, poly exchange left uni knee replacement. Stryler. 9L NS (Left)    Patient location: PACU    Anesthesia Type: general    Transport from OR: Transported from OR on 6-10 L/min O2 by face mask with adequate spontaneous ventilation    Post pain: adequate analgesia    Post assessment: no apparent anesthetic complications and tolerated procedure well    Post vital signs: stable    Level of consciousness: awake and responds to stimulation    Nausea/Vomiting: no nausea/vomiting    Complications: none    Transfer of care protocol was followed      Last vitals:   Visit Vitals  BP (!) 148/74 (BP Location: Left arm, Patient Position: Lying)   Pulse 92   Temp 36.9 °C (98.4 °F)   Resp 16   Ht 5' 7" (1.702 m)   Wt 127 kg (280 lb)   SpO2 100%   BMI 43.85 kg/m²     "

## 2018-07-03 NOTE — HPI
Yosef Rowe is a 62 y.o. male who is s/p uni knee replaccment by Dr. Jordan on 6/22. Had some drainage post operatively but was doing well up until 5 days ago when he began having increasing pain and drainage from the incision site. He now has copious foul smelling discharge from the proximal part of his incision and increasing pain and swelling. Pain is posterior and does not radiate. Reports no fevers or chills.

## 2018-07-03 NOTE — ED NOTES
Pre op, Marlin,  made verbally aware that consents have not been done in ED, to order pink to, will send to lab.

## 2018-07-03 NOTE — ANESTHESIA PROCEDURE NOTES
Adductor Canal Single Injection Block    Patient location during procedure: pre-op   Block not for primary anesthetic.  Reason for block: at surgeon's request and post-op pain management   Post-op Pain Location: L knee pain   Start time: 7/3/2018 2:16 PM  Timeout: 7/3/2018 2:15 PM   End time: 7/3/2018 2:25 PM  Staffing  Anesthesiologist: PHOENIX HODGES  Resident/CRNA: THOM KERNS  Performed: resident/CRNA   Preanesthetic Checklist  Completed: patient identified, site marked, surgical consent, pre-op evaluation, timeout performed, IV checked, risks and benefits discussed and monitors and equipment checked  Peripheral Block  Patient position: supine  Prep: ChloraPrep  Patient monitoring: heart rate, cardiac monitor, continuous pulse ox, continuous capnometry and frequent blood pressure checks  Block type: adductor canal  Laterality: left  Injection technique: single shot  Needle  Needle type: Stimuplex   Needle gauge: 21 G  Needle length: 4 in  Needle localization: anatomical landmarks and ultrasound guidance   -ultrasound image captured on disc.  Assessment  Injection assessment: negative aspiration, negative parasthesia and local visualized surrounding nerve  Paresthesia pain: none  Heart rate change: no  Slow fractionated injection: yes  Medications:  Bolus administered: 25 mL of 0.25 ropivacaine  Epinephrine added: 3.75 mcg/mL (1/300,000)  Additional Notes  VSS.  [facility] RN monitoring vitals throughout procedure.  Patient tolerated procedure well.

## 2018-07-03 NOTE — ED NOTES
Patient instructed mult times to not stand up, insisted upon getting OOB and standing copious amount of secretions from left knee. Patient SOB upon standing

## 2018-07-03 NOTE — ED NOTES
Ortho at bedside, states NPO, patient to go to OR, drank water this am in triage and food last night.

## 2018-07-03 NOTE — ASSESSMENT & PLAN NOTE
Yosef Rowe is a 62 y.o. male 11 days s/p uni knee replacement now with periprosthetic infection. WBC of 20 at this time, lactate 1.9 but he is non toxic appearing.    - Has had no food since last night, last PO was 1 cup of water about 1 hour ago. NPO   - Marked and consented for left knee I and D and poly exchange to OR this afternoon 7/3  - Will admit post operatively for IVAB and PT/OT

## 2018-07-03 NOTE — SUBJECTIVE & OBJECTIVE
Past Medical History:   Diagnosis Date    Anxiety     Arthritis     Depression     when turned 49-lost job and mother unwell at that time    Hyperlipidemia     Lumbar radiculopathy     BRETT- 2011    Osteoarthritis     Sleep apnea     Urinary tract infection        Past Surgical History:   Procedure Laterality Date    JOINT REPLACEMENT      knee arthroscopicc surgeries      Right knee- 2007  and Left knee- 2008    KNEE SURGERY      Left wrist Surgery      2000 for a torn ligament from Golf    TOTAL KNEE REPLACEMENT USING COMPUTER NAVIGATION Left 6/22/2018    Procedure: REPLACEMENT-KNEE WITH NAVIGATION-- unicondylar-- medial;  Surgeon: Orlin Jordan MD;  Location: [facility] OR [address] [place] [address];  Service: Orthopedics;  Laterality: Left;       Review of patient's allergies indicates:   Allergen Reactions    No known drug allergies        No current facility-administered medications for this encounter.      Current Outpatient Prescriptions   Medication Sig    aspirin (ECOTRIN) 81 MG EC tablet Take 1 tablet (81 mg total) by mouth 2 (two) times daily.    atorvastatin (LIPITOR) 20 MG tablet Take 1 tablet (20 mg total) by mouth once daily. (Patient taking differently: Take 20 mg by mouth every morning. )    docusate sodium (COLACE) 100 MG capsule Take 1 capsule (100 mg total) by mouth 2 (two) times daily.    DULoxetine (CYMBALTA) 60 MG capsule Take 1 capsule (60 mg total) by mouth once daily. (Patient taking differently: Take 60 mg by mouth every morning. )    losartan (COZAAR) 25 MG tablet Take 1 tablet (25 mg total) by mouth once daily.    oxyCODONE-acetaminophen (PERCOCET)  mg per tablet Take 1 tablet by mouth every 4 (four) hours as needed for Pain.    zolpidem (AMBIEN) 5 MG Tab Take 1 tablet (5 mg total) by mouth nightly as needed.    multivitamin (ONE DAILY MULTIVITAMIN) per tablet Take 1 tablet by mouth once daily.    ondansetron (ZOFRAN) 8 MG tablet Take 1 tablet (8 mg total) by mouth every 8 (eight)  "hours as needed for Nausea.     Family History     Problem Relation (Age of Onset)    Parkinsonism Mother        Social History Main Topics    Smoking status: Current Every Day Smoker     Types: Cigars    Smokeless tobacco: Never Used      Comment: occasional    Alcohol use 6.0 oz/week     10 Glasses of wine per week      Comment: Daily, last use yesterday    Drug use: No    Sexual activity: Yes     Partners: Female     ROS   Constitutional: negative for fevers  Eyes: no visual changes  ENT: negative for hearing loss  Respiratory: negative for dyspnea  Cardiovascular: negative for chest pain  Gastrointestinal: negative for abdominal pain  Genitourinary: negative for dysuria  Neurological: negative for headaches  Behavioral/Psych: negative for hallucinations  Endocrine: negative for temperature intolerance    Objective:     Vital Signs (Most Recent):  Temp: 98.2 °F (36.8 °C) (07/03/18 1211)  Pulse: 99 (07/03/18 1211)  Resp: 18 (07/03/18 1211)  BP: (!) 142/71 (07/03/18 1211)  SpO2: (!) 94 % (07/03/18 1211) Vital Signs (24h Range):  Temp:  [98.2 °F (36.8 °C)-99.3 °F (37.4 °C)] 98.2 °F (36.8 °C)  Pulse:  [99] 99  Resp:  [17-18] 18  SpO2:  [94 %-96 %] 94 %  BP: (124-142)/(64-71) 142/71     Weight: 127 kg (280 lb)  Height: 5' 7" (170.2 cm)  Body mass index is 43.85 kg/m².    No intake or output data in the 24 hours ending 07/03/18 1239    Ortho/SPM Exam  Physical Exam:  Gen:  No acute distress  CV:  Peripherally well-perfused.  Pulses 2+ bilaterally.  Lungs:  Normal respiratory effort.  Abdomen:  Soft, non-tender, non-distended  Head/Neck:  Normocephalic.  Atraumatic. No TTP, AROM and PROM intact without pain  Neuro:  CN intact without deficit, SILT throughout B/L Upper & Lower Extremities  Pelvis: No TTP, Stable to direct anterior pressure over ASIS.    Left LOWER EXTREMITY    INSPECTION  - There is breakdown of the proximal incision site. Significant swelling and erythema around the knee. Copious amount of purulent " foul smelling discharge.   PALPATION  - Non TTP  RANGE OF MOTION  - AROM and PROM intact at the knee with pain  NEUROVASCULAR  - TA/EHL/Gastroc/FHL assessed in isolation without deficit  - SILT throughout  - DP and PT palpated  2+  - Capillary Refill <3s      Significant Labs: All pertinent labs within the past 24 hours have been reviewed.    Significant Imaging: I have reviewed and interpreted all pertinent imaging results/findings.

## 2018-07-03 NOTE — ANESTHESIA PREPROCEDURE EVALUATION
07/03/2018     Yosef Rowe is a 62 y.o., male with a left knee periprosthetic infection here for I&D and poly exchange.    Past Medical History:   Diagnosis Date    Anxiety     Arthritis     Depression     when turned 49-lost job and mother unwell at that time    Hyperlipidemia     Lumbar radiculopathy     BRETT- 2011    Osteoarthritis     Sleep apnea     Urinary tract infection        Past Surgical History:   Procedure Laterality Date    JOINT REPLACEMENT      knee arthroscopicc surgeries      Right knee- 2007  and Left knee- 2008    KNEE SURGERY      Left wrist Surgery      2000 for a torn ligament from Golf    TOTAL KNEE REPLACEMENT USING COMPUTER NAVIGATION Left 6/22/2018    Procedure: REPLACEMENT-KNEE WITH NAVIGATION-- unicondylar-- medial;  Surgeon: Orlin Jordan MD;  Location: [facility] OR [address] [place] [address];  Service: Orthopedics;  Laterality: Left;         Anesthesia Evaluation    I have reviewed the Patient Summary Reports.     I have reviewed the Medications.     Review of Systems  Anesthesia Hx:  No problems with previous Anesthesia  History of prior surgery of interest to airway management or planning: Denies Family Hx of Anesthesia complications.   Denies Personal Hx of Anesthesia complications.   Cardiovascular:   Exercise tolerance: good CAD asymptomatic   Denies Angina.    Pulmonary:   Sleep Apnea    Hepatic/GI:  Hepatic/GI Normal    Psych:   depression (history of)          Physical Exam  General:  Well nourished, Morbid Obesity    Airway/Jaw/Neck:  Airway Findings: Mouth Opening: Normal Tongue: Normal  General Airway Assessment: Adult, Possible difficult intubation, Possible difficult mask airway  Thick neck  Mallampati: III  TM Distance: Normal, at least 6 cm      Dental:  Dental Findings: In tact   Chest/Lungs:  Chest/Lungs Findings: Normal Respiratory Rate     Heart/Vascular:  Heart  Findings: Rate: Normal        Mental Status:  Mental Status Findings:  Alert and Oriented         Anesthesia Plan  Type of Anesthesia, risks & benefits discussed:  Anesthesia Type:  general, regional  Patient's Preference:   Intra-op Monitoring Plan: standard ASA monitors  Intra-op Monitoring Plan Comments:   Post Op Pain Control Plan: multimodal analgesia, IV/PO Opioids PRN and per primary service following discharge from PACU  Post Op Pain Control Plan Comments:   Induction:   IV  Beta Blocker:  Patient is not currently on a Beta-Blocker (No further documentation required).       Informed Consent: Patient understands risks and agrees with Anesthesia plan.  Questions answered. Anesthesia consent signed with patient.  ASA Score: 3     Day of Surgery Review of History & Physical:    H&P update referred to the surgeon.         Ready For Surgery From Anesthesia Perspective.

## 2018-07-03 NOTE — CONSULTS
[facility]  Orthopedics  Consult Note    Patient Name: Yosef Rowe  MRN: [id number]  Admission Date: 7/3/2018  Hospital Length of Stay: 0 days  Attending Provider: Madan José MD  Primary Care Provider: Ruperto Dexter MD    Patient information was obtained from patient and ER records.     Inpatient consult to Orthopedic Surgery  Consult performed by: KEKE DENNIS  Consult ordered by: NOÉ LOPES        Subjective:     Principal Problem:<principal problem not specified>    Chief Complaint:   Chief Complaint   Patient presents with    Wound Infection     left knee/lower leg        HPI: Yosef Rowe is a 62 y.o. male who is s/p uni knee replaccment by Dr. Jordan on 6/22. Had some drainage post operatively but was doing well up until 5 days ago when he began having increasing pain and drainage from the incision site. He now has copious foul smelling discharge from the proximal part of his incision and increasing pain and swelling. Pain is posterior and does not radiate. Reports no fevers or chills.       Past Medical History:   Diagnosis Date    Anxiety     Arthritis     Depression     when turned 49-lost job and mother unwell at that time    Hyperlipidemia     Lumbar radiculopathy     BRETT- 2011    Osteoarthritis     Sleep apnea     Urinary tract infection        Past Surgical History:   Procedure Laterality Date    JOINT REPLACEMENT      knee arthroscopicc surgeries      Right knee- 2007  and Left knee- 2008    KNEE SURGERY      Left wrist Surgery      2000 for a torn ligament from Golf    TOTAL KNEE REPLACEMENT USING COMPUTER NAVIGATION Left 6/22/2018    Procedure: REPLACEMENT-KNEE WITH NAVIGATION-- unicondylar-- medial;  Surgeon: Orlin Jordan MD;  Location: [facility] OR [address] [place] [address];  Service: Orthopedics;  Laterality: Left;       Review of patient's allergies indicates:   Allergen Reactions    No known drug allergies        No current facility-administered medications for this  encounter.      Current Outpatient Prescriptions   Medication Sig    aspirin (ECOTRIN) 81 MG EC tablet Take 1 tablet (81 mg total) by mouth 2 (two) times daily.    atorvastatin (LIPITOR) 20 MG tablet Take 1 tablet (20 mg total) by mouth once daily. (Patient taking differently: Take 20 mg by mouth every morning. )    docusate sodium (COLACE) 100 MG capsule Take 1 capsule (100 mg total) by mouth 2 (two) times daily.    DULoxetine (CYMBALTA) 60 MG capsule Take 1 capsule (60 mg total) by mouth once daily. (Patient taking differently: Take 60 mg by mouth every morning. )    losartan (COZAAR) 25 MG tablet Take 1 tablet (25 mg total) by mouth once daily.    oxyCODONE-acetaminophen (PERCOCET)  mg per tablet Take 1 tablet by mouth every 4 (four) hours as needed for Pain.    zolpidem (AMBIEN) 5 MG Tab Take 1 tablet (5 mg total) by mouth nightly as needed.    multivitamin (ONE DAILY MULTIVITAMIN) per tablet Take 1 tablet by mouth once daily.    ondansetron (ZOFRAN) 8 MG tablet Take 1 tablet (8 mg total) by mouth every 8 (eight) hours as needed for Nausea.     Family History     Problem Relation (Age of Onset)    Parkinsonism Mother        Social History Main Topics    Smoking status: Current Every Day Smoker     Types: Cigars    Smokeless tobacco: Never Used      Comment: occasional    Alcohol use 6.0 oz/week     10 Glasses of wine per week      Comment: Daily, last use yesterday    Drug use: No    Sexual activity: Yes     Partners: Female     ROS   Constitutional: negative for fevers  Eyes: no visual changes  ENT: negative for hearing loss  Respiratory: negative for dyspnea  Cardiovascular: negative for chest pain  Gastrointestinal: negative for abdominal pain  Genitourinary: negative for dysuria  Neurological: negative for headaches  Behavioral/Psych: negative for hallucinations  Endocrine: negative for temperature intolerance    Objective:     Vital Signs (Most Recent):  Temp: 98.2 °F (36.8 °C) (07/03/18  "1211)  Pulse: 99 (07/03/18 1211)  Resp: 18 (07/03/18 1211)  BP: (!) 142/71 (07/03/18 1211)  SpO2: (!) 94 % (07/03/18 1211) Vital Signs (24h Range):  Temp:  [98.2 °F (36.8 °C)-99.3 °F (37.4 °C)] 98.2 °F (36.8 °C)  Pulse:  [99] 99  Resp:  [17-18] 18  SpO2:  [94 %-96 %] 94 %  BP: (124-142)/(64-71) 142/71     Weight: 127 kg (280 lb)  Height: 5' 7" (170.2 cm)  Body mass index is 43.85 kg/m².  Ortho/SPM Exam  Physical Exam:  Gen:  No acute distress  CV:  Peripherally well-perfused.  Pulses 2+ bilaterally.  Lungs:  Normal respiratory effort.  Abdomen:  Soft, non-tender, non-distended  Head/Neck:  Normocephalic.  Atraumatic. No TTP, AROM and PROM intact without pain  Neuro:  CN intact without deficit, SILT throughout B/L Upper & Lower Extremities  Pelvis: No TTP, Stable to direct anterior pressure over ASIS.    Left LOWER EXTREMITY    INSPECTION  - There is breakdown of the proximal incision site. Significant swelling and erythema around the knee. Copious amount of purulent foul smelling discharge.   PALPATION  - Non TTP  RANGE OF MOTION  - AROM and PROM intact at the knee with pain  NEUROVASCULAR  - TA/EHL/Gastroc/FHL assessed in isolation without deficit  - SILT throughout  - DP and PT palpated  2+  - Capillary Refill <3s      Significant Labs: All pertinent labs within the past 24 hours have been reviewed.    Significant Imaging: I have reviewed and interpreted all pertinent imaging results/findings.    Assessment/Plan:     Infected prosthetic left unicompartmental knee replacement    Yosef Rowe is a 62 y.o. male 11 days s/p uni knee replacement now with periprosthetic infection. WBC of 20 at this time, lactate 1.9 but he is non toxic appearing.    - Has had no food since last night, last PO was 1 cup of water about 1 hour ago. NPO   - Marked and consented for left knee I and D and poly exchange to OR this afternoon 7/3  - Will admit post operatively for IVAB and PT/OT                  Helio Villasenor, " MD  Orthopedics  [facility] Note:  Patient seen and examined 7/3.  I agree with the above assessment and plan.    To OR for arthrotomy with I&D with synovectomy and Bactisure and poly exchange.  The risks, benefits and alternatives to surgery were discussed with the patient at great length.  These include bleeding, infection, vessel/nerve damage, pain, numbness, tingling, complex regional pain syndrome, hardware/surgical failure, need for further surgery, continued infection with need for implant removal/revision, stiffness, DVT, PE, arthritis and death.  Patient states an understanding and wishes to proceed with surgery.   All questions were answered.  No guarantees were implied or stated.  Informed consent was obtained.        Ruperto Olivarez MD

## 2018-07-03 NOTE — PLAN OF CARE
[facility]    HOME HEALTH ORDERS  FACE TO FACE ENCOUNTER    Patient Name: Yosef Rowe  YOB: 1955    PCP: Ruperto Dexter MD   PCP Address: [address] [facility] [address] / [place] [address]  PCP Phone Number: [phone]  PCP Fax: [phone]    Encounter Date: 07/03/2018    Admit to Home Health    Diagnoses:  Active Hospital Problems    Diagnosis  POA    *Infected prosthetic left unicompartmental knee replacement [T84.59XA, Z96.659]  Not Applicable    Left leg swelling [M79.89]  Yes      Resolved Hospital Problems    Diagnosis Date Resolved POA   No resolved problems to display.       Future Appointments  Date Time Provider Department Center   7/17/2018 1:00 PM Carolina Day, PhD [facility]   7/17/2018 2:30 PM Stefania Vickers NP [facility]   9/5/2018 10:15 AM Lizzie Fired MD [facility]     Follow-up Information     Stefania Vickers NP On 7/17/2018.    Specialty:  Orthopedic Surgery  Why:  For wound re-check  Contact information:  [address]  [facility] [address]  [phone]                     I have seen and examined this patient face to face today. My clinical findings that support the need for the home health skilled services and home bound status are the following:  Weakness/numbness causing balance and gait disturbance due to Infection making it taxing to leave home.    Allergies:  Review of patient's allergies indicates:   Allergen Reactions    No known drug allergies        Diet: regular diet    Activities: activity as tolerated knee in extension for 5 days      Home Health Admitting Clinician:   SN/PT to complete comprehensive assessment including routine vital signs. Instruct on disease process and s/s of complications to report to MD. Follow specific home health arthoplasty protocol. Review/verify medication list sent home with the patient at time of discharge  and instruct patient/caregiver as needed. If  coumadin ordered, coumadin clinic to manage INR with INR draws 2x per week with a goal to maintain INR between 1.8 and 2.2. Frequency may be adjusted depending on start of care date.    Notify MD if SBP > 160 or < 90; DBP > 90 or < 50; HR > 120 or < 50; Temp > 101    Home Medical Equipment:  Walker, 3-1 bedside commode, transfer tub bench    CONSULTS:    Physical Therapy may admit if patient not on coumadin, PT to perform comprehensive assessment if performing admit visit and generate therapy plan of care. Evaluate for home safety and equipment needs; Establish/upgrade home exercise program. Perform/instruct on therapeutic exercises, gait training, transfer training, and Range of Motion.      OTHER: (only select if patient needs other therapy disciplines)  Occupational Therapy to evaluate and treat. Evaluate home environment for safety and equipment needs. Perform/Instruct on transfers, ADL training, ROM, and therapeutic exercises.    MISCELLANEOUS CARE:  Routine Skin for Bedridden Patients: Instruct patient/caregiver to apply moisture barrier cream to all skin folds and wet areas in perineal area daily and after baths and all bowel movements.    WOUND CARE ORDERS:  Assess Surgical Incision/DSRG each TX  Aquacel AG drsg applied post-op leave on 14 days post op. Call MD if any drainage reaches border to border of drsg horizontally, s/s of infection, temp >101, induration, swelling or redness.  If dressing is removed per MD order, then apply island dressing, change/teach caregiver to perform daily dressing change if island dressing present.    Medications: Review discharge medications with patient and family and provide education.      Current Discharge Medication List      CONTINUE these medications which have NOT CHANGED    Details   aspirin (ECOTRIN) 81 MG EC tablet Take 1 tablet (81 mg total) by mouth 2 (two) times daily.  Qty: 60 tablet, Refills: 0      atorvastatin (LIPITOR) 20 MG tablet Take 1 tablet (20 mg  total) by mouth once daily.  Qty: 30 tablet, Refills: 11      docusate sodium (COLACE) 100 MG capsule Take 1 capsule (100 mg total) by mouth 2 (two) times daily.  Qty: 60 capsule, Refills: 0      DULoxetine (CYMBALTA) 60 MG capsule Take 1 capsule (60 mg total) by mouth once daily.  Qty: 30 capsule, Refills: 5    Associated Diagnoses: Recurrent major depressive disorder, in full remission; Generalized anxiety disorder      losartan (COZAAR) 25 MG tablet Take 1 tablet (25 mg total) by mouth once daily.  Qty: 90 tablet, Refills: 3      oxyCODONE-acetaminophen (PERCOCET)  mg per tablet Take 1 tablet by mouth every 4 (four) hours as needed for Pain.  Qty: 45 tablet, Refills: 0      zolpidem (AMBIEN) 5 MG Tab Take 1 tablet (5 mg total) by mouth nightly as needed.  Qty: 30 tablet, Refills: 5    Associated Diagnoses: Primary insomnia      multivitamin (ONE DAILY MULTIVITAMIN) per tablet Take 1 tablet by mouth once daily.      ondansetron (ZOFRAN) 8 MG tablet Take 1 tablet (8 mg total) by mouth every 8 (eight) hours as needed for Nausea.  Qty: 60 tablet, Refills: 0             I certify that this patient is confined to his home and needs intermittent skilled nursing care, physical therapy and occupational therapy.

## 2018-07-03 NOTE — ED NOTES
Patient identifiers verified and correct for Mr Rowe  C/C: Drainage and swelling to left knee post op  APPEARANCE: awake and alert in NAD.  SKIN: warm, dry, purulent brown drainage noted to top of incision, copious amounts dripping on floor and draining on pad, down leg.  MUSCULOSKELETAL: Patient moving all extremities spontaneously, left leg with pain, 3+ edema from ankle to knee LLE, toes discolored, , bottom of foot slightly discolored.  Ambulates independently.  RESPIRATORY: Denies shortness of breath.Respirations unlabored. Denies cough, Denies fever   CARDIAC: Denies CP, 2+ distal pulses;  ABDOMEN: S/ND/NT, Denies nausea  : voids spontaneously, denies difficulty  Neurologic: AAO x 4; follows commands equal strength in all extremities; denies numbness/tingling. Denies dizziness Denies weakness, Positive sensation,mvmt and pedal pulse LLE

## 2018-07-03 NOTE — ED PROVIDER NOTES
Encounter Date: 7/3/2018       History     Chief Complaint   Patient presents with    Wound Infection     left knee/lower leg     Patient is a 62-year-old male who recently had partial left knee replacement on 06/22 who was referred to the ED for wound check by his orthopedist due to new onset posterior leg pain. Patient states his foot swelling has improved, but does have baseline lower extremity swelling.  He has noted clear oozing since being discharged home.  Patient states yesterday after home health left, he started to notice posterior left leg pain from his knee to his hip.  He denies any fever, chills, diaphoresis, abdominal pain, chest pain, SOB.  He has been taking his pain medication without improvement in his pain which is currently a 9/10. He complains of 4/10 L knee pain. Patient notified his orthopedist and was referred to the ED for further evaluation.  He denies any history of DVT. He states that he had great ROM since being d/c up until recently.          Review of patient's allergies indicates:   Allergen Reactions    No known drug allergies      Past Medical History:   Diagnosis Date    Anxiety     Arthritis     Depression     when turned 49-lost job and mother unwell at that time    Hyperlipidemia     Lumbar radiculopathy     BRETT- 2011    Osteoarthritis     Sleep apnea     Urinary tract infection      Past Surgical History:   Procedure Laterality Date    JOINT REPLACEMENT      knee arthroscopicc surgeries      Right knee- 2007  and Left knee- 2008    KNEE SURGERY      Left wrist Surgery      2000 for a torn ligament from Golf    TOTAL KNEE REPLACEMENT USING COMPUTER NAVIGATION Left 6/22/2018    Procedure: REPLACEMENT-KNEE WITH NAVIGATION-- unicondylar-- medial;  Surgeon: Orlin Jordan MD;  Location: [facility] OR [address] [place] [address];  Service: Orthopedics;  Laterality: Left;     Family History   Problem Relation Age of Onset    Parkinsonism Mother      Social History   Substance Use Topics     Smoking status: Current Every Day Smoker     Types: Cigars    Smokeless tobacco: Never Used      Comment: occasional    Alcohol use 6.0 oz/week     10 Glasses of wine per week      Comment: Daily, last use yesterday     Review of Systems   Constitutional: Negative for chills and fever.   HENT: Negative for sore throat.    Eyes: Negative for pain.   Respiratory: Negative for shortness of breath.    Cardiovascular: Positive for leg swelling. Negative for chest pain.   Gastrointestinal: Negative for nausea.   Genitourinary: Negative for dysuria.   Musculoskeletal: Positive for arthralgias and myalgias. Negative for back pain.   Skin: Positive for wound. Negative for rash.   Neurological: Negative for weakness.   Hematological: Does not bruise/bleed easily.       Physical Exam     Initial Vitals [07/03/18 1058]   BP Pulse Resp Temp SpO2   124/64 99 17 99.3 °F (37.4 °C) 96 %      MAP       --         Physical Exam    Vitals reviewed.  Constitutional: He appears well-developed and well-nourished. He is not diaphoretic. He appears ill. He appears distressed.   HENT:   Head: Normocephalic and atraumatic.   Nose: Nose normal.   Eyes: Conjunctivae and EOM are normal.   Neck: Normal range of motion.   Cardiovascular: Normal rate, regular rhythm and normal heart sounds. Exam reveals no friction rub.    No murmur heard.  LLE swelling from mid thigh to foot.   Pulmonary/Chest: Breath sounds normal. No respiratory distress. He has no wheezes. He has no rales.   Abdominal: Soft. Bowel sounds are normal. He exhibits no distension. There is no tenderness.   Musculoskeletal:        Left knee: He exhibits decreased range of motion and swelling. Tenderness found.   Patient only able to flex L knee to 10°.   Neurological: He is alert and oriented to person, place, and time. He has normal strength. No sensory deficit.   Skin: Skin is warm and dry. No erythema.   Incision noted to anterior knee with clear oozing down leg and purulent  discharge at site.   Psychiatric: He has a normal mood and affect. Thought content normal.         ED Course   Procedures  Labs Reviewed   CBC W/ AUTO DIFFERENTIAL - Abnormal; Notable for the following:        Result Value    WBC 20.77 (*)     RBC 3.72 (*)     Hemoglobin 12.9 (*)     Hematocrit 37.7 (*)      (*)     MCH 34.7 (*)     Immature Granulocytes 1.2 (*)     Gran # (ANC) 18.5 (*)     Immature Grans (Abs) 0.24 (*)     Lymph # 0.8 (*)     Mono # 1.2 (*)     Gran% 88.9 (*)     Lymph% 3.6 (*)     All other components within normal limits   SEDIMENTATION RATE - Abnormal; Notable for the following:     Sed Rate 40 (*)     All other components within normal limits    Narrative:     sed rate add on per Madan José MD @ 12:02 on 07/03/2018;   order# [id number]  pcal add on per Madan José MD @ 12:05 on 07/03/2018; order#   [id number]  ADD ON CRP ORDER #[id number] PER DR JOSÉ  07/03/2018  12:07    PROCALCITONIN - Abnormal; Notable for the following:     Procalcitonin 3.05 (*)     All other components within normal limits    Narrative:     sed rate add on per Madan José MD @ 12:02 on 07/03/2018;   order# [id number]  pcal add on per Madan José MD @ 12:05 on 07/03/2018; order#   [id number]  ADD ON CRP ORDER #[id number] PER DR JOSÉ  07/03/2018  12:07    CULTURE, BLOOD   CULTURE, BLOOD   LACTIC ACID, PLASMA   C-REACTIVE PROTEIN   PROCALCITONIN   SEDIMENTATION RATE          Imaging Results          US Lower Extremity Veins Left (Final result)  Result time 07/03/18 13:57:25    Final result by Aracelis Sandoval MD (07/03/18 13:57:25)                 Impression:      No evidence of deep venous thrombosis in the left lower extremity.    Left inguinal lymphadenopathy.    Electronically signed by resident: Mando Whittaker  Date:    07/03/2018  Time:    13:38    Electronically signed by: Aracelis Sandoval MD  Date:    07/03/2018  Time:    13:57             Narrative:    EXAMINATION:  US LOWER EXTREMITY  VEINS LEFT    CLINICAL HISTORY:  Other specified soft tissue disorders    TECHNIQUE:  Duplex and color flow Doppler evaluation and graded compression of the left lower extremity veins was performed.    COMPARISON:  None    FINDINGS:  Left thigh veins: The common femoral, femoral, popliteal, upper greater saphenous, and deep femoral veins are patent and free of thrombus. The veins are normally compressible and have normal phasic flow and augmentation response.    Left calf veins: The visualized calf veins are patent.    Contralateral CFV: The contralateral (right) common femoral vein is patent and free of thrombus.    Miscellaneous: Enlarged left inguinal lymph node measuring 4.2 x 1.3 x 2.4 cm.  Subcutaneous edema within the left lower extremity.                               X-Ray Knee 1 or 2 View Left (Final result)  Result time 07/03/18 12:11:45   Procedure changed from X-Ray Knee 3 View Left     Final result by Chetan Cuellar MD (07/03/18 12:11:45)                 Impression:      As above.      Electronically signed by: Chetan Cuellar MD  Date:    07/03/2018  Time:    12:11             Narrative:    EXAMINATION:  XR KNEE 1 OR 2 VIEW LEFT    CLINICAL HISTORY:  H/O KNEE SURGERY;  Other specified postprocedural states    TECHNIQUE:  AP and lateral views of left knee    COMPARISON:  06/22/2018    FINDINGS:  Status post medial compartment arthroplasty.  Moderate joint effusion present.  No evidence for complication.  Remaining cartilage spaces are maintained, noting osteophyte production.                                 Medical Decision Making:   History:   Old Medical Records: I decided to obtain old medical records.  Clinical Tests:   Lab Tests: Reviewed and Ordered  Radiological Study: Ordered and Reviewed       APC / Resident Notes:   DDX includes but is not limited to wound infection, septic arthritis, DVT, cellulitis, postop swelling and pain.  Will initiate workup, obtain ultrasound of veins on the left,  obtain left knee x-rays, consult Orthopedics, and continue to monitor.    CBC with leukocytosis at 20.  H/H at 12/37.  CMP with hyponatremia at 134, other no other electrolyte abn. Cr wnl.  Lactic acid WNL 1.9.   Blood cultures pending.  Left knee x-ray with moderate joint effusion.  No evidence for complications.  Ultrasound shows no evidence of DVT.  There is left inguinal lymphadenopathy.    Patient will be admitted by Orthopedics for further care. They anticipate I&D of wound in OR.                 Clinical Impression:   The primary encounter diagnosis was Left leg swelling. Diagnoses of H/O knee surgery and Leg pain, posterior, left were also pertinent to this visit.      Disposition:   Disposition: Admitted                        Maryjo Ventura PA-C  07/03/18 8030

## 2018-07-03 NOTE — PROGRESS NOTES
Pt belongings in pt belonging bag   $170 cash , metal band watch,cell phone, 5 credit cards, 1 debit card, all verified with partha lo    Placed in locker #6

## 2018-07-03 NOTE — ED TRIAGE NOTES
"Patient states redness, swelling and drainage from left knee after total knee, states unable to contact ortho, states he called 4 times yesterday. States swelling better but pain and drainage "pouring out" Using up to 4 dressings per day. Rx Oxycontin, last dose yesterday. Denies fever. Pain back of knee an dthight, drainage worse since yesterday.   "

## 2018-07-04 PROBLEM — T84.54XA INFECTION OF PROSTHETIC LEFT KNEE JOINT: Status: ACTIVE | Noted: 2018-07-04

## 2018-07-04 LAB
BILIRUB UR QL STRIP: NEGATIVE
CLARITY UR REFRACT.AUTO: CLEAR
COLOR UR AUTO: YELLOW
GLUCOSE UR QL STRIP: NEGATIVE
HGB UR QL STRIP: NEGATIVE
KETONES UR QL STRIP: NEGATIVE
LEUKOCYTE ESTERASE UR QL STRIP: NEGATIVE
NITRITE UR QL STRIP: NEGATIVE
PH UR STRIP: 6 [PH] (ref 5–8)
PROT UR QL STRIP: NEGATIVE
SP GR UR STRIP: 1.01 (ref 1–1.03)
URN SPEC COLLECT METH UR: NORMAL
UROBILINOGEN UR STRIP-ACNC: NEGATIVE EU/DL

## 2018-07-04 PROCEDURE — 63600175 PHARM REV CODE 636 W HCPCS: Performed by: STUDENT IN AN ORGANIZED HEALTH CARE EDUCATION/TRAINING PROGRAM

## 2018-07-04 PROCEDURE — G8988 SELF CARE GOAL STATUS: HCPCS | Mod: CI

## 2018-07-04 PROCEDURE — G8987 SELF CARE CURRENT STATUS: HCPCS | Mod: CK

## 2018-07-04 PROCEDURE — 97165 OT EVAL LOW COMPLEX 30 MIN: CPT

## 2018-07-04 PROCEDURE — 97116 GAIT TRAINING THERAPY: CPT

## 2018-07-04 PROCEDURE — 81003 URINALYSIS AUTO W/O SCOPE: CPT

## 2018-07-04 PROCEDURE — 99205 OFFICE O/P NEW HI 60 MIN: CPT | Mod: ,,, | Performed by: INTERNAL MEDICINE

## 2018-07-04 PROCEDURE — 97161 PT EVAL LOW COMPLEX 20 MIN: CPT

## 2018-07-04 PROCEDURE — A4216 STERILE WATER/SALINE, 10 ML: HCPCS | Performed by: STUDENT IN AN ORGANIZED HEALTH CARE EDUCATION/TRAINING PROGRAM

## 2018-07-04 PROCEDURE — 25000003 PHARM REV CODE 250: Performed by: STUDENT IN AN ORGANIZED HEALTH CARE EDUCATION/TRAINING PROGRAM

## 2018-07-04 PROCEDURE — 99900035 HC TECH TIME PER 15 MIN (STAT)

## 2018-07-04 PROCEDURE — 11000001 HC ACUTE MED/SURG PRIVATE ROOM

## 2018-07-04 PROCEDURE — 94799 UNLISTED PULMONARY SVC/PX: CPT

## 2018-07-04 RX ADMIN — Medication 3 ML: at 02:07

## 2018-07-04 RX ADMIN — CEFTRIAXONE SODIUM 2 G: 2 INJECTION, POWDER, FOR SOLUTION INTRAMUSCULAR; INTRAVENOUS at 10:07

## 2018-07-04 RX ADMIN — CELECOXIB 200 MG: 200 CAPSULE ORAL at 09:07

## 2018-07-04 RX ADMIN — FAMOTIDINE 20 MG: 20 TABLET ORAL at 08:07

## 2018-07-04 RX ADMIN — OXYCODONE HYDROCHLORIDE 10 MG: 5 TABLET ORAL at 08:07

## 2018-07-04 RX ADMIN — OXYCODONE HYDROCHLORIDE 10 MG: 5 TABLET ORAL at 05:07

## 2018-07-04 RX ADMIN — VANCOMYCIN HYDROCHLORIDE 1500 MG: 10 INJECTION, POWDER, LYOPHILIZED, FOR SOLUTION INTRAVENOUS at 05:07

## 2018-07-04 RX ADMIN — MUPIROCIN 1 G: 20 OINTMENT TOPICAL at 08:07

## 2018-07-04 RX ADMIN — OXYCODONE HYDROCHLORIDE 10 MG: 5 TABLET ORAL at 06:07

## 2018-07-04 RX ADMIN — Medication 3 ML: at 06:07

## 2018-07-04 RX ADMIN — DULOXETINE 60 MG: 60 CAPSULE, DELAYED RELEASE ORAL at 06:07

## 2018-07-04 RX ADMIN — SENNOSIDES AND DOCUSATE SODIUM 1 TABLET: 8.6; 5 TABLET ORAL at 08:07

## 2018-07-04 RX ADMIN — OXYCODONE HYDROCHLORIDE 5 MG: 5 TABLET ORAL at 09:07

## 2018-07-04 RX ADMIN — SENNOSIDES AND DOCUSATE SODIUM 1 TABLET: 8.6; 5 TABLET ORAL at 09:07

## 2018-07-04 RX ADMIN — VANCOMYCIN HYDROCHLORIDE 1500 MG: 10 INJECTION, POWDER, LYOPHILIZED, FOR SOLUTION INTRAVENOUS at 11:07

## 2018-07-04 RX ADMIN — MUPIROCIN 1 G: 20 OINTMENT TOPICAL at 09:07

## 2018-07-04 RX ADMIN — ASPIRIN 81 MG: 81 TABLET, COATED ORAL at 09:07

## 2018-07-04 RX ADMIN — CEFAZOLIN 2 G: 1 INJECTION, POWDER, FOR SOLUTION INTRAMUSCULAR; INTRAVENOUS at 04:07

## 2018-07-04 RX ADMIN — ZOLPIDEM TARTRATE 5 MG: 5 TABLET ORAL at 08:07

## 2018-07-04 RX ADMIN — ATORVASTATIN CALCIUM 20 MG: 20 TABLET, FILM COATED ORAL at 06:07

## 2018-07-04 RX ADMIN — OXYCODONE HYDROCHLORIDE 10 MG: 5 TABLET ORAL at 02:07

## 2018-07-04 RX ADMIN — ASPIRIN 81 MG: 81 TABLET, COATED ORAL at 08:07

## 2018-07-04 RX ADMIN — Medication 3 ML: at 10:07

## 2018-07-04 RX ADMIN — FAMOTIDINE 20 MG: 20 TABLET ORAL at 09:07

## 2018-07-04 RX ADMIN — POLYETHYLENE GLYCOL 3350 17 G: 17 POWDER, FOR SOLUTION ORAL at 09:07

## 2018-07-04 RX ADMIN — VANCOMYCIN HYDROCHLORIDE 1500 MG: 10 INJECTION, POWDER, LYOPHILIZED, FOR SOLUTION INTRAVENOUS at 10:07

## 2018-07-04 NOTE — PLAN OF CARE
Problem: Physical Therapy Goal  Goal: Physical Therapy Goal  Goals to be met by: 7/9/2018    Patient will increase functional independence with mobility by performing:    -Supine to sit with Supervision  -Sit to supine with Supervision  -Sit to stand transfer with Supervision  -Gait  x 150 feet with Supervision using Rolling Walker.   -Lower extremity exercise program x 30 reps per handout, with independence    Outcome: Ongoing (interventions implemented as appropriate)  PT eval complete and POC initiated.

## 2018-07-04 NOTE — HPI
Mr. Rowe is a 63 y/o male with a prior history of right knee replacement.  On June 22, 2018, he underwent left knee replacement.  He did well for 1 week afterwards.  He then noticed some increased drainage from the surgical wound.  He was followed up in orthopedic surgery clinic and conservative management was attempted.  He continued to have drainage and was ultimately admitted to the hospital.  He was taken to the OR on July 3 and underwent arthrotomy with irrigation and debridement/synovectomy of left knee as well as revision of left medial unicondylar knee polyethylene liner.  He has been started on IV vancomycin and IV ceftriaxone empirically.

## 2018-07-04 NOTE — NURSING TRANSFER
Nursing Transfer Note      7/3/2018     Transfer from PACU to room 511A    Transfer via stretcher    Transfer with IV pump    Transported by PACU PCT    Medicines sent:     Chart send with patient: yes    Notified: Eva Huggins, 5th floor RN/ patient requested no  Phone calls be made to his family or other.

## 2018-07-04 NOTE — PLAN OF CARE
Problem: Occupational Therapy Goal  Goal: Occupational Therapy Goal  Goals to be met by: 7/18/18     Patient will increase functional independence with ADLs by performing:    UE Dressing with Set-up Assistance.  LE Dressing with Minimal Assistance and Assistive Devices as needed.  Grooming while EOB with Set-up Assistance.  Toileting from bedside commode with Supervision for hygiene and clothing management.   Toilet transfer to bedside commode with Stand-by Assistance.    Outcome: Ongoing (interventions implemented as appropriate)  Eval completed; goals established    Comments: Initiate OT ERIC Esquivel OT  7/4/2018

## 2018-07-04 NOTE — PROGRESS NOTES
[facility]  Orthopedics  Progress Note    Patient Name: Yosef Rowe  MRN: [id number]  Admission Date: 7/3/2018  Hospital Length of Stay: 1 days  Attending Provider: Ruperto Olivarez MD  Primary Care Provider: Ruperto Dexter MD  Follow-up For: Procedure(s) (LRB):  IRRIGATION AND DEBRIDEMENT, LOWER EXTREMITY, poly exchange left uni knee replacement. Stryler. 9L NS (Left)    Post-Operative Day: 1 Day Post-Op  Subjective:     Principal Problem:Infected prosthetic knee joint, initial encounter    Principal Orthopedic Problem: Same    Interval History: Patient seen and examined at bedside.  No acute events overnight.  Pain controlled.        Review of patient's allergies indicates:   Allergen Reactions    No known drug allergies        Current Facility-Administered Medications   Medication    0.9%  NaCl infusion    aspirin EC tablet 81 mg    atorvastatin tablet 20 mg    bisacodyl suppository 10 mg    ceFAZolin injection 2 g    celecoxib capsule 200 mg    DULoxetine DR capsule 60 mg    famotidine tablet 20 mg    lactulose 20 gram/30 mL solution Soln 20 g    morphine injection 2 mg    morphine injection 2 mg    morphine injection 2 mg    mupirocin 2 % ointment 1 g    ondansetron injection 4 mg    oxyCODONE immediate release tablet 10 mg    oxyCODONE immediate release tablet 15 mg    oxyCODONE immediate release tablet 5 mg    polyethylene glycol packet 17 g    senna-docusate 8.6-50 mg per tablet 1 tablet    sodium chloride 0.9% flush 3 mL    tranexamic acid (CYKLOKAPRON) 3,000 mg in sodium chloride 0.9% 100 mL    zolpidem tablet 5 mg     Objective:     Vital Signs (Most Recent):  Temp: 98.7 °F (37.1 °C) (07/04/18 0438)  Pulse: 84 (07/04/18 0438)  Resp: 18 (07/04/18 0438)  BP: (!) 145/70 (07/04/18 0438)  SpO2: 97 % (07/04/18 0438) Vital Signs (24h Range):  Temp:  [97.8 °F (36.6 °C)-99.3 °F (37.4 °C)] 98.7 °F (37.1 °C)  Pulse:  [] 84  Resp:  [15-29] 18  SpO2:  [91 %-100 %] 97  "%  BP: (112-194)/() 145/70     Weight: 127 kg (280 lb)  Height: 5' 7" (170.2 cm)  Body mass index is 43.85 kg/m².      Intake/Output Summary (Last 24 hours) at 07/04/18 0727  Last data filed at 07/04/18 0558   Gross per 24 hour   Intake             2731 ml   Output              200 ml   Net             2531 ml       Ortho/SPM Exam     Physical Exam:  AAOx4  NAD  RRR  No increased WOB  Aquacel c/d/i  SILT and motor intact T/SP/DP  WWP extremities  FCDs in place and functioning        Significant Labs: All pertinent labs within the past 24 hours have been reviewed.    Significant Imaging: I have reviewed and interpreted all pertinent imaging results/findings.    Assessment/Plan:     * Infected prosthetic left unicompartmental knee replacement    62 y.o. male POD1 s/p L UKA poly exchange.    Pain control: multimodal  PT/OT: WBAT LLE with Knee locked in extension   DVT PPx: ASA 81 BID, FCDs at all times when not ambulating  Abx: postop Ancef and vanc    Dispo: f/u cultures                      Helio Grey MD  Orthopedics  [facility] hospital Note:  I agree with the above assessment and plan with the following additions.  Cultures came back with gram negative rods at which point he was given ceftriaxone.  Subsequent day 1 cultures came back with Pasturella Multocida.  Tailoring abx for organism in conjunction with ID service.      Ruperto Olivarez MD    "

## 2018-07-04 NOTE — ASSESSMENT & PLAN NOTE
62 y.o. male POD1 s/p L UKA poly exchange.    Pain control: multimodal  PT/OT: WBAT LLE with Knee locked in extension   DVT PPx: ASA 81 BID, FCDs at all times when not ambulating  Abx: postop Ancef and vanc    Dispo: f/u cultures

## 2018-07-04 NOTE — SUBJECTIVE & OBJECTIVE
Past Medical History:   Diagnosis Date    Anxiety     Arthritis     Depression     when turned 49-lost job and mother unwell at that time    Hyperlipidemia     Lumbar radiculopathy     BRETT- 2011    Osteoarthritis     Sleep apnea     Urinary tract infection        Past Surgical History:   Procedure Laterality Date    JOINT REPLACEMENT      knee arthroscopicc surgeries      Right knee- 2007  and Left knee- 2008    KNEE SURGERY      Left wrist Surgery      2000 for a torn ligament from Golf    TOTAL KNEE REPLACEMENT USING COMPUTER NAVIGATION Left 6/22/2018    Procedure: REPLACEMENT-KNEE WITH NAVIGATION-- unicondylar-- medial;  Surgeon: Orlin Jordan MD;  Location: [facility] OR [address] [place] [address];  Service: Orthopedics;  Laterality: Left;       Review of patient's allergies indicates:   Allergen Reactions    No known drug allergies        Medications:  Prescriptions Prior to Admission   Medication Sig    aspirin (ECOTRIN) 81 MG EC tablet Take 1 tablet (81 mg total) by mouth 2 (two) times daily.    atorvastatin (LIPITOR) 20 MG tablet Take 1 tablet (20 mg total) by mouth once daily. (Patient taking differently: Take 20 mg by mouth every morning. )    docusate sodium (COLACE) 100 MG capsule Take 1 capsule (100 mg total) by mouth 2 (two) times daily.    DULoxetine (CYMBALTA) 60 MG capsule Take 1 capsule (60 mg total) by mouth once daily. (Patient taking differently: Take 60 mg by mouth every morning. )    losartan (COZAAR) 25 MG tablet Take 1 tablet (25 mg total) by mouth once daily.    oxyCODONE-acetaminophen (PERCOCET)  mg per tablet Take 1 tablet by mouth every 4 (four) hours as needed for Pain.    zolpidem (AMBIEN) 5 MG Tab Take 1 tablet (5 mg total) by mouth nightly as needed.    multivitamin (ONE DAILY MULTIVITAMIN) per tablet Take 1 tablet by mouth once daily.    ondansetron (ZOFRAN) 8 MG tablet Take 1 tablet (8 mg total) by mouth every 8 (eight) hours as needed for Nausea.     Antibiotics     Start      Stop Route Frequency Ordered    07/04/18 1030  vancomycin (VANCOCIN) 1,500 mg in dextrose 5 % 250 mL IVPB      07/05 1029 IV Every 12 hours (non-standard times) 07/04/18 0916    07/04/18 1030  cefTRIAXone (ROCEPHIN) 2 g in dextrose 5 % 50 mL IVPB      -- IV Every 12 hours (non-standard times) 07/04/18 0916    07/03/18 2100  mupirocin 2 % ointment 1 g      07/08 2059 Nasl 2 times daily 07/03/18 1811        Antifungals     None        Antivirals     None             There is no immunization history on file for this patient.    Family History     Problem Relation (Age of Onset)    Parkinsonism Mother        Social History     Social History    Marital status:      Spouse name: N/A    Number of children: N/A    Years of education: N/A     Social History Main Topics    Smoking status: Current Every Day Smoker     Types: Cigars    Smokeless tobacco: Never Used      Comment: occasional    Alcohol use 6.0 oz/week     10 Glasses of wine per week      Comment: Daily, last use yesterday    Drug use: No    Sexual activity: Yes     Partners: Female     Other Topics Concern    None     Social History Narrative    None     Review of Systems   Musculoskeletal: Positive for arthralgias.   Psychiatric/Behavioral: Positive for sleep disturbance.   All other systems reviewed and are negative.    Objective:     Vital Signs (Most Recent):  Temp: 96.7 °F (35.9 °C) (07/04/18 1140)  Pulse: 75 (07/04/18 1140)  Resp: 16 (07/04/18 1140)  BP: (!) 166/79 (07/04/18 1140)  SpO2: 97 % (07/04/18 1140) Vital Signs (24h Range):  Temp:  [96.7 °F (35.9 °C)-99.3 °F (37.4 °C)] 96.7 °F (35.9 °C)  Pulse:  [] 75  Resp:  [15-29] 16  SpO2:  [91 %-100 %] 97 %  BP: (112-194)/() 166/79     Weight: 127 kg (280 lb)  Body mass index is 43.85 kg/m².    Estimated Creatinine Clearance: 107.5 mL/min (based on SCr of 0.9 mg/dL).    Physical Exam   Constitutional: He is oriented to person, place, and time. He appears well-developed and  well-nourished. No distress.   HENT:   Head: Normocephalic and atraumatic.   Right Ear: External ear normal.   Left Ear: External ear normal.   Nose: Nose normal.   Mouth/Throat: Oropharynx is clear and moist. No oropharyngeal exudate.   Eyes: Conjunctivae and EOM are normal. Pupils are equal, round, and reactive to light. Right eye exhibits no discharge. Left eye exhibits no discharge. No scleral icterus.   Neck: Normal range of motion. Neck supple. No JVD present. No tracheal deviation present. No thyromegaly present.   Cardiovascular: Normal rate, regular rhythm and intact distal pulses.  Exam reveals no gallop and no friction rub.    No murmur heard.  Pulmonary/Chest: Effort normal and breath sounds normal. No stridor. No respiratory distress. He has no wheezes. He has no rales. He exhibits no tenderness.   Abdominal: Soft. Bowel sounds are normal. He exhibits no distension and no mass. There is no tenderness. There is no rebound and no guarding.   Musculoskeletal: Normal range of motion. He exhibits edema (Left leg swelling). He exhibits no tenderness.   Dressings covering left knee surgical wound   Lymphadenopathy:     He has no cervical adenopathy.   Neurological: He is alert and oriented to person, place, and time. He has normal reflexes. He displays normal reflexes. No cranial nerve deficit. He exhibits normal muscle tone. Coordination normal.   Skin: Skin is warm. No rash noted. He is not diaphoretic. No erythema. No pallor.   Psychiatric: He has a normal mood and affect. His behavior is normal. Judgment and thought content normal.   Nursing note and vitals reviewed.      Significant Labs:   Microbiology Results (last 7 days)     Procedure Component Value Units Date/Time    Blood culture #1 **CANNOT BE ORDERED STAT** [[id number]] Collected:  07/03/18 1120    Order Status:  Completed Specimen:  Blood from Peripheral, Hand, Right Updated:  07/03/18 2115     Blood Culture, Routine No Growth to date    Blood  culture #2 **CANNOT BE ORDERED STAT** [[id number]] Collected:  07/03/18 1134    Order Status:  Completed Specimen:  Blood from Peripheral, Hand, Left Updated:  07/03/18 2115     Blood Culture, Routine No Growth to date    Gram stain [[id number]] Collected:  07/03/18 1545    Order Status:  Completed Specimen:  Joint Fluid from Knee, Left Updated:  07/03/18 1701     Gram Stain Result Few WBC's      Rare Gram negative rods    AFB Culture & Smear [[id number]] Collected:  07/03/18 1545    Order Status:  Sent Specimen:  Joint Fluid from Knee, Left Updated:  07/03/18 1558    Aerobic culture [[id number]] Collected:  07/03/18 1545    Order Status:  Sent Specimen:  Joint Fluid from Knee, Left Updated:  07/03/18 1556    Culture, Anaerobe [[id number]] Collected:  07/03/18 1545    Order Status:  Sent Specimen:  Joint Fluid from Knee, Left Updated:  07/03/18 1556    Fungus culture [[id number]] Collected:  07/03/18 1545    Order Status:  Sent Specimen:  Joint Fluid from Knee, Left Updated:  07/03/18 1555          Significant Imaging: I have reviewed all pertinent imaging results/findings within the past 24 hours.

## 2018-07-04 NOTE — PT/OT/SLP EVAL
Occupational Therapy   Evaluation    Name: Yosef Rowe  MRN: [id number]  Admitting Diagnosis:  Infected prosthetic knee joint, initial encounter 1 Day Post-Op    Recommendations:     Discharge Recommendations: home health OT  Discharge Equipment Recommendations:  none  Barriers to discharge:  None    History:     Occupational Profile:  Living Environment: Pt lives alone in 1st floor apartment with 0STE. Home has tub/shower with grab bars  Previous level of function: Pt with hx of recent L TKA; reports independence with all ADL and IADL upon d/c from hospital. Pt was not using any AD for ambulation or ADLs at home. Pt drives and works full-time as a   Roles and Routines:   Equipment Owned:  walker, rolling, bedside commode  Assistance upon Discharge: Friend to assist as needed following d/c     Past Medical History:   Diagnosis Date    Anxiety     Arthritis     Depression     when turned 49-lost job and mother unwell at that time    Hyperlipidemia     Lumbar radiculopathy     BRETT- 2011    Osteoarthritis     Sleep apnea     Urinary tract infection        Past Surgical History:   Procedure Laterality Date    JOINT REPLACEMENT      knee arthroscopicc surgeries      Right knee- 2007  and Left knee- 2008    KNEE SURGERY      Left wrist Surgery      2000 for a torn ligament from Golf    TOTAL KNEE REPLACEMENT USING COMPUTER NAVIGATION Left 6/22/2018    Procedure: REPLACEMENT-KNEE WITH NAVIGATION-- unicondylar-- medial;  Surgeon: Orlin Jordan MD;  Location: [facility] OR [address] [place] [address];  Service: Orthopedics;  Laterality: Left;       Subjective     Chief Complaint: pain, knee immobilizer brace  Patient/Family stated goals: return to PLOF  Communicated with: RN prior to session.  Pain/Comfort:  · Pain Rating 1: 5/10  · Location - Side 1: Left  · Location - Orientation 1: posterior  · Location 1: knee  · Pain Addressed 1: Reposition, Distraction, Cessation of Activity  · Pain Rating  Post-Intervention 1: 5/10    Patients cultural, spiritual, Cheondoism conflicts given the current situation: none reported    Objective:     Patient found with: peripheral IV (knee extension brace)    General Precautions: Standard, fall   Orthopedic Precautions:LLE weight bearing as tolerated (LLE in extension)   Braces: Knee immobilizer     Occupational Performance:    Bed Mobility:    · Patient completed Scooting/Bridging with contact guard assistance  · Patient completed Supine to Sit with contact guard assistance, with side rail and elevated HOB    Functional Mobility/Transfers:  · Patient completed Sit <> Stand Transfer with moderate assistance  with  rolling walker   · Patient completed Bed <> Chair Transfer using Step Transfer technique with contact guard assistance with rolling walker  · Functional Mobility: Pt too 4 steps bed>chair with CGA using RW    Activities of Daily Living:  · Lower Body Dressing: maximal assistance to don R sock    Cognitive/Visual Perceptual:  Cognitive/Psychosocial Skills:     -       Oriented to: Person, Place and Situation   -       Follows Commands/attention:Follows multistep  commands  -       Communication: clear/fluent  -       Memory: No Deficits noted  -       Safety awareness/insight to disability: intact   -       Mood/Affect/Coping skills/emotional control: Appropriate to situation  Visual/Perceptual:      -Intact    Physical Exam:  Balance:    -       good sitting balance, fair standing balance (RW used for t/f)  Postural examination/scapula alignment:    -       No postural abnormalities identified  Skin integrity: Visible skin intact  Edema:  Severe LLE  Sensation:    -       Intact  Dominant hand:    -       R hand  Upper Extremity Range of Motion:     -       Right Upper Extremity: WFL  -       Left Upper Extremity: WFL  Upper Extremity Strength:    -       Right Upper Extremity: WFL  -       Left Upper Extremity: WFL   Strength:    -       Right Upper  "Extremity: WFL  -       Left Upper Extremity: WFL  Fine Motor Coordination:    -       Intact  Gross motor coordination:   WFL    Patient left up in chair with all lines intact and call button in reach    [facility] 6 Click:  [facility] Total Score: 18    Treatment & Education:  Pt educated on role of OT/POC  Pt educated on LLE WBAT precautions  Pt educated on elevation of LLE and no sock for edema management  Pt educated on importance of ambulation/UIC  White board/communication board updated  Education:    Assessment:     Yosef Rowe is a 62 y.o. male with a medical diagnosis of Infected prosthetic knee joint, initial encounter.  He presents with LLE pain and immobilizer impairing functional mobility. Pt demo good EOB balance and t/f ability, but need assist to stand.  Performance deficits affecting function are pain, decreased lower extremity function, orthopedic precautions, impaired self care skills, impaired functional mobilty, weakness, impaired endurance, impaired balance, gait instability.      Rehab Prognosis:  Good; patient would benefit from acute skilled OT services to address these deficits and reach maximum level of function.         Clinical Decision Makin.  OT Low:  "Pt evaluation falls under low complexity for evaluation coding due to performance deficits noted in 1-3 areas as stated above and 0 co-morbities affecting current functional status. Data obtained from problem focused assessments. No modifications or assistance was required for completion of evaluation. Only brief occupational profile and history review completed."     Plan:     Patient to be seen 4 x/week to address the above listed problems via self-care/home management, therapeutic activities, therapeutic exercises  · Plan of Care Expires: 18  · Plan of Care Reviewed with: patient    This Plan of care has been discussed with the patient who was involved in its development and understands and is in agreement with the identified " goals and treatment plan    GOALS:    Occupational Therapy Goals        Problem: Occupational Therapy Goal    Goal Priority Disciplines Outcome Interventions   Occupational Therapy Goal     OT, PT/OT Ongoing (interventions implemented as appropriate)    Description:  Goals to be met by: 7/18/18     Patient will increase functional independence with ADLs by performing:    UE Dressing with Set-up Assistance.  LE Dressing with Minimal Assistance and Assistive Devices as needed.  Grooming while EOB with Set-up Assistance.  Toileting from bedside commode with Supervision for hygiene and clothing management.   Toilet transfer to bedside commode with Stand-by Assistance.                      Time Tracking:     OT Date of Treatment: 07/04/18  OT Start Time: 0802  OT Stop Time: 0819  OT Total Time (min): 17 min    Billable Minutes:Evaluation 17    Lisa Esquivel OT  7/4/2018

## 2018-07-04 NOTE — PLAN OF CARE
Problem: Patient Care Overview  Goal: Plan of Care Review  Patient AAOX4, up in chair, legs elevated and ice on left knee.  IV infusing 150ml NS, site clean, dry and intact.  Patient was a bit aggravated this afternoon he wants to go home today.  Explained to patient as per physician, patient still needs IV antibiotic therapy, and follow up with ID.  After encouragement patient verbalized understanding and agreed at this point to stay in the hospital. Kirsten Andrade LPN

## 2018-07-04 NOTE — OP NOTE
DATE OF PROCEDURE:  07/03/2018.    PREOPERATIVE DIAGNOSIS:  Infected left medial unicondylar knee replacement.    POSTOPERATIVE DIAGNOSIS:  Infected left medial unicondylar knee replacement.    PROCEDURES PERFORMED:  1.  Arthrotomy with irrigation and debridement/synovectomy of left knee.  2.  Revision of left medial unicondylar knee polyethylene liner.    SURGEON:  Ruperto Olivarez M.D.    ASSISTANT:  Helio Grey.    ANESTHESIA:  General endotracheal.    ESTIMATED BLOOD LOSS:  100 mL.    IV FLUIDS:  1500 mL crystalloid.    IMPLANTS:  [place] polyethylene.    SPECIMENS:  Cultures.    INDICATIONS FOR PROCEDURE:  The patient is a 62-year-old male who underwent a   left medial unicondylar knee replacement on 06/22/2018.  The patient presented   to the Emergency Department with erythema at his surgical incision and purulent   drainage from the proximal portion of the wound.  I have taken him to the   Operating Room for debridement, synovectomy and exchange of his polyethylene   liner.  The risks, benefits, and alternatives of surgery were discussed with the   patient prior to going to the Operating Room.  Informed consent was obtained.    PROCEDURE IN DETAIL:  The patient was identified in the preoperative holding   area and the site was marked.  Regional analgesia was performed.  The patient   was wheeled in the Operating Room and placed on the operating table in a supine   position.  General endotracheal anesthesia was induced.  The left lower   extremity was then placed into a nonsterile tourniquet and prepped and draped in   sterile fashion.  A timeout was undertaken to confirm patient, site, side,   surgery and surgeon.  All agreed and we proceeded.    I incised through his old incision, and at this point, a good deal of purulent   and murky discharge was produced.  I cultured this, and after gaining two   cultures, antibiotics were administered to the patient.  I dissected down to the   level of his prior  arthrotomy.  The arthrotomy was opened for about 2 cm at the   superomedial aspect of the patella.  I re-entered his arthrotomy sharply with a   #10 blade, and at this point, I suctioned the remaining fluid from the knee.  I   removed all the Vicryl sutures from the medial arthrotomy.  The patient did   have some fat necrosis in the subcutaneous layer.  This was sharply debrided   with a #10 blade.  At this point, I used pulse lavage and copiously irrigated   the entirety of the knee.  I took some synovitis out with a #10 blade from the   prepatellar area and around the medial side of the knee.  I was able to move the   patella out of the way and I then excised some synovitis sharply from the   anterolateral portion of the joint as well.    At this point, the knee was flexed up and the polyethylene liner was removed.  I   then removed some of the inflamed synovium from the posteromedial portion of   the knee with a pituitary rongeur.  At this point, copious irrigation was   undertaken again with normal saline solution via pulse lavage.  I then used a 1   liter bag of Bactisure via pulse lavage to break up any glycocalix, which may   have formed within the knee.  After this, I again copiously irrigated with   normal saline solution via pulse lavage with several more liters.  At this   point, I extended the knee out fully and filled this with 17 in 500 of Betadine   solution and let this soak for about 3-4 minutes and then again performed pulse   lavage.    At this point, I replaced the polyethylene liner, inserted the same size that   was in before, ensured that this was seated fully and then took the knee through   a range of motion and it had good stability.  I again used 17 in 500 mL   Betadine solution and then again performed pulse lavage.    At this point, I placed a gram of vancomycin powder within the joint itself and   then closed the arthrotomy with antimicrobial #1 Vicryl suture in interrupted    figure-of-eight fashion.  I placed another gram of vancomycin powder in the next   layer and then closed the next layer of fascia with interrupted 0-Vicryl   suture.  The subcutaneous layer was closed with inverted 2-0 Vicryl suture and   the skin with 3-0 nylon suture.  A sterile dressing was applied.    All instrument and sponge counts were reported correct at the end of the case.    There were no complications.  The patient was extubated, awakened and taken to   the Recovery Room in stable condition.    PLAN FOR THE PATIENT:  Will be to follow cultures, see what he grows from the   wound to determine his next steps.      MIMI  dd: 07/04/2018 08:20:24 (CDT)  td: 07/04/2018 10:09:29 (CDT)  Doc ID   #[id number]  Job ID #[id number]    CC:

## 2018-07-04 NOTE — CONSULTS
[facility]  Infectious Disease  Consult Note    Patient Name: Yosef Rowe  MRN: [id number]  Admission Date: 7/3/2018  Hospital Length of Stay: 1 days  Attending Physician: Ruperto Olivarez MD  Primary Care Provider: Ruperto Dexter MD     Isolation Status: No active isolations    Patient information was obtained from patient, past medical records and ER records.      Inpatient consult to Infectious Diseases  Consult performed by: NISHA BRANHAM.  Consult ordered by: KEKE JEFFERY        Assessment/Plan:     Infection of prosthetic left knee joint    63 y/o with prosthetic knee infection s/p washout and polyethylene liner exchange on July 3.  Gram stain is positive for gram negative rods.  Cultures are still incubating.  Continue vanc and ceftriaxone for now.  Will follow up culture results.  Anticipate at least 6 weeks of IV antibiotics.  Will continue to follow.            Thank you for your consult. I will follow-up with patient. Please contact us if you have any additional questions.    Nisha Branham MD  Infectious Disease  [facility]    Subjective:     Principal Problem: Infected prosthetic knee joint, initial encounter    HPI: Mr. Rowe is a 63 y/o male with a prior history of right knee replacement.  On June 22, 2018, he underwent left knee replacement.  He did well for 1 week afterwards.  He then noticed some increased drainage from the surgical wound.  He was followed up in orthopedic surgery clinic and conservative management was attempted.  He continued to have drainage and was ultimately admitted to the hospital.  He was taken to the OR on July 3 and underwent arthrotomy with irrigation and debridement/synovectomy of left knee as well as revision of left medial unicondylar knee polyethylene liner.  He has been started on IV vancomycin and IV ceftriaxone empirically.    Past Medical History:   Diagnosis Date    Anxiety     Arthritis     Depression     when  turned 49-lost job and mother unwell at that time    Hyperlipidemia     Lumbar radiculopathy     BRETT- 2011    Osteoarthritis     Sleep apnea     Urinary tract infection        Past Surgical History:   Procedure Laterality Date    JOINT REPLACEMENT      knee arthroscopicc surgeries      Right knee- 2007  and Left knee- 2008    KNEE SURGERY      Left wrist Surgery      2000 for a torn ligament from Golf    TOTAL KNEE REPLACEMENT USING COMPUTER NAVIGATION Left 6/22/2018    Procedure: REPLACEMENT-KNEE WITH NAVIGATION-- unicondylar-- medial;  Surgeon: Orlin Jordan MD;  Location: [facility] OR [address] [place] [address];  Service: Orthopedics;  Laterality: Left;       Review of patient's allergies indicates:   Allergen Reactions    No known drug allergies        Medications:  Prescriptions Prior to Admission   Medication Sig    aspirin (ECOTRIN) 81 MG EC tablet Take 1 tablet (81 mg total) by mouth 2 (two) times daily.    atorvastatin (LIPITOR) 20 MG tablet Take 1 tablet (20 mg total) by mouth once daily. (Patient taking differently: Take 20 mg by mouth every morning. )    docusate sodium (COLACE) 100 MG capsule Take 1 capsule (100 mg total) by mouth 2 (two) times daily.    DULoxetine (CYMBALTA) 60 MG capsule Take 1 capsule (60 mg total) by mouth once daily. (Patient taking differently: Take 60 mg by mouth every morning. )    losartan (COZAAR) 25 MG tablet Take 1 tablet (25 mg total) by mouth once daily.    oxyCODONE-acetaminophen (PERCOCET)  mg per tablet Take 1 tablet by mouth every 4 (four) hours as needed for Pain.    zolpidem (AMBIEN) 5 MG Tab Take 1 tablet (5 mg total) by mouth nightly as needed.    multivitamin (ONE DAILY MULTIVITAMIN) per tablet Take 1 tablet by mouth once daily.    ondansetron (ZOFRAN) 8 MG tablet Take 1 tablet (8 mg total) by mouth every 8 (eight) hours as needed for Nausea.     Antibiotics     Start     Stop Route Frequency Ordered    07/04/18 1030  vancomycin (VANCOCIN) 1,500 mg in dextrose  5 % 250 mL IVPB      07/05 1029 IV Every 12 hours (non-standard times) 07/04/18 0916    07/04/18 1030  cefTRIAXone (ROCEPHIN) 2 g in dextrose 5 % 50 mL IVPB      -- IV Every 12 hours (non-standard times) 07/04/18 0916    07/03/18 2100  mupirocin 2 % ointment 1 g      07/08 2059 Nasl 2 times daily 07/03/18 1811        Antifungals     None        Antivirals     None             There is no immunization history on file for this patient.    Family History     Problem Relation (Age of Onset)    Parkinsonism Mother        Social History     Social History    Marital status:      Spouse name: N/A    Number of children: N/A    Years of education: N/A     Social History Main Topics    Smoking status: Current Every Day Smoker     Types: Cigars    Smokeless tobacco: Never Used      Comment: occasional    Alcohol use 6.0 oz/week     10 Glasses of wine per week      Comment: Daily, last use yesterday    Drug use: No    Sexual activity: Yes     Partners: Female     Other Topics Concern    None     Social History Narrative    None     Review of Systems   Musculoskeletal: Positive for arthralgias.   Psychiatric/Behavioral: Positive for sleep disturbance.   All other systems reviewed and are negative.    Objective:     Vital Signs (Most Recent):  Temp: 96.7 °F (35.9 °C) (07/04/18 1140)  Pulse: 75 (07/04/18 1140)  Resp: 16 (07/04/18 1140)  BP: (!) 166/79 (07/04/18 1140)  SpO2: 97 % (07/04/18 1140) Vital Signs (24h Range):  Temp:  [96.7 °F (35.9 °C)-99.3 °F (37.4 °C)] 96.7 °F (35.9 °C)  Pulse:  [] 75  Resp:  [15-29] 16  SpO2:  [91 %-100 %] 97 %  BP: (112-194)/() 166/79     Weight: 127 kg (280 lb)  Body mass index is 43.85 kg/m².    Estimated Creatinine Clearance: 107.5 mL/min (based on SCr of 0.9 mg/dL).    Physical Exam   Constitutional: He is oriented to person, place, and time. He appears well-developed and well-nourished. No distress.   HENT:   Head: Normocephalic and atraumatic.   Right Ear: External  ear normal.   Left Ear: External ear normal.   Nose: Nose normal.   Mouth/Throat: Oropharynx is clear and moist. No oropharyngeal exudate.   Eyes: Conjunctivae and EOM are normal. Pupils are equal, round, and reactive to light. Right eye exhibits no discharge. Left eye exhibits no discharge. No scleral icterus.   Neck: Normal range of motion. Neck supple. No JVD present. No tracheal deviation present. No thyromegaly present.   Cardiovascular: Normal rate, regular rhythm and intact distal pulses.  Exam reveals no gallop and no friction rub.    No murmur heard.  Pulmonary/Chest: Effort normal and breath sounds normal. No stridor. No respiratory distress. He has no wheezes. He has no rales. He exhibits no tenderness.   Abdominal: Soft. Bowel sounds are normal. He exhibits no distension and no mass. There is no tenderness. There is no rebound and no guarding.   Musculoskeletal: Normal range of motion. He exhibits edema (Left leg swelling). He exhibits no tenderness.   Dressings covering left knee surgical wound   Lymphadenopathy:     He has no cervical adenopathy.   Neurological: He is alert and oriented to person, place, and time. He has normal reflexes. He displays normal reflexes. No cranial nerve deficit. He exhibits normal muscle tone. Coordination normal.   Skin: Skin is warm. No rash noted. He is not diaphoretic. No erythema. No pallor.   Psychiatric: He has a normal mood and affect. His behavior is normal. Judgment and thought content normal.   Nursing note and vitals reviewed.      Significant Labs:   Microbiology Results (last 7 days)     Procedure Component Value Units Date/Time    Blood culture #1 **CANNOT BE ORDERED STAT** [[id number]] Collected:  07/03/18 1120    Order Status:  Completed Specimen:  Blood from Peripheral, Hand, Right Updated:  07/03/18 2115     Blood Culture, Routine No Growth to date    Blood culture #2 **CANNOT BE ORDERED STAT** [[id number]] Collected:  07/03/18 1134    Order Status:   Completed Specimen:  Blood from Peripheral, Hand, Left Updated:  07/03/18 2115     Blood Culture, Routine No Growth to date    Gram stain [[id number]] Collected:  07/03/18 1545    Order Status:  Completed Specimen:  Joint Fluid from Knee, Left Updated:  07/03/18 1701     Gram Stain Result Few WBC's      Rare Gram negative rods    AFB Culture & Smear [[id number]] Collected:  07/03/18 1545    Order Status:  Sent Specimen:  Joint Fluid from Knee, Left Updated:  07/03/18 1558    Aerobic culture [[id number]] Collected:  07/03/18 1545    Order Status:  Sent Specimen:  Joint Fluid from Knee, Left Updated:  07/03/18 1556    Culture, Anaerobe [[id number]] Collected:  07/03/18 1545    Order Status:  Sent Specimen:  Joint Fluid from Knee, Left Updated:  07/03/18 1556    Fungus culture [[id number]] Collected:  07/03/18 1545    Order Status:  Sent Specimen:  Joint Fluid from Knee, Left Updated:  07/03/18 1555          Significant Imaging: I have reviewed all pertinent imaging results/findings within the past 24 hours.

## 2018-07-04 NOTE — ASSESSMENT & PLAN NOTE
63 y/o with prosthetic knee infection s/p washout and polyethylene liner exchange on July 3.  Gram stain is positive for gram negative rods.  Cultures are still incubating.  Continue vanc and ceftriaxone for now.  Will follow up culture results.  Anticipate at least 6 weeks of IV antibiotics.  Will continue to follow.

## 2018-07-04 NOTE — PT/OT/SLP EVAL
Physical Therapy Evaluation    Patient Name:  Yosef Rowe   MRN:  [id number]    Recommendations:     Discharge recommendations: Home Health PT  Barriers to discharge: None  Equipment recommendations: none    Assessment:     Yosef Rowe is a 62 y.o. male admitted with a medical diagnosis of L poly exchange uni knee replacement.  He presents with the below impairments/functional limitations.  Pt tolerated evaluation well today and is motivated to do well with recent surgery.  Pt able to perform all functional mobility without incident.  Pt is a good candidate for skilled PT services to address the below deficits and to increase functional independence.      Rehab Prognosis: good; patient would benefit from acute skilled PT services to address these deficits and reach maximum level of function.      Rehab Identified impairments/functional limitations:   weakness, impaired endurance, impaired sensation, impaired self care skills, impaired functional mobilty, gait instability, impaired balance, decreased coordination, decreased lower extremity function, decreased safety awareness, pain, decreased ROM, impaired skin, edema, orthopedic precautions    Recent Surgery: Procedure(s) (LRB):  IRRIGATION AND DEBRIDEMENT, LOWER EXTREMITY, poly exchange left uni knee replacement. Stryler. 9L NS (Left) 1 Day Post-Op    Plan:     During this hospitalization, patient to be seen QD to address the above listed problems via gait training, therapeutic activity, therapeutic exercise, and neuromuscular re-education   Plan of Care Reviewed with: patient    Subjective     Communicated with RN prior to session.  Patient found seated in chair upon PT entry, agreeable to evaluation.      Chief Complaint: none  Patient comments/goals: would like to go home    Pain/Comfort:  Pain level prior: 5/10 in L knee  Pain level post: 5/10 in L knee    Patients cultural, spiritual, Yazidi conflicts given the current situation: none stated    Living  Environment:  Per pt, he lives with his wife in a 2SH with 0 STORMY.  No difficulties with mobility following recent UKA.  Ambulating with RW and participating with HH.      Patient has the following equipment: rolling walker and bedside commode      Objective:     Patient found supine in bed with perineural catheter, polar ice, peripheral IV, and FCD.     General Precautions: Standard, fall  Orthopedic Precautions:  LLE WBAT  Braces: none     Physical Exam:  Postural examination/scapula alignment: WFL    Skin integrity: Visible skin intact  Edema: Moderate LLE    Sensation:   Intact    Lower Extremity Range of Motion:  Right Lower Extremity: WFL   Left Lower Extremity: WFL except knee    Lower Extremity Strength:  Right Lower Extremity: WFL   Left Lower Extremity: WFL except knee    Functional Mobility:    Transfers:    Sit<>Stand: SBA with RW from bedside chair    Gait:  Pt ambulated ~120 feet with CGA and RW.  3 point gait pattern.  No LOB or SOB  Deficits noted:  · Slow gait speed    AM-PAC 6 CLICK MOBILITY  Total Score: 20     Therapeutic Activities and Exercises:  · Pt educated on:   · Role of PT, safety with functional mobility.   · DME and discharge needs  · Importance of OOB activity  · Weightbearing precautions  · Gait sequencing    Pt instructed to perform supine therex, 30x each/5x daily  · Ankle pumps  · Quad sets  · Glute squeezes      Pt educated on:  · Car transfers  · DME Management   · Common signs and symptoms associated with UKA revision  · Importance of protecting surgical incision during functional tasks to reduce the risk of bleeding/infection  · HEP      Patient left seated in chair with all lines intact, call button in reach and RN notified.    GOALS:    Physical Therapy Goals        Problem: Physical Therapy Goal    Goal Priority Disciplines Outcome Goal Variances Interventions   Physical Therapy Goal     PT/OT, PT Ongoing (interventions implemented as appropriate)     Description:  Goals to  be met by: 7/9/2018    Patient will increase functional independence with mobility by performing:    -Supine to sit with Supervision  -Sit to supine with Supervision  -Sit to stand transfer with Supervision  -Gait  x 150 feet with Supervision using Rolling Walker.   -Lower extremity exercise program x 30 reps per handout, with independence                       History:     Past Medical History:   Diagnosis Date    Anxiety     Arthritis     Depression     when turned 49-lost job and mother unwell at that time    Hyperlipidemia     Lumbar radiculopathy     BRETT- 2011    Osteoarthritis     Sleep apnea     Urinary tract infection        Past Surgical History:   Procedure Laterality Date    JOINT REPLACEMENT      knee arthroscopicc surgeries      Right knee- 2007  and Left knee- 2008    KNEE SURGERY      Left wrist Surgery      2000 for a torn ligament from Golf    TOTAL KNEE REPLACEMENT USING COMPUTER NAVIGATION Left 6/22/2018    Procedure: REPLACEMENT-KNEE WITH NAVIGATION-- unicondylar-- medial;  Surgeon: Orlin Jordan MD;  Location: [facility] OR [address] [place] [address];  Service: Orthopedics;  Laterality: Left;       Time Tracking:     PT Received On: 07/04/18  PT Start Time: 0920     PT Stop Time: 0943  PT Total Time (min): 23 min     Billable Minutes: Evaluation 13 and Gait Training 10      Ron Prasad, PT, DPT  7/4/2018   [phone]

## 2018-07-05 VITALS
DIASTOLIC BLOOD PRESSURE: 60 MMHG | BODY MASS INDEX: 43.95 KG/M2 | WEIGHT: 280 LBS | SYSTOLIC BLOOD PRESSURE: 123 MMHG | HEIGHT: 67 IN | HEART RATE: 69 BPM | OXYGEN SATURATION: 95 % | RESPIRATION RATE: 20 BRPM | TEMPERATURE: 97 F

## 2018-07-05 LAB
ANION GAP SERPL CALC-SCNC: 7 MMOL/L
BASOPHILS # BLD AUTO: 0.05 K/UL
BASOPHILS NFR BLD: 0.4 %
BUN SERPL-MCNC: 13 MG/DL
CALCIUM SERPL-MCNC: 8.9 MG/DL
CHLORIDE SERPL-SCNC: 105 MMOL/L
CO2 SERPL-SCNC: 23 MMOL/L
CREAT SERPL-MCNC: 0.8 MG/DL
DIFFERENTIAL METHOD: ABNORMAL
EOSINOPHIL # BLD AUTO: 0.3 K/UL
EOSINOPHIL NFR BLD: 2.9 %
ERYTHROCYTE [DISTWIDTH] IN BLOOD BY AUTOMATED COUNT: 12.5 %
EST. GFR  (AFRICAN AMERICAN): >60 ML/MIN/1.73 M^2
EST. GFR  (NON AFRICAN AMERICAN): >60 ML/MIN/1.73 M^2
GLUCOSE SERPL-MCNC: 100 MG/DL
HCT VFR BLD AUTO: 33.2 %
HGB BLD-MCNC: 10.8 G/DL
IMM GRANULOCYTES # BLD AUTO: 0.16 K/UL
IMM GRANULOCYTES NFR BLD AUTO: 1.4 %
LYMPHOCYTES # BLD AUTO: 0.8 K/UL
LYMPHOCYTES NFR BLD: 7.3 %
MCH RBC QN AUTO: 34.1 PG
MCHC RBC AUTO-ENTMCNC: 32.5 G/DL
MCV RBC AUTO: 105 FL
MONOCYTES # BLD AUTO: 1.3 K/UL
MONOCYTES NFR BLD: 11.6 %
NEUTROPHILS # BLD AUTO: 8.6 K/UL
NEUTROPHILS NFR BLD: 76.4 %
NRBC BLD-RTO: 0 /100 WBC
PLATELET # BLD AUTO: 193 K/UL
PMV BLD AUTO: 10 FL
POTASSIUM SERPL-SCNC: 4 MMOL/L
RBC # BLD AUTO: 3.17 M/UL
SODIUM SERPL-SCNC: 135 MMOL/L
WBC # BLD AUTO: 11.22 K/UL

## 2018-07-05 PROCEDURE — 97530 THERAPEUTIC ACTIVITIES: CPT

## 2018-07-05 PROCEDURE — 36569 INSJ PICC 5 YR+ W/O IMAGING: CPT

## 2018-07-05 PROCEDURE — 97535 SELF CARE MNGMENT TRAINING: CPT

## 2018-07-05 PROCEDURE — A4216 STERILE WATER/SALINE, 10 ML: HCPCS | Performed by: STUDENT IN AN ORGANIZED HEALTH CARE EDUCATION/TRAINING PROGRAM

## 2018-07-05 PROCEDURE — 11000001 HC ACUTE MED/SURG PRIVATE ROOM

## 2018-07-05 PROCEDURE — G8989 SELF CARE D/C STATUS: HCPCS | Mod: CK

## 2018-07-05 PROCEDURE — 80048 BASIC METABOLIC PNL TOTAL CA: CPT

## 2018-07-05 PROCEDURE — 76937 US GUIDE VASCULAR ACCESS: CPT

## 2018-07-05 PROCEDURE — 85025 COMPLETE CBC W/AUTO DIFF WBC: CPT

## 2018-07-05 PROCEDURE — 36415 COLL VENOUS BLD VENIPUNCTURE: CPT

## 2018-07-05 PROCEDURE — C1751 CATH, INF, PER/CENT/MIDLINE: HCPCS

## 2018-07-05 PROCEDURE — 63600175 PHARM REV CODE 636 W HCPCS: Performed by: STUDENT IN AN ORGANIZED HEALTH CARE EDUCATION/TRAINING PROGRAM

## 2018-07-05 PROCEDURE — 25000003 PHARM REV CODE 250: Performed by: STUDENT IN AN ORGANIZED HEALTH CARE EDUCATION/TRAINING PROGRAM

## 2018-07-05 PROCEDURE — 99233 SBSQ HOSP IP/OBS HIGH 50: CPT | Mod: ,,, | Performed by: INTERNAL MEDICINE

## 2018-07-05 PROCEDURE — 99900035 HC TECH TIME PER 15 MIN (STAT)

## 2018-07-05 RX ADMIN — FAMOTIDINE 20 MG: 20 TABLET ORAL at 09:07

## 2018-07-05 RX ADMIN — CEFTRIAXONE SODIUM 2 G: 2 INJECTION, POWDER, FOR SOLUTION INTRAMUSCULAR; INTRAVENOUS at 10:07

## 2018-07-05 RX ADMIN — OXYCODONE HYDROCHLORIDE 10 MG: 5 TABLET ORAL at 02:07

## 2018-07-05 RX ADMIN — MUPIROCIN 1 G: 20 OINTMENT TOPICAL at 09:07

## 2018-07-05 RX ADMIN — OXYCODONE HYDROCHLORIDE 10 MG: 5 TABLET ORAL at 06:07

## 2018-07-05 RX ADMIN — Medication 3 ML: at 02:07

## 2018-07-05 RX ADMIN — ATORVASTATIN CALCIUM 20 MG: 20 TABLET, FILM COATED ORAL at 07:07

## 2018-07-05 RX ADMIN — CELECOXIB 200 MG: 200 CAPSULE ORAL at 09:07

## 2018-07-05 RX ADMIN — SENNOSIDES AND DOCUSATE SODIUM 1 TABLET: 8.6; 5 TABLET ORAL at 09:07

## 2018-07-05 RX ADMIN — DULOXETINE 60 MG: 60 CAPSULE, DELAYED RELEASE ORAL at 06:07

## 2018-07-05 RX ADMIN — OXYCODONE HYDROCHLORIDE 10 MG: 5 TABLET ORAL at 10:07

## 2018-07-05 RX ADMIN — ASPIRIN 81 MG: 81 TABLET, COATED ORAL at 09:07

## 2018-07-05 NOTE — NURSING
Patient was given discharge instructions, but refused to wait to confirm xray placement or that Home Health could deliver IV Antibiotics tonight.  Patient was strongly encouraged by this nurse and charge nurse Aodlph, to wait to confirm both of these things.  Patient refused and had friend roll him off floor in wheelchair.  Of note this nurse was able to give patient discharge paperwork that was ready.  Kirsten Andrade LPN

## 2018-07-05 NOTE — NURSING
Received call back from surgery team, Dr Grey in surgery currently.  Notified that PICC has been placed and awaiting xray confirmation to use.  Notified team again, that patient is dressed and demanding to go home, stating he will go home today. Kirsten Andrade LPN

## 2018-07-05 NOTE — NURSING
Paged Dr Olivarez's team, patient has PICC in place, awaiting xray confirmation.  Patient still adamant will go home today.  Need to confirm with team if this is indeed plan of care for today.   Kirsten Andrade LPN

## 2018-07-05 NOTE — CONSULTS
Double lumen PICC placed to (R) BRACHIAL  vein.   45cm in length, 0cm exposed, and 36cm arm circumference.  Lot # [id number]

## 2018-07-05 NOTE — PLAN OF CARE
Problem: Patient Care Overview  Goal: Plan of Care Review  Patient is AAOX4, up in chair and fully dressed in personal clothing.  Patient is adamant all shift that he is going home today and he will leave on his own if needed.  This nurse has explained multiple times to patient reasons for his hospital stay and infection, patient verbalizes understanding but insist he will go home today.  Patient is awaiting PICC line placement for ATB course x6 weeks.  Wound vac is in place and suctioning at 125.  Patient continues to disconnect wound vac when ambulating to bathroom, event thought patient has been instructed to call for assistance and staff will help him to bathroom.  Patient's current IV sites are saline locked and clean, dry and intact.  Kirsten Andrade LPN

## 2018-07-05 NOTE — SUBJECTIVE & OBJECTIVE
Interval History: NAEON. Afebrile. WBC resolved. Cultures returned positive for pasturella. Patient reports he bought a new cat 3 days prior to his TKA. Denies cat scratches/bites/contact with the wound.    He had a wound vac placed today. He is eager to go home.     Review of Systems   Constitutional: Negative for chills, diaphoresis and fever.   Respiratory: Negative for chest tightness and shortness of breath.    Cardiovascular: Negative for chest pain.   Gastrointestinal: Negative for nausea and vomiting.   Musculoskeletal: Positive for arthralgias.   Skin: Positive for wound.   Psychiatric/Behavioral: Positive for sleep disturbance.   All other systems reviewed and are negative.    Objective:     Vital Signs (Most Recent):  Temp: 96.9 °F (36.1 °C) (07/05/18 1222)  Pulse: 69 (07/05/18 1222)  Resp: 20 (07/05/18 1222)  BP: 123/60 (07/05/18 1222)  SpO2: 95 % (07/05/18 1222) Vital Signs (24h Range):  Temp:  [96.8 °F (36 °C)-97.8 °F (36.6 °C)] 96.9 °F (36.1 °C)  Pulse:  [67-88] 69  Resp:  [16-20] 20  SpO2:  [95 %-100 %] 95 %  BP: (123-157)/(60-85) 123/60     Weight: 127 kg (280 lb)  Body mass index is 43.85 kg/m².    Estimated Creatinine Clearance: 121 mL/min (based on SCr of 0.8 mg/dL).    Physical Exam   Constitutional: He is oriented to person, place, and time. He appears well-developed and well-nourished. No distress.   HENT:   Head: Normocephalic and atraumatic.   Eyes: Conjunctivae are normal. No scleral icterus.   Cardiovascular: Normal rate and regular rhythm.    No murmur heard.  Pulmonary/Chest: Effort normal and breath sounds normal. No respiratory distress.   Abdominal: Soft. He exhibits no distension. There is no tenderness.   Musculoskeletal: He exhibits edema (Left leg).   Left leg dressed with wound vac in place.    Neurological: He is alert and oriented to person, place, and time.   Skin: Skin is warm and dry. No rash noted. He is not diaphoretic.   Psychiatric: He has a normal mood and affect. His  behavior is normal.       Significant Labs:   Blood Culture:   Recent Labs  Lab 07/03/18  1120 07/03/18  1134   LABBLOO No Growth to date  No Growth to date No Growth to date  No Growth to date     CBC:   Recent Labs  Lab 07/05/18  1029   WBC 11.22   HGB 10.8*   HCT 33.2*        CMP:   Recent Labs  Lab 07/03/18  1529 07/05/18  1029   * 135*   K 4.5 4.0    105   CO2 24 23    100   BUN 22 13   CREATININE 0.9 0.8   CALCIUM 9.1 8.9   PROT 6.4  --    ALBUMIN 3.1*  --    BILITOT 1.3*  --    ALKPHOS 58  --    AST 28  --    ALT 30  --    ANIONGAP 10 7*   EGFRNONAA >60.0 >60.0     Fungus Culture (Blood or Bone Marrow): No results for input(s): FUNGUSCULTUR in the last 4320 hours.  Genital Culture: No results for input(s): LABGENI in the last 4320 hours.  Lactic Acid: No results for input(s): LACTATE in the last 48 hours.  Lipase: No results for input(s): LIPASE in the last 48 hours.  Procalcitonin: No results for input(s): PROCAL in the last 48 hours.  Quantiferon: No results for input(s): NIL, TBAG, TBAGNIL, MITOGENNIL, TBGOLD in the last 48 hours.  Respiratory Culture: No results for input(s): GSRESP, RESPIRATORYC in the last 4320 hours.  Urine Culture: No results for input(s): LABURIN in the last 4320 hours.  Urine Studies:   Recent Labs  Lab 07/04/18  1828   COLORU Yellow   APPEARANCEUA Clear   PHUR 6.0   SPECGRAV 1.010   PROTEINUA Negative   GLUCUA Negative   KETONESU Negative   BILIRUBINUA Negative   OCCULTUA Negative   NITRITE Negative   UROBILINOGEN Negative   LEUKOCYTESUR Negative     Wound Culture:   Recent Labs  Lab 07/03/18  1545   LABAERO PASTEURELLA MULTOCIDAModerateSusceptibility testing not routinely performed       Significant Imaging: I have reviewed all pertinent imaging results/findings within the past 24 hours.

## 2018-07-05 NOTE — NURSING
During patient rounds, patient was noted in bathroom, and he had disconnected the wound vac himself.  Patient is extremely aggravated this afternoon.  He wants to go home and there are not discharge orders at this time.  Multiple discussions have been made with the patient regarding why he is in the hospital and the need for inpatient care at this time.  Patient continues to state he is not sleeping another night here and will leave on his own. Kirsten Andrade LPN

## 2018-07-05 NOTE — PLAN OF CARE
CM noted patient was current with [facility] prior to admit. CM sent HH referral to [facility] via [facility]. Pending acceptance.    At 1147: [facility] accepted patient. [facility] added to AVS.    Shahla Pineda RN, CM [facility]  [phone] -[phone]

## 2018-07-05 NOTE — PLAN OF CARE
CM attempted to meet with patient to complete discharge assessment. Patient very agitated at this time voicing concerns about being discharged today. See nurses note for details. CM viewed medical record for assessment details. Patient recently discharged home in June 2018 with [facility]. Patient owns a rolling walker and bedside commode. CM spoke with Dr. Grey regarding discharge needs. Patient will need to resume home health services and will need home IV antibiotics. CM has a referral out to [facility] via [facility] (pending insurance approval). SW and CM will continue to follow for any additional needs. Plan A to discharge home with home health and IV antibiotics as soon as medically stable. Plan B to discharge home with plans for outpatient rehab.    PCP: Ruperto Dexter MD    Pharmacy:   [id number]  [place] - [address] AT [facility] [address]  [address] [address]  Phone: [phone] Fax: [phone]    Payor: BLUE CROSS BLUE SHIELD / Plan: BCBS ALL OUT OF STATE / Product Type: PPO /      07/05/18 0900   Discharge Assessment   Assessment Type Discharge Planning Assessment   Confirmed/corrected address and phone number on facesheet? Yes   Assessment information obtained from? Medical Record   Expected Length of Stay (days) 5   Communicated expected length of stay with patient/caregiver no   Prior to hospitilization cognitive status: Alert/Oriented   Prior to hospitalization functional status: Assistive Equipment   Current cognitive status: Alert/Oriented   Current Functional Status: Assistive Equipment   Lives With spouse   Able to Return to Prior Arrangements yes   Is patient able to care for self after discharge? Yes   Who are your caregiver(s) and their phone number(s)? spouse- Bertha Rowe [phone], [phone]   Patient's perception of discharge disposition home health   Readmission Within The Last 30 Days no previous admission in last 30 days    Patient currently being followed by outpatient case management? No   Patient currently receives any other outside agency services? No   Equipment Currently Used at Home bedside commode;walker, rolling   Do you have any problems affording any of your prescribed medications? No   Is the patient taking medications as prescribed? yes   Does the patient have transportation home? Yes   Transportation Available family or friend will provide   Does the patient receive services at the [facility]? No   Discharge Plan A Home Health;Home with family   Discharge Plan B Home with family;Other  (outpatient rehab)   Patient/Family In Agreement With Plan yes   Does the patient have transportation to healthcare appointments? Yes

## 2018-07-05 NOTE — NURSING
Call placed to Dr Jaeger's team patient with expiratory wheezes noted this evening, patient denies shortness of breath.    Kirsten Andrade LPN

## 2018-07-05 NOTE — PT/OT/SLP PROGRESS
Occupational Therapy   Treatment    Name: Yosef Rowe  MRN: [id number]  Admitting Diagnosis:  Infected prosthetic knee joint, initial encounter  2 Days Post-Op    Recommendations:     Discharge Recommendations: home with home health  Discharge Equipment Recommendations:  none  Barriers to discharge:  None    Subjective     Communicated with: RN prior to session.  Pain/Comfort:  · Pain Rating 1:  (did not rate)  · Location - Side 1: Left  · Location - Orientation 1: generalized  · Location 1: knee  · Pain Addressed 1: Reposition, Distraction, Cessation of Activity  · Pain Rating Post-Intervention 1:  (did not rate)    Patients cultural, spiritual, Uatsdin conflicts given the current situation: none stated     Objective:     Patient found with: wound vac    General Precautions: Standard, fall   Orthopedic Precautions:LLE weight bearing as tolerated (L knee in extension )   Braces: Knee immobilizer     Occupational Performance:    Bed Mobility:    · Patient completed Scooting/Bridging with supervision  · Patient completed Supine to Sit with supervision     Functional Mobility/Transfers:  · Patient completed Sit <> Stand Transfer x2 with stand by assistance  with  rolling walker   · Patient completed Bed <> Chair Transfer using Stand Pivot technique with stand by assistance with rolling walker  · Functional Mobility: Pt ambulated household distance w/ SBA and RW for increased stability, no signs of LOB noted. Pt w/ minimal SOB following all task performances.     Activities of Daily Living:  · Grooming: modified independence standing at sink for ~3 minutes performing multiple grooming tasks     Patient left up in chair with all lines intact, call button in reach, RN notified and friend present    [facility] 6 Click: Self-care  [facility] Total Score: 19    Treatment & Education:  - OT POC  - Importance of OOB activity to maximize recovery   - Safety w/ functional mobility; safe hand placement; importance of RW use w/ mobility  for safety  - knee immobilizer donned prior to OOB activity   Education:    Assessment:     Yosef Rowe is a 62 y.o. male with a medical diagnosis of Infected prosthetic knee joint, initial encounter.  He presents with the following performance deficits affecting function are impaired endurance, impaired self care skills, impaired functional mobilty, gait instability, impaired balance, decreased lower extremity function, orthopedic precautions, impaired skin, edema.      Pt tolerated session well. Pt motivated to return home safely. Pt continues to require L knee in extension w/ OOB activity but demonstrated increased overall balance to safely perform ambulation and standing ADLs. Pt continues to benefit from skilled OT to address the above listed impairments.     Rehab Prognosis:  Good; patient would benefit from acute skilled OT services to address these deficits and reach maximum level of function.       Plan:     Patient to be seen 4 x/week to address the above listed problems via self-care/home management, therapeutic activities, therapeutic exercises  · Plan of Care Expires: 08/03/18  · Plan of Care Reviewed with: patient, friend    This Plan of care has been discussed with the patient who was involved in its development and understands and is in agreement with the identified goals and treatment plan    GOALS:    Occupational Therapy Goals        Problem: Occupational Therapy Goal    Goal Priority Disciplines Outcome Interventions   Occupational Therapy Goal     OT, PT/OT Ongoing (interventions implemented as appropriate)    Description:  Goals to be met by: 7/18/18     Patient will increase functional independence with ADLs by performing:    UE Dressing with Set-up Assistance.  LE Dressing with Minimal Assistance and Assistive Devices as needed.  Grooming while EOB with Set-up Assistance. -- MET in standing (7/5)  Toileting from bedside commode with Supervision for hygiene and clothing management.   Toilet  transfer to bedside commode with Stand-by Assistance.                       Time Tracking:     OT Date of Treatment: 07/05/18  OT Start Time: 1257  OT Stop Time: 1323  OT Total Time (min): 26 min    Billable Minutes:Self Care/Home Management 16  Therapeutic Activity 10    Milagro Ramos, OT  7/5/2018

## 2018-07-05 NOTE — NURSING
Call placed to Dr Olivarez's team, patient aggravated and wants to go home.  States if he is not allowed to go home soon he is just going to leave.  Discussed with patient again as yesterday that he has an infection and the plan was IV Antibiotics.  Patient still adamant that he wants to go home ASAP. Kirsten Andrade LPN

## 2018-07-05 NOTE — PLAN OF CARE
[facility]    HOME HEALTH ORDERS  FACE TO FACE ENCOUNTER    Patient Name: Yosef Rowe  YOB: 1955    PCP: Ruperto Dexter MD   PCP Address: [address] [place]  PCP Phone Number: [phone]  PCP Fax: [phone]    Encounter Date: 07/05/2018    Admit to Home Health    Diagnoses:  Active Hospital Problems    Diagnosis  POA    *Infected prosthetic left unicompartmental knee replacement [T84.59XA, Z96.659]  Not Applicable    Infection of prosthetic left knee joint [T84.54XA]  Not Applicable    Left leg swelling [M79.89]  Yes      Resolved Hospital Problems    Diagnosis Date Resolved POA   No resolved problems to display.       Future Appointments  Date Time Provider Department Center   7/17/2018 1:00 PM Carolina Day, PhD [facility] PSYCHOL [facility]   7/17/2018 2:30 PM Stefania Vickers NP [facility] ORTHO [facility]   9/5/2018 10:15 AM Lizzie Fried MD [facility]     Follow-up Information     Stefania Vickers NP On 7/17/2018.    Specialty:  Orthopedic Surgery  Why:  For wound re-check  Contact information:  [address] [facility] [address]  [phone]             [facility].    Specialty:  Home Health Services  Why:  Home Health   Contact information:  [address]  [address]  [place] [address]  [phone]                     I have seen and examined this patient face to face today. My clinical findings that support the need for the home health skilled services and home bound status are the following:  Weakness/numbness causing balance and gait disturbance due to Joint Replacement making it taxing to leave home.    Allergies:  Review of patient's allergies indicates:   Allergen Reactions    No known drug allergies        Diet: regular diet    Activities: Knee locked in extension for 5 days.    Home Health Admitting Clinician:   SN/PT to complete comprehensive assessment including routine vital signs.  Instruct on disease process and s/s of complications to report to MD. Follow specific home health arthoplasty protocol. Review/verify medication list sent home with the patient at time of discharge  and instruct patient/caregiver as needed. If coumadin ordered, coumadin clinic to manage INR with INR draws 2x per week with a goal to maintain INR between 1.8 and 2.2. Frequency may be adjusted depending on start of care date.    Notify MD if SBP > 160 or < 90; DBP > 90 or < 50; HR > 120 or < 50; Temp > 101    Home Medical Equipment:  Walker, 3-1 bedside commode, transfer tub bench    CONSULTS:    Physical Therapy may admit if patient not on coumadin, PT to perform comprehensive assessment if performing admit visit and generate therapy plan of care. Evaluate for home safety and equipment needs; Establish/upgrade home exercise program. Perform/instruct on therapeutic exercises, gait training, transfer training, and Range of Motion.      OTHER: (only select if patient needs other therapy disciplines)  Occupational Therapy to evaluate and treat. Evaluate home environment for safety and equipment needs. Perform/Instruct on transfers, ADL training, ROM, and therapeutic exercises.    MISCELLANEOUS CARE:  Routine Skin for Bedridden Patients: Instruct patient/caregiver to apply moisture barrier cream to all skin folds and wet areas in perineal area daily and after baths and all bowel movements.    WOUND CARE ORDERS:  MONITOR INCISIONAL WOUND VAC CARE  . Call MD if any drainage reaches border to border of drsg horizontally, s/s of infection, temp >101, induration, swelling or redness.  If dressing is removed per MD order, then apply island dressing, change/teach caregiver to perform daily dressing change if island dressing present.    Medications: Review discharge medications with patient and family and provide education.      Current Discharge Medication List      START taking these medications    Details   ceftriaxone sodium  (CEFTRIAXONE 2 G/50 ML D5W, READY TO MIX,) Inject 50 mLs (2 g total) into the vein once daily.  Qty: 30 each, Refills: 1... END DATE 8/16/18           CONTINUE these medications which have NOT CHANGED    Details   aspirin (ECOTRIN) 81 MG EC tablet Take 1 tablet (81 mg total) by mouth 2 (two) times daily.  Qty: 60 tablet, Refills: 0      atorvastatin (LIPITOR) 20 MG tablet Take 1 tablet (20 mg total) by mouth once daily.  Qty: 30 tablet, Refills: 11      docusate sodium (COLACE) 100 MG capsule Take 1 capsule (100 mg total) by mouth 2 (two) times daily.  Qty: 60 capsule, Refills: 0      DULoxetine (CYMBALTA) 60 MG capsule Take 1 capsule (60 mg total) by mouth once daily.  Qty: 30 capsule, Refills: 5    Associated Diagnoses: Recurrent major depressive disorder, in full remission; Generalized anxiety disorder      losartan (COZAAR) 25 MG tablet Take 1 tablet (25 mg total) by mouth once daily.  Qty: 90 tablet, Refills: 3      oxyCODONE-acetaminophen (PERCOCET)  mg per tablet Take 1 tablet by mouth every 4 (four) hours as needed for Pain.  Qty: 45 tablet, Refills: 0      zolpidem (AMBIEN) 5 MG Tab Take 1 tablet (5 mg total) by mouth nightly as needed.  Qty: 30 tablet, Refills: 5    Associated Diagnoses: Primary insomnia      multivitamin (ONE DAILY MULTIVITAMIN) per tablet Take 1 tablet by mouth once daily.      ondansetron (ZOFRAN) 8 MG tablet Take 1 tablet (8 mg total) by mouth every 8 (eight) hours as needed for Nausea.  Qty: 60 tablet, Refills: 0           IV INFUSION THERAPY WITH Ceftriaxone 2g q24 hours as written above.  Infectious disease recommendations are below.     Patient will need ESR, CRP, CBC and CMP to be drawn weekly with results faxed to the ID Department at  [phone]      I certify that this patient is confined to his home and needs intermittent skilled nursing care, physical therapy and occupational therapy.

## 2018-07-05 NOTE — PLAN OF CARE
Problem: Occupational Therapy Goal  Goal: Occupational Therapy Goal  Goals to be met by: 7/18/18     Patient will increase functional independence with ADLs by performing:    UE Dressing with Set-up Assistance.  LE Dressing with Minimal Assistance and Assistive Devices as needed.  Grooming while EOB with Set-up Assistance. -- MET in standing (7/5)  Toileting from bedside commode with Supervision for hygiene and clothing management.   Toilet transfer to bedside commode with Stand-by Assistance.     Outcome: Ongoing (interventions implemented as appropriate)  Pt progressing well towards all functional outcomes     Comments: Cont OT POC

## 2018-07-05 NOTE — PLAN OF CARE
Patient approved by Dr. Grey to upgrade patient from outpatient recovery to inpatient status. Order placed.     CM notified by Dr. Grey that patient needs IV antibiotics. Referral sent to [place] IV infusions. PICC line placement ordered per MD.    Shahla Pineda RN, CM [facility]  [phone]

## 2018-07-05 NOTE — PROGRESS NOTES
[facility]  Infectious Disease  Progress Note    Patient Name: Yosef Rowe  MRN: [id number]  Admission Date: 7/3/2018  Length of Stay: 1 days  Attending Physician: Ruperto Olivarez MD  Primary Care Provider: Ruperto Dexter MD    Isolation Status: No active isolations  Assessment/Plan:      Infection of prosthetic left knee joint    62 year old male with history of left TKA on 6/22 complicated by prosthetic knee infection s/p washout and polyethylene liner exchange on 7/3. 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 Of note, he does mention he has a kitten at home. He is currently on vanc and ceftriaxone. Doing well post op. Afebrile. Leukocytosis resolved. VSS.    Plan  - Recommend Ceftriaxone 2 g IV q 24 hours x 6 weeks from day of surgery (estimated end date 8/14/18)  - Patient will need ESR, CRP, CBC and CMP to be drawn weekly with results faxed to the ID Department at  [phone]  - Orthopedic surgery follow up scheduled for 7/17. Will arrange ID follow up the same day.  - ID will sign off.             Please call for any questions. Thank you.  Ludy Hutchison PA-C  Phone: [phone]  Pager: [phone]    Subjective:     Principal Problem:Infected prosthetic knee joint, initial encounter    HPI: Mr. Rowe is a 61 y/o male with a prior history of right knee replacement.  On June 22, 2018, he underwent left knee replacement.  He did well for 1 week afterwards.  He then noticed some increased drainage from the surgical wound.  He was followed up in orthopedic surgery clinic and conservative management was attempted.  He continued to have drainage and was ultimately admitted to the hospital.  He was taken to the OR on July 3 and underwent arthrotomy with irrigation and debridement/synovectomy of left knee as well as revision of left medial unicondylar knee polyethylene liner.  He has been started on IV vancomycin and IV ceftriaxone empirically.  Interval History: NAEON. Afebrile. WBC  resolved. Cultures returned positive for pasturella. Patient reports he bought a new cat 3 days prior to his TKA. Denies cat scratches/bites/contact with the wound.    He had a wound vac placed today. He is eager to go home.     Review of Systems   Constitutional: Negative for chills, diaphoresis and fever.   Respiratory: Negative for chest tightness and shortness of breath.    Cardiovascular: Negative for chest pain.   Gastrointestinal: Negative for nausea and vomiting.   Musculoskeletal: Positive for arthralgias.   Skin: Positive for wound.   Psychiatric/Behavioral: Positive for sleep disturbance.   All other systems reviewed and are negative.    Objective:     Vital Signs (Most Recent):  Temp: 96.9 °F (36.1 °C) (07/05/18 1222)  Pulse: 69 (07/05/18 1222)  Resp: 20 (07/05/18 1222)  BP: 123/60 (07/05/18 1222)  SpO2: 95 % (07/05/18 1222) Vital Signs (24h Range):  Temp:  [96.8 °F (36 °C)-97.8 °F (36.6 °C)] 96.9 °F (36.1 °C)  Pulse:  [67-88] 69  Resp:  [16-20] 20  SpO2:  [95 %-100 %] 95 %  BP: (123-157)/(60-85) 123/60     Weight: 127 kg (280 lb)  Body mass index is 43.85 kg/m².    Estimated Creatinine Clearance: 121 mL/min (based on SCr of 0.8 mg/dL).    Physical Exam   Constitutional: He is oriented to person, place, and time. He appears well-developed and well-nourished. No distress.   HENT:   Head: Normocephalic and atraumatic.   Eyes: Conjunctivae are normal. No scleral icterus.   Cardiovascular: Normal rate and regular rhythm.    No murmur heard.  Pulmonary/Chest: Effort normal and breath sounds normal. No respiratory distress.   Abdominal: Soft. He exhibits no distension. There is no tenderness.   Musculoskeletal: He exhibits edema (Left leg).   Left leg dressed with wound vac in place.    Neurological: He is alert and oriented to person, place, and time.   Skin: Skin is warm and dry. No rash noted. He is not diaphoretic.   Psychiatric: He has a normal mood and affect. His behavior is normal.       Significant  Labs:   Blood Culture:   Recent Labs  Lab 07/03/18  1120 07/03/18  1134   LABBLOO No Growth to date  No Growth to date No Growth to date  No Growth to date     CBC:   Recent Labs  Lab 07/05/18  1029   WBC 11.22   HGB 10.8*   HCT 33.2*        CMP:   Recent Labs  Lab 07/03/18  1529 07/05/18  1029   * 135*   K 4.5 4.0    105   CO2 24 23    100   BUN 22 13   CREATININE 0.9 0.8   CALCIUM 9.1 8.9   PROT 6.4  --    ALBUMIN 3.1*  --    BILITOT 1.3*  --    ALKPHOS 58  --    AST 28  --    ALT 30  --    ANIONGAP 10 7*   EGFRNONAA >60.0 >60.0     Fungus Culture (Blood or Bone Marrow): No results for input(s): FUNGUSCULTUR in the last 4320 hours.  Genital Culture: No results for input(s): LABGENI in the last 4320 hours.  Lactic Acid: No results for input(s): LACTATE in the last 48 hours.  Lipase: No results for input(s): LIPASE in the last 48 hours.  Procalcitonin: No results for input(s): PROCAL in the last 48 hours.  Quantiferon: No results for input(s): NIL, TBAG, TBAGNIL, MITOGENNIL, TBGOLD in the last 48 hours.  Respiratory Culture: No results for input(s): GSRESP, RESPIRATORYC in the last 4320 hours.  Urine Culture: No results for input(s): LABURIN in the last 4320 hours.  Urine Studies:   Recent Labs  Lab 07/04/18  1828   COLORU Yellow   APPEARANCEUA Clear   PHUR 6.0   SPECGRAV 1.010   PROTEINUA Negative   GLUCUA Negative   KETONESU Negative   BILIRUBINUA Negative   OCCULTUA Negative   NITRITE Negative   UROBILINOGEN Negative   LEUKOCYTESUR Negative     Wound Culture:   Recent Labs  Lab 07/03/18  1545   LABAERO PASTEURELLA MULTOCIDAModerateSusceptibility testing not routinely performed       Significant Imaging: I have reviewed all pertinent imaging results/findings within the past 24 hours.

## 2018-07-05 NOTE — ASSESSMENT & PLAN NOTE
62 year old male with history of left TKA on 6/22 complicated by prosthetic knee infection s/p washout and polyethylene liner exchange on 7/3. 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 Of note, he does mention he has a kitten at home. He is currently on vanc and ceftriaxone. Doing well post op. Afebrile. Leukocytosis resolved. VSS.    Plan  - Recommend Ceftriaxone 2 g IV q 24 hours x 6 weeks from day of surgery (estimated end date 8/14/18)  - Patient will need ESR, CRP, CBC and CMP to be drawn weekly with results faxed to the ID Department at  [phone]  - Orthopedic surgery follow up scheduled for 7/17. Will arrange ID follow up the same day.  - ID will sign off.

## 2018-07-06 ENCOUNTER — TELEPHONE (OUTPATIENT)
Dept: ORTHOPEDICS | Facility: CLINIC | Age: 63
End: 2018-07-06

## 2018-07-06 NOTE — SUBJECTIVE & OBJECTIVE
Principal Problem:Infected prosthetic knee joint, initial encounter    Principal Orthopedic Problem: Same    Interval History: Patient seen and examined at bedside multiple times throughout the day.  Patient was considerably anxious all day wanting to leave.  This morning his dressing was saturated with bloody drainage.  I placed and incisional wound vac and placed him back into his knee immobilizer which he had taken himself out of.        Review of patient's allergies indicates:   Allergen Reactions    No known drug allergies        No current facility-administered medications for this encounter.      Current Outpatient Prescriptions   Medication Sig    aspirin (ECOTRIN) 81 MG EC tablet Take 1 tablet (81 mg total) by mouth 2 (two) times daily.    atorvastatin (LIPITOR) 20 MG tablet Take 1 tablet (20 mg total) by mouth once daily. (Patient taking differently: Take 20 mg by mouth every morning. )    docusate sodium (COLACE) 100 MG capsule Take 1 capsule (100 mg total) by mouth 2 (two) times daily.    DULoxetine (CYMBALTA) 60 MG capsule Take 1 capsule (60 mg total) by mouth once daily. (Patient taking differently: Take 60 mg by mouth every morning. )    losartan (COZAAR) 25 MG tablet Take 1 tablet (25 mg total) by mouth once daily.    oxyCODONE-acetaminophen (PERCOCET)  mg per tablet Take 1 tablet by mouth every 4 (four) hours as needed for Pain.    zolpidem (AMBIEN) 5 MG Tab Take 1 tablet (5 mg total) by mouth nightly as needed.    ceftriaxone sodium (CEFTRIAXONE 2 G/50 ML D5W, READY TO MIX,) Inject 50 mLs (2 g total) into the vein once daily.    multivitamin (ONE DAILY MULTIVITAMIN) per tablet Take 1 tablet by mouth once daily.    ondansetron (ZOFRAN) 8 MG tablet Take 1 tablet (8 mg total) by mouth every 8 (eight) hours as needed for Nausea.     Objective:     Vital Signs (Most Recent):  Temp: 96.9 °F (36.1 °C) (07/05/18 1222)  Pulse: 69 (07/05/18 1222)  Resp: 20 (07/05/18 1222)  BP: 123/60  "(07/05/18 1222)  SpO2: 95 % (07/05/18 1222) Vital Signs (24h Range):  Temp:  [96.8 °F (36 °C)-97.8 °F (36.6 °C)] 96.9 °F (36.1 °C)  Pulse:  [67-88] 69  Resp:  [16-20] 20  SpO2:  [95 %-100 %] 95 %  BP: (123-157)/(60-85) 123/60     Weight: 127 kg (280 lb)  Height: 5' 7" (170.2 cm)  Body mass index is 43.85 kg/m².      Intake/Output Summary (Last 24 hours) at 07/05/18 2020  Last data filed at 07/05/18 1400   Gross per 24 hour   Intake             1313 ml   Output              850 ml   Net              463 ml       Ortho/SPM Exam       Physical Exam:  AAOx4  NAD  RRR  No increased WOB  Aquacel saturated then replaced with wound vac  SILT and motor intact T/SP/DP  WWP extremities  FCDs in place and functioning        Significant Labs: All pertinent labs within the past 24 hours have been reviewed.    Significant Imaging: I have reviewed and interpreted all pertinent imaging results/findings.  "

## 2018-07-06 NOTE — TELEPHONE ENCOUNTER
I left a message for patient on his voice mail asking that he come in today at 1:00 so that Stefania can check his wound because patient did not answer his phone.

## 2018-07-06 NOTE — PROGRESS NOTES
[facility]  Orthopedics  Progress Note    Patient Name: Yosef Rowe  MRN: [id number]  Admission Date: 7/3/2018  Hospital Length of Stay: 1 days  Attending Provider: No att. providers found  Primary Care Provider: Ruperto Dexter MD  Follow-up For: Procedure(s) (LRB):  IRRIGATION AND DEBRIDEMENT, LOWER EXTREMITY, poly exchange left uni knee replacement. Stryler. 9L NS (Left)    Post-Operative Day: 2 Days Post-Op  Subjective:     Principal Problem:Infected prosthetic knee joint, initial encounter    Principal Orthopedic Problem: Same    Interval History: Patient seen and examined at bedside multiple times throughout the day.  Patient was considerably anxious all day wanting to leave.  This morning his dressing was saturated with bloody drainage.  I placed and incisional wound vac and placed him back into his knee immobilizer which he had taken himself out of.        Review of patient's allergies indicates:   Allergen Reactions    No known drug allergies        No current facility-administered medications for this encounter.      Current Outpatient Prescriptions   Medication Sig    aspirin (ECOTRIN) 81 MG EC tablet Take 1 tablet (81 mg total) by mouth 2 (two) times daily.    atorvastatin (LIPITOR) 20 MG tablet Take 1 tablet (20 mg total) by mouth once daily. (Patient taking differently: Take 20 mg by mouth every morning. )    docusate sodium (COLACE) 100 MG capsule Take 1 capsule (100 mg total) by mouth 2 (two) times daily.    DULoxetine (CYMBALTA) 60 MG capsule Take 1 capsule (60 mg total) by mouth once daily. (Patient taking differently: Take 60 mg by mouth every morning. )    losartan (COZAAR) 25 MG tablet Take 1 tablet (25 mg total) by mouth once daily.    oxyCODONE-acetaminophen (PERCOCET)  mg per tablet Take 1 tablet by mouth every 4 (four) hours as needed for Pain.    zolpidem (AMBIEN) 5 MG Tab Take 1 tablet (5 mg total) by mouth nightly as needed.    ceftriaxone sodium  "(CEFTRIAXONE 2 G/50 ML D5W, READY TO MIX,) Inject 50 mLs (2 g total) into the vein once daily.    multivitamin (ONE DAILY MULTIVITAMIN) per tablet Take 1 tablet by mouth once daily.    ondansetron (ZOFRAN) 8 MG tablet Take 1 tablet (8 mg total) by mouth every 8 (eight) hours as needed for Nausea.     Objective:     Vital Signs (Most Recent):  Temp: 96.9 °F (36.1 °C) (07/05/18 1222)  Pulse: 69 (07/05/18 1222)  Resp: 20 (07/05/18 1222)  BP: 123/60 (07/05/18 1222)  SpO2: 95 % (07/05/18 1222) Vital Signs (24h Range):  Temp:  [96.8 °F (36 °C)-97.8 °F (36.6 °C)] 96.9 °F (36.1 °C)  Pulse:  [67-88] 69  Resp:  [16-20] 20  SpO2:  [95 %-100 %] 95 %  BP: (123-157)/(60-85) 123/60     Weight: 127 kg (280 lb)  Height: 5' 7" (170.2 cm)  Body mass index is 43.85 kg/m².      Intake/Output Summary (Last 24 hours) at 07/05/18 2020  Last data filed at 07/05/18 1400   Gross per 24 hour   Intake             1313 ml   Output              850 ml   Net              463 ml       Ortho/SPM Exam       Physical Exam:  AAOx4  NAD  RRR  No increased WOB  Aquacel saturated then replaced with wound vac  SILT and motor intact T/SP/DP  WWP extremities  FCDs in place and functioning        Significant Labs: All pertinent labs within the past 24 hours have been reviewed.    Significant Imaging: I have reviewed and interpreted all pertinent imaging results/findings.    Assessment/Plan:     * Infected prosthetic left unicompartmental knee replacement    62 y.o. male POD2 s/p L UKA poly exchange.    Pain control: multimodal  PT/OT: WBAT LLE with Knee locked in extension   DVT PPx: ASA 81 BID, FCDs at all times when not ambulating  Abx: ceftriaxone for 6 weeks per ID to cover Pasturella multocida.   Patient was considerably anxious all day wanting to leave.  This morning his dressing was saturated with bloody drainage.  I placed and incisional wound vac and placed him back into his knee immobilizer which he had taken himself out of.  PICC line was placed " today and patient left AMA immediately following x-ray confirmation that evening.  Dr. Olivarez and myself had a very long discussion with the patient about his situation and the importance of compliance with antibiotics and further management.  He will continue IV ceftriaxone for 6 weeks and we will see him in clinic next week to remove the wound vac and check his wound.                          Helio Grey MD  Orthopedics  [facility] Note:  I agree with the above assessment and plan with the addition that while we were setting him up for d/c after chest x-ray to confirm position of PICC line, patient left.  Fortunately, all of his post discharge HH abx had been set up and the line was in good position.  He will get his first dose of abx delivered tonight and we will set him up for clinic f/u in 6 days given the incisional wound vac.      Ruperto Olivarez MD

## 2018-07-06 NOTE — TELEPHONE ENCOUNTER
"Home health nurse called to report that the patient has an island dressing in place, but he has "soaked through 3 ABD pads. He told her that he can't get through to anyone in the orthopedic department and he didn't come for his 1pm appt because he "couldn't get there". He was given my extension twice this week and when she reviewed his phone with him, no calls showed on his phone to [facility], but there were several missed calls from [facility] and a voicemail from Dr. Jordan's MA. He states that he will come in to the ER if he continues to drain this weekend. I can see him 1st thing Monday morning if he's unable to get in to the ER.                                "

## 2018-07-06 NOTE — PLAN OF CARE
On 7/6/18 on 0739: CM noted patient discharged home on 7/5/18 at 1745. Patient discharged home with [facility] for IV antibiotics. See nurses notes for details regarding patient's discharge. Discharge and follow-up instructions completed by the bedside nurse.    Future Appointments  Date Time Provider Department Center   7/17/2018 1:00 PM Carolina Day, PhD [facility] PSYCHOL [facility]   7/17/2018 2:30 PM Stefania Vickers NP [facility] ORTHO [facility]   9/5/2018 10:15 AM Lizzie Fried MD [facility] BARIAT [facility]      07/06/18 0738   Final Note   Assessment Type Final Discharge Note   Discharge Disposition Home-Health  ([facility])   What phone number can be called within the next 1-3 days to see how you are doing after discharge? ([phone])   Hospital Follow Up  Appt(s) scheduled? Yes   Discharge plans and expectations educations in teach back method with documentation complete? Yes

## 2018-07-06 NOTE — ASSESSMENT & PLAN NOTE
62 y.o. male POD2 s/p L UKA poly exchange.    Pain control: multimodal  PT/OT: WBAT LLE with Knee locked in extension   DVT PPx: ASA 81 BID, FCDs at all times when not ambulating  Abx: ceftriaxone for 6 weeks per ID to cover Pasturella multocida.   Patient was considerably anxious all day wanting to leave.  This morning his dressing was saturated with bloody drainage.  I placed and incisional wound vac and placed him back into his knee immobilizer which he had taken himself out of.  PICC line was placed today and patient left AMA immediately following x-ray confirmation that evening.  Dr. Olivarez and myself had a very long discussion with the patient about his situation and the importance of compliance with antibiotics and further management.  He will continue IV ceftriaxone for 6 weeks and we will see him in clinic next week to remove the wound vac and check his wound.

## 2018-07-06 NOTE — TELEPHONE ENCOUNTER
Ortho Telephone Triage Call   [phone]  Patient C/O: post op dsg filled with drainage/leaking and that he is going to place another dsg from the drugstore. Pt states HH was to come out for his PICC line and ABX this morning and he is unable to get in touch with HH.   HX: L knee I&D 7/3/18  Resolution: Klarissa Fu RN/[facility] notified and to have HH nurse visit pt for ABX and post op wound check. Requested that she have HH nurse follow up with Ortho Triage. ALEXEI Vickers NP notified.

## 2018-07-06 NOTE — PLAN OF CARE
"On 7/6/18 at 1100: CM spoke with [facility]. Plans for [facility] to see patient today. [facility] needs physician to add IV infusion therapy for IV antibiotics to HH orders. Also, [facility] needs the wound care to state "continue to monitor incisional wound vac and associated dressing". Current HH orders currently include wound care for an Aquacel dressing.    CM sent above information to Dr. Grey.    [facility] sending their nurse out to patient's residence today.    Shahla Pineda RN, CM  [facility]  [phone] -[phone]    "

## 2018-07-06 NOTE — TELEPHONE ENCOUNTER
"Ortho Telephone Triage Follow Up Call 1107  Spoke with pt who states has dsg in place to L knee. When asked, pt states was discharged with "wound pump, but it came loose and he is not sure where it is." ALEXEI Vickers NP notified and will contact pt regarding being seen in Clinic today for wound check.     "

## 2018-07-07 ENCOUNTER — HOSPITAL ENCOUNTER (INPATIENT)
Facility: HOSPITAL | Age: 63
LOS: 3 days | Discharge: HOME | DRG: 464 | End: 2018-07-10
Attending: EMERGENCY MEDICINE | Admitting: ORTHOPAEDIC SURGERY
Payer: COMMERCIAL

## 2018-07-07 ENCOUNTER — ANESTHESIA (OUTPATIENT)
Dept: SURGERY | Facility: HOSPITAL | Age: 63
DRG: 464 | End: 2018-07-07
Payer: COMMERCIAL

## 2018-07-07 ENCOUNTER — TELEPHONE (OUTPATIENT)
Dept: ORTHOPEDICS | Facility: CLINIC | Age: 63
End: 2018-07-07

## 2018-07-07 ENCOUNTER — ANESTHESIA EVENT (OUTPATIENT)
Dept: SURGERY | Facility: HOSPITAL | Age: 63
DRG: 464 | End: 2018-07-07
Payer: COMMERCIAL

## 2018-07-07 DIAGNOSIS — T84.54XA INFECTION ASSOCIATED WITH INTERNAL LEFT KNEE PROSTHESIS, INITIAL ENCOUNTER: ICD-10-CM

## 2018-07-07 DIAGNOSIS — T84.54XA INFECTION OF PROSTHETIC LEFT KNEE JOINT: ICD-10-CM

## 2018-07-07 DIAGNOSIS — T84.54XD INFECTION ASSOCIATED WITH INTERNAL LEFT KNEE PROSTHESIS, SUBSEQUENT ENCOUNTER: Primary | ICD-10-CM

## 2018-07-07 LAB
ALBUMIN SERPL BCP-MCNC: 2.6 G/DL
ALP SERPL-CCNC: 92 U/L
ALT SERPL W/O P-5'-P-CCNC: 28 U/L
ANION GAP SERPL CALC-SCNC: 11 MMOL/L
AST SERPL-CCNC: 33 U/L
BASOPHILS # BLD AUTO: 0.09 K/UL
BASOPHILS NFR BLD: 0.9 %
BILIRUB SERPL-MCNC: 1 MG/DL
BUN SERPL-MCNC: 12 MG/DL
CALCIUM SERPL-MCNC: 9.6 MG/DL
CHLORIDE SERPL-SCNC: 103 MMOL/L
CO2 SERPL-SCNC: 25 MMOL/L
CREAT SERPL-MCNC: 0.8 MG/DL
CRP SERPL-MCNC: 245.5 MG/L
DIFFERENTIAL METHOD: ABNORMAL
EOSINOPHIL # BLD AUTO: 0.2 K/UL
EOSINOPHIL NFR BLD: 2.1 %
ERYTHROCYTE [DISTWIDTH] IN BLOOD BY AUTOMATED COUNT: 12.6 %
ERYTHROCYTE [SEDIMENTATION RATE] IN BLOOD BY WESTERGREN METHOD: 107 MM/HR
EST. GFR  (AFRICAN AMERICAN): >60 ML/MIN/1.73 M^2
EST. GFR  (NON AFRICAN AMERICAN): >60 ML/MIN/1.73 M^2
ESTIMATED AVG GLUCOSE: 100 MG/DL
GLUCOSE SERPL-MCNC: 102 MG/DL
GRAM STN SPEC: NORMAL
HBA1C MFR BLD HPLC: 5.1 %
HCT VFR BLD AUTO: 33.7 %
HGB BLD-MCNC: 11.1 G/DL
IMM GRANULOCYTES # BLD AUTO: 0.41 K/UL
IMM GRANULOCYTES NFR BLD AUTO: 4.2 %
LYMPHOCYTES # BLD AUTO: 0.9 K/UL
LYMPHOCYTES NFR BLD: 9.2 %
MCH RBC QN AUTO: 33.8 PG
MCHC RBC AUTO-ENTMCNC: 32.9 G/DL
MCV RBC AUTO: 103 FL
MONOCYTES # BLD AUTO: 1.4 K/UL
MONOCYTES NFR BLD: 14.3 %
NEUTROPHILS # BLD AUTO: 6.7 K/UL
NEUTROPHILS NFR BLD: 69.3 %
NRBC BLD-RTO: 0 /100 WBC
PLATELET # BLD AUTO: 276 K/UL
PMV BLD AUTO: 9.6 FL
POTASSIUM SERPL-SCNC: 3.7 MMOL/L
PROCALCITONIN SERPL IA-MCNC: 0.88 NG/ML
PROT SERPL-MCNC: 6.5 G/DL
RBC # BLD AUTO: 3.28 M/UL
SODIUM SERPL-SCNC: 139 MMOL/L
WBC # BLD AUTO: 9.66 K/UL

## 2018-07-07 PROCEDURE — 83036 HEMOGLOBIN GLYCOSYLATED A1C: CPT

## 2018-07-07 PROCEDURE — 3E1U38Z IRRIGATION OF JOINTS USING IRRIGATING SUBSTANCE, PERCUTANEOUS APPROACH: ICD-10-PCS | Performed by: ORTHOPAEDIC SURGERY

## 2018-07-07 PROCEDURE — 0SBC0ZZ EXCISION OF RIGHT KNEE JOINT, OPEN APPROACH: ICD-10-PCS | Performed by: ORTHOPAEDIC SURGERY

## 2018-07-07 PROCEDURE — 71000039 HC RECOVERY, EACH ADD'L HOUR: Performed by: ORTHOPAEDIC SURGERY

## 2018-07-07 PROCEDURE — 63600175 PHARM REV CODE 636 W HCPCS: Performed by: STUDENT IN AN ORGANIZED HEALTH CARE EDUCATION/TRAINING PROGRAM

## 2018-07-07 PROCEDURE — 63600175 PHARM REV CODE 636 W HCPCS: Performed by: ORTHOPAEDIC SURGERY

## 2018-07-07 PROCEDURE — 27201423 OPTIME MED/SURG SUP & DEVICES STERILE SUPPLY: Performed by: ORTHOPAEDIC SURGERY

## 2018-07-07 PROCEDURE — 64447 NJX AA&/STRD FEMORAL NRV IMG: CPT | Mod: 59,LT,, | Performed by: ANESTHESIOLOGY

## 2018-07-07 PROCEDURE — 25000003 PHARM REV CODE 250: Performed by: STUDENT IN AN ORGANIZED HEALTH CARE EDUCATION/TRAINING PROGRAM

## 2018-07-07 PROCEDURE — 96374 THER/PROPH/DIAG INJ IV PUSH: CPT

## 2018-07-07 PROCEDURE — C1729 CATH, DRAINAGE: HCPCS | Performed by: ORTHOPAEDIC SURGERY

## 2018-07-07 PROCEDURE — 25000003 PHARM REV CODE 250: Performed by: NURSE ANESTHETIST, CERTIFIED REGISTERED

## 2018-07-07 PROCEDURE — 85651 RBC SED RATE NONAUTOMATED: CPT

## 2018-07-07 PROCEDURE — 0SPD09Z REMOVAL OF LINER FROM LEFT KNEE JOINT, OPEN APPROACH: ICD-10-PCS | Performed by: ORTHOPAEDIC SURGERY

## 2018-07-07 PROCEDURE — 71000033 HC RECOVERY, INTIAL HOUR: Performed by: ORTHOPAEDIC SURGERY

## 2018-07-07 PROCEDURE — D9220A PRA ANESTHESIA: Mod: CRNA,,, | Performed by: NURSE ANESTHETIST, CERTIFIED REGISTERED

## 2018-07-07 PROCEDURE — 0SBD0ZZ EXCISION OF LEFT KNEE JOINT, OPEN APPROACH: ICD-10-PCS | Performed by: ORTHOPAEDIC SURGERY

## 2018-07-07 PROCEDURE — 94660 CPAP INITIATION&MGMT: CPT

## 2018-07-07 PROCEDURE — 37000008 HC ANESTHESIA 1ST 15 MINUTES: Performed by: ORTHOPAEDIC SURGERY

## 2018-07-07 PROCEDURE — 63600175 PHARM REV CODE 636 W HCPCS: Performed by: NURSE ANESTHETIST, CERTIFIED REGISTERED

## 2018-07-07 PROCEDURE — 87015 SPECIMEN INFECT AGNT CONCNTJ: CPT

## 2018-07-07 PROCEDURE — 99283 EMERGENCY DEPT VISIT LOW MDM: CPT | Mod: ,,, | Performed by: NURSE PRACTITIONER

## 2018-07-07 PROCEDURE — 11000001 HC ACUTE MED/SURG PRIVATE ROOM

## 2018-07-07 PROCEDURE — 88300 SURGICAL PATH GROSS: CPT | Mod: 26,,, | Performed by: PATHOLOGY

## 2018-07-07 PROCEDURE — 99285 EMERGENCY DEPT VISIT HI MDM: CPT | Mod: 25

## 2018-07-07 PROCEDURE — 80053 COMPREHEN METABOLIC PANEL: CPT

## 2018-07-07 PROCEDURE — 84145 PROCALCITONIN (PCT): CPT

## 2018-07-07 PROCEDURE — 76942 ECHO GUIDE FOR BIOPSY: CPT | Mod: 26,,, | Performed by: ANESTHESIOLOGY

## 2018-07-07 PROCEDURE — 85025 COMPLETE CBC W/AUTO DIFF WBC: CPT

## 2018-07-07 PROCEDURE — 63600175 PHARM REV CODE 636 W HCPCS

## 2018-07-07 PROCEDURE — 27000190 HC CPAP FULL FACE MASK W/VALVE

## 2018-07-07 PROCEDURE — C1776 JOINT DEVICE (IMPLANTABLE): HCPCS | Performed by: ORTHOPAEDIC SURGERY

## 2018-07-07 PROCEDURE — 87205 SMEAR GRAM STAIN: CPT

## 2018-07-07 PROCEDURE — 36000711: Performed by: ORTHOPAEDIC SURGERY

## 2018-07-07 PROCEDURE — D9220A PRA ANESTHESIA: Mod: ANES,,, | Performed by: ANESTHESIOLOGY

## 2018-07-07 PROCEDURE — 37000009 HC ANESTHESIA EA ADD 15 MINS: Performed by: ORTHOPAEDIC SURGERY

## 2018-07-07 PROCEDURE — 99900035 HC TECH TIME PER 15 MIN (STAT)

## 2018-07-07 PROCEDURE — 87102 FUNGUS ISOLATION CULTURE: CPT

## 2018-07-07 PROCEDURE — 87206 SMEAR FLUORESCENT/ACID STAI: CPT

## 2018-07-07 PROCEDURE — 86140 C-REACTIVE PROTEIN: CPT

## 2018-07-07 PROCEDURE — 63600175 PHARM REV CODE 636 W HCPCS: Performed by: ANESTHESIOLOGY

## 2018-07-07 PROCEDURE — 87116 MYCOBACTERIA CULTURE: CPT | Mod: 59

## 2018-07-07 PROCEDURE — 87070 CULTURE OTHR SPECIMN AEROBIC: CPT

## 2018-07-07 PROCEDURE — 36000710: Performed by: ORTHOPAEDIC SURGERY

## 2018-07-07 PROCEDURE — 88300 SURGICAL PATH GROSS: CPT | Performed by: PATHOLOGY

## 2018-07-07 PROCEDURE — 27488 REMOVAL OF KNEE PROSTHESIS: CPT | Mod: 78,52,LT, | Performed by: ORTHOPAEDIC SURGERY

## 2018-07-07 PROCEDURE — 87075 CULTR BACTERIA EXCEPT BLOOD: CPT

## 2018-07-07 DEVICE — TIBIAL ONLAY INSRT MCK SZ5 8MM: Type: IMPLANTABLE DEVICE | Site: KNEE | Status: FUNCTIONAL

## 2018-07-07 RX ORDER — LOSARTAN POTASSIUM 25 MG/1
25 TABLET ORAL DAILY
Status: DISCONTINUED | OUTPATIENT
Start: 2018-07-07 | End: 2018-07-10 | Stop reason: HOSPADM

## 2018-07-07 RX ORDER — OXYCODONE HYDROCHLORIDE 5 MG/1
10 TABLET ORAL EVERY 4 HOURS PRN
Status: DISCONTINUED | OUTPATIENT
Start: 2018-07-07 | End: 2018-07-10 | Stop reason: HOSPADM

## 2018-07-07 RX ORDER — GLYCOPYRROLATE 0.2 MG/ML
INJECTION INTRAMUSCULAR; INTRAVENOUS
Status: DISCONTINUED | OUTPATIENT
Start: 2018-07-07 | End: 2018-07-07

## 2018-07-07 RX ORDER — ONDANSETRON 4 MG/1
8 TABLET, FILM COATED ORAL EVERY 6 HOURS PRN
Status: DISCONTINUED | OUTPATIENT
Start: 2018-07-07 | End: 2018-07-10 | Stop reason: HOSPADM

## 2018-07-07 RX ORDER — DEXAMETHASONE SODIUM PHOSPHATE 4 MG/ML
INJECTION, SOLUTION INTRA-ARTICULAR; INTRALESIONAL; INTRAMUSCULAR; INTRAVENOUS; SOFT TISSUE
Status: DISCONTINUED | OUTPATIENT
Start: 2018-07-07 | End: 2018-07-07

## 2018-07-07 RX ORDER — FENTANYL CITRATE 50 UG/ML
25 INJECTION, SOLUTION INTRAMUSCULAR; INTRAVENOUS EVERY 5 MIN PRN
Status: COMPLETED | OUTPATIENT
Start: 2018-07-07 | End: 2018-07-07

## 2018-07-07 RX ORDER — NEOSTIGMINE METHYLSULFATE 1 MG/ML
INJECTION, SOLUTION INTRAVENOUS
Status: DISCONTINUED | OUTPATIENT
Start: 2018-07-07 | End: 2018-07-07

## 2018-07-07 RX ORDER — VANCOMYCIN HYDROCHLORIDE 1 G/20ML
INJECTION, POWDER, LYOPHILIZED, FOR SOLUTION INTRAVENOUS
Status: DISCONTINUED | OUTPATIENT
Start: 2018-07-07 | End: 2018-07-07 | Stop reason: HOSPADM

## 2018-07-07 RX ORDER — PROMETHAZINE HYDROCHLORIDE 12.5 MG/1
12.5 TABLET ORAL EVERY 6 HOURS PRN
Status: DISCONTINUED | OUTPATIENT
Start: 2018-07-07 | End: 2018-07-10 | Stop reason: HOSPADM

## 2018-07-07 RX ORDER — POLYETHYLENE GLYCOL 3350 17 G/17G
17 POWDER, FOR SOLUTION ORAL 2 TIMES DAILY PRN
Status: DISCONTINUED | OUTPATIENT
Start: 2018-07-07 | End: 2018-07-10 | Stop reason: HOSPADM

## 2018-07-07 RX ORDER — ONDANSETRON 2 MG/ML
INJECTION INTRAMUSCULAR; INTRAVENOUS
Status: DISCONTINUED | OUTPATIENT
Start: 2018-07-07 | End: 2018-07-07

## 2018-07-07 RX ORDER — ROCURONIUM BROMIDE 10 MG/ML
INJECTION, SOLUTION INTRAVENOUS
Status: DISCONTINUED | OUTPATIENT
Start: 2018-07-07 | End: 2018-07-07

## 2018-07-07 RX ORDER — MORPHINE SULFATE 2 MG/ML
2 INJECTION, SOLUTION INTRAMUSCULAR; INTRAVENOUS
Status: DISCONTINUED | OUTPATIENT
Start: 2018-07-07 | End: 2018-07-07

## 2018-07-07 RX ORDER — DOCUSATE SODIUM 100 MG/1
100 CAPSULE, LIQUID FILLED ORAL 2 TIMES DAILY
Qty: 60 CAPSULE | Refills: 0 | Status: SHIPPED | OUTPATIENT
Start: 2018-07-07 | End: 2018-12-26

## 2018-07-07 RX ORDER — RAMELTEON 8 MG/1
8 TABLET ORAL NIGHTLY PRN
Status: DISCONTINUED | OUTPATIENT
Start: 2018-07-07 | End: 2018-07-10 | Stop reason: HOSPADM

## 2018-07-07 RX ORDER — DOCUSATE SODIUM 100 MG/1
100 CAPSULE, LIQUID FILLED ORAL 2 TIMES DAILY
Status: DISCONTINUED | OUTPATIENT
Start: 2018-07-07 | End: 2018-07-10 | Stop reason: HOSPADM

## 2018-07-07 RX ORDER — SODIUM CHLORIDE 0.9 % (FLUSH) 0.9 %
3 SYRINGE (ML) INJECTION
Status: DISCONTINUED | OUTPATIENT
Start: 2018-07-07 | End: 2018-07-10 | Stop reason: HOSPADM

## 2018-07-07 RX ORDER — SUCCINYLCHOLINE CHLORIDE 20 MG/ML
INJECTION INTRAMUSCULAR; INTRAVENOUS
Status: DISCONTINUED | OUTPATIENT
Start: 2018-07-07 | End: 2018-07-07

## 2018-07-07 RX ORDER — ATORVASTATIN CALCIUM 20 MG/1
20 TABLET, FILM COATED ORAL EVERY MORNING
Status: DISCONTINUED | OUTPATIENT
Start: 2018-07-08 | End: 2018-07-10 | Stop reason: HOSPADM

## 2018-07-07 RX ORDER — FENTANYL CITRATE 50 UG/ML
INJECTION, SOLUTION INTRAMUSCULAR; INTRAVENOUS
Status: DISCONTINUED | OUTPATIENT
Start: 2018-07-07 | End: 2018-07-07

## 2018-07-07 RX ORDER — ZOLPIDEM TARTRATE 5 MG/1
5 TABLET ORAL NIGHTLY PRN
Status: DISCONTINUED | OUTPATIENT
Start: 2018-07-07 | End: 2018-07-10 | Stop reason: HOSPADM

## 2018-07-07 RX ORDER — SODIUM CHLORIDE 9 MG/ML
INJECTION, SOLUTION INTRAVENOUS CONTINUOUS
Status: ACTIVE | OUTPATIENT
Start: 2018-07-07 | End: 2018-07-08

## 2018-07-07 RX ORDER — MORPHINE SULFATE 4 MG/ML
2 INJECTION, SOLUTION INTRAMUSCULAR; INTRAVENOUS
Status: DISCONTINUED | OUTPATIENT
Start: 2018-07-07 | End: 2018-07-09

## 2018-07-07 RX ORDER — CELECOXIB 200 MG/1
200 CAPSULE ORAL DAILY
Status: DISCONTINUED | OUTPATIENT
Start: 2018-07-07 | End: 2018-07-10 | Stop reason: HOSPADM

## 2018-07-07 RX ORDER — ASPIRIN 81 MG/1
81 TABLET ORAL 2 TIMES DAILY
Status: DISCONTINUED | OUTPATIENT
Start: 2018-07-07 | End: 2018-07-10 | Stop reason: HOSPADM

## 2018-07-07 RX ORDER — ACETAMINOPHEN 325 MG/1
650 TABLET ORAL EVERY 6 HOURS
Status: DISCONTINUED | OUTPATIENT
Start: 2018-07-07 | End: 2018-07-10 | Stop reason: HOSPADM

## 2018-07-07 RX ORDER — ONDANSETRON HYDROCHLORIDE 8 MG/1
8 TABLET, FILM COATED ORAL EVERY 8 HOURS PRN
Qty: 60 TABLET | Refills: 0 | Status: SHIPPED | OUTPATIENT
Start: 2018-07-07 | End: 2018-12-26

## 2018-07-07 RX ORDER — ASPIRIN 81 MG/1
81 TABLET ORAL 2 TIMES DAILY
Qty: 60 TABLET | Refills: 0 | Status: SHIPPED | OUTPATIENT
Start: 2018-07-07 | End: 2018-08-29

## 2018-07-07 RX ORDER — MEPERIDINE HYDROCHLORIDE 50 MG/ML
INJECTION INTRAMUSCULAR; INTRAVENOUS; SUBCUTANEOUS
Status: COMPLETED
Start: 2018-07-07 | End: 2018-07-07

## 2018-07-07 RX ORDER — DIPHENHYDRAMINE HCL 25 MG
25 CAPSULE ORAL EVERY 6 HOURS PRN
Status: DISCONTINUED | OUTPATIENT
Start: 2018-07-07 | End: 2018-07-10 | Stop reason: HOSPADM

## 2018-07-07 RX ORDER — PANTOPRAZOLE SODIUM 40 MG/1
40 TABLET, DELAYED RELEASE ORAL DAILY
Status: DISCONTINUED | OUTPATIENT
Start: 2018-07-07 | End: 2018-07-10 | Stop reason: HOSPADM

## 2018-07-07 RX ORDER — MEPERIDINE HYDROCHLORIDE 50 MG/ML
12.5 INJECTION INTRAMUSCULAR; INTRAVENOUS; SUBCUTANEOUS ONCE
Status: COMPLETED | OUTPATIENT
Start: 2018-07-07 | End: 2018-07-07

## 2018-07-07 RX ORDER — OXYCODONE AND ACETAMINOPHEN 5; 325 MG/1; MG/1
1 TABLET ORAL EVERY 4 HOURS PRN
Qty: 60 TABLET | Refills: 0 | Status: SHIPPED | OUTPATIENT
Start: 2018-07-07 | End: 2018-07-11 | Stop reason: SDUPTHER

## 2018-07-07 RX ORDER — LIDOCAINE HCL/PF 100 MG/5ML
SYRINGE (ML) INTRAVENOUS
Status: DISCONTINUED | OUTPATIENT
Start: 2018-07-07 | End: 2018-07-07

## 2018-07-07 RX ORDER — PREGABALIN 75 MG/1
75 CAPSULE ORAL NIGHTLY
Status: DISCONTINUED | OUTPATIENT
Start: 2018-07-07 | End: 2018-07-10 | Stop reason: HOSPADM

## 2018-07-07 RX ORDER — MIDAZOLAM HYDROCHLORIDE 1 MG/ML
INJECTION, SOLUTION INTRAMUSCULAR; INTRAVENOUS
Status: DISCONTINUED | OUTPATIENT
Start: 2018-07-07 | End: 2018-07-07

## 2018-07-07 RX ORDER — OXYCODONE HYDROCHLORIDE 5 MG/1
5 TABLET ORAL EVERY 4 HOURS PRN
Status: DISCONTINUED | OUTPATIENT
Start: 2018-07-07 | End: 2018-07-10 | Stop reason: HOSPADM

## 2018-07-07 RX ORDER — DULOXETIN HYDROCHLORIDE 60 MG/1
60 CAPSULE, DELAYED RELEASE ORAL EVERY MORNING
Status: DISCONTINUED | OUTPATIENT
Start: 2018-07-08 | End: 2018-07-10 | Stop reason: HOSPADM

## 2018-07-07 RX ORDER — MORPHINE SULFATE 4 MG/ML
4 INJECTION, SOLUTION INTRAMUSCULAR; INTRAVENOUS
Status: COMPLETED | OUTPATIENT
Start: 2018-07-07 | End: 2018-07-07

## 2018-07-07 RX ORDER — PROPOFOL 10 MG/ML
VIAL (ML) INTRAVENOUS
Status: DISCONTINUED | OUTPATIENT
Start: 2018-07-07 | End: 2018-07-07

## 2018-07-07 RX ADMIN — VANCOMYCIN HYDROCHLORIDE 1500 MG: 10 INJECTION, POWDER, LYOPHILIZED, FOR SOLUTION INTRAVENOUS at 10:07

## 2018-07-07 RX ADMIN — PROPOFOL 200 MG: 10 INJECTION, EMULSION INTRAVENOUS at 11:07

## 2018-07-07 RX ADMIN — PREGABALIN 75 MG: 75 CAPSULE ORAL at 08:07

## 2018-07-07 RX ADMIN — FENTANYL CITRATE 25 MCG: 50 INJECTION, SOLUTION INTRAMUSCULAR; INTRAVENOUS at 12:07

## 2018-07-07 RX ADMIN — DEXAMETHASONE SODIUM PHOSPHATE 8 MG: 4 INJECTION, SOLUTION INTRAMUSCULAR; INTRAVENOUS at 12:07

## 2018-07-07 RX ADMIN — OXYCODONE HYDROCHLORIDE 10 MG: 5 TABLET ORAL at 09:07

## 2018-07-07 RX ADMIN — ACETAMINOPHEN 650 MG: 325 TABLET, FILM COATED ORAL at 07:07

## 2018-07-07 RX ADMIN — OXYCODONE HYDROCHLORIDE 10 MG: 5 TABLET ORAL at 03:07

## 2018-07-07 RX ADMIN — FENTANYL CITRATE 25 MCG: 50 INJECTION INTRAMUSCULAR; INTRAVENOUS at 03:07

## 2018-07-07 RX ADMIN — ROCURONIUM BROMIDE 10 MG: 10 INJECTION, SOLUTION INTRAVENOUS at 12:07

## 2018-07-07 RX ADMIN — FENTANYL CITRATE 75 MCG: 50 INJECTION, SOLUTION INTRAMUSCULAR; INTRAVENOUS at 11:07

## 2018-07-07 RX ADMIN — MEPERIDINE HYDROCHLORIDE 12.5 MG: 50 INJECTION INTRAMUSCULAR; INTRAVENOUS; SUBCUTANEOUS at 03:07

## 2018-07-07 RX ADMIN — DOCUSATE SODIUM 100 MG: 100 CAPSULE, LIQUID FILLED ORAL at 08:07

## 2018-07-07 RX ADMIN — GLYCOPYRROLATE 0.6 MG: 0.2 INJECTION, SOLUTION INTRAMUSCULAR; INTRAVENOUS at 01:07

## 2018-07-07 RX ADMIN — SODIUM CHLORIDE, SODIUM GLUCONATE, SODIUM ACETATE, POTASSIUM CHLORIDE, MAGNESIUM CHLORIDE, SODIUM PHOSPHATE, DIBASIC, AND POTASSIUM PHOSPHATE: .53; .5; .37; .037; .03; .012; .00082 INJECTION, SOLUTION INTRAVENOUS at 11:07

## 2018-07-07 RX ADMIN — VANCOMYCIN HYDROCHLORIDE 1500 MG: 1 INJECTION, POWDER, LYOPHILIZED, FOR SOLUTION INTRAVENOUS at 12:07

## 2018-07-07 RX ADMIN — ONDANSETRON 4 MG: 2 INJECTION INTRAMUSCULAR; INTRAVENOUS at 02:07

## 2018-07-07 RX ADMIN — ZOLPIDEM TARTRATE 5 MG: 5 TABLET ORAL at 09:07

## 2018-07-07 RX ADMIN — NEOSTIGMINE METHYLSULFATE 4 MG: 1 INJECTION INTRAVENOUS at 01:07

## 2018-07-07 RX ADMIN — CELECOXIB 200 MG: 200 CAPSULE ORAL at 03:07

## 2018-07-07 RX ADMIN — CEFTRIAXONE 2 G: 2 INJECTION, SOLUTION INTRAVENOUS at 07:07

## 2018-07-07 RX ADMIN — MORPHINE SULFATE 4 MG: 4 INJECTION INTRAVENOUS at 10:07

## 2018-07-07 RX ADMIN — MORPHINE SULFATE 2 MG: 4 INJECTION, SOLUTION INTRAMUSCULAR; INTRAVENOUS at 07:07

## 2018-07-07 RX ADMIN — CEFTRIAXONE 2 G: 1 INJECTION, SOLUTION INTRAVENOUS at 12:07

## 2018-07-07 RX ADMIN — ASPIRIN 81 MG: 81 TABLET, COATED ORAL at 08:07

## 2018-07-07 RX ADMIN — FENTANYL CITRATE 25 MCG: 50 INJECTION, SOLUTION INTRAMUSCULAR; INTRAVENOUS at 11:07

## 2018-07-07 RX ADMIN — SODIUM CHLORIDE: 0.9 INJECTION, SOLUTION INTRAVENOUS at 02:07

## 2018-07-07 RX ADMIN — ROCURONIUM BROMIDE 10 MG: 10 INJECTION, SOLUTION INTRAVENOUS at 11:07

## 2018-07-07 RX ADMIN — LOSARTAN POTASSIUM 25 MG: 25 TABLET, FILM COATED ORAL at 03:07

## 2018-07-07 RX ADMIN — SUCCINYLCHOLINE CHLORIDE 140 MG: 20 INJECTION, SOLUTION INTRAMUSCULAR; INTRAVENOUS at 11:07

## 2018-07-07 RX ADMIN — MIDAZOLAM HYDROCHLORIDE 2 MG: 1 INJECTION, SOLUTION INTRAMUSCULAR; INTRAVENOUS at 11:07

## 2018-07-07 RX ADMIN — PANTOPRAZOLE SODIUM 40 MG: 40 TABLET, DELAYED RELEASE ORAL at 03:07

## 2018-07-07 RX ADMIN — LIDOCAINE HYDROCHLORIDE 60 MG: 20 INJECTION, SOLUTION INTRAVENOUS at 11:07

## 2018-07-07 NOTE — PLAN OF CARE
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       Future Appointments  Date Time Provider Department Center   7/12/2018 1:15 PM Stefania Vickers NP [facility] ORTHO [facility]   7/17/2018 1:00 PM Carolina Day,  [facility] PSYCHOL [facility]   7/17/2018 2:30 PM Stefania Vickers NP [facility] ORTHO [facility]   7/17/2018 2:30 PM VISH Garcia [facility] ID [facility]   9/5/2018 10:15 AM Lizzie Fried MD [facility] BARIAT [facility]     Follow-up Information     Stefania Vickers NP. Go on 7/12/2018.    Specialty:  Orthopedic Surgery  Why:  For wound re-check  Contact information:  [address]  [facility] [id number] [phone]                     I have seen and examined this patient face to face today. My clinical findings that support the need for the home health skilled services and home bound status are the following:  Weakness/numbness causing balance and gait disturbance due to Joint Replacement and Infection making it taxing to leave home.    Allergies:  Review of patient's allergies indicates:   Allergen Reactions    No known drug allergies        Diet: regular diet    Activities: activity as tolerated, leg locked out in extension at all times    Nursing:   SN to complete comprehensive assessment including routine vital signs. Instruct on disease process and s/s of complications to report to MD. Follow specific home health arthoplasty protocol. Review/verify medication list sent home with the patient at time  of discharge  and instruct patient/caregiver as needed.    Notify MD if SBP > 160 or < 90; DBP > 90 or < 50; HR > 120 or < 50; Temp > 101    - Unasyn 12 g IV continuous infusion q 24 hours x 6 weeks from day of surgery (estimated end date 8/18/18)  - Patient will need ESR, CRP, CBC and CMP to be drawn weekly with results faxed to the ID Department at  [phone]    Home Medical Equipment:  Walker, 3-1 bedside commode, transfer tub bench    CONSULTS:    Physical Therapy to evaluate and treat. Evaluate for home safety and equipment needs; Establish/upgrade home exercise program. Perform / instruct on therapeutic exercises, gait training, transfer training, and Range of Motion.    WOUND CARE ORDERS  Do not remove surgical dressing for 2 weeks post-op. This will be done only by MD at initial post-op visit    Do not get dressings wet. Do not shower.     If dressing continues to be saturated or there are signs of infection, please call Ortho Clinic [phone] for further instructions and to make appt to be seen.     Please call Jarett Reyes at [phone] with [employer] if incisional vac is malfunctioning or with questions about vac.      Medications: Review discharge medications with patient and family and provide education.      Current Discharge Medication List      START taking these medications    Details   oxyCODONE-acetaminophen (PERCOCET) 5-325 mg per tablet Take 1 tablet by mouth every 4 (four) hours as needed for Pain.  Qty: 60 tablet, Refills: 0         CONTINUE these medications which have CHANGED    Details   aspirin (ECOTRIN) 81 MG EC tablet Take 1 tablet (81 mg total) by mouth 2 (two) times daily.  Qty: 60 tablet, Refills: 0      docusate sodium (COLACE) 100 MG capsule Take 1 capsule (100 mg total) by mouth 2 (two) times daily.  Qty: 60 capsule, Refills: 0      ondansetron (ZOFRAN) 8 MG tablet Take 1 tablet (8 mg total) by mouth every 8 (eight) hours as needed for Nausea.  Qty: 60 tablet, Refills: 0          CONTINUE these medications which have NOT CHANGED    Details   atorvastatin (LIPITOR) 20 MG tablet Take 1 tablet (20 mg total) by mouth once daily.  Qty: 30 tablet, Refills: 11      ceftriaxone sodium (CEFTRIAXONE 2 G/50 ML D5W, READY TO MIX,) Inject 50 mLs (2 g total) into the vein once daily.  Qty: 30 each, Refills: 1      DULoxetine (CYMBALTA) 60 MG capsule Take 1 capsule (60 mg total) by mouth once daily.  Qty: 30 capsule, Refills: 5    Associated Diagnoses: Recurrent major depressive disorder, in full remission; Generalized anxiety disorder      losartan (COZAAR) 25 MG tablet Take 1 tablet (25 mg total) by mouth once daily.  Qty: 90 tablet, Refills: 3      zolpidem (AMBIEN) 5 MG Tab Take 1 tablet (5 mg total) by mouth nightly as needed.  Qty: 30 tablet, Refills: 5    Associated Diagnoses: Primary insomnia         STOP taking these medications       multivitamin (ONE DAILY MULTIVITAMIN) per tablet Comments:   Reason for Stopping:         oxyCODONE-acetaminophen (PERCOCET)  mg per tablet Comments:   Reason for Stopping:               I certify that this patient is confined to his home and needs intermittent skilled nursing care and physical therapy.

## 2018-07-07 NOTE — SUBJECTIVE & OBJECTIVE
Past Medical History:   Diagnosis Date    Anxiety     Arthritis     Depression     when turned 49-lost job and mother unwell at that time    Hyperlipidemia     Lumbar radiculopathy     BRETT- 2011    Osteoarthritis     Sleep apnea     Urinary tract infection        Past Surgical History:   Procedure Laterality Date    IRRIGATION AND DEBRIDEMENT OF LOWER EXTREMITY Left 7/3/2018    Procedure: IRRIGATION AND DEBRIDEMENT, LOWER EXTREMITY, poly exchange left uni knee replacement. Stryler. 9L NS;  Surgeon: Ruperto Olivarez MD;  Location: [facility] OR [address] [place] [address];  Service: Orthopedics;  Laterality: Left;    JOINT REPLACEMENT      knee arthroscopicc surgeries      Right knee- 2007  and Left knee- 2008    KNEE SURGERY      Left wrist Surgery      2000 for a torn ligament from Golf    TOTAL KNEE REPLACEMENT USING COMPUTER NAVIGATION Left 6/22/2018    Procedure: REPLACEMENT-KNEE WITH NAVIGATION-- unicondylar-- medial;  Surgeon: Orlin Jordan MD;  Location: [facility] OR [address] [place] [address];  Service: Orthopedics;  Laterality: Left;       Review of patient's allergies indicates:   Allergen Reactions    No known drug allergies        No current facility-administered medications for this encounter.      Current Outpatient Prescriptions   Medication Sig    aspirin (ECOTRIN) 81 MG EC tablet Take 1 tablet (81 mg total) by mouth 2 (two) times daily.    atorvastatin (LIPITOR) 20 MG tablet Take 1 tablet (20 mg total) by mouth once daily. (Patient taking differently: Take 20 mg by mouth every morning. )    ceftriaxone sodium (CEFTRIAXONE 2 G/50 ML D5W, READY TO MIX,) Inject 50 mLs (2 g total) into the vein once daily.    docusate sodium (COLACE) 100 MG capsule Take 1 capsule (100 mg total) by mouth 2 (two) times daily.    DULoxetine (CYMBALTA) 60 MG capsule Take 1 capsule (60 mg total) by mouth once daily. (Patient taking differently: Take 60 mg by mouth every morning. )    losartan (COZAAR) 25 MG tablet Take 1 tablet (25 mg total) by mouth  "once daily.    multivitamin (ONE DAILY MULTIVITAMIN) per tablet Take 1 tablet by mouth once daily.    ondansetron (ZOFRAN) 8 MG tablet Take 1 tablet (8 mg total) by mouth every 8 (eight) hours as needed for Nausea.    oxyCODONE-acetaminophen (PERCOCET)  mg per tablet Take 1 tablet by mouth every 4 (four) hours as needed for Pain.    zolpidem (AMBIEN) 5 MG Tab Take 1 tablet (5 mg total) by mouth nightly as needed.     Family History     Problem Relation (Age of Onset)    Parkinsonism Mother        Social History Main Topics    Smoking status: Current Every Day Smoker     Types: Cigars    Smokeless tobacco: Never Used      Comment: occasional    Alcohol use 6.0 oz/week     10 Glasses of wine per week      Comment: Daily, last use yesterday    Drug use: No    Sexual activity: Yes     Partners: Female     ROS   Constitutional: negative for fevers  Eyes: no visual changes  ENT: negative for hearing loss  Respiratory: negative for dyspnea  Cardiovascular: negative for chest pain  Gastrointestinal: negative for abdominal pain  Genitourinary: negative for dysuria  Neurological: negative for headaches  Behavioral/Psych: negative for hallucinations  Endocrine: negative for temperature intolerance      Objective:     Vital Signs (Most Recent):  Temp: 98.9 °F (37.2 °C) (07/07/18 0949)  Pulse: 81 (07/07/18 0949)  Resp: 18 (07/07/18 0949)  BP: (!) 147/79 (07/07/18 0949)  SpO2: 98 % (07/07/18 0949) Vital Signs (24h Range):  Temp:  [98.9 °F (37.2 °C)] 98.9 °F (37.2 °C)  Pulse:  [81] 81  Resp:  [18] 18  SpO2:  [98 %] 98 %  BP: (147)/(79) 147/79     Weight: 124.7 kg (275 lb)  Height: 5' 7" (170.2 cm)  Body mass index is 43.07 kg/m².    No intake or output data in the 24 hours ending 07/07/18 1024    Ortho/SPM Exam  Left LOWER EXTREMITY     INSPECTION  - There is breakdown of the proximal incision site. Significant swelling and erythema around the knee. Copious amount of purulent foul smelling discharge.   PALPATION  - " Non TTP  RANGE OF MOTION  - AROM and PROM intact at the knee with pain  NEUROVASCULAR  - TA/EHL/Gastroc/FHL assessed in isolation without deficit  - SILT throughout  - DP and PT palpated  2+  - Capillary Refill <3s     Significant Labs: All pertinent labs within the past 24 hours have been reviewed.    Significant Imaging: I have reviewed and interpreted all pertinent imaging results/findings.

## 2018-07-07 NOTE — TRANSFER OF CARE
"Anesthesia Transfer of Care Note    Patient: Yosef Rowe    Procedure(s) Performed: Procedure(s) (LRB):  REPLACEMENT, POLYETHYLENE LINER (Left)  IRRIGATION AND DEBRIDEMENT, LOWER EXTREMITY (Left)    Patient location: PACU    Anesthesia Type: general    Transport from OR: Transported from OR on 6-10 L/min O2 by face mask with adequate spontaneous ventilation    Post pain: adequate analgesia    Post assessment: no apparent anesthetic complications and tolerated procedure well    Post vital signs: stable    Level of consciousness: responds to stimulation    Nausea/Vomiting: no nausea/vomiting    Complications: none    Transfer of care protocol was followed      Last vitals:   Visit Vitals  BP (!) 202/94 (BP Location: Left arm, Patient Position: Lying)   Pulse 90   Temp 36.5 °C (97.7 °F) (Temporal)   Resp 18   Ht 5' 7" (1.702 m)   Wt 124.7 kg (275 lb)   SpO2 100%   BMI 43.07 kg/m²     "

## 2018-07-07 NOTE — ED NOTES
Ortho resident at bedside and consent signed for washout of Left Knee.  Patients clothes and other belongings placed in a patient belonging bag and labeled with his hospital sticker.  In his belongs is $10, CPaP machine, and a gold metal watch.

## 2018-07-07 NOTE — HPI
Yosef Rowe is a 62 y.o. male who is s/p uni knee replaccment by Dr. Jordan on 6/22. Had some drainage post operatively but was doing well up until a week or so ago when he began having increasing pain and drainage from the incision site. He returned to [facility] on 7/4 and was taken to OR for I and D and poly exchange. He was discharged on 7/5 with PICC and IVAB. Since dc he has continued drainage and pain. Pain has not Reports no fevers or chills. Presents today for repeat I and D and poly exchange.

## 2018-07-07 NOTE — ASSESSMENT & PLAN NOTE
Yosef Rowe is a 62 y.o. male with continued drainage s/p I and D of left knee and poly exchange. Concern for continued infection.    -To OR today 7/7 for repeat I and D and poly exchang  - NPO  - Pre op labs ordered  - Will admit to observation to rosita ALCANTAR back on board

## 2018-07-07 NOTE — ED NOTES
Patient's name and date of birth checked and is correct.  LOC: The patient is awake, alert and aware of environment with an appropriate affect, the patient is oriented x 3 and speaking appropriately.  APPEARANCE: Patient resting comfortably and in no acute distress, patient is clean and well groomed, patient's clothing is properly fastened.  CARDIOVASCULAR:  Heart rate regular and even with no peripheral edema noted.  SKIN: The skin is warm and dry, patient has normal skin turgor and moist mucus membranes, skin intact, no breakdown or brusing noted. MUSKULOSKELETAL: Patient moving all extremities. LLE red and very swollen.  RESPIRATORY: Airway is open and patent, respirations are spontaneous, patient has a normal effort and rate.

## 2018-07-07 NOTE — ED PROVIDER NOTES
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  Patient was recently admitted to the hospital from Monday to Thursday and has returned today for worsened pain.  He spoke with his orthopedist who advised that he return to the emergency department today and will be admitted for repeat washout at admission.  He currently has a PICC line and is receiving IV Rocephin by the home health nurse.  He denies fever.  Reports pain, redness and yellow pus from the wound.  Unable to move the left knee.  Denies weakness or numbness.  No other problems or concerns voiced at this time.    Review of patient's allergies indicates:   Allergen Reactions    No known drug allergies      Past Medical History:   Diagnosis Date    Anxiety     Arthritis     Depression     when turned 49-lost job and mother unwell at that time    Hyperlipidemia     Lumbar radiculopathy     BRETT- 2011    Osteoarthritis     Sleep apnea     Urinary tract infection      Past Surgical History:   Procedure Laterality Date    IRRIGATION AND DEBRIDEMENT OF LOWER EXTREMITY Left 7/3/2018    Procedure: IRRIGATION AND DEBRIDEMENT, LOWER EXTREMITY, poly exchange left uni knee replacement. Stryler. 9L NS;  Surgeon: Ruperto Olivarez MD;  Location: [facility] OR [address] [place] [address];  Service: Orthopedics;  Laterality: Left;    IRRIGATION AND DEBRIDEMENT OF LOWER EXTREMITY Left 7/7/2018    Procedure: IRRIGATION AND DEBRIDEMENT, LOWER EXTREMITY;  Surgeon: Orlin Jordan MD;  Location: [facility] OR [address] [place] [address];  Service: Orthopedics;  Laterality: Left;    JOINT REPLACEMENT      knee arthroscopicc surgeries      Right knee- 2007  and Left knee- 2008    KNEE SURGERY      Left wrist  Surgery      2000 for a torn ligament from Golf    TOTAL KNEE REPLACEMENT USING COMPUTER NAVIGATION Left 6/22/2018    Procedure: REPLACEMENT-KNEE WITH NAVIGATION-- unicondylar-- medial;  Surgeon: Orlin Jordan MD;  Location: [facility] OR [address] [place] [address];  Service: Orthopedics;  Laterality: Left;     Family History   Problem Relation Age of Onset    Parkinsonism Mother      Social History   Substance Use Topics    Smoking status: Current Every Day Smoker     Types: Cigars    Smokeless tobacco: Never Used      Comment: occasional    Alcohol use 6.0 oz/week     10 Glasses of wine per week      Comment: Daily, last use yesterday     Review of Systems   Constitutional: Negative for chills and fever.   HENT: Negative for congestion and sinus pressure.    Eyes: Negative for visual disturbance.   Respiratory: Negative for cough, chest tightness and shortness of breath.    Cardiovascular: Negative for chest pain.   Gastrointestinal: Negative for abdominal pain, diarrhea, nausea and vomiting.   Genitourinary: Negative for flank pain. Negative for dysuria.  Musculoskeletal:  Positive for arthralgia.  Skin:  Positive for wound infection.  Positive for redness. Positive for swelling.  Neurological: Negative for dizziness. Negative for weakness and numbness.   Psychiatric/Behavioral: Negative for confusion.         Physical Exam     Initial Vitals [07/07/18 0949]   BP Pulse Resp Temp SpO2   (!) 147/79 81 18 98.9 °F (37.2 °C) 98 %      MAP       --         Physical Exam   Constitutional: Negative for chills and fever.   HENT: Negative for congestion and sinus pressure.    Eyes: Negative for visual disturbance.   Respiratory: Negative for cough, chest tightness and shortness of breath.    Cardiovascular: Negative for chest pain.   Gastrointestinal: Negative for abdominal pain, diarrhea, nausea and vomiting.   Genitourinary: Negative for flank pain. Negative for dysuria.  Musculoskeletal:  Swelling and erythema noted to the left lower  extremity.    Skin:  Postoperative wound infection noted.   Neurological: Negative for dizziness. Negative for weakness and numbness.   Psychiatric/Behavioral: Negative for confusion.         ED Course   Procedures  Labs Reviewed   C-REACTIVE PROTEIN - Abnormal; Notable for the following:        Result Value    .5 (*)     All other components within normal limits   CBC W/ AUTO DIFFERENTIAL - Abnormal; Notable for the following:     RBC 3.28 (*)     Hemoglobin 11.1 (*)     Hematocrit 33.7 (*)      (*)     MCH 33.8 (*)     Immature Granulocytes 4.2 (*)     Immature Grans (Abs) 0.41 (*)     Lymph # 0.9 (*)     Mono # 1.4 (*)     Lymph% 9.2 (*)     All other components within normal limits   COMPREHENSIVE METABOLIC PANEL - Abnormal; Notable for the following:     Albumin 2.6 (*)     All other components within normal limits   HEMOGLOBIN A1C          Imaging Results    None          Medical Decision Making:   ED Management:  This is a 62-year-old male who is status post left total knee arthroplasty on 06/22/2018.  He has had a post operative course complicated by joint infection, involving 2 washouts since that surgery.  He was discharged home from the hospital on Thursday and has continued to have worsened pain. He contacted his orthopedist who instructed him to come through the ER.  Orthopedics plans to admit the patient for repeat washout today.  Patient is on IV Rocephin per home health.                      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                             Roberta Dong NP  07/09/18 [id number]

## 2018-07-07 NOTE — PT/OT/SLP DISCHARGE
Occupational Therapy Discharge Summary    Yosef Rowe  MRN: [id number]   Principal Problem: Infected prosthetic knee joint, initial encounter      Patient Discharged from acute Occupational Therapy on 7/5/2018.  Please refer to prior OT note dated 7/5/2018 for functional status.    Assessment:      Patient appropriate for care in another setting.    Objective:     GOALS:    Occupational Therapy Goals        Problem: Occupational Therapy Goal    Goal Priority Disciplines Outcome Interventions   Occupational Therapy Goal     OT, PT/OT Ongoing (interventions implemented as appropriate)    Description:  Goals to be met by: 7/18/18     Patient will increase functional independence with ADLs by performing:    UE Dressing with Set-up Assistance.  LE Dressing with Minimal Assistance and Assistive Devices as needed.  Grooming while EOB with Set-up Assistance. -- MET in standing (7/5)  Toileting from bedside commode with Supervision for hygiene and clothing management.   Toilet transfer to bedside commode with Stand-by Assistance.                       Reasons for Discontinuation of Therapy Services  Transfer to alternate level of care.      Plan:     Patient Discharged to: Home with Home Health Service    Milagro Ramos, OT  7/7/2018

## 2018-07-07 NOTE — ED TRIAGE NOTES
Presents to ER with a non healing surgical wound to his left knee.  Had his original surgery 6/22/18 and has had 2 wash outs since that time.  Has a PICC and is receiving IV ABS.  States that he is in terrible pain and is left is red and swollen.

## 2018-07-07 NOTE — ANESTHESIA PREPROCEDURE EVALUATION
Pre-operative evaluation for REVISION, ARTHROPLASTY, KNEE - nura - uni knee poly exchange - left (Left)    Case Discussion:  Yosef Rowe is a 62 y.o. male with L Uni-Knee replacement complicated by infection presenting for above procedure. Also has HTN, DM-II, IVORY, Morbid obesity, anxiety, and insomnia. NPO since midnight.    Initial replacement done under CSE, follow-up debridement done under GETA with Glidescope.       Previous Airway:  - Intubation: postinduction, direct laryngoscopy, Glidescope.     Endotracheal tube: oral, 7.5 mm ID, cuffed (inflated to minimal occlusive pressure), stylet utilized in ETT.   Laryngoscopy x 1 = Grade I - full view of cords.   Complicating factors: small mouth, poor neck/head extension, obesity, short neck and none.   Findings post-intubation: positive ETCO2, bilateral breath sounds and atraumatic / condition of teeth unchanged.   Tube secured at 23 cm at the lips.   Complications: none.     Past Surgical History:   Procedure Laterality Date    IRRIGATION AND DEBRIDEMENT OF LOWER EXTREMITY Left 7/3/2018    Procedure: IRRIGATION AND DEBRIDEMENT, LOWER EXTREMITY, poly exchange left uni knee replacement. Stryler. 9L NS;  Surgeon: Ruperto Olivarez MD;  Location: [facility] OR [address] [place] [address];  Service: Orthopedics;  Laterality: Left;    JOINT REPLACEMENT      knee arthroscopicc surgeries      Right knee- 2007  and Left knee- 2008    KNEE SURGERY      Left wrist Surgery      2000 for a torn ligament from Golf    TOTAL KNEE REPLACEMENT USING COMPUTER NAVIGATION Left 6/22/2018    Procedure: REPLACEMENT-KNEE WITH NAVIGATION-- unicondylar-- medial;  Surgeon: Orlin Jordan MD;  Location: [facility] OR [address] [place] [address];  Service: Orthopedics;  Laterality: Left;         Vital Signs Range (Last 24H):  Temp:  [37.2 °C (98.9 °F)-37.2 °C (99 °F)]   Pulse:  [79-81]   Resp:  [18-22]   BP: (147-175)/(79)   SpO2:  [95 %-98 %]       CBC:     Recent Labs  Lab 06/14/18  0821 07/03/18  1120 07/05/18  1029 07/07/18  1008    WBC 5.77 20.77* 11.22 9.66   RBC 4.02* 3.72* 3.17* 3.28*   HGB 13.8* 12.9* 10.8* 11.1*   HCT 41.3 37.7* 33.2* 33.7*    210 193 276   * 101* 105* 103*   MCH 34.3* 34.7* 34.1* 33.8*   MCHC 33.4 34.2 32.5 32.9       CMP:   Recent Labs  Lab 06/14/18  0821 07/03/18  1529 07/03/18  1835 07/05/18  1029 07/07/18  1008   * 134*  --  135* 139   K 4.3 4.5  --  4.0 3.7    100  --  105 103   CO2 31* 24  --  23 25   BUN 12 22  --  13 12   CREATININE 0.8 0.9  --  0.8 0.8   GLU 96 100  --  100 102   MG  --   --  1.8  --   --    PHOS  --   --  2.9  --   --    CALCIUM 9.8 9.1  --  8.9 9.6   ALBUMIN  --  3.1*  --   --  2.6*   PROT  --  6.4  --   --  6.5   ALKPHOS  --  58  --   --  92   ALT  --  30  --   --  28   AST  --  28  --   --  33   BILITOT  --  1.3*  --   --  1.0       INR:    Recent Labs  Lab 06/14/18  0821   INR 0.9         Diagnostic Studies:      EKG:  -Sinus bradycardia    PET Stress:  CONCLUSIONS: NO CLINICALLY SIGNIFICANT STRESS INDUCED PERFUSION DEFECTS as assessed with PET perfusion imaging.  1. There is no evidence of a discrete hemodynamically significant coronary stenosis.   2. Although no clinically significant stress defect is seen, there is reduced coronary flow reserve. These results suggest mild endothelial dysfunction due to mild, diffuse, non-obstructive coronary atherosclerosis without clinically significant,   localized perfusion defects.   3. Resting wall motion is physiologic. Stress wall motion is physiologic.   4. LV function is normal at rest and stress.  (normal is >= 51%)  5. The ventricular volumes are normal at rest and stress.   6. The extracardiac distribution of radioactivity is normal.   7. There was no previous study available to compare.    Anesthesia Evaluation    I have reviewed the Patient Summary Reports.     I have reviewed the Medications.     Review of Systems  Anesthesia Hx:  No problems with previous Anesthesia  History of prior surgery of interest to  airway management or planning: Denies Family Hx of Anesthesia complications.   Denies Personal Hx of Anesthesia complications.   Cardiovascular:   Exercise tolerance: good CAD asymptomatic   Denies Angina.    Pulmonary:   Sleep Apnea    Hepatic/GI:  Hepatic/GI Normal    Psych:   depression (history of)          Physical Exam  General:  Well nourished, Morbid Obesity    Airway/Jaw/Neck:  Airway Findings: Mouth Opening: Normal Tongue: Normal  General Airway Assessment: Adult, Possible difficult intubation, Possible difficult mask airway  Thick neck  Mallampati: III  TM Distance: Normal, at least 6 cm      Dental:  Dental Findings: In tact   Chest/Lungs:  Chest/Lungs Findings: Normal Respiratory Rate     Heart/Vascular:  Heart Findings: Rate: Normal        Mental Status:  Mental Status Findings:  Alert and Oriented         Anesthesia Plan  Type of Anesthesia, risks & benefits discussed:  Anesthesia Type:  regional, MAC, general, epidural, spinal, CSE  Patient's Preference:   Intra-op Monitoring Plan: standard ASA monitors  Intra-op Monitoring Plan Comments:   Post Op Pain Control Plan:   Post Op Pain Control Plan Comments:   Induction:   IV  Beta Blocker:  Patient is not currently on a Beta-Blocker (No further documentation required).       Informed Consent: Patient understands risks and agrees with Anesthesia plan.  Questions answered. Anesthesia consent signed with patient.  ASA Score: 3     Day of Surgery Review of History & Physical:    H&P update referred to the surgeon.         Ready For Surgery From Anesthesia Perspective.

## 2018-07-07 NOTE — ANESTHESIA PROCEDURE NOTES
Adductor Canal SIngle Shot    Patient location during procedure: OR   Block not for primary anesthetic.  Reason for block: at surgeon's request and post-op pain management   Post-op Pain Location: Left Knee  Start time: 7/7/2018 2:33 PM  Timeout: 7/7/2018 2:28 PM   End time: 7/7/2018 2:37 PM  Staffing  Anesthesiologist: MARIA LUZ ABARCA  Resident/CRNA: MELVIN RENAE  Performed: resident/CRNA   Preanesthetic Checklist  Completed: patient identified, site marked, surgical consent, pre-op evaluation, timeout performed, IV checked, risks and benefits discussed and monitors and equipment checked  Peripheral Block  Patient position: supine  Prep: ChloraPrep  Patient monitoring: heart rate, cardiac monitor, continuous pulse ox, continuous capnometry and frequent blood pressure checks  Block type: adductor canal  Laterality: left  Injection technique: single shot  Needle  Needle type: Stimuplex   Needle gauge: 22 G  Needle length: 4 in  Needle localization: ultrasound guidance   -ultrasound image captured on disc.  Assessment  Injection assessment: positive aspiration  Medications:  Bolus administered: 23 mL of 0.25 bupivacaine  Epinephrine added: 3.75 mcg/mL (1/300,000)  Additional Notes  Needle advanced with last 3cc of local. Confirmed IV injection with aspiration and stopped injection.

## 2018-07-07 NOTE — NURSING TRANSFER
Nursing Transfer Note      7/7/2018     Transfer To: 510    Transfer via bed    Transfer with ivf    Transported by transport    Medicines sent: none    Chart send with patient: Yes    Notified: na    Patient reassessed at: 7/7/18 1600

## 2018-07-07 NOTE — TELEPHONE ENCOUNTER
Tried calling patient through hospital  and through person cell yesterday and this morning to discuss treatment.  No success via either route.  I have left my personal cell phone number on his voicemail in hopes of a return call.  I have been told though Dr. Olivarez that his would vac came off within 1 day of discharge and that he refuses to come in for re-application of wound vac.

## 2018-07-07 NOTE — CONSULTS
[facility]  Orthopedics  Consult Note    Patient Name: Yosef Rowe  MRN: [id number]  Admission Date: 7/7/2018  Hospital Length of Stay: 0 days  Attending Provider: Marissa Peter MD  Primary Care Provider: Ruperto Dexter MD    Patient information was obtained from patient and ER records.     Consults  Subjective:     Principal Problem:<principal problem not specified>    Chief Complaint:   Chief Complaint   Patient presents with    Post-op Problem     had knee replacement june 22 on home antibiotics        HPI: Yosef Rowe is a 62 y.o. male who is s/p uni knee replaccment by Dr. Jordan on 6/22. Had some drainage post operatively but was doing well up until a week or so ago when he began having increasing pain and drainage from the incision site. He returned to [facility] on 7/4 and was taken to OR for I and D and poly exchange. He was discharged on 7/5 with PICC and IVAB. Since dc he has continued drainage and pain. Pain has not Reports no fevers or chills. Presents today for repeat I and D and poly exchange.     Past Medical History:   Diagnosis Date    Anxiety     Arthritis     Depression     when turned 49-lost job and mother unwell at that time    Hyperlipidemia     Lumbar radiculopathy     BRETT- 2011    Osteoarthritis     Sleep apnea     Urinary tract infection        Past Surgical History:   Procedure Laterality Date    IRRIGATION AND DEBRIDEMENT OF LOWER EXTREMITY Left 7/3/2018    Procedure: IRRIGATION AND DEBRIDEMENT, LOWER EXTREMITY, poly exchange left uni knee replacement. Stryler. 9L NS;  Surgeon: Ruperto Olivarez MD;  Location: [address];  Service: Orthopedics;  Laterality: Left;    JOINT REPLACEMENT      knee arthroscopicc surgeries      Right knee- 2007  and Left knee- 2008    KNEE SURGERY      Left wrist Surgery      2000 for a torn ligament from Golf    TOTAL KNEE REPLACEMENT USING COMPUTER NAVIGATION Left 6/22/2018    Procedure: REPLACEMENT-KNEE WITH  NAVIGATION-- unicondylar-- medial;  Surgeon: Orlin Jordan MD;  Location: [facility] OR [address] [place] [address];  Service: Orthopedics;  Laterality: Left;       Review of patient's allergies indicates:   Allergen Reactions    No known drug allergies        No current facility-administered medications for this encounter.      Current Outpatient Prescriptions   Medication Sig    aspirin (ECOTRIN) 81 MG EC tablet Take 1 tablet (81 mg total) by mouth 2 (two) times daily.    atorvastatin (LIPITOR) 20 MG tablet Take 1 tablet (20 mg total) by mouth once daily. (Patient taking differently: Take 20 mg by mouth every morning. )    ceftriaxone sodium (CEFTRIAXONE 2 G/50 ML D5W, READY TO MIX,) Inject 50 mLs (2 g total) into the vein once daily.    docusate sodium (COLACE) 100 MG capsule Take 1 capsule (100 mg total) by mouth 2 (two) times daily.    DULoxetine (CYMBALTA) 60 MG capsule Take 1 capsule (60 mg total) by mouth once daily. (Patient taking differently: Take 60 mg by mouth every morning. )    losartan (COZAAR) 25 MG tablet Take 1 tablet (25 mg total) by mouth once daily.    multivitamin (ONE DAILY MULTIVITAMIN) per tablet Take 1 tablet by mouth once daily.    ondansetron (ZOFRAN) 8 MG tablet Take 1 tablet (8 mg total) by mouth every 8 (eight) hours as needed for Nausea.    oxyCODONE-acetaminophen (PERCOCET)  mg per tablet Take 1 tablet by mouth every 4 (four) hours as needed for Pain.    zolpidem (AMBIEN) 5 MG Tab Take 1 tablet (5 mg total) by mouth nightly as needed.     Family History     Problem Relation (Age of Onset)    Parkinsonism Mother        Social History Main Topics    Smoking status: Current Every Day Smoker     Types: Cigars    Smokeless tobacco: Never Used      Comment: occasional    Alcohol use 6.0 oz/week     10 Glasses of wine per week      Comment: Daily, last use yesterday    Drug use: No    Sexual activity: Yes     Partners: Female     ROS   Constitutional: negative for fevers  Eyes: no visual  "changes  ENT: negative for hearing loss  Respiratory: negative for dyspnea  Cardiovascular: negative for chest pain  Gastrointestinal: negative for abdominal pain  Genitourinary: negative for dysuria  Neurological: negative for headaches  Behavioral/Psych: negative for hallucinations  Endocrine: negative for temperature intolerance      Objective:     Vital Signs (Most Recent):  Temp: 98.9 °F (37.2 °C) (07/07/18 0949)  Pulse: 81 (07/07/18 0949)  Resp: 18 (07/07/18 0949)  BP: (!) 147/79 (07/07/18 0949)  SpO2: 98 % (07/07/18 0949) Vital Signs (24h Range):  Temp:  [98.9 °F (37.2 °C)] 98.9 °F (37.2 °C)  Pulse:  [81] 81  Resp:  [18] 18  SpO2:  [98 %] 98 %  BP: (147)/(79) 147/79     Weight: 124.7 kg (275 lb)  Height: 5' 7" (170.2 cm)  Body mass index is 43.07 kg/m².    No intake or output data in the 24 hours ending 07/07/18 1024    Ortho/SPM Exam  Left LOWER EXTREMITY     INSPECTION  - There is breakdown of the proximal incision site. Significant swelling and erythema around the knee. Copious amount of purulent foul smelling discharge.   PALPATION  - Non TTP  RANGE OF MOTION  - AROM and PROM intact at the knee with pain  NEUROVASCULAR  - TA/EHL/Gastroc/FHL assessed in isolation without deficit  - SILT throughout  - DP and PT palpated  2+  - Capillary Refill <3s     Significant Labs: All pertinent labs within the past 24 hours have been reviewed.    Significant Imaging: I have reviewed and interpreted all pertinent imaging results/findings.    Assessment/Plan:     Infection of prosthetic left knee joint    Yosef Rowe is a 62 y.o. male with continued drainage s/p I and D of left knee and poly exchange. Concern for continued infection.    -To OR today 7/7 for repeat I and D and poly exchang  - NPO  - Pre op labs ordered  - Will admit to observation to rosita ID back on board               Helio Villasenor MD  Orthopedics  [facility]      "

## 2018-07-08 LAB
BACTERIA BLD CULT: NORMAL
BACTERIA BLD CULT: NORMAL

## 2018-07-08 PROCEDURE — 97161 PT EVAL LOW COMPLEX 20 MIN: CPT | Performed by: PHYSICAL THERAPIST

## 2018-07-08 PROCEDURE — 97165 OT EVAL LOW COMPLEX 30 MIN: CPT

## 2018-07-08 PROCEDURE — 99223 1ST HOSP IP/OBS HIGH 75: CPT | Mod: ,,, | Performed by: PHYSICIAN ASSISTANT

## 2018-07-08 PROCEDURE — 25000003 PHARM REV CODE 250: Performed by: PHYSICIAN ASSISTANT

## 2018-07-08 PROCEDURE — 11000001 HC ACUTE MED/SURG PRIVATE ROOM

## 2018-07-08 PROCEDURE — 99900035 HC TECH TIME PER 15 MIN (STAT)

## 2018-07-08 PROCEDURE — 25000003 PHARM REV CODE 250: Performed by: STUDENT IN AN ORGANIZED HEALTH CARE EDUCATION/TRAINING PROGRAM

## 2018-07-08 PROCEDURE — 63600175 PHARM REV CODE 636 W HCPCS: Performed by: STUDENT IN AN ORGANIZED HEALTH CARE EDUCATION/TRAINING PROGRAM

## 2018-07-08 PROCEDURE — 63600175 PHARM REV CODE 636 W HCPCS: Performed by: PHYSICIAN ASSISTANT

## 2018-07-08 PROCEDURE — 99499 UNLISTED E&M SERVICE: CPT | Mod: ,,, | Performed by: PHYSICIAN ASSISTANT

## 2018-07-08 PROCEDURE — 97116 GAIT TRAINING THERAPY: CPT | Performed by: PHYSICAL THERAPIST

## 2018-07-08 RX ADMIN — ACETAMINOPHEN 650 MG: 325 TABLET, FILM COATED ORAL at 06:07

## 2018-07-08 RX ADMIN — OXYCODONE HYDROCHLORIDE 10 MG: 5 TABLET ORAL at 05:07

## 2018-07-08 RX ADMIN — ACETAMINOPHEN 650 MG: 325 TABLET, FILM COATED ORAL at 12:07

## 2018-07-08 RX ADMIN — ASPIRIN 81 MG: 81 TABLET, COATED ORAL at 08:07

## 2018-07-08 RX ADMIN — OXYCODONE HYDROCHLORIDE 10 MG: 5 TABLET ORAL at 10:07

## 2018-07-08 RX ADMIN — DULOXETINE 60 MG: 60 CAPSULE, DELAYED RELEASE ORAL at 06:07

## 2018-07-08 RX ADMIN — LOSARTAN POTASSIUM 25 MG: 25 TABLET, FILM COATED ORAL at 08:07

## 2018-07-08 RX ADMIN — POLYETHYLENE GLYCOL 3350 17 G: 17 POWDER, FOR SOLUTION ORAL at 02:07

## 2018-07-08 RX ADMIN — VANCOMYCIN HYDROCHLORIDE 1500 MG: 10 INJECTION, POWDER, LYOPHILIZED, FOR SOLUTION INTRAVENOUS at 11:07

## 2018-07-08 RX ADMIN — MORPHINE SULFATE 2 MG: 4 INJECTION, SOLUTION INTRAMUSCULAR; INTRAVENOUS at 01:07

## 2018-07-08 RX ADMIN — AMPICILLIN AND SULBACTAM 3 G: 2; 1 INJECTION, POWDER, FOR SOLUTION INTRAVENOUS at 02:07

## 2018-07-08 RX ADMIN — MORPHINE SULFATE 2 MG: 4 INJECTION, SOLUTION INTRAMUSCULAR; INTRAVENOUS at 07:07

## 2018-07-08 RX ADMIN — ZOLPIDEM TARTRATE 5 MG: 5 TABLET ORAL at 10:07

## 2018-07-08 RX ADMIN — OXYCODONE HYDROCHLORIDE 10 MG: 5 TABLET ORAL at 06:07

## 2018-07-08 RX ADMIN — CELECOXIB 200 MG: 200 CAPSULE ORAL at 08:07

## 2018-07-08 RX ADMIN — MORPHINE SULFATE 2 MG: 4 INJECTION, SOLUTION INTRAMUSCULAR; INTRAVENOUS at 03:07

## 2018-07-08 RX ADMIN — AMPICILLIN AND SULBACTAM 3 G: 2; 1 INJECTION, POWDER, FOR SOLUTION INTRAVENOUS at 10:07

## 2018-07-08 RX ADMIN — MORPHINE SULFATE 2 MG: 4 INJECTION, SOLUTION INTRAMUSCULAR; INTRAVENOUS at 08:07

## 2018-07-08 RX ADMIN — MORPHINE SULFATE 2 MG: 4 INJECTION, SOLUTION INTRAMUSCULAR; INTRAVENOUS at 11:07

## 2018-07-08 RX ADMIN — DOCUSATE SODIUM 100 MG: 100 CAPSULE, LIQUID FILLED ORAL at 08:07

## 2018-07-08 RX ADMIN — ACETAMINOPHEN 650 MG: 325 TABLET, FILM COATED ORAL at 05:07

## 2018-07-08 RX ADMIN — PANTOPRAZOLE SODIUM 40 MG: 40 TABLET, DELAYED RELEASE ORAL at 08:07

## 2018-07-08 RX ADMIN — MORPHINE SULFATE 2 MG: 4 INJECTION, SOLUTION INTRAMUSCULAR; INTRAVENOUS at 05:07

## 2018-07-08 RX ADMIN — ACETAMINOPHEN 650 MG: 325 TABLET, FILM COATED ORAL at 11:07

## 2018-07-08 RX ADMIN — PREGABALIN 75 MG: 75 CAPSULE ORAL at 08:07

## 2018-07-08 RX ADMIN — OXYCODONE HYDROCHLORIDE 10 MG: 5 TABLET ORAL at 02:07

## 2018-07-08 RX ADMIN — ATORVASTATIN CALCIUM 20 MG: 20 TABLET, FILM COATED ORAL at 06:07

## 2018-07-08 NOTE — ASSESSMENT & PLAN NOTE
62 year old male with history of left TKA on 6/22 complicated by prosthetic knee infection s/p washout and polyethylene liner exchange on 7/3. Surgical cultures grew Pasturella multocida. Of note, he does mention he has a kitten at home. He was discharged on Ceftriaxone three days ago. Patient was re-admitted yesterday as he had continued purulent drainage and pain of his left knee. He was taken to the OR for a repeat washout and poly exchange. Surgical cultures are pending. Per discussion with ortho, infected fluid was encountered again in surgery and team is concerned patient did not respond well to ceftriaxone. He is currently on Vancomycin and Unasyn. Afebrile. No leukocytosis. Reports symptomatic improvement following washout.    Plan  - Continue Vancomycin and Unasyn. Vanc trough due before 4th dose (ordered)  - Called micro lab to run Pasteurella susceptibilities  - Will follow surgical cultures and tailor antibiotics accordingly  - Plan for 6 weeks of IV antibiotics for a prosthetic joint infection  - ID will follow.

## 2018-07-08 NOTE — PT/OT/SLP EVAL
Occupational Therapy   Evaluation    Name: Yosef Rowe  MRN: [id number]  Admitting Diagnosis:  Infection of prosthetic left knee joint 1 Day Post-Op    Recommendations:     Discharge Recommendations: home health OT  Discharge Equipment Recommendations:  bedside commode  Barriers to discharge:  None    History:     Occupational Profile:  Living Environment: lives in apt downstairs, alone, reports he has friends he can call on for assist  Previous level of function: independent  Roles and Routines: none stated  Equipment Owned:  walker, standard, shower chair  Assistance upon Discharge: friends if needed    Past Medical History:   Diagnosis Date    Anxiety     Arthritis     Depression     when turned 49-lost job and mother unwell at that time    Hyperlipidemia     Lumbar radiculopathy     BRETT- 2011    Osteoarthritis     Sleep apnea     Urinary tract infection        Past Surgical History:   Procedure Laterality Date    IRRIGATION AND DEBRIDEMENT OF LOWER EXTREMITY Left 7/3/2018    Procedure: IRRIGATION AND DEBRIDEMENT, LOWER EXTREMITY, poly exchange left uni knee replacement. Stryler. 9L NS;  Surgeon: Ruperto Olivarez MD;  Location: [facility] OR [address] [place] [address];  Service: Orthopedics;  Laterality: Left;    JOINT REPLACEMENT      knee arthroscopicc surgeries      Right knee- 2007  and Left knee- 2008    KNEE SURGERY      Left wrist Surgery      2000 for a torn ligament from Golf    TOTAL KNEE REPLACEMENT USING COMPUTER NAVIGATION Left 6/22/2018    Procedure: REPLACEMENT-KNEE WITH NAVIGATION-- unicondylar-- medial;  Surgeon: Orlin Jordan MD;  Location: [address];  Service: Orthopedics;  Laterality: Left;       Subjective     Chief Complaint: multiple c/o about nsg care and prior care  Patient/Family stated goals: go home  Communicated with: nsg prior to session. Cc with PT  Pain/Comfort:  · Pain Rating 1: 5/10 (LLE)  · Pain Addressed 1: Reposition    Patients cultural, spiritual, Amish conflicts given the  "current situation: none    Objective:     Patient found with: telemetry, wound vac, peripheral IV    General Precautions: Standard, fall   Orthopedic Precautions:LLE weight bearing as tolerated   Braces: Hinged knee brace (locked in extension)     Occupational Performance:    Bed Mobility:    · Sup to sit with min assist with LLE    Functional Mobility/Transfers:  · Sit to stand with CGA  · Functional Mobility: ambulated in hallway household distance with RW with CGA  · T/f to chair with CGA and min cues for technique and LLE positioning    Activities of Daily Living:  · Grooming seated with setup to bedside  · UE dress with gown with setup to bedside,seated  · Socks total assist    Cognitive/Visual Perceptual:  intact    Physical Exam:  BUE 5/5 strength    Patient left up in chair with all lines intact and call button in reach    [facility] 6 Click:  [facility] Total Score: 21    Treatment & Education:  Performed above activity, educated on POC, importance of OOB to chair as tolerated, progression of activity.  Education:    Assessment:     Yosef Rowe is a 62 y.o. male with a medical diagnosis of Infection of prosthetic left knee joint.  He presents with good strength for mobility.  Performance deficits affecting function are weakness, impaired endurance, impaired self care skills, impaired functional mobilty, gait instability, decreased ROM, orthopedic precautions.      Rehab Prognosis:  good; patient would benefit from acute skilled OT services to address these deficits and reach maximum level of function.         Clinical Decision Makin.  OT Low:  "Pt evaluation falls under low complexity for evaluation coding due to performance deficits noted in 1-3 areas as stated above and 0 co-morbities affecting current functional status. Data obtained from problem focused assessments. No modifications or assistance was required for completion of evaluation. Only brief occupational profile and history review completed." "     Plan:     Patient to be seen 3 x/week to address the above listed problems via self-care/home management, therapeutic activities, therapeutic exercises  · Plan of Care Expires: 08/07/18  · Plan of Care Reviewed with: patient    This Plan of care has been discussed with the patient who was involved in its development and understands and is in agreement with the identified goals and treatment plan    GOALS:    Occupational Therapy Goals        Problem: Occupational Therapy Goal    Goal Priority Disciplines Outcome Interventions   Occupational Therapy Goal     OT, PT/OT Ongoing (interventions implemented as appropriate)    Description:  Goals to be met by: 7/22/18     Patient will increase functional independence with ADLs by performing:    UE Dressing with mod I.  LE Dressing with Modified L[place].  Grooming while standing with Modified [place].  Toileting from toilet with Modified [place] for hygiene and clothing management.   Toilet transfer to toilet with Modified [place].                      Time Tracking:     OT Date of Treatment: 07/08/18  OT Start Time: 0743  OT Stop Time: 0811  OT Total Time (min): 28 min    Billable Minutes:Evaluation 28 min    Erika Arellano OT  7/8/2018

## 2018-07-08 NOTE — PROGRESS NOTES
"Spoke to Dr. Sewell about verifying placement of PICC line and receiving an "okay" to use PICC line. Was ordered to hold off with using PICC at this time, keep pt as a lab collect and will address this in the AM.   "

## 2018-07-08 NOTE — PROGRESS NOTES
RTT paged to set up pt on CPAP machine. PRN and breakthrough medication given to pt. Pt seems agitated and overstimulated. Charge Nurse also making attempts to quickly answer pt's request. Pt requesting to not be bothered throughout the night. He is requesting that his vitals not be taken throughout the night but I am allowed to give him any medication that is scheduled and also visualize pt for frequent checks. Will attempt to cluster care and provided a calm environment to promote rest.

## 2018-07-08 NOTE — HPI
Mr. Rowe is a 62 year old male with history of left TKA on 6/22 complicated by prosthetic knee infection s/p washout and polyethylene liner exchange on 7/3. Per op note, a good deal of purulent and murky discharge was encountered in surgery. Surgical cultures grew Pasturella multocida. Patient reports he bought a new cat 3 days prior to his TKA. Denies cat scratches/bites/contact with the wound. He was discharged on Ceftriaxone.     Patient was re-admitted yesterday as he had continued purulent drainage and pain of his left knee. He was taken to the OR for a repeat washout and poly exchange. Surgical cultures are pending. Per discussion with ortho, infected fluid was encountered again. Ortho team is concerned patient did not respond well to ceftriaxone. He is currently on Vancomycin and unasyn. Afebrile. He is seen at bedside. Reports a significant improvement in pain now that his knee was washed out again. He states pain is much better than it was following prior washout. Denies fevers, chills, sweats.

## 2018-07-08 NOTE — CONSULTS
[facility]  Infectious Disease  Consult Note    Patient Name: Yosef Rowe  MRN: [id number]  Admission Date: 7/7/2018  Hospital Length of Stay: 1 days  Attending Physician: Orlin Jordan MD  Primary Care Provider: Ruperto Dexter MD       Inpatient consult to Infectious Diseases  Consult performed by: LUDY HUTCHISON  Consult ordered by: RADHA CRUZ consult received. Patient known to service. Continue broad spectrum antibiotics pending new culture data. Full consult note to follow.      Thank you,  Ludy Hutchison PA-C  [phone]

## 2018-07-08 NOTE — PT/OT/SLP EVAL
Physical Therapy Evaluation    Patient Name:  Yosef Rowe   MRN:  [id number]    Recommendations:     Discharge Recommendations:  home health PT (with intermittent assist from friends)   Discharge Equipment Recommendations: bedside commode   Barriers to discharge: Decreased caregiver support (reports he has help from friends)    Assessment:     Yosef Rowe is a 62 y.o. male admitted with a medical diagnosis of Infection of prosthetic left knee joint.  He presents with the following impairments/functional limitations:  pain, decreased lower extremity function, impaired endurance, impaired functional mobilty, gait instability, decreased ROM.  Pt demonstrated good strength for transfers and gait with walker.  Required min A for bed mobility.  Reports he will have support from friends upon d/c and is hoping to d/c home. May be some concern with wound vac management if d/c home with large wound vac as this will be difficult for pt to ambulate with alone.  May need a little more supprot from friends or increased HH support.  .    Rehab Prognosis:  good; patient would benefit from acute skilled PT services to address these deficits and reach maximum level of function.      Recent Surgery: Procedure(s) (LRB):  REPLACEMENT, POLYETHYLENE LINER (Left)  IRRIGATION AND DEBRIDEMENT, LOWER EXTREMITY (Left) 1 Day Post-Op    Plan:     During this hospitalization, patient to be seen daily to address the above listed problems via gait training, therapeutic activities, therapeutic exercises  · Plan of Care Expires:  08/07/18   Plan of Care Reviewed with: patient    Subjective     Communicated with RN prior to session.  Patient found supine upon PT entry to room, agreeable to evaluation.      Chief Complaint: knee pain (pt reprots feeling much better since procedure yesterday)  Patient comments/goals: desire to get better.   Pain/Comfort:  · Pain Rating 1: 5/10  · Location - Side 1: Left  · Location 1: knee  · Pain Addressed 1:  Reposition  · Pain Rating Post-Intervention 1: 5/10    Patients cultural, spiritual, Orthodoxy conflicts given the current situation: none    Living Environment:  Lives alone in an apartment.    Prior to admission, patients level of function was independent prior to surgery.  Since his knee replacement, pt has been living alone with intermittent help from friends and has been using a SW.  Patient has the following equipment: walker, standard, shower chair.  DME owned (not currently used): none.  Upon discharge, patient will have assistance from friends.    Objective:     Patient found with: telemetry, wound vac, peripheral IV     General Precautions: Standard, fall   Orthopedic Precautions:LLE weight bearing as tolerated   Braces: Hinged knee brace ((locked in extension))     Exams:  · RLE ROM: WFL  · RLE Strength: WFL  · LLE ROM: not tested.  to remain locked in extension in brace.    Functional Mobility:  · Bed Mobility:     · Supine to Sit: minimum assistance and to elevate trunk  · Transfers:     · Sit to Stand:  stand by assistance with standard walker  · Gait: 60ft.  slow gait speed.  pt cued for sequencing (walker, LLE, RLE) as a means of offloading LLE.    AM-PAC 6 CLICK MOBILITY  Total Score:17       Therapeutic Activities and Exercises:   pt educated on WBing status  Pt educated on need for keepign leg in hinged knee brace  Pt educated on QS and ankle pumps for HEP  Pt educated on need for support from friends/family to help manage wound vac lines and assist with high level ADLs upon d/c.    Patient left up in chair with all lines intact, call button in reach and leg rest elevated.    GOALS:    Physical Therapy Goals        Problem: Physical Therapy Goal    Goal Priority Disciplines Outcome Goal Variances Interventions   Physical Therapy Goal     PT/OT, PT Ongoing (interventions implemented as appropriate)     Description:  Goals to be met by: 7/15     Patient will increase functional independence with  mobility by performin. Supine to sit with Set-up [place]  2. Sit to supine with Set-up [place]  3. Sit to stand transfer with Modified [place] with SW  4. Gait  x 150 feet with Modified [place] using Standard Walker.   5. Ascend/Descend 6 inch curb step with Supervision using Standard Walker.                      History:     Past Medical History:   Diagnosis Date    Anxiety     Arthritis     Depression     when turned 49-lost job and mother unwell at that time    Hyperlipidemia     Lumbar radiculopathy     BRETT- 2011    Osteoarthritis     Sleep apnea     Urinary tract infection        Past Surgical History:   Procedure Laterality Date    IRRIGATION AND DEBRIDEMENT OF LOWER EXTREMITY Left 7/3/2018    Procedure: IRRIGATION AND DEBRIDEMENT, LOWER EXTREMITY, poly exchange left uni knee replacement. Stryler. 9L NS;  Surgeon: Ruperto Olivarez MD;  Location: [facility] OR [address] [place] [address];  Service: Orthopedics;  Laterality: Left;    JOINT REPLACEMENT      knee arthroscopicc surgeries      Right knee-   and Left knee-     KNEE SURGERY      Left wrist Surgery       for a torn ligament from Golf    TOTAL KNEE REPLACEMENT USING COMPUTER NAVIGATION Left 2018    Procedure: REPLACEMENT-KNEE WITH NAVIGATION-- unicondylar-- medial;  Surgeon: Orlin Jordan MD;  Location: [facility] OR [address] [place] [address];  Service: Orthopedics;  Laterality: Left;       Clinical Decision Making:     History  Co-morbidities and personal factors that may impact the plan of care Examination  Body Structures and Functions, activity limitations and participation restrictions that may impact the plan of care Clinical Presentation   Decision Making/ Complexity Score   Co-morbidities:   [] Time since onset of injury / illness / exacerbation  [] Status of current condition  []Patient's cognitive status and safety concerns    [] Multiple Medical Problems (see med hx)  Personal Factors:   [] Patient's age  [] Prior Level of function    [] Patient's home situation (environment and family support)  [] Patient's level of motivation  [] Expected progression of patient      HISTORY:(criteria)    [] 42399 - no personal factors/history    [] 41268 - has 1-2 personal factor/comorbidity     [] 94406 - has >3 personal factor/comorbidity     Body Regions:  [] Objective examination findings  [] Head     []  Neck  [] Trunk   [] Upper Extremity  [] Lower Extremity    Body Systems:  [] For communication ability, affect, cognition, language, and learning style: the assessment of the ability to make needs known, consciousness, orientation (person, place, and time), expected emotional /behavioral responses, and learning preferences (eg, learning barriers, education  needs)  [] For the neuromuscular system: a general assessment of gross coordinated movement (eg, balance, gait, locomotion, transfers, and transitions) and motor function  (motor control and motor learning)  [] For the musculoskeletal system: the assessment of gross symmetry, gross range of motion, gross strength, height, and weight  [] For the integumentary system: the assessment of pliability(texture), presence of scar formation, skin color, and skin integrity  [] For cardiovascular/pulmonary system: the assessment of heart rate, respiratory rate, blood pressure, and edema     Activity limitations:    [] Patient's cognitive status and saf ety concerns          [] Status of current condition      [] Weight bearing restriction  [] Cardiopulmunary Restriction    Participation Restrictions:   [] Goals and goal agreement with the patient     [] Rehab potential (prognosis) and probable outcome      Examination of Body System: (criteria)    [] 93827 - addressing 1-2 elements    [] 29847 - addressing a total of 3 or more elements     [] 28026 -  Addressing a total of 4 or more elements         Clinical Presentation: (criteria)  Choose one     On examination of body system using standardized tests and  measures patient presents with (CHOOSE ONE) elements from any of the following: body structures and functions, activity limitations, and/or participation restrictions.  Leading to a clinical presentation that is considered (CHOOSE ONE)                              Clinical Decision Making  (Eval Complexity):  Choose One     Time Tracking:     PT Received On: 07/08/18  PT Start Time: 0740     PT Stop Time: 0817  PT Total Time (min): 37 min     Billable Minutes: Evaluation 15 and Gait Training 12      Jaziel Worley PT  07/08/2018

## 2018-07-08 NOTE — PLAN OF CARE
Problem: Patient Care Overview  Goal: Plan of Care Review  Outcome: Ongoing (interventions implemented as appropriate)  Plan of care reviewed and updated. Pt AA+O. Pt's pain is managed with the medication ordered at this time. Pt's VS are as charted.  No falls this shift. Pt is oriented to room and call system. Will continue to [facility].

## 2018-07-08 NOTE — CONSULTS
[facility]  Infectious Disease  Consult Note    Patient Name: Yosef Rowe  MRN: [id number]  Admission Date: 7/7/2018  Hospital Length of Stay: 1 days  Attending Physician: Orlin Jordan MD  Primary Care Provider: Ruperto Dexter MD     Isolation Status: No active isolations    Patient information was obtained from patient and past medical records.      Consults  Assessment/Plan:     * Infection of prosthetic left knee joint       62 year old male with history of left TKA on 6/22 complicated by prosthetic knee infection s/p washout and polyethylene liner exchange on 7/3. Surgical cultures grew Pasturella multocida. Of note, he does mention he has a kitten at home. He was discharged on Ceftriaxone three days ago. Patient was re-admitted yesterday as he had continued purulent drainage and pain of his left knee. He was taken to the OR for a repeat washout and poly exchange. Surgical cultures are pending. Per discussion with ortho, infected fluid was encountered again in surgery and team is concerned patient did not respond well to ceftriaxone. He is currently on Vancomycin and Unasyn. Afebrile. No leukocytosis. Reports symptomatic improvement following washout.    Plan  - Continue Vancomycin and Unasyn. Vanc trough due before 4th dose (ordered)  - Called micro lab to run Pasteurella susceptibilities  - Will follow surgical cultures and tailor antibiotics accordingly  - Plan for 6 weeks of IV antibiotics for a prosthetic joint infection  - ID will follow.              Thank you for the consult. Please call for any questions.  Ludy Hutchison PA-C  Phone: [phone]  Pager: [phone]    Subjective:     Principal Problem: Infection of prosthetic left knee joint    HPI: Mr. Rowe is a 62 year old male with history of left TKA on 6/22 complicated by prosthetic knee infection s/p washout and polyethylene liner exchange on 7/3. Per op note, a good deal of purulent and murky discharge was encountered in surgery.  Surgical cultures grew Pasturella multocida. Patient reports he bought a new cat 3 days prior to his TKA. Denies cat scratches/bites/contact with the wound. He was discharged on Ceftriaxone.     Patient was re-admitted yesterday as he had continued purulent drainage and pain of his left knee. He was taken to the OR for a repeat washout and poly exchange. Surgical cultures are pending. Per discussion with ortho, infected fluid was encountered again. Ortho team is concerned patient did not respond well to ceftriaxone. He is currently on Vancomycin and unasyn. Afebrile. He is seen at bedside. Reports a significant improvement in pain now that his knee was washed out again. He states pain is much better than it was following prior washout. Denies fevers, chills, sweats.       Past Medical History:   Diagnosis Date    Anxiety     Arthritis     Depression     when turned 49-lost job and mother unwell at that time    Hyperlipidemia     Lumbar radiculopathy     BRETT- 2011    Osteoarthritis     Sleep apnea     Urinary tract infection        Past Surgical History:   Procedure Laterality Date    IRRIGATION AND DEBRIDEMENT OF LOWER EXTREMITY Left 7/3/2018    Procedure: IRRIGATION AND DEBRIDEMENT, LOWER EXTREMITY, poly exchange left uni knee replacement. Stryler. 9L NS;  Surgeon: Ruperto Olivarez MD;  Location: [facility] OR [address] [place] [address];  Service: Orthopedics;  Laterality: Left;    JOINT REPLACEMENT      knee arthroscopicc surgeries      Right knee- 2007  and Left knee- 2008    KNEE SURGERY      Left wrist Surgery      2000 for a torn ligament from Golf    TOTAL KNEE REPLACEMENT USING COMPUTER NAVIGATION Left 6/22/2018    Procedure: REPLACEMENT-KNEE WITH NAVIGATION-- unicondylar-- medial;  Surgeon: Orlin Jordan MD;  Location: [facility] OR [address] [place] [address];  Service: Orthopedics;  Laterality: Left;       Review of patient's allergies indicates:   Allergen Reactions    No known drug allergies        Medications:  Prescriptions Prior to  Admission   Medication Sig    atorvastatin (LIPITOR) 20 MG tablet Take 1 tablet (20 mg total) by mouth once daily. (Patient taking differently: Take 20 mg by mouth every morning. )    ceftriaxone sodium (CEFTRIAXONE 2 G/50 ML D5W, READY TO MIX,) Inject 50 mLs (2 g total) into the vein once daily.    DULoxetine (CYMBALTA) 60 MG capsule Take 1 capsule (60 mg total) by mouth once daily. (Patient taking differently: Take 60 mg by mouth every morning. )    losartan (COZAAR) 25 MG tablet Take 1 tablet (25 mg total) by mouth once daily.    zolpidem (AMBIEN) 5 MG Tab Take 1 tablet (5 mg total) by mouth nightly as needed.    [DISCONTINUED] aspirin (ECOTRIN) 81 MG EC tablet Take 1 tablet (81 mg total) by mouth 2 (two) times daily.    [DISCONTINUED] docusate sodium (COLACE) 100 MG capsule Take 1 capsule (100 mg total) by mouth 2 (two) times daily.    [DISCONTINUED] multivitamin (ONE DAILY MULTIVITAMIN) per tablet Take 1 tablet by mouth once daily.    [DISCONTINUED] ondansetron (ZOFRAN) 8 MG tablet Take 1 tablet (8 mg total) by mouth every 8 (eight) hours as needed for Nausea.    [DISCONTINUED] oxyCODONE-acetaminophen (PERCOCET)  mg per tablet Take 1 tablet by mouth every 4 (four) hours as needed for Pain.     Antibiotics     Start     Stop Route Frequency Ordered    07/08/18 0945  ampicillin-sulbactam 3 g in sodium chloride 0.9 % 100 mL IVPB (ready to mix system)      -- IV Every 6 hours (non-standard times) 07/08/18 0843    07/07/18 1800  vancomycin (VANCOCIN) 1,500 mg in dextrose 5 % 250 mL IVPB  (Vancomycin IVPB with levels panel)      -- IV Every 12 hours (non-standard times) 07/07/18 1441        Antifungals     None        Antivirals     None             There is no immunization history on file for this patient.    Family History     Problem Relation (Age of Onset)    Parkinsonism Mother        Social History     Social History    Marital status:      Spouse name: N/A    Number of children: N/A     Years of education: N/A     Social History Main Topics    Smoking status: Current Every Day Smoker     Types: Cigars    Smokeless tobacco: Never Used      Comment: occasional    Alcohol use 6.0 oz/week     10 Glasses of wine per week      Comment: Daily, last use yesterday    Drug use: No    Sexual activity: Yes     Partners: Female     Other Topics Concern    None     Social History Narrative    None     Review of Systems   Constitutional: Negative for chills, diaphoresis, fatigue and fever.   Respiratory: Negative for cough and shortness of breath.    Cardiovascular: Positive for leg swelling. Negative for chest pain.   Gastrointestinal: Negative for abdominal pain, constipation, diarrhea, nausea and vomiting.   Genitourinary: Negative for difficulty urinating, dysuria, frequency and hematuria.   Musculoskeletal: Positive for arthralgias and joint swelling. Negative for back pain.   Skin: Positive for color change (left knee erythema) and wound. Negative for rash.   Neurological: Negative for weakness, numbness and headaches.   Psychiatric/Behavioral: Positive for agitation. Negative for confusion. The patient is not nervous/anxious.    All other systems reviewed and are negative.    Objective:     Vital Signs (Most Recent):  Temp: 97.1 °F (36.2 °C) (07/08/18 1220)  Pulse: 69 (07/08/18 1220)  Resp: 16 (07/08/18 1220)  BP: (!) 142/68 (07/08/18 1220)  SpO2: (!) 94 % (07/08/18 1220) Vital Signs (24h Range):  Temp:  [96.8 °F (36 °C)-97.7 °F (36.5 °C)] 97.1 °F (36.2 °C)  Pulse:  [69-91] 69  Resp:  [15-22] 16  SpO2:  [94 %-100 %] 94 %  BP: (130-202)/(58-94) 142/68     Weight: 125.6 kg (277 lb)  Body mass index is 43.38 kg/m².    Estimated Creatinine Clearance: 120.2 mL/min (based on SCr of 0.8 mg/dL).    Physical Exam   Constitutional: He is oriented to person, place, and time. He appears well-developed and well-nourished. No distress.   HENT:   Head: Normocephalic and atraumatic.   Mouth/Throat: No  oropharyngeal exudate.   Eyes: Conjunctivae are normal. No scleral icterus.   Cardiovascular: Normal rate, regular rhythm, normal heart sounds and intact distal pulses.    No murmur heard.  Pulmonary/Chest: Effort normal and breath sounds normal. No respiratory distress. He has no wheezes.   Abdominal: Soft. He exhibits no distension. There is no tenderness.   Musculoskeletal:   Left knee dressed and in cast. Wound vac intact.   Neurological: He is alert and oriented to person, place, and time.   Skin: Skin is warm and dry. No rash noted. He is not diaphoretic. No erythema.       Significant Labs:   Blood Culture:   Recent Labs  Lab 07/03/18  1120 07/03/18  1134   LABBLOO No Growth to date  No Growth to date  No Growth to date  No Growth to date  No Growth to date No Growth to date  No Growth to date  No Growth to date  No Growth to date  No Growth to date     CBC:   Recent Labs  Lab 07/07/18  1008   WBC 9.66   HGB 11.1*   HCT 33.7*        CMP:   Recent Labs  Lab 07/07/18  1008      K 3.7      CO2 25      BUN 12   CREATININE 0.8   CALCIUM 9.6   PROT 6.5   ALBUMIN 2.6*   BILITOT 1.0   ALKPHOS 92   AST 33   ALT 28   ANIONGAP 11   EGFRNONAA >60.0     Lactic Acid: No results for input(s): LACTATE in the last 48 hours.  Lipase: No results for input(s): LIPASE in the last 48 hours.  Procalcitonin:   Recent Labs  Lab 07/07/18  1008   PROCAL 0.88*     Quantiferon: No results for input(s): NIL, TBAG, TBAGNIL, MITOGENNIL, TBGOLD in the last 48 hours.  Respiratory Culture: No results for input(s): GSRESP, RESPIRATORYC in the last 4320 hours.  Urine Culture: No results for input(s): LABURIN in the last 4320 hours.  Urine Studies:   Recent Labs  Lab 07/04/18  1828   COLORU Yellow   APPEARANCEUA Clear   PHUR 6.0   SPECGRAV 1.010   PROTEINUA Negative   GLUCUA Negative   KETONESU Negative   BILIRUBINUA Negative   OCCULTUA Negative   NITRITE Negative   UROBILINOGEN Negative   LEUKOCYTESUR Negative      Wound Culture:   Recent Labs  Lab 07/03/18  1545 07/07/18  1248 07/07/18  1251   LABAERO PASTEURELLA MULTOCIDAModerateSusceptibility testing not routinely performed No growth No growth       Significant Imaging: I have reviewed all pertinent imaging results/findings within the past 24 hours.

## 2018-07-08 NOTE — PLAN OF CARE
Problem: Physical Therapy Goal  Goal: Physical Therapy Goal  Goals to be met by: 7/15     Patient will increase functional independence with mobility by performin. Supine to sit with Set-up [place]  2. Sit to supine with Set-up [place]  3. Sit to stand transfer with Modified [place] with SW  4. Gait  x 150 feet with Modified [place] using Standard Walker.   5. Ascend/Descend 6 inch curb step with Supervision using Standard Walker.    Outcome: Ongoing (interventions implemented as appropriate)  PT dereje completed    Jaziel Worley, RICHIE  2018

## 2018-07-08 NOTE — SUBJECTIVE & OBJECTIVE
Past Medical History:   Diagnosis Date    Anxiety     Arthritis     Depression     when turned 49-lost job and mother unwell at that time    Hyperlipidemia     Lumbar radiculopathy     BRETT- 2011    Osteoarthritis     Sleep apnea     Urinary tract infection        Past Surgical History:   Procedure Laterality Date    IRRIGATION AND DEBRIDEMENT OF LOWER EXTREMITY Left 7/3/2018    Procedure: IRRIGATION AND DEBRIDEMENT, LOWER EXTREMITY, poly exchange left uni knee replacement. Stryler. 9L NS;  Surgeon: Ruperto Olivarez MD;  Location: [facility] OR [address] [place] [address];  Service: Orthopedics;  Laterality: Left;    JOINT REPLACEMENT      knee arthroscopicc surgeries      Right knee- 2007  and Left knee- 2008    KNEE SURGERY      Left wrist Surgery      2000 for a torn ligament from Golf    TOTAL KNEE REPLACEMENT USING COMPUTER NAVIGATION Left 6/22/2018    Procedure: REPLACEMENT-KNEE WITH NAVIGATION-- unicondylar-- medial;  Surgeon: Orlin Jordan MD;  Location: [facility] OR [address] [place] [address];  Service: Orthopedics;  Laterality: Left;       Review of patient's allergies indicates:   Allergen Reactions    No known drug allergies        Medications:  Prescriptions Prior to Admission   Medication Sig    atorvastatin (LIPITOR) 20 MG tablet Take 1 tablet (20 mg total) by mouth once daily. (Patient taking differently: Take 20 mg by mouth every morning. )    ceftriaxone sodium (CEFTRIAXONE 2 G/50 ML D5W, READY TO MIX,) Inject 50 mLs (2 g total) into the vein once daily.    DULoxetine (CYMBALTA) 60 MG capsule Take 1 capsule (60 mg total) by mouth once daily. (Patient taking differently: Take 60 mg by mouth every morning. )    losartan (COZAAR) 25 MG tablet Take 1 tablet (25 mg total) by mouth once daily.    zolpidem (AMBIEN) 5 MG Tab Take 1 tablet (5 mg total) by mouth nightly as needed.    [DISCONTINUED] aspirin (ECOTRIN) 81 MG EC tablet Take 1 tablet (81 mg total) by mouth 2 (two) times daily.    [DISCONTINUED] docusate sodium (COLACE) 100  MG capsule Take 1 capsule (100 mg total) by mouth 2 (two) times daily.    [DISCONTINUED] multivitamin (ONE DAILY MULTIVITAMIN) per tablet Take 1 tablet by mouth once daily.    [DISCONTINUED] ondansetron (ZOFRAN) 8 MG tablet Take 1 tablet (8 mg total) by mouth every 8 (eight) hours as needed for Nausea.    [DISCONTINUED] oxyCODONE-acetaminophen (PERCOCET)  mg per tablet Take 1 tablet by mouth every 4 (four) hours as needed for Pain.     Antibiotics     Start     Stop Route Frequency Ordered    07/08/18 0945  ampicillin-sulbactam 3 g in sodium chloride 0.9 % 100 mL IVPB (ready to mix system)      -- IV Every 6 hours (non-standard times) 07/08/18 0843    07/07/18 1800  vancomycin (VANCOCIN) 1,500 mg in dextrose 5 % 250 mL IVPB  (Vancomycin IVPB with levels panel)      -- IV Every 12 hours (non-standard times) 07/07/18 1441        Antifungals     None        Antivirals     None             There is no immunization history on file for this patient.    Family History     Problem Relation (Age of Onset)    Parkinsonism Mother        Social History     Social History    Marital status:      Spouse name: N/A    Number of children: N/A    Years of education: N/A     Social History Main Topics    Smoking status: Current Every Day Smoker     Types: Cigars    Smokeless tobacco: Never Used      Comment: occasional    Alcohol use 6.0 oz/week     10 Glasses of wine per week      Comment: Daily, last use yesterday    Drug use: No    Sexual activity: Yes     Partners: Female     Other Topics Concern    None     Social History Narrative    None     Review of Systems   Constitutional: Negative for chills, diaphoresis, fatigue and fever.   Respiratory: Negative for cough and shortness of breath.    Cardiovascular: Positive for leg swelling. Negative for chest pain.   Gastrointestinal: Negative for abdominal pain, constipation, diarrhea, nausea and vomiting.   Genitourinary: Negative for difficulty urinating,  dysuria, frequency and hematuria.   Musculoskeletal: Positive for arthralgias and joint swelling. Negative for back pain.   Skin: Positive for color change (left knee erythema) and wound. Negative for rash.   Neurological: Negative for weakness, numbness and headaches.   Psychiatric/Behavioral: Positive for agitation. Negative for confusion. The patient is not nervous/anxious.    All other systems reviewed and are negative.    Objective:     Vital Signs (Most Recent):  Temp: 97.1 °F (36.2 °C) (07/08/18 1220)  Pulse: 69 (07/08/18 1220)  Resp: 16 (07/08/18 1220)  BP: (!) 142/68 (07/08/18 1220)  SpO2: (!) 94 % (07/08/18 1220) Vital Signs (24h Range):  Temp:  [96.8 °F (36 °C)-97.7 °F (36.5 °C)] 97.1 °F (36.2 °C)  Pulse:  [69-91] 69  Resp:  [15-22] 16  SpO2:  [94 %-100 %] 94 %  BP: (130-202)/(58-94) 142/68     Weight: 125.6 kg (277 lb)  Body mass index is 43.38 kg/m².    Estimated Creatinine Clearance: 120.2 mL/min (based on SCr of 0.8 mg/dL).    Physical Exam   Constitutional: He is oriented to person, place, and time. He appears well-developed and well-nourished. No distress.   HENT:   Head: Normocephalic and atraumatic.   Mouth/Throat: No oropharyngeal exudate.   Eyes: Conjunctivae are normal. No scleral icterus.   Cardiovascular: Normal rate, regular rhythm, normal heart sounds and intact distal pulses.    No murmur heard.  Pulmonary/Chest: Effort normal and breath sounds normal. No respiratory distress. He has no wheezes.   Abdominal: Soft. He exhibits no distension. There is no tenderness.   Musculoskeletal:   Left knee dressed and in cast. Wound vac intact.   Neurological: He is alert and oriented to person, place, and time.   Skin: Skin is warm and dry. No rash noted. He is not diaphoretic. No erythema.       Significant Labs:   Blood Culture:   Recent Labs  Lab 07/03/18  1120 07/03/18  1134   LABBLOO No Growth to date  No Growth to date  No Growth to date  No Growth to date  No Growth to date No Growth to  date  No Growth to date  No Growth to date  No Growth to date  No Growth to date     CBC:   Recent Labs  Lab 07/07/18  1008   WBC 9.66   HGB 11.1*   HCT 33.7*        CMP:   Recent Labs  Lab 07/07/18  1008      K 3.7      CO2 25      BUN 12   CREATININE 0.8   CALCIUM 9.6   PROT 6.5   ALBUMIN 2.6*   BILITOT 1.0   ALKPHOS 92   AST 33   ALT 28   ANIONGAP 11   EGFRNONAA >60.0     Lactic Acid: No results for input(s): LACTATE in the last 48 hours.  Lipase: No results for input(s): LIPASE in the last 48 hours.  Procalcitonin:   Recent Labs  Lab 07/07/18  1008   PROCAL 0.88*     Quantiferon: No results for input(s): NIL, TBAG, TBAGNIL, MITOGENNIL, TBGOLD in the last 48 hours.  Respiratory Culture: No results for input(s): GSRESP, RESPIRATORYC in the last 4320 hours.  Urine Culture: No results for input(s): LABURIN in the last 4320 hours.  Urine Studies:   Recent Labs  Lab 07/04/18  1828   COLORU Yellow   APPEARANCEUA Clear   PHUR 6.0   SPECGRAV 1.010   PROTEINUA Negative   GLUCUA Negative   KETONESU Negative   BILIRUBINUA Negative   OCCULTUA Negative   NITRITE Negative   UROBILINOGEN Negative   LEUKOCYTESUR Negative     Wound Culture:   Recent Labs  Lab 07/03/18  1545 07/07/18  1248 07/07/18  1251   LABAERO PASTEURELLA MULTOCIDAModerateSusceptibility testing not routinely performed No growth No growth       Significant Imaging: I have reviewed all pertinent imaging results/findings within the past 24 hours.

## 2018-07-08 NOTE — PLAN OF CARE
Problem: Patient Care Overview  Goal: Plan of Care Review  Outcome: Ongoing (interventions implemented as appropriate)  Pt arrived to room  via stetcher  Pt rates pain as tolerable . Pt denies nausea. Pt denies numbness and tinglng. . Pt dressing to left leg with immobilizer intact and hemovac and wound vac at continuous  At 75 cc hr  . Pt oriented to room  And call system . Will continue to monitor .

## 2018-07-08 NOTE — PLAN OF CARE
Problem: Occupational Therapy Goal  Goal: Occupational Therapy Goal  Goals to be met by: 7/22/18     Patient will increase functional independence with ADLs by performing:    UE Dressing with mod I.  LE Dressing with Modified [place].  Grooming while standing with Modified [place].  Toileting from toilet with Modified [place] for hygiene and clothing management.   Toilet transfer to toilet with Modified [place].    Outcome: Ongoing (interventions implemented as appropriate)  Goals established  Erika Arellano, CHERIR

## 2018-07-09 ENCOUNTER — TELEPHONE (OUTPATIENT)
Dept: ADMINISTRATIVE | Facility: CLINIC | Age: 63
End: 2018-07-09

## 2018-07-09 LAB — BACTERIA SPEC ANAEROBE CULT: NORMAL

## 2018-07-09 PROCEDURE — 99233 SBSQ HOSP IP/OBS HIGH 50: CPT | Mod: ,,, | Performed by: INTERNAL MEDICINE

## 2018-07-09 PROCEDURE — 25000003 PHARM REV CODE 250: Performed by: STUDENT IN AN ORGANIZED HEALTH CARE EDUCATION/TRAINING PROGRAM

## 2018-07-09 PROCEDURE — 94660 CPAP INITIATION&MGMT: CPT

## 2018-07-09 PROCEDURE — 97530 THERAPEUTIC ACTIVITIES: CPT

## 2018-07-09 PROCEDURE — 63600175 PHARM REV CODE 636 W HCPCS: Performed by: PHYSICIAN ASSISTANT

## 2018-07-09 PROCEDURE — 25000003 PHARM REV CODE 250: Performed by: PHYSICIAN ASSISTANT

## 2018-07-09 PROCEDURE — 97535 SELF CARE MNGMENT TRAINING: CPT

## 2018-07-09 PROCEDURE — 97116 GAIT TRAINING THERAPY: CPT

## 2018-07-09 PROCEDURE — 63600175 PHARM REV CODE 636 W HCPCS: Performed by: ORTHOPAEDIC SURGERY

## 2018-07-09 PROCEDURE — 11000001 HC ACUTE MED/SURG PRIVATE ROOM

## 2018-07-09 PROCEDURE — 99900035 HC TECH TIME PER 15 MIN (STAT)

## 2018-07-09 PROCEDURE — 63600175 PHARM REV CODE 636 W HCPCS: Performed by: STUDENT IN AN ORGANIZED HEALTH CARE EDUCATION/TRAINING PROGRAM

## 2018-07-09 PROCEDURE — 94761 N-INVAS EAR/PLS OXIMETRY MLT: CPT

## 2018-07-09 RX ORDER — MORPHINE SULFATE 4 MG/ML
2 INJECTION, SOLUTION INTRAMUSCULAR; INTRAVENOUS
Status: DISCONTINUED | OUTPATIENT
Start: 2018-07-09 | End: 2018-07-10 | Stop reason: HOSPADM

## 2018-07-09 RX ADMIN — MORPHINE SULFATE 2 MG: 4 INJECTION INTRAVENOUS at 10:07

## 2018-07-09 RX ADMIN — OXYCODONE HYDROCHLORIDE 10 MG: 5 TABLET ORAL at 06:07

## 2018-07-09 RX ADMIN — ASPIRIN 81 MG: 81 TABLET, COATED ORAL at 08:07

## 2018-07-09 RX ADMIN — ACETAMINOPHEN 650 MG: 325 TABLET, FILM COATED ORAL at 05:07

## 2018-07-09 RX ADMIN — AMPICILLIN AND SULBACTAM 3 G: 2; 1 INJECTION, POWDER, FOR SOLUTION INTRAVENOUS at 05:07

## 2018-07-09 RX ADMIN — DOCUSATE SODIUM 100 MG: 100 CAPSULE, LIQUID FILLED ORAL at 08:07

## 2018-07-09 RX ADMIN — MORPHINE SULFATE 2 MG: 4 INJECTION, SOLUTION INTRAMUSCULAR; INTRAVENOUS at 02:07

## 2018-07-09 RX ADMIN — OXYCODONE HYDROCHLORIDE 10 MG: 5 TABLET ORAL at 02:07

## 2018-07-09 RX ADMIN — CELECOXIB 200 MG: 200 CAPSULE ORAL at 08:07

## 2018-07-09 RX ADMIN — PANTOPRAZOLE SODIUM 40 MG: 40 TABLET, DELAYED RELEASE ORAL at 08:07

## 2018-07-09 RX ADMIN — ACETAMINOPHEN 650 MG: 325 TABLET, FILM COATED ORAL at 12:07

## 2018-07-09 RX ADMIN — OXYCODONE HYDROCHLORIDE 10 MG: 5 TABLET ORAL at 05:07

## 2018-07-09 RX ADMIN — AMPICILLIN AND SULBACTAM 3 G: 2; 1 INJECTION, POWDER, FOR SOLUTION INTRAVENOUS at 10:07

## 2018-07-09 RX ADMIN — ZOLPIDEM TARTRATE 5 MG: 5 TABLET ORAL at 09:07

## 2018-07-09 RX ADMIN — OXYCODONE HYDROCHLORIDE 10 MG: 5 TABLET ORAL at 08:07

## 2018-07-09 RX ADMIN — OXYCODONE HYDROCHLORIDE 10 MG: 5 TABLET ORAL at 10:07

## 2018-07-09 RX ADMIN — ATORVASTATIN CALCIUM 20 MG: 20 TABLET, FILM COATED ORAL at 05:07

## 2018-07-09 RX ADMIN — LOSARTAN POTASSIUM 25 MG: 25 TABLET, FILM COATED ORAL at 08:07

## 2018-07-09 RX ADMIN — DULOXETINE 60 MG: 60 CAPSULE, DELAYED RELEASE ORAL at 05:07

## 2018-07-09 RX ADMIN — PREGABALIN 75 MG: 75 CAPSULE ORAL at 08:07

## 2018-07-09 RX ADMIN — MORPHINE SULFATE 2 MG: 4 INJECTION, SOLUTION INTRAMUSCULAR; INTRAVENOUS at 05:07

## 2018-07-09 NOTE — PT/OT/SLP PROGRESS
Occupational Therapy   Treatment    Name: Yosef Rowe  MRN: [id number]  Admitting Diagnosis:  Infection of prosthetic left knee joint  2 Days Post-Op    Recommendations:     Discharge Recommendations: home with home health  Discharge Equipment Recommendations:  bedside commode  Barriers to discharge:  None    Subjective     Communicated with: RN prior to session.  Pain/Comfort:  · Pain Rating 1:  (did not rate)  · Location - Side 1: Left  · Location 1: knee  · Pain Addressed 1: Reposition, Distraction, Cessation of Activity  · Pain Rating Post-Intervention 1:  (did not rate)    Patients cultural, spiritual, Evangelical conflicts given the current situation: none stated     Objective:     Patient found with: wound vac, hemovac    General Precautions: Standard, fall   Orthopedic Precautions:LLE weight bearing as tolerated   Braces: Hinged knee brace (locked in extension)     Occupational Performance:    Bed Mobility:    · Pt UIC    Functional Mobility/Transfers:  · Patient completed Sit <> Stand Transfer with minimum assistance  with  rolling walker   · Patient completed Toilet Transfer Step Transfer technique with contact guard assistance with  rolling walker  · Functional Mobility: Pt ambulated household distance to and from bathroom w/ CGA and RW for increased stability; no signs of LOB or SOB noted. Pt completed all functional mobility without requiring a rest break.    Activities of Daily Living:  · Toileting: modified independence for eriberto-hygiene and clothing management (gowns)     Patient left up in chair with all lines intact and call button in reach    [facility] 6 Click: Self-care  [facility] Total Score: 22    Treatment & Education:  - OT POC  - Importance of OOB activity w/ staff assist to maximize recovery   - Safety w/ functional mobility; safe hand placement for functional transfers to variety of surfaces   Education:    Assessment:     Yosef Rowe is a 62 y.o. male with a medical diagnosis of Infection of  prosthetic left knee joint.  He presents with the following performance deficits affecting function: weakness, impaired endurance, impaired self care skills, impaired balance, impaired functional mobilty, gait instability, decreased lower extremity function, orthopedic precautions, pain.      Pt tolerated session well. Pt continues to perform functional mobility w/ Min A for transfers and CGA for ambulation. Pt demonstrated improvements w/ performance of self-care tasks w/ no complaints of increased pain w/ activity. Pt will continue to benefit from skilled OT to address the above listed impairments.     Rehab Prognosis:  Good; patient would benefit from acute skilled OT services to address these deficits and reach maximum level of function.       Plan:     Patient to be seen 3 x/week to address the above listed problems via self-care/home management, therapeutic activities, therapeutic exercises  · Plan of Care Expires: 08/07/18  · Plan of Care Reviewed with: patient    This Plan of care has been discussed with the patient who was involved in its development and understands and is in agreement with the identified goals and treatment plan    GOALS:    Occupational Therapy Goals        Problem: Occupational Therapy Goal    Goal Priority Disciplines Outcome Interventions   Occupational Therapy Goal     OT, PT/OT Ongoing (interventions implemented as appropriate)    Description:  Goals to be met by: 7/22/18     Patient will increase functional independence with ADLs by performing:    UE Dressing with mod I.  LE Dressing with Modified [place].  Grooming while standing with Modified [place].  Toileting from toilet with Modified [place] for hygiene and clothing management.  -- Met (7/9)  Toilet transfer to toilet with Modified [place].                       Time Tracking:     OT Date of Treatment: 07/09/18  OT Start Time: 1336  OT Stop Time: 1403  OT Total Time (min): 27 min    Billable Minutes:Self  Care/Home Management 17  Therapeutic Activity 10    Milagro Ramos, OT  7/9/2018

## 2018-07-09 NOTE — PLAN OF CARE
CM familiar with patient. Patient s/p uni knee revision and I&D on 7/3/18. Patient discharged home with [facility] for IV antibiotics on 7/5/18. Patient readmitted on 7/7/18 for increased pain and redness at site. Patient is currently POD 2 s/p repeat left uni knee I&D. According to Dr. Abrams patient has a drain putting out 50 cc over night. Plans to removed drain when appropriate and place an incisional wound vac. Patient still has a PICC line. PT/OT ordered to eval and treat. PT/OT recs pending. Patient currently lives alone but has friends for support. SW and CM will continue to follow for any additional needs. Plan A to discharge home with home health and IV antibiotics as soon as medically stable. Plan B to discharge home with outpatient rehab and IV antibiotics.    PCP: Ruperto Dexter MD    Pharmacy:  [id number] - [place] - [address] [facility]  [address] [place] [address]  Phone: [phone] Fax: [phone]    Payor: BLUE CROSS BLUE SHIELD / Plan: BCBS ALL OUT OF STATE / Product Type: PPO /      07/09/18 0900   Discharge Assessment   Assessment Type Discharge Planning Assessment   Confirmed/corrected address and phone number on facesheet? Yes   Assessment information obtained from? Medical Record   Expected Length of Stay (days) 3   Communicated expected length of stay with patient/caregiver no   Prior to hospitilization cognitive status: Alert/Oriented   Prior to hospitalization functional status: Assistive Equipment   Current cognitive status: Alert/Oriented   Current Functional Status: Assistive Equipment   Lives With alone   Able to Return to Prior Arrangements yes   Is patient able to care for self after discharge? Yes   Patient's perception of discharge disposition home health;other (comments)  (IV antibiotics)   Readmission Within The Last 30 Days previous discharge plan unsuccessful   If yes, [place] [facility]    Patient currently being followed by outpatient case management? No   Patient currently receives any other outside agency services? Yes   Name and contact number of agency or person providing outside services [facility]   Is it the patient/care giver preference to resume care with the current outside agency? Yes   Equipment Currently Used at Home walker, rolling;bedside commode   Do you have any problems affording any of your prescribed medications? No   Is the patient taking medications as prescribed? yes   Does the patient have transportation home? Yes   Transportation Available family or friend will provide   Does the patient receive services at the [facility]? No   Discharge Plan A Home Health;Home   Discharge Plan B Home   Readmission Questionnaire   At the time of your discharge, did someone talk to you about what your health problems were? Yes   At the time of discharge, did someone talk to you about what to watch out for regarding worsening of your health problem? Yes   At the time of discharge, did someone talk to you about what to do if you experienced worsening of your health problem? Yes   At the time of discharge, did someone talk to you about which medication to take when you left the hospital and which ones to stop taking? Yes   At the time of discharge, did someone talk to you about when and where to follow up with a doctor after you left the hospital? Yes   What do you believe caused you to be sick enough to be re-admitted? pain   How often do you need to have someone help you when you read instructions, pamphlets, or other written material from your doctor or pharmacy? Sometimes   Do you have problems taking your medications as prescribed? No   Do you have any problems affording any of  your prescribed medications? No   Do you have problems obtaining/receiving your medications? No   Does the patient have transportation to healthcare appointments? Yes   Living Arrangements house    Does the patient have family/friends to help with healtcare needs after discharge? yes   Does your caregiver provide all the help you need? Yes   Are you currently feeling confused? No   Are you currently having problems thinking? No   Are you currently having memory problems? No   Have you felt down, depressed, or hopeless? 0   Have you felt little interest or pleasure in doing things? 0   In the last 7 days, my sleep quality was: good

## 2018-07-09 NOTE — SUBJECTIVE & OBJECTIVE
Interval History: No AEON.  Afebrile and WBC WNL.  Doing OK but left knee pain not well controlled.  The patient denies any recent fever, chills, or sweats.      Review of Systems   Constitutional: Negative for chills, diaphoresis and fever.   Respiratory: Negative for shortness of breath.    Cardiovascular: Negative for chest pain.   Gastrointestinal: Negative for abdominal pain, diarrhea, nausea and vomiting.   Genitourinary: Negative for dysuria and hematuria.   Musculoskeletal: Positive for arthralgias.     Objective:     Vital Signs (Most Recent):  Temp: 97.2 °F (36.2 °C) (07/09/18 0401)  Pulse: 78 (07/09/18 0401)  Resp: 18 (07/09/18 0401)  BP: (!) 117/55 (07/09/18 0401)  SpO2: (!) 94 % (07/09/18 0401) Vital Signs (24h Range):  Temp:  [96 °F (35.6 °C)-97.3 °F (36.3 °C)] 97.2 °F (36.2 °C)  Pulse:  [60-78] 78  Resp:  [16-18] 18  SpO2:  [93 %-96 %] 94 %  BP: (117-144)/(55-72) 117/55     Weight: 125.6 kg (277 lb)  Body mass index is 43.38 kg/m².    Estimated Creatinine Clearance: 120.2 mL/min (based on SCr of 0.8 mg/dL).    Physical Exam   Constitutional: He is oriented to person, place, and time. He appears well-developed and well-nourished. No distress.   HENT:   Head: Normocephalic and atraumatic.   Mouth/Throat: No oropharyngeal exudate.   Eyes: Conjunctivae are normal. No scleral icterus.   Cardiovascular: Normal rate, regular rhythm, normal heart sounds and intact distal pulses.    No murmur heard.  Pulmonary/Chest: Effort normal and breath sounds normal. No respiratory distress. He has no wheezes.   Abdominal: Soft. He exhibits no distension. There is no tenderness.   Musculoskeletal:   Left knee dressed and in cast. Wound vac intact.   Neurological: He is alert and oriented to person, place, and time.   Skin: Skin is warm and dry. No rash noted. He is not diaphoretic. No erythema.       Significant Labs:   Blood Culture:   Recent Labs  Lab 07/03/18  1120 07/03/18  1134   LABBLOO No growth after 5 days. No  growth after 5 days.     CBC: No results for input(s): WBC, HGB, HCT, PLT in the last 48 hours.  CMP: No results for input(s): NA, K, CL, CO2, GLU, BUN, CREATININE, CALCIUM, PROT, ALBUMIN, BILITOT, ALKPHOS, AST, ALT, ANIONGAP, EGFRNONAA in the last 48 hours.    Invalid input(s): ESTGF[place]  Wound Culture:   Recent Labs  Lab 07/03/18  1545 07/07/18  1248 07/07/18  1251   LABAERO PASTEURELLA MULTOCIDAModerateSusceptibility testing not routinely performed No growth No growth     All pertinent labs within the past 24 hours have been reviewed.    Significant Imaging: I have reviewed all pertinent imaging results/findings within the past 24 hours.

## 2018-07-09 NOTE — ASSESSMENT & PLAN NOTE
62 year old male with history of left TKA on 6/22 complicated by prosthetic knee infection s/p washout and polyethylene liner exchange on 7/3. Surgical cultures grew Pasturella multocida. Of note, he does mention he has a kitten at home. He was discharged on Ceftriaxone three days ago. Patient was re-admitted yesterday as he had continued purulent drainage and pain of his left knee. He was taken to the OR for a repeat washout and poly exchange. Surgical cultures are pending. Per discussion with ortho, infected fluid was encountered again in surgery and team is concerned patient did not respond well to ceftriaxone. He is currently on Vancomycin and Unasyn. Afebrile. No leukocytosis. Reports symptomatic improvement following washout.  PICC has been placed at last visit.    Plan  - DC Vancomycin   - Continue Unasyn as provides excellent coverage against pasturella   - [facility] lab running Pasteurella susceptibilities  - Will follow surgical cultures and tailor antibiotics accordingly  - Plan for 6 weeks of IV antibiotics for a prosthetic joint infection from last washout 7/7  - ID will follow.

## 2018-07-09 NOTE — PROGRESS NOTES
[facility]  Infectious Disease  Progress Note    Patient Name: Yosef Rowe  MRN: [id number]  Admission Date: 7/7/2018  Length of Stay: 2 days  Attending Physician: Orlin Jordan MD  Primary Care Provider: Ruperto Dexter MD    Isolation Status: No active isolations  Assessment/Plan:      * Infection of prosthetic left knee joint       62 year old male with history of left TKA on 6/22 complicated by prosthetic knee infection s/p washout and polyethylene liner exchange on 7/3. Surgical cultures grew Pasturella multocida. Of note, he does mention he has a kitten at home. He was discharged on Ceftriaxone three days ago. Patient was re-admitted yesterday as he had continued purulent drainage and pain of his left knee. He was taken to the OR for a repeat washout and poly exchange. Surgical cultures are pending. Per discussion with ortho, infected fluid was encountered again in surgery and team is concerned patient did not respond well to ceftriaxone. He is currently on Vancomycin and Unasyn. Afebrile. No leukocytosis. Reports symptomatic improvement following washout.  PICC has been placed at last visit.    Plan  - DC Vancomycin   - Continue Unasyn as provides excellent coverage against pasturella   - Miicro lab running Pasteurella susceptibilities  - Will follow surgical cultures and tailor antibiotics accordingly  - Plan for 6 weeks of IV antibiotics for a prosthetic joint infection from last washout 7/7  - ID will follow.              Anticipated Disposition: tbd    Thank you for your consult. I will follow-up with patient. Please contact us if you have any additional questions.    FRENCH Momin  Infectious Disease  [facility]    Subjective:     Principal Problem:Infection of prosthetic left knee joint    HPI: Mr. Rowe is a 62 year old male with history of left TKA on 6/22 complicated by prosthetic knee infection s/p washout and polyethylene liner exchange on 7/3. Per op  note, a good deal of purulent and murky discharge was encountered in surgery. Surgical cultures grew Pasturella multocida. Patient reports he bought a new cat 3 days prior to his TKA. Denies cat scratches/bites/contact with the wound. He was discharged on Ceftriaxone.     Patient was re-admitted yesterday as he had continued purulent drainage and pain of his left knee. He was taken to the OR for a repeat washout and poly exchange. Surgical cultures are pending. Per discussion with ortho, infected fluid was encountered again. Ortho team is concerned patient did not respond well to ceftriaxone. He is currently on Vancomycin and unasyn. Afebrile. He is seen at bedside. Reports a significant improvement in pain now that his knee was washed out again. He states pain is much better than it was following prior washout. Denies fevers, chills, sweats.     Interval History: No AEON.  Afebrile and WBC WNL.  Doing OK but left knee pain not well controlled.  The patient denies any recent fever, chills, or sweats.      Review of Systems   Constitutional: Negative for chills, diaphoresis and fever.   Respiratory: Negative for shortness of breath.    Cardiovascular: Negative for chest pain.   Gastrointestinal: Negative for abdominal pain, diarrhea, nausea and vomiting.   Genitourinary: Negative for dysuria and hematuria.   Musculoskeletal: Positive for arthralgias.     Objective:     Vital Signs (Most Recent):  Temp: 97.2 °F (36.2 °C) (07/09/18 0401)  Pulse: 78 (07/09/18 0401)  Resp: 18 (07/09/18 0401)  BP: (!) 117/55 (07/09/18 0401)  SpO2: (!) 94 % (07/09/18 0401) Vital Signs (24h Range):  Temp:  [96 °F (35.6 °C)-97.3 °F (36.3 °C)] 97.2 °F (36.2 °C)  Pulse:  [60-78] 78  Resp:  [16-18] 18  SpO2:  [93 %-96 %] 94 %  BP: (117-144)/(55-72) 117/55     Weight: 125.6 kg (277 lb)  Body mass index is 43.38 kg/m².    Estimated Creatinine Clearance: 120.2 mL/min (based on SCr of 0.8 mg/dL).    Physical Exam   Constitutional: He is oriented to  person, place, and time. He appears well-developed and well-nourished. No distress.   HENT:   Head: Normocephalic and atraumatic.   Mouth/Throat: No oropharyngeal exudate.   Eyes: Conjunctivae are normal. No scleral icterus.   Cardiovascular: Normal rate, regular rhythm, normal heart sounds and intact distal pulses.    No murmur heard.  Pulmonary/Chest: Effort normal and breath sounds normal. No respiratory distress. He has no wheezes.   Abdominal: Soft. He exhibits no distension. There is no tenderness.   Musculoskeletal:   Left knee dressed and in cast. Wound vac intact.   Neurological: He is alert and oriented to person, place, and time.   Skin: Skin is warm and dry. No rash noted. He is not diaphoretic. No erythema.       Significant Labs:   Blood Culture:   Recent Labs  Lab 07/03/18  1120 07/03/18  1134   LABBLOO No growth after 5 days. No growth after 5 days.     CBC: No results for input(s): WBC, HGB, HCT, PLT in the last 48 hours.  CMP: No results for input(s): NA, K, CL, CO2, GLU, BUN, CREATININE, CALCIUM, PROT, ALBUMIN, BILITOT, ALKPHOS, AST, ALT, ANIONGAP, EGFRNONAA in the last 48 hours.    Invalid input(s): ESTGFA[place]  Wound Culture:   Recent Labs  Lab 07/03/18  1545 07/07/18  1248 07/07/18  1251   LABAERO PASTEURELLA MULTOCIDAModerateSusceptibility testing not routinely performed No growth No growth     All pertinent labs within the past 24 hours have been reviewed.    Significant Imaging: I have reviewed all pertinent imaging results/findings within the past 24 hours.

## 2018-07-09 NOTE — PT/OT/SLP PROGRESS
Physical Therapy Treatment    Patient Name:  Yosef Rowe   MRN:  [id number]    Recommendations:     Discharge Recommendations:  home with home health   Discharge Equipment Recommendations: bedside commode   Barriers to discharge: None    Assessment:     Yosef Rowe is a 62 y.o. male admitted with a medical diagnosis of Infection of prosthetic left knee joint.  He presents with the below impairments/functional limitations.  Pt tolerated treatment fairly today.  Pt will continue to benefit from skilled PT services to address the below deficits and to increase functional independence.      Rehab Prognosis:  good; patient would benefit from acute skilled PT services to address these deficits and reach maximum level of function.      Rehab Identified impairments/functional limitations:   weakness, impaired endurance, impaired functional mobilty, gait instability, impaired balance, decreased ROM, decreased lower extremity function, edema, orthopedic precautions, impaired skin, pain    Recent Surgery: Procedure(s) (LRB):  REPLACEMENT, POLYETHYLENE LINER (Left)  IRRIGATION AND DEBRIDEMENT, LOWER EXTREMITY (Left) 2 Days Post-Op    Plan:     During this hospitalization, patient to be seen daily to address the above listed problems via gait training, therapeutic activities, therapeutic exercises, neuromuscular re-education  · Plan of Care Expires:  08/07/18   Plan of Care Reviewed with: patient    Subjective     Communicated with RN prior to session.  Patient found supine in bed upon PT entry to room, agreeable to treatment.      Chief Complaint: would like to sit in chair    Pain/Comfort:  · Pain Rating 1:  (did not rate)  · Location - Side 1: Left  · Location 1: knee  · Pain Addressed 1: Reposition, Distraction, Cessation of Activity  · Pain Rating Post-Intervention 1:  (did not rate)    Patients cultural, spiritual, Adventism conflicts given the current situation: none noted    Objective:     Patient found with: wound  vac, hemovac     General Precautions: Standard, fall   Orthopedic Precautions:LLE weight bearing as tolerated   Braces: Hinged knee brace (locked in extension)     Functional Mobility:    Bed Mobility:    · Patient completed Scooting/Bridging with contact guard assistance  · Patient completed Supine to Sit with minimum assistance     Transfers:  · Sit <> Stand: Minimal Assistance with Rolling Walker from EOB    Gait:  Pt ambulated ~10 feet with RW and CGA.  No LOB or SOB    PT returned to patient's room in PM to ambulate further, but patient found arguing loudly with medical team.  Charge RN in room.      AM-PAC 6 CLICK MOBILITY  Turning over in bed (including adjusting bedclothes, sheets and blankets)?: 3  Sitting down on and standing up from a chair with arms (e.g., wheelchair, bedside commode, etc.): 3  Moving from lying on back to sitting on the side of the bed?: 3  Moving to and from a bed to a chair (including a wheelchair)?: 3  Need to walk in hospital room?: 3  Climbing 3-5 steps with a railing?: 2  Basic Mobility Total Score: 17     Therapeutic Activities and Exercises:  · Pt educated on:   · Role of PT, safety with functional mobility.   · DME and discharge needs  · Importance of OOB activity  · Weightbearing precautions  · Gait sequencing    Pt safe to transfer with RN staff    Patient left up in chair with all lines intact, call button in reach and RN notified..    GOALS:    Physical Therapy Goals        Problem: Physical Therapy Goal    Goal Priority Disciplines Outcome Goal Variances Interventions   Physical Therapy Goal     PT/OT, PT Ongoing (interventions implemented as appropriate)     Description:  Goals to be met by: 7/15     Patient will increase functional independence with mobility by performin. Supine to sit with Set-up [place]  2. Sit to supine with Set-up [place]  3. Sit to stand transfer with Modified [place] with SW  4. Gait  x 150 feet with Modified [place]  using Standard Walker.   5. Ascend/Descend 6 inch curb step with Supervision using Standard Walker.                      Time Tracking:     PT Received On: 07/09/18  PT Start Time: 1043     PT Stop Time: 1055  PT Total Time (min): 12 min     Billable Minutes: Therapeutic Activity 12    Ron Prasad, PT, DPT  7/9/2018   [phone]

## 2018-07-09 NOTE — OP NOTE
DATE OF PROCEDURE:  07/07/2018    PREOPERATIVE DIAGNOSIS:  Infected left medial partial knee replacement.    POSTOPERATIVE DIAGNOSIS:  Infected left medial partial knee replacement.    PROCEDURE:  Arthrotomy, left knee with irrigation and debridement for   periprosthetic left knee infection.    SURGEON:  Orlin Jordan M.D.    ASSISTANT:  Dr. Baeza, Dr. Abrams, Dr. Villasenor.    ANESTHESIA:  General.    ESTIMATED BLOOD LOSS:  200 mL    SPECIMEN:  Synovium for culture.    INDICATIONS:  The patient is a 62-year-old gentleman who is two weeks status   post left partial knee replacement.  He presented last week with the acute onset   of severe left knee pain.  This was after approximately 10 days of being   asymptomatic and denying any pain whatsoever.  He was taken to the Operating   Room for irrigation and debridement and polyethylene exchange.  This was   performed by Dr. Olivarez.  Cultures subsequently revealed Pasteurella.  He was   placed on IV antibiotics and discharge.  However, he had continued pain and   drainage and it was recommended to return to the hospital for repeat irrigation   and debridement and polyethylene exchange.  Risks and complications were   discussed including, but not limited to the risks of anesthetic complications   and continued infection, continued wound healing complications, DVT, pulmonary   embolism, death and in particular, the most considerable risk of continued   infection necessitating removal of the prosthesis.  He elected to proceed.    DESCRIPTION OF PROCEDURE:  The patient was taken to the Operating Room where   general anesthesia was administered by the Anesthesia Department.  The left   lower extremity was then sterilely prepped and draped in a normal fashion.    The patient's previous incision was opened along the length of approximately 20   cm.  The subcutaneous tissue was then dissected and the subcutaneous sutures   were removed.  He had significant medial and lateral  subcutaneous pockets.    These had necrotic fat and purulence within them.  Intraoperative cultures of   this were obtained and the subcutaneous tissue was then thoroughly irrigated   using pulse lavage.  The prior deep arthrotomy was then incised and the sutures   were removed.  He had some cloudy appearing fluid within the knee, although it   was not as impressive as the subcutaneous tissue with regard to suspicion for   infection.  Deep cultures were obtained at this time along with synovial biopsy   for culture.    The knee was provisionally irrigated with 3 L of sterile saline in a pulse   lavage fashion.  Following this, dilute Betadine was placed in the wound and   after 3 minutes, was removed using a second 3-liter washing with sterile saline.    Diluted Betadine was again placed and at this time, sterile brushes were used   to brush the prosthesis in an attempt to remove any forming biofilm.    Synovectomy was then performed.  The synovium was not extremely thickened.    At this time, Bactisure was placed within the wound in a pulse lavage fashion   and brushed using sterile brushes.  After applying the Bactisure via the pulse   lavage and brushing, it was removed with a final liter of sterile saline in a   pulse lavage fashion.    Of note, after the arthrotomy was made, the prior polyethylene was removed prior   to irrigation and debridement to gain access to the posterior aspect of the   knee.    A new 5 x 8 mm left medial polyethylene was then locked into position.  Then, 1   g of vancomycin powder was placed deep within the wound.  A 1/8-inch Hemovac was   then placed deep in the knee and exited laterally.  The arthrotomy was then   closed with interrupted figure-of-eight sutures of antibiotic-impregnated #1   Vicryl.  The subcutaneous tissue was then closed with interrupted inverted   stitches of antibiotic-impregnated Vicryl followed by both retention sutures and   horizontal mattress sutures of #3-0  nylon.  An incisional wound VAC was then   placed over the incision itself.    Sterile dressings were then applied.  General anesthesia was reversed and he was   returned to the Postanesthesia Care Unit in stable condition.            /daniel [id number] blank(s)        MSM/SUSAN  dd: 07/07/2018 13:13:01 (CDT)  td: 07/07/2018 14:28:25 (CDT)  Doc ID   #[id number]  Job ID #[id number]    CC:

## 2018-07-09 NOTE — PROGRESS NOTES
[facility]  Orthopedics  Progress Note    Patient Name: Yosef Rowe  MRN: [id number]  Admission Date: 7/7/2018  Hospital Length of Stay: 2 days  Attending Provider: Orlin Jordan MD  Primary Care Provider: Ruperto Dexter MD  Follow-up For: Procedure(s) (LRB):  REPLACEMENT, POLYETHYLENE LINER (Left)  IRRIGATION AND DEBRIDEMENT, LOWER EXTREMITY (Left)    Post-Operative Day: 2 Days Post-Op  Subjective:     Past Medical History:   Diagnosis Date    Anxiety     Arthritis     Depression     when turned 49-lost job and mother unwell at that time    Hyperlipidemia     Lumbar radiculopathy     BRETT- 2011    Osteoarthritis     Sleep apnea     Urinary tract infection        Past Surgical History:   Procedure Laterality Date    IRRIGATION AND DEBRIDEMENT OF LOWER EXTREMITY Left 7/3/2018    Procedure: IRRIGATION AND DEBRIDEMENT, LOWER EXTREMITY, poly exchange left uni knee replacement. [place]ry[place]. 9L NS;  Surgeon: Ruperto Olivarez MD;  Location: [facility] OR [address] [place] [address];  Service: Orthopedics;  Laterality: Left;    JOINT REPLACEMENT      knee arthroscopicc surgeries      Right knee- 2007  and Left knee- 2008    KNEE SURGERY      Left wrist Surgery      2000 for a torn ligament from Golf    TOTAL KNEE REPLACEMENT USING COMPUTER NAVIGATION Left 6/22/2018    Procedure: REPLACEMENT-KNEE WITH NAVIGATION-- unicondylar-- medial;  Surgeon: Orlin Jordan MD;  Location: [facility] OR [address] [place] [address];  Service: Orthopedics;  Laterality: Left;       Review of patient's allergies indicates:   Allergen Reactions    No known drug allergies        Current Facility-Administered Medications   Medication    acetaminophen tablet 650 mg    ampicillin-sulbactam 3 g in sodium chloride 0.9 % 100 mL IVPB (ready to mix system)    aspirin EC tablet 81 mg    atorvastatin tablet 20 mg    celecoxib capsule 200 mg    diphenhydrAMINE capsule 25 mg    docusate sodium capsule 100 mg    DULoxetine DR capsule 60 mg    losartan tablet 25 mg     "morphine injection 2 mg    ondansetron tablet 8 mg    oxyCODONE immediate release tablet 10 mg    oxyCODONE immediate release tablet 5 mg    pantoprazole EC tablet 40 mg    polyethylene glycol packet 17 g    pregabalin capsule 75 mg    promethazine tablet 12.5 mg    ramelteon tablet 8 mg    sodium chloride 0.9% flush 3 mL    vancomycin (VANCOCIN) 1,500 mg in dextrose 5 % 250 mL IVPB    zolpidem tablet 5 mg     Family History     Problem Relation (Age of Onset)    Parkinsonism Mother        Social History Main Topics    Smoking status: Current Every Day Smoker     Types: Cigars    Smokeless tobacco: Never Used      Comment: occasional    Alcohol use 6.0 oz/week     10 Glasses of wine per week      Comment: Daily, last use yesterday    Drug use: No    Sexual activity: Yes     Partners: Female     ROS   Constitutional: negative for fevers  Eyes: no visual changes  ENT: negative for hearing loss  Respiratory: negative for dyspnea  Cardiovascular: negative for chest pain  Gastrointestinal: negative for abdominal pain  Genitourinary: negative for dysuria  Neurological: negative for headaches  Behavioral/Psych: negative for hallucinations  Endocrine: negative for temperature intolerance      Objective:     Vital Signs (Most Recent):  Temp: 97.2 °F (36.2 °C) (07/09/18 0401)  Pulse: 78 (07/09/18 0401)  Resp: 18 (07/09/18 0401)  BP: (!) 117/55 (07/09/18 0401)  SpO2: (!) 94 % (07/09/18 0401) Vital Signs (24h Range):  Temp:  [96 °F (35.6 °C)-97.3 °F (36.3 °C)] 97.2 °F (36.2 °C)  Pulse:  [60-78] 78  Resp:  [16-18] 18  SpO2:  [93 %-97 %] 94 %  BP: (117-148)/(55-72) 117/55     Weight: 125.6 kg (277 lb)  Height: 5' 7" (170.2 cm)  Body mass index is 43.38 kg/m².      Intake/Output Summary (Last 24 hours) at 07/09/18 0502  Last data filed at 07/09/18 0200   Gross per 24 hour   Intake             1395 ml   Output              100 ml   Net             1295 ml       Ortho/SPM Exam    AAOx4  NAD  RRR  No increased " WOB  Wound vac c/d/i  Knee immobilizer locked in extension  SILT and motor intact T/SP/DP  WWP extremities  FCDs in place and functioning    Significant Labs: All pertinent labs within the past 24 hours have been reviewed.    Significant Imaging: I have reviewed and interpreted all pertinent imaging results/findings.    Assessment/Plan:     * Infection of prosthetic left knee joint    Yosef Rowe is a 62 y.o. male POD2 from repeat L uni knee I&D    - WBAT in hinged knee brace locked in extension  - Cultures: NGTD, ID following  - Abx: Vanc and unasyn  - Plan for wound check and wound vac replacement tomorrow  - Drain output 50 cc over 24 hours    - Dispo: likely discharge tomorrow with incisional wound vac        Hyperlipidemia    Home meds        Essential hypertension    Home meds              Joel Abrams MD  Orthopedics  [facility]

## 2018-07-09 NOTE — PLAN OF CARE
Problem: Occupational Therapy Goal  Goal: Occupational Therapy Goal  Goals to be met by: 7/22/18     Patient will increase functional independence with ADLs by performing:    UE Dressing with mod I.  LE Dressing with Modified [place].  Grooming while standing with Modified [place].  Toileting from toilet with Modified [place] for hygiene and clothing management.  -- Met (7/9)  Toilet transfer to toilet with Modified [place].     Outcome: Ongoing (interventions implemented as appropriate)  Pt progressing well towards functional outcomes     Comments: Cont OT POC

## 2018-07-09 NOTE — ASSESSMENT & PLAN NOTE
Yosef Rowe is a 62 y.o. male POD2 from repeat L uni knee I&D    - WBAT in hinged knee brace locked in extension  - Cultures: NGTD, ID following  - Abx: Vanc and unasyn  - Plan for wound check and wound vac replacement tomorrow  - Drain output 50 cc over 24 hours

## 2018-07-09 NOTE — PLAN OF CARE
Discharge plan is [facility] Home Infusions with [facility].  Awaiting updated orders to include home infusions. Referral initiated through [facility] but not completed.    Juanita Cole LMSW

## 2018-07-09 NOTE — PLAN OF CARE
Problem: Physical Therapy Goal  Goal: Physical Therapy Goal  Goals to be met by: 7/15     Patient will increase functional independence with mobility by performin. Supine to sit with Set-up [place]  2. Sit to supine with Set-up [place]  3. Sit to stand transfer with Modified [place] with SW  4. Gait  x 150 feet with Modified [place] using Standard Walker.   5. Ascend/Descend 6 inch curb step with Supervision using Standard Walker.     Outcome: Ongoing (interventions implemented as appropriate)  Continue POC.

## 2018-07-10 VITALS
OXYGEN SATURATION: 95 % | HEIGHT: 67 IN | HEART RATE: 78 BPM | BODY MASS INDEX: 43.47 KG/M2 | RESPIRATION RATE: 20 BRPM | DIASTOLIC BLOOD PRESSURE: 52 MMHG | WEIGHT: 277 LBS | SYSTOLIC BLOOD PRESSURE: 121 MMHG | TEMPERATURE: 97 F

## 2018-07-10 LAB
ALBUMIN SERPL BCP-MCNC: 2.3 G/DL
ALP SERPL-CCNC: 78 U/L
ALT SERPL W/O P-5'-P-CCNC: 20 U/L
ANION GAP SERPL CALC-SCNC: 7 MMOL/L
ANISOCYTOSIS BLD QL SMEAR: SLIGHT
AST SERPL-CCNC: 24 U/L
BACTERIA SPEC AEROBE CULT: NO GROWTH
BACTERIA SPEC AEROBE CULT: NO GROWTH
BACTERIA SPEC AEROBE CULT: NORMAL
BASOPHILS # BLD AUTO: ABNORMAL K/UL
BASOPHILS NFR BLD: 1 %
BILIRUB SERPL-MCNC: 0.3 MG/DL
BUN SERPL-MCNC: 11 MG/DL
CALCIUM SERPL-MCNC: 8.4 MG/DL
CHLORIDE SERPL-SCNC: 103 MMOL/L
CO2 SERPL-SCNC: 29 MMOL/L
CREAT SERPL-MCNC: 0.9 MG/DL
CRP SERPL-MCNC: 108.6 MG/L
DIFFERENTIAL METHOD: ABNORMAL
EOSINOPHIL # BLD AUTO: ABNORMAL K/UL
EOSINOPHIL NFR BLD: 2 %
ERYTHROCYTE [DISTWIDTH] IN BLOOD BY AUTOMATED COUNT: 12.5 %
ERYTHROCYTE [SEDIMENTATION RATE] IN BLOOD BY WESTERGREN METHOD: 119 MM/HR
EST. GFR  (AFRICAN AMERICAN): >60 ML/MIN/1.73 M^2
EST. GFR  (NON AFRICAN AMERICAN): >60 ML/MIN/1.73 M^2
GLUCOSE SERPL-MCNC: 108 MG/DL
HCT VFR BLD AUTO: 28.5 %
HGB BLD-MCNC: 9 G/DL
IMM GRANULOCYTES # BLD AUTO: ABNORMAL K/UL
IMM GRANULOCYTES NFR BLD AUTO: ABNORMAL %
LYMPHOCYTES # BLD AUTO: ABNORMAL K/UL
LYMPHOCYTES NFR BLD: 13 %
MCH RBC QN AUTO: 33.3 PG
MCHC RBC AUTO-ENTMCNC: 31.6 G/DL
MCV RBC AUTO: 106 FL
METAMYELOCYTES NFR BLD MANUAL: 0.5 %
MONOCYTES # BLD AUTO: ABNORMAL K/UL
MONOCYTES NFR BLD: 12 %
MYELOCYTES NFR BLD MANUAL: 1 %
NEUTROPHILS NFR BLD: 67 %
NEUTS BAND NFR BLD MANUAL: 3.5 %
NRBC BLD-RTO: 0 /100 WBC
PLATELET # BLD AUTO: 344 K/UL
PLATELET BLD QL SMEAR: ABNORMAL
PMV BLD AUTO: 9.4 FL
POTASSIUM SERPL-SCNC: 4 MMOL/L
PROCALCITONIN SERPL IA-MCNC: 0.34 NG/ML
PROT SERPL-MCNC: 5.6 G/DL
RBC # BLD AUTO: 2.7 M/UL
SODIUM SERPL-SCNC: 139 MMOL/L
WBC # BLD AUTO: 10.54 K/UL

## 2018-07-10 PROCEDURE — 99233 SBSQ HOSP IP/OBS HIGH 50: CPT | Mod: ,,, | Performed by: PHYSICIAN ASSISTANT

## 2018-07-10 PROCEDURE — 84145 PROCALCITONIN (PCT): CPT

## 2018-07-10 PROCEDURE — 63600175 PHARM REV CODE 636 W HCPCS: Performed by: ORTHOPAEDIC SURGERY

## 2018-07-10 PROCEDURE — 99900035 HC TECH TIME PER 15 MIN (STAT)

## 2018-07-10 PROCEDURE — 36569 INSJ PICC 5 YR+ W/O IMAGING: CPT

## 2018-07-10 PROCEDURE — 25000003 PHARM REV CODE 250: Performed by: PHYSICIAN ASSISTANT

## 2018-07-10 PROCEDURE — 25000003 PHARM REV CODE 250: Performed by: STUDENT IN AN ORGANIZED HEALTH CARE EDUCATION/TRAINING PROGRAM

## 2018-07-10 PROCEDURE — C1751 CATH, INF, PER/CENT/MIDLINE: HCPCS

## 2018-07-10 PROCEDURE — 36415 COLL VENOUS BLD VENIPUNCTURE: CPT

## 2018-07-10 PROCEDURE — A4216 STERILE WATER/SALINE, 10 ML: HCPCS | Performed by: ORTHOPAEDIC SURGERY

## 2018-07-10 PROCEDURE — 25000003 PHARM REV CODE 250: Performed by: ORTHOPAEDIC SURGERY

## 2018-07-10 PROCEDURE — 85651 RBC SED RATE NONAUTOMATED: CPT

## 2018-07-10 PROCEDURE — 80053 COMPREHEN METABOLIC PANEL: CPT

## 2018-07-10 PROCEDURE — 02HV33Z INSERTION OF INFUSION DEVICE INTO SUPERIOR VENA CAVA, PERCUTANEOUS APPROACH: ICD-10-PCS | Performed by: ORTHOPAEDIC SURGERY

## 2018-07-10 PROCEDURE — 86140 C-REACTIVE PROTEIN: CPT

## 2018-07-10 PROCEDURE — 76937 US GUIDE VASCULAR ACCESS: CPT

## 2018-07-10 PROCEDURE — 63600175 PHARM REV CODE 636 W HCPCS: Performed by: PHYSICIAN ASSISTANT

## 2018-07-10 RX ORDER — SODIUM CHLORIDE 0.9 % (FLUSH) 0.9 %
10 SYRINGE (ML) INJECTION EVERY 6 HOURS
Status: DISCONTINUED | OUTPATIENT
Start: 2018-07-10 | End: 2018-07-10 | Stop reason: HOSPADM

## 2018-07-10 RX ORDER — SODIUM CHLORIDE 0.9 % (FLUSH) 0.9 %
10 SYRINGE (ML) INJECTION
Status: DISCONTINUED | OUTPATIENT
Start: 2018-07-10 | End: 2018-07-10 | Stop reason: HOSPADM

## 2018-07-10 RX ADMIN — ASPIRIN 81 MG: 81 TABLET, COATED ORAL at 09:07

## 2018-07-10 RX ADMIN — MORPHINE SULFATE 2 MG: 4 INJECTION INTRAVENOUS at 12:07

## 2018-07-10 RX ADMIN — OXYCODONE HYDROCHLORIDE 10 MG: 5 TABLET ORAL at 07:07

## 2018-07-10 RX ADMIN — ACETAMINOPHEN 650 MG: 325 TABLET, FILM COATED ORAL at 06:07

## 2018-07-10 RX ADMIN — DOCUSATE SODIUM 100 MG: 100 CAPSULE, LIQUID FILLED ORAL at 08:07

## 2018-07-10 RX ADMIN — ACETAMINOPHEN 650 MG: 325 TABLET, FILM COATED ORAL at 12:07

## 2018-07-10 RX ADMIN — DULOXETINE 60 MG: 60 CAPSULE, DELAYED RELEASE ORAL at 07:07

## 2018-07-10 RX ADMIN — AMPICILLIN AND SULBACTAM 3 G: 2; 1 INJECTION, POWDER, FOR SOLUTION INTRAVENOUS at 04:07

## 2018-07-10 RX ADMIN — ACETAMINOPHEN 650 MG: 325 TABLET, FILM COATED ORAL at 05:07

## 2018-07-10 RX ADMIN — Medication 10 ML: at 06:07

## 2018-07-10 RX ADMIN — PANTOPRAZOLE SODIUM 40 MG: 40 TABLET, DELAYED RELEASE ORAL at 09:07

## 2018-07-10 RX ADMIN — OXYCODONE HYDROCHLORIDE 10 MG: 5 TABLET ORAL at 03:07

## 2018-07-10 RX ADMIN — AMPICILLIN AND SULBACTAM 3 G: 2; 1 INJECTION, POWDER, FOR SOLUTION INTRAVENOUS at 11:07

## 2018-07-10 RX ADMIN — ASPIRIN 81 MG: 81 TABLET, COATED ORAL at 08:07

## 2018-07-10 RX ADMIN — MORPHINE SULFATE 2 MG: 4 INJECTION INTRAVENOUS at 08:07

## 2018-07-10 RX ADMIN — PREGABALIN 75 MG: 75 CAPSULE ORAL at 08:07

## 2018-07-10 RX ADMIN — ACETAMINOPHEN 650 MG: 325 TABLET, FILM COATED ORAL at 11:07

## 2018-07-10 RX ADMIN — MORPHINE SULFATE 2 MG: 4 INJECTION INTRAVENOUS at 09:07

## 2018-07-10 RX ADMIN — DOCUSATE SODIUM 100 MG: 100 CAPSULE, LIQUID FILLED ORAL at 09:07

## 2018-07-10 RX ADMIN — LOSARTAN POTASSIUM 25 MG: 25 TABLET, FILM COATED ORAL at 09:07

## 2018-07-10 RX ADMIN — ATORVASTATIN CALCIUM 20 MG: 20 TABLET, FILM COATED ORAL at 07:07

## 2018-07-10 RX ADMIN — OXYCODONE HYDROCHLORIDE 10 MG: 5 TABLET ORAL at 11:07

## 2018-07-10 RX ADMIN — CELECOXIB 200 MG: 200 CAPSULE ORAL at 09:07

## 2018-07-10 NOTE — PROGRESS NOTES
"[facility]  Orthopedics  Progress Note    Patient Name: Yosef Rowe  MRN: [id number]  Admission Date: 7/7/2018  Hospital Length of Stay: 3 days  Attending Provider: Orlin Jordan MD  Primary Care Provider: Ruperto Dexter MD  Follow-up For: Procedure(s) (LRB):  REPLACEMENT, POLYETHYLENE LINER (Left)  IRRIGATION AND DEBRIDEMENT, LOWER EXTREMITY (Left)    Post-Operative Day: 3 Days Post-Op  Subjective:     Principal Problem:Infection of prosthetic left knee joint    Principal Orthopedic Problem: same    Interval History: Patient seen and examined at bedside.  No acute events overnight.  Pain controlled.  Walked 10 feet with PT yesterday.      Review of patient's allergies indicates:   Allergen Reactions    No known drug allergies        Current Facility-Administered Medications   Medication    acetaminophen tablet 650 mg    ampicillin-sulbactam 3 g in sodium chloride 0.9 % 100 mL IVPB (ready to mix system)    aspirin EC tablet 81 mg    atorvastatin tablet 20 mg    celecoxib capsule 200 mg    diphenhydrAMINE capsule 25 mg    docusate sodium capsule 100 mg    DULoxetine DR capsule 60 mg    losartan tablet 25 mg    morphine injection 2 mg    ondansetron tablet 8 mg    oxyCODONE immediate release tablet 10 mg    oxyCODONE immediate release tablet 5 mg    pantoprazole EC tablet 40 mg    polyethylene glycol packet 17 g    pregabalin capsule 75 mg    promethazine tablet 12.5 mg    ramelteon tablet 8 mg    sodium chloride 0.9% flush 3 mL    zolpidem tablet 5 mg     Objective:     Vital Signs (Most Recent):  Temp: 97.8 °F (36.6 °C) (07/10/18 0040)  Pulse: 68 (07/10/18 0447)  Resp: 18 (07/10/18 0447)  BP: (!) 140/65 (07/10/18 0040)  SpO2: 98 % (07/10/18 0447) Vital Signs (24h Range):  Temp:  [96.8 °F (36 °C)-98.7 °F (37.1 °C)] 97.8 °F (36.6 °C)  Pulse:  [68-81] 68  Resp:  [16-18] 18  SpO2:  [93 %-99 %] 98 %  BP: (123-152)/(62-70) 140/65     Weight: 125.6 kg (277 lb)  Height: 5' 7" (170.2 " cm)  Body mass index is 43.38 kg/m².      Intake/Output Summary (Last 24 hours) at 07/10/18 0600  Last data filed at 07/10/18 0448   Gross per 24 hour   Intake                0 ml   Output              400 ml   Net             -400 ml       Ortho/SPM Exam      AAOx4  NAD  RRR  No increased WOB    LLE:  Wound vac c/d/i  Knee immobilizer locked in extension  SILT and motor intact T/SP/DP  WWP extremities  FCDs in place and functioning    Significant Labs:   CBC:   Recent Labs  Lab 07/10/18  0207   WBC 10.54   HGB 9.0*   HCT 28.5*        CMP:   Recent Labs  Lab 07/10/18  0207      K 4.0      CO2 29      BUN 11   CREATININE 0.9   CALCIUM 8.4*   PROT 5.6*   ALBUMIN 2.3*   BILITOT 0.3   ALKPHOS 78   AST 24   ALT 20   ANIONGAP 7*   EGFRNONAA >60.0     All pertinent labs within the past 24 hours have been reviewed.    Significant Imaging: None    Assessment/Plan:     * Infection of prosthetic left knee joint    Yosef Rowe is a 62 y.o. male POD3 from repeat L uni knee I&D    - WBAT in hinged knee brace locked in extension  - PT/OT  - Cultures: NGTD from most recent washout, Pastuerella multocida from prior washout (sensitivies being obtained), ID following  - Abx: unasyn, discontinue vanc  - Plan for wound check and wound vac replacement today  - Drain output not recorded yet  - DVT ppx: ASA 81 mg BID, TEDs, SCDs, early ambulation        Hyperlipidemia    Home meds        Essential hypertension    Home meds              Joel Abrams MD  Orthopedics  [facility]

## 2018-07-10 NOTE — PT/OT/SLP PROGRESS
Physical Therapy      Patient Name:  Yosef Rowe   MRN:  [id number]    Patient not seen today secondary to PICC line placement.  Pt was about to ambulate with therapist when PICC team arrived. Will follow-up tomorrow.    Ron Prasad, PT

## 2018-07-10 NOTE — SUBJECTIVE & OBJECTIVE
"Principal Problem:Infection of prosthetic left knee joint    Principal Orthopedic Problem: same    Interval History: Patient seen and examined at bedside.  No acute events overnight.  Pain controlled.  Walked 10 feet with PT yesterday.      Review of patient's allergies indicates:   Allergen Reactions    No known drug allergies        Current Facility-Administered Medications   Medication    acetaminophen tablet 650 mg    ampicillin-sulbactam 3 g in sodium chloride 0.9 % 100 mL IVPB (ready to mix system)    aspirin EC tablet 81 mg    atorvastatin tablet 20 mg    celecoxib capsule 200 mg    diphenhydrAMINE capsule 25 mg    docusate sodium capsule 100 mg    DULoxetine DR capsule 60 mg    losartan tablet 25 mg    morphine injection 2 mg    ondansetron tablet 8 mg    oxyCODONE immediate release tablet 10 mg    oxyCODONE immediate release tablet 5 mg    pantoprazole EC tablet 40 mg    polyethylene glycol packet 17 g    pregabalin capsule 75 mg    promethazine tablet 12.5 mg    ramelteon tablet 8 mg    sodium chloride 0.9% flush 3 mL    zolpidem tablet 5 mg     Objective:     Vital Signs (Most Recent):  Temp: 97.8 °F (36.6 °C) (07/10/18 0040)  Pulse: 68 (07/10/18 0447)  Resp: 18 (07/10/18 0447)  BP: (!) 140/65 (07/10/18 0040)  SpO2: 98 % (07/10/18 0447) Vital Signs (24h Range):  Temp:  [96.8 °F (36 °C)-98.7 °F (37.1 °C)] 97.8 °F (36.6 °C)  Pulse:  [68-81] 68  Resp:  [16-18] 18  SpO2:  [93 %-99 %] 98 %  BP: (123-152)/(62-70) 140/65     Weight: 125.6 kg (277 lb)  Height: 5' 7" (170.2 cm)  Body mass index is 43.38 kg/m².      Intake/Output Summary (Last 24 hours) at 07/10/18 0600  Last data filed at 07/10/18 0448   Gross per 24 hour   Intake                0 ml   Output              400 ml   Net             -400 ml       Ortho/SPM Exam      AAOx4  NAD  RRR  No increased WOB    LLE:  Wound vac c/d/i  Knee immobilizer locked in extension  SILT and motor intact T/SP/DP  WWP extremities  FCDs in place and " functioning    Significant Labs:   CBC:   Recent Labs  Lab 07/10/18  0207   WBC 10.54   HGB 9.0*   HCT 28.5*        CMP:   Recent Labs  Lab 07/10/18  0207      K 4.0      CO2 29      BUN 11   CREATININE 0.9   CALCIUM 8.4*   PROT 5.6*   ALBUMIN 2.3*   BILITOT 0.3   ALKPHOS 78   AST 24   ALT 20   ANIONGAP 7*   EGFRNONAA >60.0     All pertinent labs within the past 24 hours have been reviewed.    Significant Imaging: None

## 2018-07-10 NOTE — PLAN OF CARE
CM called [facility] to inquire about readiness to discharge. Louise at [facility] stated that patient will be connected to continuous IV infusion at 1700 today and then the patient can discharge.    CM went to patient's room to update him on the plan. Upon arrival to the room the patient notified the CM that his PICC inadvertently came out. ELIO spoke with bedside nurse Adolph. Adolph stated he paged the ID physician.     CM called Dr. Abrams and notified him of above. Dr. Abrams to call PICC team and have new PICC placed asap.    CM called Louise at [facility] to update her on above. CM to keep [facility] updated so they can start antibiotics once new PICC is placed.    Shahla Pineda RN, CM [facility]  [phone]

## 2018-07-10 NOTE — NURSING
Called MD on call for ID about pts picc line came out. md stated that they were in a room but would come by as soon as they were finished. Will continue to monitor.

## 2018-07-10 NOTE — CONSULTS
Double lumen PICC place to left brachial vein.  46 cm in length, 38 cm arm circumference and 0 cm exposed.   Lot # [id number].

## 2018-07-10 NOTE — SUBJECTIVE & OBJECTIVE
Interval History: No AEON.  Afebrile and WBC WNL.  The patient denies any recent fever, chills, or sweats. His pain is well controlled. Tolerating Unasyn. Repeat surgical cultures are negative thus far.    Review of Systems   Constitutional: Negative for chills, diaphoresis and fever.   Respiratory: Negative for shortness of breath.    Cardiovascular: Negative for chest pain.   Gastrointestinal: Negative for abdominal pain, diarrhea, nausea and vomiting.   Genitourinary: Negative for dysuria and hematuria.   Musculoskeletal: Positive for arthralgias.     Objective:     Vital Signs (Most Recent):  Temp: 97.8 °F (36.6 °C) (07/10/18 0040)  Pulse: 68 (07/10/18 0447)  Resp: 18 (07/10/18 0447)  BP: (!) 140/65 (07/10/18 0040)  SpO2: 98 % (07/10/18 0447) Vital Signs (24h Range):  Temp:  [96.8 °F (36 °C)-98.7 °F (37.1 °C)] 97.8 °F (36.6 °C)  Pulse:  [68-81] 68  Resp:  [16-18] 18  SpO2:  [93 %-99 %] 98 %  BP: (123-152)/(62-70) 140/65     Weight: 125.6 kg (277 lb)  Body mass index is 43.38 kg/m².    Estimated Creatinine Clearance: 106.8 mL/min (based on SCr of 0.9 mg/dL).    Physical Exam   Constitutional: He is oriented to person, place, and time. He appears well-developed and well-nourished. No distress.   HENT:   Head: Normocephalic and atraumatic.   Eyes: Conjunctivae are normal. No scleral icterus.   Cardiovascular: Normal rate, regular rhythm and intact distal pulses.    No murmur heard.  Pulmonary/Chest: Effort normal. No respiratory distress.   Abdominal: Soft. He exhibits no distension. There is no tenderness.   Musculoskeletal:   Left knee dressed and in cast. Wound vac intact.   Neurological: He is alert and oriented to person, place, and time.   Skin: Skin is warm and dry. No rash noted. He is not diaphoretic. No erythema.       Significant Labs:   Blood Culture:   Recent Labs  Lab 07/03/18  1120 07/03/18  1134   LABBLOO No growth after 5 days. No growth after 5 days.     CBC:   Recent Labs  Lab 07/10/18  0207    WBC 10.54   HGB 9.0*   HCT 28.5*        CMP:   Recent Labs  Lab 07/10/18  0207      K 4.0      CO2 29      BUN 11   CREATININE 0.9   CALCIUM 8.4*   PROT 5.6*   ALBUMIN 2.3*   BILITOT 0.3   ALKPHOS 78   AST 24   ALT 20   ANIONGAP 7*   EGFRNONAA >60.0     Wound Culture:   Recent Labs  Lab 07/03/18  1545 07/07/18  1248 07/07/18  1251   LABAERO PASTEURELLA MULTOCIDAModerateSusceptibility pending No growth No growth     All pertinent labs within the past 24 hours have been reviewed.    Significant Imaging: I have reviewed all pertinent imaging results/findings within the past 24 hours.

## 2018-07-10 NOTE — NURSING TRANSFER
Nursing Transfer Note      6/22/2018     Transfer to 506    Transfer via stretcher    Transfer with portable O2/polar ice/continuous tele, ETCO2 and pulse ox    Transported by PCT    Medicines sent: n/a    Chart send with patient: yes    Notified: wife    Patient reassessed at: 6/22/18 @ 4742   oral

## 2018-07-10 NOTE — ASSESSMENT & PLAN NOTE
62 year old male with history of left TKA on 6/22 complicated by prosthetic knee infection s/p washout and polyethylene liner exchange on 7/3. Surgical cultures grew Pasturella multocida. Of note, he does mention he has a kitten at home. He was discharged on Ceftriaxone three days ago. Patient was re-admitted yesterday as he had continued purulent drainage and pain of his left knee. He was taken to the OR for a repeat washout and poly exchange. Surgical cultures are NGTD. Per discussion with ortho, infected fluid was encountered again in surgery and team is concerned patient did not respond well to ceftriaxone. He is currently on Unasyn. Afebrile. No leukocytosis. CRP is trending down. Reports symptomatic improvement following washout.    Plan  - Recommend Unasyn 12 g IV continuous infusion q 24 hours x 6 weeks from day of surgery (estimated end date 8/18/18)  - Patient will need ESR, CRP, CBC and CMP to be drawn weekly with results faxed to the ID Department at  [phone]  - Will arrange ID follow up with orthopedic surgery follow up.  - ID will sign off.

## 2018-07-10 NOTE — DISCHARGE SUMMARY
[facility]  Orthopedics  Discharge Summary      Patient Name: Yosef Rowe  MRN: [id number]  Admission Date: 7/7/2018  Hospital Length of Stay: 3 days  Discharge Date and Time:  07/10/2018  Attending Physician: Orlin Jordan MD   Discharging Provider: Joel Abrams MD  Primary Care Provider: Ruperto Dexter MD    HPI:   Yosef Rowe is a 62 y.o. male who is s/p uni knee replaccment by Dr. Jordan on 6/22. Had some drainage post operatively but was doing well up until a week or so ago when he began having increasing pain and drainage from the incision site. He returned to [facility] on 7/4 and was taken to OR for I and D and poly exchange. He was discharged on 7/5 with PICC and IVAB. Since dc he has continued drainage and pain. Pain has not Reports no fevers or chills. Presents today for repeat I and D and poly exchange.     Procedure(s) (LRB):  REPLACEMENT, POLYETHYLENE LINER (Left)  IRRIGATION AND DEBRIDEMENT, LOWER EXTREMITY (Left)      Hospital Course:  On 7/7/2018, the patient arrived to the [facility] for proper pre-operative management.  Upon completion of pre-operative preparation, the patient was taken back to the operative theatre.  A left unicondylar knee I&D and poly exchange was performed without complication and the patient was transported to the post anesthesia care unit in stable condition.  After appropriate recovery from the anaesthetic agents used during the surgery, the patient was then transported to the hospital inpatient floor.  The interim of the hospital stay from arrival on the floor up to discharge has been uncomplicated. The patient's diet has progressed to a regular diet with no nausea or vomiting.  The patient's pain has been controlled using a multimodal approach. Currently, the patient's pain is well controlled on an oral regimen.  The patient has been voiding without difficulty ever since surgery. ID was consulted for recommendations on  antibiotics. Patient was switched to unasyn for antibiotics and will be discharged on it. The patient began participation in physical therapy on post-operative day one and has progressed throughout the entire hospital stay.  Currently the patient is ambulating with moderate assistance and the physical therapy team feels that the patient's progress is sufficient to allow the patient to be discharged home safely.  The patient agrees with this assessment and desires a discharge to home today.          Consults         Status Ordering Provider     Inpatient consult to Infectious Diseases  Once     Provider:  (Not yet assigned)    Completed RADHA CRUZ     Inpatient consult to Social Work/Case Management  Once     Provider:  (Not yet assigned)    Acknowledged RADHA CRUZ          Significant Diagnostic Studies: Labs:   CMP   Recent Labs  Lab 07/10/18  0207      K 4.0      CO2 29      BUN 11   CREATININE 0.9   CALCIUM 8.4*   PROT 5.6*   ALBUMIN 2.3*   BILITOT 0.3   ALKPHOS 78   AST 24   ALT 20   ANIONGAP 7*   ESTGFRAFRICA >60.0   EGFRNONAA >60.0    and All labs within the past 24 hours have been reviewed    Pending Diagnostic Studies:     None        Final Active Diagnoses:    Diagnosis Date Noted POA    PRINCIPAL PROBLEM:  Infection of prosthetic left knee joint [T84.54XA] 07/04/2018 Not Applicable    Hyperlipidemia [E78.5] 10/12/2017 Yes    Essential hypertension [I10] 11/08/2013 Yes      Problems Resolved During this Admission:    Diagnosis Date Noted Date Resolved POA      Discharged Condition: stable    Disposition: Home or Self Care    Follow Up:  Follow-up Information     Stefania Vickers NP. Go on 7/17/2018.    Specialty:  Orthopedic Surgery  Why:  For wound re-check  Contact information:  [address]  [facility] [id number] [phone]                 Patient Instructions:     Call MD for:  temperature >100.4     Call MD for:  persistent nausea and vomiting  or diarrhea     Call MD for:  redness, tenderness, or signs of infection (pain, swelling, redness, odor or green/yellow discharge around incision site)     Call MD for:  difficulty breathing or increased cough     Call MD for:  severe persistent headache     Call MD for:  worsening rash     Call MD for:  persistent dizziness, light-headedness, or visual disturbances     Call MD for:  increased confusion or weakness     Leave dressing on - Keep it clean, dry, and intact until clinic visit     Sponge bath only until clinic visit     Keep surgical extremity elevated     Other restrictions (specify):   Order Comments: Hinged knee brace locked in extension at all times.     Weight bearing restrictions (specify):   Order Comments: Weight bearing as tolerated       Medications:  Reconciled Home Medications:      Medication List      START taking these medications    oxyCODONE-acetaminophen 5-325 mg per tablet  Commonly known as:  PERCOCET  Take 1 tablet by mouth every 4 (four) hours as needed for Pain.  Replaces:  oxyCODONE-acetaminophen  mg per tablet        CHANGE how you take these medications    atorvastatin 20 MG tablet  Commonly known as:  LIPITOR  Take 1 tablet (20 mg total) by mouth once daily.  What changed:  when to take this     DULoxetine 60 MG capsule  Commonly known as:  CYMBALTA  Take 1 capsule (60 mg total) by mouth once daily.  What changed:  when to take this        CONTINUE taking these medications    aspirin 81 MG EC tablet  Commonly known as:  ECOTRIN  Take 1 tablet (81 mg total) by mouth 2 (two) times daily.     CEFTRIAXONE 2 G/50 ML D5W (READY TO MIX)  Inject 50 mLs (2 g total) into the vein once daily.     docusate sodium 100 MG capsule  Commonly known as:  COLACE  Take 1 capsule (100 mg total) by mouth 2 (two) times daily.     losartan 25 MG tablet  Commonly known as:  COZAAR  Take 1 tablet (25 mg total) by mouth once daily.     ondansetron 8 MG tablet  Commonly known as:  ZOFRAN  Take 1  tablet (8 mg total) by mouth every 8 (eight) hours as needed for Nausea.     zolpidem 5 MG Tab  Commonly known as:  AMBIEN  Take 1 tablet (5 mg total) by mouth nightly as needed.        STOP taking these medications    ONE DAILY MULTIVITAMIN per tablet  Generic drug:  multivitamin     oxyCODONE-acetaminophen  mg per tablet  Commonly known as:  PERCOCET  Replaced by:  oxyCODONE-acetaminophen 5-325 mg per tablet            Joel Abrams MD  Orthopedics  [facility]

## 2018-07-10 NOTE — HOSPITAL COURSE
On 7/7/2018, the patient arrived to the [facility] for proper pre-operative management.  Upon completion of pre-operative preparation, the patient was taken back to the operative theatre.  A left unicondylar knee I&D and poly exchange was performed without complication and the patient was transported to the post anesthesia care unit in stable condition.  After appropriate recovery from the anaesthetic agents used during the surgery, the patient was then transported to the hospital inpatient floor.  The interim of the hospital stay from arrival on the floor up to discharge has been uncomplicated. The patient's diet has progressed to a regular diet with no nausea or vomiting.  The patient's pain has been controlled using a multimodal approach. Currently, the patient's pain is well controlled on an oral regimen.  The patient has been voiding without difficulty ever since surgery. ID was consulted for recommendations on antibiotics. Patient was switched to unasyn for antibiotics and will be discharged on it. The patient began participation in physical therapy on post-operative day one and has progressed throughout the entire hospital stay.  Currently the patient is ambulating with moderate assistance and the physical therapy team feels that the patient's progress is sufficient to allow the patient to be discharged home safely.  The patient agrees with this assessment and desires a discharge to home today.

## 2018-07-10 NOTE — ASSESSMENT & PLAN NOTE
Yosef Rowe is a 62 y.o. male POD3 from repeat L uni knee I&D    - WBAT in hinged knee brace locked in extension  - PT/OT  - Cultures: NGTD from most recent washout, Pastuerella multocida from prior washout (sensitivies being obtained), ID following  - Abx: unasyn, discontinue vanc  - Plan for wound check and wound vac replacement today  - Drain output not recorded yet  - DVT ppx: ASA 81 mg BID, TEDs, SCDs, early ambulation

## 2018-07-10 NOTE — PHYSICIAN QUERY
PT Name: Yosef Rowe  MR #: [id number]     Physician Query Form - Documentation Clarification      CDS/: Marjorie Archibald               Contact information: [phone]    This form is a permanent document in the medical record.     Query Date: July 10, 2018    By submitting this query, we are merely seeking further clarification of documentation. Please utilize your independent clinical judgment when addressing the question(s) below.    The Medical record reflects the following:    Supporting Clinical Findings Location in Medical Record     Past Medical History:  Diagnosis            Date   Anxiety     Arthritis     Depression        when turned 49-lost job and mother        unwell at that time   Hyperlipidemia     Lumbar radiculopathy              Orthopedic Surgery Consult Note 7/7/2018       DULoxetine DR capsule 60 mg: Oral:    Every Morning     Pregabalin capsule 75 mg: Oral: Nightly      MD Order 7/7/2018       MD Order 7/7/2018                                                                            Doctor, Please specify diagnosis or diagnoses associated with above clinical findings.    Provider Use Only     1. (    ) Major depressive disorder, single episode, in full remission.     2. (     ) Major depressive disorder, single episode, in partial remission.     3. (     ) Anxiety with depression; major depressive disorder, single episode.     4. (     ) Other, Please specify ____________________________________                                                                                                                                [ X ] Clinically undetermined

## 2018-07-10 NOTE — PROCEDURES
"Yosef Rowe is a 62 y.o. male patient.    Temp: 97.1 °F (36.2 °C) (07/10/18 1221)  Pulse: 78 (07/10/18 1221)  Resp: 20 (07/10/18 1221)  BP: (!) 121/52 (07/10/18 1221)  SpO2: 95 % (07/10/18 1221)  Weight: 125.6 kg (277 lb) (07/07/18 2300)  Height: 5' 7" (170.2 cm) (07/07/18 2300)    PICC  Date/Time: 7/10/2018 5:04 PM  Performed by: JACQUE ERNANDEZ  Consent Done: Yes  Time out: Immediately prior to procedure a time out was called to verify the correct patient, procedure, equipment, support staff and site/side marked as required  Indications: med administration and vascular access  Anesthesia: local infiltration  Local anesthetic: lidocaine 1% without epinephrine  Anesthetic Total (mL): 2  Preparation: skin prepped with ChloraPrep  Skin prep agent dried: skin prep agent completely dried prior to procedure  Sterile barriers: all five maximum sterile barriers used - cap, mask, sterile gown, sterile gloves, and large sterile sheet  Hand hygiene: hand hygiene performed prior to central venous catheter insertion  Location details: left brachial  Catheter type: double lumen  Catheter size: 5 Fr  Catheter Length: 46cm    Ultrasound guidance: yes  Vessel Caliber: medium and patent, compressibility normal  Vascular Doppler: not done  Needle advanced into vessel with real time Ultrasound guidance.  Guidewire confirmed in vessel.  Image recorded and saved.  Sterile sheath used.  no esophageal manometryNumber of attempts: 1  Post-procedure: blood return through all ports, chlorhexidine patch and sterile dressing applied  Technical procedures used: 3CG  Specimens: No  Implants: No  Assessment: placement verified by x-ray  Complications: none          Marcella Wagner  7/10/2018  "

## 2018-07-10 NOTE — PLAN OF CARE
Problem: Patient Care Overview  Goal: Plan of Care Review  Pt awake, alert and oriented x4. Vitals stable. Prn pain med schedule updated on white board. q2h rounding protocol followed. Urinal at bedside within pt reach. Wound vac in place and pt. Sleeping with c-pap on.

## 2018-07-10 NOTE — PROGRESS NOTES
[facility]  Infectious Disease  Progress Note    Patient Name: Yosef Rowe  MRN: [id number]  Admission Date: 7/7/2018  Length of Stay: 3 days  Attending Physician: Orlin Jordan MD  Primary Care Provider: Ruperto Dexter MD    Isolation Status: No active isolations  Assessment/Plan:      * Infection of prosthetic left knee joint       62 year old male with history of left TKA on 6/22 complicated by prosthetic knee infection s/p washout and polyethylene liner exchange on 7/3. Surgical cultures grew Pasturella multocida. Of note, he does mention he has a kitten at home. He was discharged on Ceftriaxone three days ago. Patient was re-admitted yesterday as he had continued purulent drainage and pain of his left knee. He was taken to the OR for a repeat washout and poly exchange. Surgical cultures are NGTD. Per discussion with ortho, infected fluid was encountered again in surgery and team is concerned patient did not respond well to ceftriaxone. He is currently on Unasyn. Afebrile. No leukocytosis. CRP is trending down. Reports symptomatic improvement following washout.    Plan  - Recommend Unasyn 12 g IV continuous infusion q 24 hours x 6 weeks from day of surgery (estimated end date 8/18/18)  - Patient will need ESR, CRP, CBC and CMP to be drawn weekly with results faxed to the ID Department at  [phone]  - Will arrange ID follow up with orthopedic surgery follow up.  - ID will sign off.              Please call for any questions. Thank you.  Ludy Hutchison PA-C  Phone: [phone]  Pager: [phone]    Subjective:     Principal Problem:Infection of prosthetic left knee joint    HPI: Mr. Rowe is a 62 year old male with history of left TKA on 6/22 complicated by prosthetic knee infection s/p washout and polyethylene liner exchange on 7/3. Per op note, a good deal of purulent and murky discharge was encountered in surgery. Surgical cultures grew Pasturella multocida. Patient reports he bought a new  cat 3 days prior to his TKA. Denies cat scratches/bites/contact with the wound. He was discharged on Ceftriaxone.     Patient was re-admitted yesterday as he had continued purulent drainage and pain of his left knee. He was taken to the OR for a repeat washout and poly exchange. Surgical cultures are pending. Per discussion with ortho, infected fluid was encountered again. Ortho team is concerned patient did not respond well to ceftriaxone. He is currently on Vancomycin and unasyn. Afebrile. He is seen at bedside. Reports a significant improvement in pain now that his knee was washed out again. He states pain is much better than it was following prior washout. Denies fevers, chills, sweats.     Interval History: No AEON.  Afebrile and WBC WNL.  The patient denies any recent fever, chills, or sweats. His pain is well controlled. Tolerating Unasyn. Repeat surgical cultures are negative thus far.    Review of Systems   Constitutional: Negative for chills, diaphoresis and fever.   Respiratory: Negative for shortness of breath.    Cardiovascular: Negative for chest pain.   Gastrointestinal: Negative for abdominal pain, diarrhea, nausea and vomiting.   Genitourinary: Negative for dysuria and hematuria.   Musculoskeletal: Positive for arthralgias.     Objective:     Vital Signs (Most Recent):  Temp: 97.8 °F (36.6 °C) (07/10/18 0040)  Pulse: 68 (07/10/18 0447)  Resp: 18 (07/10/18 0447)  BP: (!) 140/65 (07/10/18 0040)  SpO2: 98 % (07/10/18 0447) Vital Signs (24h Range):  Temp:  [96.8 °F (36 °C)-98.7 °F (37.1 °C)] 97.8 °F (36.6 °C)  Pulse:  [68-81] 68  Resp:  [16-18] 18  SpO2:  [93 %-99 %] 98 %  BP: (123-152)/(62-70) 140/65     Weight: 125.6 kg (277 lb)  Body mass index is 43.38 kg/m².    Estimated Creatinine Clearance: 106.8 mL/min (based on SCr of 0.9 mg/dL).    Physical Exam   Constitutional: He is oriented to person, place, and time. He appears well-developed and well-nourished. No distress.   HENT:   Head: Normocephalic  and atraumatic.   Eyes: Conjunctivae are normal. No scleral icterus.   Cardiovascular: Normal rate, regular rhythm and intact distal pulses.    No murmur heard.  Pulmonary/Chest: Effort normal. No respiratory distress.   Abdominal: Soft. He exhibits no distension. There is no tenderness.   Musculoskeletal:   Left knee dressed and in cast. Wound vac intact.   Neurological: He is alert and oriented to person, place, and time.   Skin: Skin is warm and dry. No rash noted. He is not diaphoretic. No erythema.       Significant Labs:   Blood Culture:   Recent Labs  Lab 07/03/18  1120 07/03/18  1134   LABBLOO No growth after 5 days. No growth after 5 days.     CBC:   Recent Labs  Lab 07/10/18  0207   WBC 10.54   HGB 9.0*   HCT 28.5*        CMP:   Recent Labs  Lab 07/10/18  0207      K 4.0      CO2 29      BUN 11   CREATININE 0.9   CALCIUM 8.4*   PROT 5.6*   ALBUMIN 2.3*   BILITOT 0.3   ALKPHOS 78   AST 24   ALT 20   ANIONGAP 7*   EGFRNONAA >60.0     Wound Culture:   Recent Labs  Lab 07/03/18  1545 07/07/18  1248 07/07/18  1251   LABAERO PASTEURELLA MULTOCIDAModerateSusceptibility pending No growth No growth     All pertinent labs within the past 24 hours have been reviewed.    Significant Imaging: I have reviewed all pertinent imaging results/findings within the past 24 hours.

## 2018-07-10 NOTE — H&P
[facility]  Orthopedics  H and P     Patient Name: Yosef Rowe  MRN: [id number]  Admission Date: 7/7/2018  Hospital Length of Stay: 0 days  Attending Provider: Marissa Peter MD  Primary Care Provider: Ruperto Dexter MD     Patient information was obtained from patient and ER records.      Consults  Subjective:      Principal Problem:<principal problem not specified>     Chief Complaint:        Chief Complaint   Patient presents with    Post-op Problem       had knee replacement june 22 on home antibiotics         HPI: Yosef Rowe is a 62 y.o. male who is s/p uni knee replaccment by Dr. Jordan on 6/22. Had some drainage post operatively but was doing well up until a week or so ago when he began having increasing pain and drainage from the incision site. He returned to [facility] on 7/4 and was taken to OR for I and D and poly exchange. He was discharged on 7/5 with PICC and IVAB. Since dc he has continued drainage and pain. Pain has not Reports no fevers or chills. Presents today for repeat I and D and poly exchange.           Past Medical History:   Diagnosis Date    Anxiety      Arthritis      Depression       when turned 49-lost job and mother unwell at that time    Hyperlipidemia      Lumbar radiculopathy       BRETT- 2011    Osteoarthritis      Sleep apnea      Urinary tract infection                 Past Surgical History:   Procedure Laterality Date    IRRIGATION AND DEBRIDEMENT OF LOWER EXTREMITY Left 7/3/2018     Procedure: IRRIGATION AND DEBRIDEMENT, LOWER EXTREMITY, poly exchange left uni knee replacement. Stryler. 9L NS;  Surgeon: Ruperto Olivarez MD;  Location: [address];  Service: Orthopedics;  Laterality: Left;    JOINT REPLACEMENT        knee arthroscopicc surgeries         Right knee- 2007  and Left knee- 2008    KNEE SURGERY        Left wrist Surgery         2000 for a torn ligament from Golf    TOTAL KNEE REPLACEMENT USING COMPUTER NAVIGATION Left 6/22/2018      Procedure: REPLACEMENT-KNEE WITH NAVIGATION-- unicondylar-- medial;  Surgeon: Orlin Jordan MD;  Location: [facility] OR [address] [place] [address];  Service: Orthopedics;  Laterality: Left;              Review of patient's allergies indicates:   Allergen Reactions    No known drug allergies           No current facility-administered medications for this encounter.            Current Outpatient Prescriptions   Medication Sig    aspirin (ECOTRIN) 81 MG EC tablet Take 1 tablet (81 mg total) by mouth 2 (two) times daily.    atorvastatin (LIPITOR) 20 MG tablet Take 1 tablet (20 mg total) by mouth once daily. (Patient taking differently: Take 20 mg by mouth every morning. )    ceftriaxone sodium (CEFTRIAXONE 2 G/50 ML D5W, READY TO MIX,) Inject 50 mLs (2 g total) into the vein once daily.    docusate sodium (COLACE) 100 MG capsule Take 1 capsule (100 mg total) by mouth 2 (two) times daily.    DULoxetine (CYMBALTA) 60 MG capsule Take 1 capsule (60 mg total) by mouth once daily. (Patient taking differently: Take 60 mg by mouth every morning. )    losartan (COZAAR) 25 MG tablet Take 1 tablet (25 mg total) by mouth once daily.    multivitamin (ONE DAILY MULTIVITAMIN) per tablet Take 1 tablet by mouth once daily.    ondansetron (ZOFRAN) 8 MG tablet Take 1 tablet (8 mg total) by mouth every 8 (eight) hours as needed for Nausea.    oxyCODONE-acetaminophen (PERCOCET)  mg per tablet Take 1 tablet by mouth every 4 (four) hours as needed for Pain.    zolpidem (AMBIEN) 5 MG Tab Take 1 tablet (5 mg total) by mouth nightly as needed.           Family History      Problem Relation (Age of Onset)     Parkinsonism Mother                 Social History Main Topics    Smoking status: Current Every Day Smoker       Types: Cigars    Smokeless tobacco: Never Used         Comment: occasional    Alcohol use 6.0 oz/week       10 Glasses of wine per week         Comment: Daily, last use yesterday    Drug use: No    Sexual activity: Yes        "Partners: Female      ROS   Constitutional: negative for fevers  Eyes: no visual changes  ENT: negative for hearing loss  Respiratory: negative for dyspnea  Cardiovascular: negative for chest pain  Gastrointestinal: negative for abdominal pain  Genitourinary: negative for dysuria  Neurological: negative for headaches  Behavioral/Psych: negative for hallucinations  Endocrine: negative for temperature intolerance        Objective:      Vital Signs (Most Recent):  Temp: 98.9 °F (37.2 °C) (07/07/18 0949)  Pulse: 81 (07/07/18 0949)  Resp: 18 (07/07/18 0949)  BP: (!) 147/79 (07/07/18 0949)  SpO2: 98 % (07/07/18 0949) Vital Signs (24h Range):  Temp:  [98.9 °F (37.2 °C)] 98.9 °F (37.2 °C)  Pulse:  [81] 81  Resp:  [18] 18  SpO2:  [98 %] 98 %  BP: (147)/(79) 147/79      Weight: 124.7 kg (275 lb)  Height: 5' 7" (170.2 cm)  Body mass index is 43.07 kg/m².     No intake or output data in the 24 hours ending 07/07/18 1024     Ortho/SPM Exam  Left LOWER EXTREMITY     INSPECTION  - There is breakdown of the proximal incision site. Significant swelling and erythema around the knee. Copious amount of purulent foul smelling discharge.   PALPATION  - Non TTP  RANGE OF MOTION  - AROM and PROM intact at the knee with pain  NEUROVASCULAR  - TA/EHL/Gastroc/FHL assessed in isolation without deficit  - SILT throughout  - DP and PT palpated  2+  - Capillary Refill <3s     Significant Labs: All pertinent labs within the past 24 hours have been reviewed.     Significant Imaging: I have reviewed and interpreted all pertinent imaging results/findings.     Assessment/Plan:          Infection of prosthetic left knee joint     Yosef Rowe is a 62 y.o. male with continued drainage s/p I and D of left knee and poly exchange. Concern for continued infection.     -To OR today 7/7 for repeat I and D and poly exchang  - NPO  - Pre op labs ordered  - Will admit to observation to rosita ALCANTAR back on board                    Helio Villasenor, " MD  Orthopedics  [facility]

## 2018-07-11 ENCOUNTER — TELEPHONE (OUTPATIENT)
Dept: ORTHOPEDICS | Facility: CLINIC | Age: 63
End: 2018-07-11

## 2018-07-11 DIAGNOSIS — G89.18 POST-OP PAIN: Primary | ICD-10-CM

## 2018-07-11 RX ORDER — OXYCODONE AND ACETAMINOPHEN 5; 325 MG/1; MG/1
1 TABLET ORAL EVERY 4 HOURS PRN
Qty: 60 TABLET | Refills: 0 | Status: SHIPPED | OUTPATIENT
Start: 2018-07-11 | End: 2018-08-01

## 2018-07-11 NOTE — PT/OT/SLP DISCHARGE
Occupational Therapy Discharge Summary    Yosef Rowe  MRN: [id number]   Principal Problem: Infection of prosthetic left knee joint      Patient Discharged from acute Occupational Therapy on 7/10/2018.  Please refer to prior OT note dated 7/9/2018 for functional status.    Assessment:      Patient appropriate for care in another setting.    Objective:     GOALS:    Occupational Therapy Goals        Problem: Occupational Therapy Goal    Goal Priority Disciplines Outcome Interventions   Occupational Therapy Goal     OT, PT/OT Ongoing (interventions implemented as appropriate)    Description:  Goals to be met by: 7/22/18     Patient will increase functional independence with ADLs by performing:    UE Dressing with mod I.  LE Dressing with Modified [place].  Grooming while standing with Modified [place].  Toileting from toilet with Modified [place] for hygiene and clothing management.  -- Met (7/9)  Toilet transfer to toilet with Modified [place].                       Reasons for Discontinuation of Therapy Services  Transfer to alternate level of care.      Plan:     Patient Discharged to: Home with Home Health Service    Milagro Ramos, OT  7/11/2018

## 2018-07-11 NOTE — TELEPHONE ENCOUNTER
----- Message from Octavio Forbes sent at 7/11/2018  1:32 PM CDT -----  Contact: Razia  phone  fax  The following prescription was not signed and need a verbal order to fill: ondansetron (ZOFRAN) 8 MG tablet, docusate sodium (COLACE) 100 MG capsule and aspirin (ECOTRIN) 81 MG EC tablet

## 2018-07-11 NOTE — NURSING
Pt in bed resting with family at bedside. Pt denies c/o pain or n/v. D/c orders and prescriptions given to pt. Pt states understanding of orders. Pt waiting for PICC in LUE verification from radiologist and  transport to vehicle via w/c.

## 2018-07-12 ENCOUNTER — TELEPHONE (OUTPATIENT)
Dept: ORTHOPEDICS | Facility: CLINIC | Age: 63
End: 2018-07-12

## 2018-07-12 NOTE — TELEPHONE ENCOUNTER
----- Message from Stefania Vickers NP sent at 7/12/2018  9:28 AM CDT -----  Contact: self  No ma'am he's coming in on Tuesday and Dr. Jordan is coming in to see him then as well. This must be an error. Ms. Hills can you please call him for us so that it's consistent and there's no confusion.  ----- Message -----  From: Mel Hernandez MA  Sent: 7/12/2018   8:49 AM  To: Stefania Vickers NP    I'm not understanding why he needs to come in.  ----- Message -----  From: Octavio Forbes  Sent: 7/12/2018   8:39 AM  To: Alee Aguiar Staff    Patient states received a message to call office in regards to getting an appointment today for 1p

## 2018-07-15 DIAGNOSIS — Z98.890 POSTOPERATIVE STATE: Primary | ICD-10-CM

## 2018-07-15 RX ORDER — OXYCODONE AND ACETAMINOPHEN 5; 325 MG/1; MG/1
1 TABLET ORAL
Qty: 60 TABLET | Refills: 0 | Status: SHIPPED | OUTPATIENT
Start: 2018-07-15 | End: 2018-07-23 | Stop reason: SDUPTHER

## 2018-07-16 ENCOUNTER — LAB VISIT (OUTPATIENT)
Dept: LAB | Facility: HOSPITAL | Age: 63
End: 2018-07-16
Attending: ORTHOPAEDIC SURGERY
Payer: COMMERCIAL

## 2018-07-16 DIAGNOSIS — T84.54XA INFECTION OF PROSTHETIC LEFT KNEE JOINT: Primary | ICD-10-CM

## 2018-07-16 LAB
ALBUMIN SERPL BCP-MCNC: 2.6 G/DL
ALP SERPL-CCNC: 76 U/L
ALT SERPL W/O P-5'-P-CCNC: 23 U/L
ANION GAP SERPL CALC-SCNC: 10 MMOL/L
AST SERPL-CCNC: 33 U/L
BACTERIA SPEC ANAEROBE CULT: NORMAL
BACTERIA SPEC ANAEROBE CULT: NORMAL
BASOPHILS # BLD AUTO: 0.07 K/UL
BASOPHILS NFR BLD: 0.7 %
BILIRUB SERPL-MCNC: 0.6 MG/DL
BUN SERPL-MCNC: 7 MG/DL
CALCIUM SERPL-MCNC: 8.8 MG/DL
CHLORIDE SERPL-SCNC: 103 MMOL/L
CO2 SERPL-SCNC: 25 MMOL/L
CREAT SERPL-MCNC: 0.8 MG/DL
CRP SERPL-MCNC: 142.8 MG/L
DIFFERENTIAL METHOD: ABNORMAL
EOSINOPHIL # BLD AUTO: 0.2 K/UL
EOSINOPHIL NFR BLD: 2.3 %
ERYTHROCYTE [DISTWIDTH] IN BLOOD BY AUTOMATED COUNT: 13.2 %
ERYTHROCYTE [SEDIMENTATION RATE] IN BLOOD BY WESTERGREN METHOD: 134 MM/HR
EST. GFR  (AFRICAN AMERICAN): >60 ML/MIN/1.73 M^2
EST. GFR  (NON AFRICAN AMERICAN): >60 ML/MIN/1.73 M^2
GLUCOSE SERPL-MCNC: 112 MG/DL
HCT VFR BLD AUTO: 25.3 %
HGB BLD-MCNC: 8.5 G/DL
IMM GRANULOCYTES # BLD AUTO: 0.09 K/UL
IMM GRANULOCYTES NFR BLD AUTO: 0.9 %
LYMPHOCYTES # BLD AUTO: 1.2 K/UL
LYMPHOCYTES NFR BLD: 12.2 %
MCH RBC QN AUTO: 36.5 PG
MCHC RBC AUTO-ENTMCNC: 33.6 G/DL
MCV RBC AUTO: 109 FL
MONOCYTES # BLD AUTO: 1 K/UL
MONOCYTES NFR BLD: 10.2 %
NEUTROPHILS # BLD AUTO: 7.3 K/UL
NEUTROPHILS NFR BLD: 73.7 %
NRBC BLD-RTO: 0 /100 WBC
PLATELET # BLD AUTO: 557 K/UL
PMV BLD AUTO: 9.3 FL
POTASSIUM SERPL-SCNC: 4.3 MMOL/L
PROT SERPL-MCNC: 6.3 G/DL
RBC # BLD AUTO: 2.33 M/UL
SODIUM SERPL-SCNC: 138 MMOL/L
WBC # BLD AUTO: 9.85 K/UL

## 2018-07-16 PROCEDURE — 85025 COMPLETE CBC W/AUTO DIFF WBC: CPT

## 2018-07-16 PROCEDURE — 80053 COMPREHEN METABOLIC PANEL: CPT

## 2018-07-16 PROCEDURE — 86140 C-REACTIVE PROTEIN: CPT

## 2018-07-16 PROCEDURE — 85651 RBC SED RATE NONAUTOMATED: CPT

## 2018-07-17 ENCOUNTER — OFFICE VISIT (OUTPATIENT)
Dept: ORTHOPEDICS | Facility: CLINIC | Age: 63
End: 2018-07-17
Payer: COMMERCIAL

## 2018-07-17 VITALS — HEIGHT: 67 IN | WEIGHT: 280 LBS | BODY MASS INDEX: 43.95 KG/M2

## 2018-07-17 DIAGNOSIS — Z51.89 VISIT FOR WOUND CHECK: Primary | ICD-10-CM

## 2018-07-17 PROCEDURE — 99999 PR PBB SHADOW E&M-EST. PATIENT-LVL III: CPT | Mod: PBBFAC,,, | Performed by: NURSE PRACTITIONER

## 2018-07-17 PROCEDURE — 99024 POSTOP FOLLOW-UP VISIT: CPT | Mod: S$GLB,,, | Performed by: NURSE PRACTITIONER

## 2018-07-18 ENCOUNTER — TELEPHONE (OUTPATIENT)
Dept: ORTHOPEDICS | Facility: CLINIC | Age: 63
End: 2018-07-18

## 2018-07-18 NOTE — TELEPHONE ENCOUNTER
----- Message from Orlin Jordan MD sent at 7/18/2018  2:04 PM CDT -----  Contact: kendall from [facility]   As long as he keeps it straight that's OK with me.  Thanks.      ----- Message -----  From: Stefania Vickers NP  Sent: 7/18/2018   1:31 PM  To: MD Dr. Julian Bundy,    Does Mr. Rowe have to continue to wear his knee brace if he keeps his leg straight? He says he can't bend it much with the vac anyway and the brace is really cutting into his leg. Let me know and I'll call [facility] back.    Thanks  Stefania  ----- Message -----  From: Mel Hernandez MA  Sent: 7/18/2018   1:11 PM  To: Stefania Vickers NP        ----- Message -----  From: Lia Cowan  Sent: 7/18/2018  12:52 PM  To: Alee Aguiar Staff    Needs clarification on a knee brace.     Would like a call back at [phone]    Thanks  KB

## 2018-07-18 NOTE — PROGRESS NOTES
Mr Rowe returns for f/u after repeat I&D 7/7/18. He had a partial knee replacement 6/22/18 by Dr. Jordan and had a washout with poly exchange on 7/3/18. He continued to drain and had the second washout 7/7/18 by Dr. Jordan. He has had a wound vac in place for the past week. There is little drainage in the cannister. The sutures are intact and there is only slight drainage from the center of the incision. A new incisional wound vac was placed to stay in place for another week. He will keep the sutures in for another 2 weeks to ensure healing. His CRP is still elevated at 142, but there is no fever or chills and the knee is not red and hot and there is not excessive drainage. He is currently on unasyn continuously. He was unable to go to his ID appointment due to an unfortunate loss of bowel control, but I spoke to Tracy Perkins and explained and she will see him when he returns. Will continue to follow weekly labs.

## 2018-07-18 NOTE — TELEPHONE ENCOUNTER
Returned call to Gabrielle and let her know that he can not wear the brace as long as he keeps his leg straight. He will f/u as scheduled on Monday.

## 2018-07-19 ENCOUNTER — TELEPHONE (OUTPATIENT)
Dept: ORTHOPEDICS | Facility: CLINIC | Age: 63
End: 2018-07-19

## 2018-07-19 NOTE — TELEPHONE ENCOUNTER
----- Message from Mel Hernandez MA sent at 7/19/2018 10:31 AM CDT -----  Contact: patient      ----- Message -----  From: Fabiola Adams  Sent: 7/19/2018  10:22 AM  To: Alee Aguiar Staff    Patient needs to speak with the nurse he as in on 7/17/18 & was told the nurse would call him to schedule another post op with Dr Ugalde but that has not been done.  Please call patient to schedule at [phone]

## 2018-07-19 NOTE — TELEPHONE ENCOUNTER
Patient will be coming in on Monday at 10:00 to see Stefania and Dr. Jordan will see him as well.  Patient was informed.

## 2018-07-23 ENCOUNTER — OFFICE VISIT (OUTPATIENT)
Dept: ORTHOPEDICS | Facility: CLINIC | Age: 63
End: 2018-07-23
Payer: COMMERCIAL

## 2018-07-23 ENCOUNTER — TELEPHONE (OUTPATIENT)
Dept: ORTHOPEDICS | Facility: CLINIC | Age: 63
End: 2018-07-23

## 2018-07-23 ENCOUNTER — HOSPITAL ENCOUNTER (OUTPATIENT)
Dept: RADIOLOGY | Facility: HOSPITAL | Age: 63
Discharge: HOME | End: 2018-07-23
Attending: NURSE PRACTITIONER
Payer: COMMERCIAL

## 2018-07-23 VITALS — HEIGHT: 67 IN | WEIGHT: 282.19 LBS | BODY MASS INDEX: 44.29 KG/M2

## 2018-07-23 DIAGNOSIS — M79.89 LEG SWELLING: ICD-10-CM

## 2018-07-23 DIAGNOSIS — Z98.890 POSTOPERATIVE STATE: ICD-10-CM

## 2018-07-23 DIAGNOSIS — M79.89 LEG SWELLING: Primary | ICD-10-CM

## 2018-07-23 PROCEDURE — 99999 PR PBB SHADOW E&M-EST. PATIENT-LVL III: CPT | Mod: PBBFAC,,, | Performed by: NURSE PRACTITIONER

## 2018-07-23 PROCEDURE — 93971 EXTREMITY STUDY: CPT | Mod: TC

## 2018-07-23 PROCEDURE — 93971 EXTREMITY STUDY: CPT | Mod: 26,,, | Performed by: RADIOLOGY

## 2018-07-23 PROCEDURE — 99024 POSTOP FOLLOW-UP VISIT: CPT | Mod: S$GLB,,, | Performed by: NURSE PRACTITIONER

## 2018-07-23 RX ORDER — OXYCODONE AND ACETAMINOPHEN 5; 325 MG/1; MG/1
1 TABLET ORAL
Qty: 60 TABLET | Refills: 0 | Status: SHIPPED | OUTPATIENT
Start: 2018-07-23 | End: 2018-08-01

## 2018-07-23 NOTE — PROGRESS NOTES
"Yosef Rowe presents for post-operative visit following a left knee arthroplasty performed by Dr. Jordan on 7/7/2018. Tolerating pain medication well.      Exam:   Height 5' 7" (1.702 m), weight 128 kg (282 lb 3 oz).   Ambulating well with assistive device- cane only.  Incision is clean and dry without drainage or erythema. Sutures intact with no drainage or breakdown.  ROM:0-110    Post-operative radiographs reviewed today revealing a well fixed and aligned prosthesis.    A/P:  2 weeks s/p left knee replacement 6/22/18 with subsequent washout 7/3/18 and 7/7/18  - Pain medication refilled  - f/u in 2 weeks for suture removal  - f/u crp/esr to be drawn today  - ultrasound at 230p to r/o dvt  - Follow up results of above by phone    "

## 2018-07-24 ENCOUNTER — LAB VISIT (OUTPATIENT)
Dept: LAB | Facility: HOSPITAL | Age: 63
End: 2018-07-24
Attending: INTERNAL MEDICINE
Payer: COMMERCIAL

## 2018-07-24 DIAGNOSIS — M54.16 LUMBAR RADICULOPATHY: Primary | ICD-10-CM

## 2018-07-24 LAB
ALBUMIN SERPL BCP-MCNC: 2.6 G/DL
ALP SERPL-CCNC: 84 U/L
ALT SERPL W/O P-5'-P-CCNC: 16 U/L
ANION GAP SERPL CALC-SCNC: 10 MMOL/L
AST SERPL-CCNC: 29 U/L
BASOPHILS # BLD AUTO: 0.04 K/UL
BASOPHILS NFR BLD: 0.6 %
BILIRUB SERPL-MCNC: 0.3 MG/DL
BUN SERPL-MCNC: 10 MG/DL
CALCIUM SERPL-MCNC: 8.3 MG/DL
CHLORIDE SERPL-SCNC: 107 MMOL/L
CO2 SERPL-SCNC: 24 MMOL/L
CREAT SERPL-MCNC: 0.8 MG/DL
CRP SERPL-MCNC: 47.2 MG/L
DIFFERENTIAL METHOD: ABNORMAL
EOSINOPHIL # BLD AUTO: 0.3 K/UL
EOSINOPHIL NFR BLD: 4.2 %
ERYTHROCYTE [DISTWIDTH] IN BLOOD BY AUTOMATED COUNT: 13.6 %
ERYTHROCYTE [SEDIMENTATION RATE] IN BLOOD BY WESTERGREN METHOD: 85 MM/HR
EST. GFR  (AFRICAN AMERICAN): >60 ML/MIN/1.73 M^2
EST. GFR  (NON AFRICAN AMERICAN): >60 ML/MIN/1.73 M^2
GLUCOSE SERPL-MCNC: 122 MG/DL
HCT VFR BLD AUTO: 26.3 %
HGB BLD-MCNC: 8.4 G/DL
IMM GRANULOCYTES # BLD AUTO: 0.07 K/UL
IMM GRANULOCYTES NFR BLD AUTO: 1 %
LYMPHOCYTES # BLD AUTO: 1.1 K/UL
LYMPHOCYTES NFR BLD: 16 %
MCH RBC QN AUTO: 33.9 PG
MCHC RBC AUTO-ENTMCNC: 31.9 G/DL
MCV RBC AUTO: 106 FL
MONOCYTES # BLD AUTO: 0.8 K/UL
MONOCYTES NFR BLD: 11 %
NEUTROPHILS # BLD AUTO: 4.8 K/UL
NEUTROPHILS NFR BLD: 67.2 %
NRBC BLD-RTO: 0 /100 WBC
PLATELET # BLD AUTO: 369 K/UL
PMV BLD AUTO: 9.3 FL
POTASSIUM SERPL-SCNC: 4 MMOL/L
PROT SERPL-MCNC: 6.1 G/DL
RBC # BLD AUTO: 2.48 M/UL
SODIUM SERPL-SCNC: 141 MMOL/L
WBC # BLD AUTO: 7.11 K/UL

## 2018-07-24 PROCEDURE — 86140 C-REACTIVE PROTEIN: CPT

## 2018-07-24 PROCEDURE — 85651 RBC SED RATE NONAUTOMATED: CPT

## 2018-07-24 PROCEDURE — 85025 COMPLETE CBC W/AUTO DIFF WBC: CPT

## 2018-07-24 PROCEDURE — 80053 COMPREHEN METABOLIC PANEL: CPT

## 2018-07-25 ENCOUNTER — TELEPHONE (OUTPATIENT)
Dept: ORTHOPEDICS | Facility: CLINIC | Age: 63
End: 2018-07-25

## 2018-07-25 NOTE — TELEPHONE ENCOUNTER
----- Message from Octavio Forbes sent at 7/25/2018  1:56 PM CDT -----  Contact: Toni- [facility]   Aquilino ask for a call in regards to the patient's dressing came off his knee need to know what to do

## 2018-07-25 NOTE — TELEPHONE ENCOUNTER
Spoke with Aquilino izaguirre at [facility] and told her that it was fine to apply new dressing and change out bandage as needed. Message was sent to Stefania.

## 2018-07-27 ENCOUNTER — TELEPHONE (OUTPATIENT)
Dept: ORTHOPEDICS | Facility: CLINIC | Age: 63
End: 2018-07-27

## 2018-07-27 NOTE — TELEPHONE ENCOUNTER
Attempted to contact Meryl 3 times a woman that answers states it is the wrong number.            ----- Message from Demetrius Dubose sent at 7/27/2018  3:13 PM CDT -----  Contact: Naimah [facility] @ [phone]  Meryl would like a call back @ [phone] in regards to pt progression

## 2018-07-30 ENCOUNTER — TELEPHONE (OUTPATIENT)
Dept: ORTHOPEDICS | Facility: CLINIC | Age: 63
End: 2018-07-30

## 2018-07-30 NOTE — TELEPHONE ENCOUNTER
----- Message from Deepthi Miles RN sent at 7/30/2018  4:28 PM CDT -----  Contact: Juanita Rubiasdavid [place] [phone]      ----- Message -----  From: Octavio Forbes  Sent: 7/30/2018   9:55 AM  To: Alee Aguiar Staff    Juanita is at the home of the patient and need clarification on orders for the physical therapy. Need to know if can start progressive range of motion

## 2018-07-30 NOTE — TELEPHONE ENCOUNTER
Returned call to Juanita about increasing range of motion. Message sent to Dr. Jordan for f/u. Will call Juanita once I get an answer.

## 2018-07-31 ENCOUNTER — TELEPHONE (OUTPATIENT)
Dept: ORTHOPEDICS | Facility: CLINIC | Age: 63
End: 2018-07-31

## 2018-07-31 ENCOUNTER — LAB VISIT (OUTPATIENT)
Dept: LAB | Facility: HOSPITAL | Age: 63
End: 2018-07-31
Attending: INTERNAL MEDICINE
Payer: COMMERCIAL

## 2018-07-31 DIAGNOSIS — T84.54XA INFECTION OF PROSTHETIC LEFT KNEE JOINT: Primary | ICD-10-CM

## 2018-07-31 LAB
ALBUMIN SERPL BCP-MCNC: 3.2 G/DL
ALP SERPL-CCNC: 85 U/L
ALT SERPL W/O P-5'-P-CCNC: 16 U/L
ANION GAP SERPL CALC-SCNC: 9 MMOL/L
AST SERPL-CCNC: 27 U/L
BASOPHILS # BLD AUTO: 0.05 K/UL
BASOPHILS NFR BLD: 0.8 %
BILIRUB SERPL-MCNC: 0.3 MG/DL
BUN SERPL-MCNC: 9 MG/DL
CALCIUM SERPL-MCNC: 9 MG/DL
CHLORIDE SERPL-SCNC: 105 MMOL/L
CO2 SERPL-SCNC: 25 MMOL/L
CREAT SERPL-MCNC: 0.9 MG/DL
CRP SERPL-MCNC: 22.7 MG/L
DIFFERENTIAL METHOD: ABNORMAL
EOSINOPHIL # BLD AUTO: 0.4 K/UL
EOSINOPHIL NFR BLD: 5.4 %
ERYTHROCYTE [DISTWIDTH] IN BLOOD BY AUTOMATED COUNT: 14.6 %
ERYTHROCYTE [SEDIMENTATION RATE] IN BLOOD BY WESTERGREN METHOD: 53 MM/HR
EST. GFR  (AFRICAN AMERICAN): >60 ML/MIN/1.73 M^2
EST. GFR  (NON AFRICAN AMERICAN): >60 ML/MIN/1.73 M^2
GLUCOSE SERPL-MCNC: 142 MG/DL
HCT VFR BLD AUTO: 31.5 %
HGB BLD-MCNC: 10 G/DL
IMM GRANULOCYTES # BLD AUTO: 0.07 K/UL
IMM GRANULOCYTES NFR BLD AUTO: 1.1 %
LYMPHOCYTES # BLD AUTO: 1.4 K/UL
LYMPHOCYTES NFR BLD: 21.5 %
MCH RBC QN AUTO: 33.3 PG
MCHC RBC AUTO-ENTMCNC: 31.7 G/DL
MCV RBC AUTO: 105 FL
MONOCYTES # BLD AUTO: 0.5 K/UL
MONOCYTES NFR BLD: 8.3 %
NEUTROPHILS # BLD AUTO: 4.1 K/UL
NEUTROPHILS NFR BLD: 62.9 %
NRBC BLD-RTO: 0 /100 WBC
PLATELET # BLD AUTO: 337 K/UL
PMV BLD AUTO: 9.2 FL
POTASSIUM SERPL-SCNC: 3.9 MMOL/L
PROT SERPL-MCNC: 6.8 G/DL
RBC # BLD AUTO: 3 M/UL
SODIUM SERPL-SCNC: 139 MMOL/L
WBC # BLD AUTO: 6.47 K/UL

## 2018-07-31 PROCEDURE — 85025 COMPLETE CBC W/AUTO DIFF WBC: CPT

## 2018-07-31 PROCEDURE — 85651 RBC SED RATE NONAUTOMATED: CPT

## 2018-07-31 PROCEDURE — 86140 C-REACTIVE PROTEIN: CPT

## 2018-07-31 PROCEDURE — 80053 COMPREHEN METABOLIC PANEL: CPT

## 2018-07-31 NOTE — TELEPHONE ENCOUNTER
----- Message from Orlin Jordan MD sent at 7/30/2018  5:48 PM CDT -----  Yes - fine at this time.  Thanks.    ----- Message -----  From: Stefania Vickers NP  Sent: 7/30/2018   1:22 PM  To: MD Dr. Julian Bundy,    When were you planning to let Mr. Rowe progress with range of motion with his knee?    Stefania

## 2018-08-01 DIAGNOSIS — G89.18 POST-OP PAIN: Primary | ICD-10-CM

## 2018-08-01 RX ORDER — OXYCODONE AND ACETAMINOPHEN 5; 325 MG/1; MG/1
1 TABLET ORAL EVERY 4 HOURS PRN
Qty: 60 TABLET | Refills: 0 | Status: SHIPPED | OUTPATIENT
Start: 2018-08-01 | End: 2018-08-10 | Stop reason: SDUPTHER

## 2018-08-02 ENCOUNTER — TELEPHONE (OUTPATIENT)
Dept: ORTHOPEDICS | Facility: CLINIC | Age: 63
End: 2018-08-02

## 2018-08-02 NOTE — TELEPHONE ENCOUNTER
----- Message from Mona Muniz sent at 8/2/2018  9:13 AM CDT -----  Contact: self  Pt called stating the [id number] - [place], [place] - [address] [facility] [address] is saying that they did not receive his prescription refill for oxyCODONE-acetaminophen (PERCOCET) 5-325 mg per tablet. Please contact pharmacy at  [phone] to confirm.

## 2018-08-02 NOTE — TELEPHONE ENCOUNTER
Spoke with someone over at [facility] to confirm that they had received 's refill. The pharmacy stated that they were trying to reach out to the pt to let him know that is Rx was ready. I did asad the pt and informed him of this.

## 2018-08-06 ENCOUNTER — OFFICE VISIT (OUTPATIENT)
Dept: INFECTIOUS DISEASES | Facility: CLINIC | Age: 63
End: 2018-08-06
Payer: COMMERCIAL

## 2018-08-06 ENCOUNTER — OFFICE VISIT (OUTPATIENT)
Dept: ORTHOPEDICS | Facility: CLINIC | Age: 63
End: 2018-08-06
Payer: COMMERCIAL

## 2018-08-06 VITALS
WEIGHT: 284.38 LBS | TEMPERATURE: 98 F | BODY MASS INDEX: 44.63 KG/M2 | HEART RATE: 78 BPM | HEIGHT: 67 IN | SYSTOLIC BLOOD PRESSURE: 145 MMHG | DIASTOLIC BLOOD PRESSURE: 77 MMHG

## 2018-08-06 VITALS — BODY MASS INDEX: 44.29 KG/M2 | HEIGHT: 67 IN | WEIGHT: 282.19 LBS

## 2018-08-06 DIAGNOSIS — E78.5 HYPERLIPIDEMIA, UNSPECIFIED HYPERLIPIDEMIA TYPE: ICD-10-CM

## 2018-08-06 DIAGNOSIS — A28.0 PASTEURELLA INFECTION: ICD-10-CM

## 2018-08-06 DIAGNOSIS — I10 ESSENTIAL HYPERTENSION: ICD-10-CM

## 2018-08-06 DIAGNOSIS — T84.54XD INFECTION ASSOCIATED WITH INTERNAL LEFT KNEE PROSTHESIS, SUBSEQUENT ENCOUNTER: Primary | ICD-10-CM

## 2018-08-06 DIAGNOSIS — E66.01 OBESITY, CLASS III, BMI 40-49.9 (MORBID OBESITY): ICD-10-CM

## 2018-08-06 DIAGNOSIS — Z51.89 VISIT FOR WOUND CHECK: Primary | ICD-10-CM

## 2018-08-06 PROCEDURE — 99999 PR PBB SHADOW E&M-EST. PATIENT-LVL III: CPT | Mod: PBBFAC,,, | Performed by: NURSE PRACTITIONER

## 2018-08-06 PROCEDURE — 3077F SYST BP >= 140 MM HG: CPT | Mod: CPTII,S$GLB,, | Performed by: INTERNAL MEDICINE

## 2018-08-06 PROCEDURE — 99999 PR PBB SHADOW E&M-EST. PATIENT-LVL III: CPT | Mod: PBBFAC,,, | Performed by: INTERNAL MEDICINE

## 2018-08-06 PROCEDURE — 3008F BODY MASS INDEX DOCD: CPT | Mod: CPTII,S$GLB,, | Performed by: INTERNAL MEDICINE

## 2018-08-06 PROCEDURE — 3078F DIAST BP <80 MM HG: CPT | Mod: CPTII,S$GLB,, | Performed by: INTERNAL MEDICINE

## 2018-08-06 PROCEDURE — 99024 POSTOP FOLLOW-UP VISIT: CPT | Mod: S$GLB,,, | Performed by: NURSE PRACTITIONER

## 2018-08-06 PROCEDURE — 99215 OFFICE O/P EST HI 40 MIN: CPT | Mod: S$GLB,,, | Performed by: INTERNAL MEDICINE

## 2018-08-06 RX ORDER — PHENTERMINE HYDROCHLORIDE 37.5 MG/1
TABLET ORAL
Refills: 2 | COMMUNITY
Start: 2018-07-02 | End: 2018-09-05

## 2018-08-06 NOTE — PROGRESS NOTES
Pt returns for suture removal from left knee. Sutures removed without difficulty. Incision clean dry and intact. New mepilex dressing placed. Crp down to 22. Appt with ID following this appt. He will f/u with Dr. Jordan as he requests or sooner as needed.

## 2018-08-06 NOTE — PROGRESS NOTES
Subjective:      Patient ID: Yosef Rowe is a 62 y.o. male.    Chief Complaint: Follow-up for pasteurella PJI    History of Present Illness  62-year old male with history obesity, HTN, HLD, left TKA on 6/22 complicated by Pasteurella prosthetic knee infection s/p washout and polyethylene liner exchange on 7/3 s/prepeat washout and poly exchange 7/7, presents for follow-up.     Patient was initially on ceftriaxone after 7/3/2018 washout, subsequently developed purulent drainage - repeat washout performed 7/7.  There was concern for poor efficacy with ceftriaxone - patient was switched to amp-sulbactam after second washout on 7/7.  Patient reports feeling well - no redness, swelling, pain, warmth in his left knee.  He is frustrated he is unable to go to work as he has to get the amp-sulbactam compounded every day, also hooked onto continuous infusion - requesting for possible oral antibiotic.  Patient denied having any problems with his PICC line, no swelling, redness, pain.      Review of Systems   Constitution: Positive for weakness and malaise/fatigue. Negative for chills, decreased appetite, fever, night sweats, weight gain and weight loss.   HENT: Negative for congestion, ear pain, hearing loss, hoarse voice, sore throat and tinnitus.    Eyes: Negative for blurred vision, redness and visual disturbance.   Cardiovascular: Negative for chest pain, leg swelling and palpitations.   Respiratory: Negative for cough, hemoptysis, shortness of breath and sputum production.    Hematologic/Lymphatic: Negative for adenopathy. Bruises/bleeds easily.   Skin: Negative for dry skin, itching, rash and suspicious lesions.   Musculoskeletal: Negative for back pain, joint pain, myalgias and neck pain.   Gastrointestinal: Negative for abdominal pain, constipation, diarrhea, heartburn, nausea and vomiting.   Genitourinary: Positive for frequency. Negative for dysuria, flank pain, hematuria, hesitancy and urgency.   Neurological:  Negative for dizziness, headaches, numbness and paresthesias.   Psychiatric/Behavioral: Negative for depression and memory loss. The patient does not have insomnia and is not nervous/anxious.      Objective:   Physical Exam   Constitutional: He is oriented to person, place, and time. He appears well-developed and well-nourished. No distress.   HENT:   Head: Normocephalic and atraumatic.   Eyes: Conjunctivae and EOM are normal.   Neck: Normal range of motion. Neck supple.   Pulmonary/Chest: Effort normal. No respiratory distress.   Abdominal: Soft. He exhibits no distension.   Musculoskeletal: Normal range of motion. He exhibits no edema.   Left knee with dressing, no surrounding redness, swelling, warmth   Neurological: He is alert and oriented to person, place, and time.   Skin: Skin is warm and dry. No rash noted. He is not diaphoretic. No erythema.   Psychiatric: He has a normal mood and affect. His behavior is normal.   Vitals reviewed.    Significant results reviewed:    Component      Latest Ref Rng & Units 7/31/2018 7/24/2018 7/16/2018 7/10/2018   CRP      0.0 - 8.2 mg/L 22.7 (H) 47.2 (H) 142.8 (H) 108.6 (H)     Component      Latest Ref Rng & Units 7/31/2018 7/24/2018 7/16/2018 7/10/2018   Sed Rate      0 - 10 mm/Hr 53 (H) 85 (H) 134 (H) 119 (H)     7/3/2018   Pasteurella multocida     CULTURE, AEROBIC  (SPECIFY SOURCE) (Corrected)     Ceftriaxone .012       Doxycycline 1.0       Erythromycin 2  Intermediate     Moxifloxacin .016       Penicillin .38  Sensitive          Assessment:   62-year-old male  - Obesity, HTN, HLD  - 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      Suspect prior need for repeat washout / polyexchange related to source control issue, not related to antibiotic failure - will transition from amp-sulbactam to ceftriaxone.    Plan:   - Discontinue amp-sulbactam  - Start ceftriaxone  2g q24 hours IV, last day 8/17/2018  - qweekly CBC with diff, CMP, ESR, CRP  - After completion of IV antibiotics, will start on suppressive amoxicillin 500 mg PO TID for 6-12 month course    Bibi Mercado MD MPH  [facility]

## 2018-08-07 ENCOUNTER — LAB VISIT (OUTPATIENT)
Dept: LAB | Facility: HOSPITAL | Age: 63
End: 2018-08-07
Attending: INTERNAL MEDICINE
Payer: COMMERCIAL

## 2018-08-07 DIAGNOSIS — T84.54XA INFECTION OF PROSTHETIC LEFT KNEE JOINT: Primary | ICD-10-CM

## 2018-08-07 LAB
ALBUMIN SERPL BCP-MCNC: 3.3 G/DL
ALP SERPL-CCNC: 82 U/L
ALT SERPL W/O P-5'-P-CCNC: 16 U/L
ANION GAP SERPL CALC-SCNC: 9 MMOL/L
AST SERPL-CCNC: 28 U/L
BASOPHILS # BLD AUTO: 0.06 K/UL
BASOPHILS NFR BLD: 0.9 %
BILIRUB SERPL-MCNC: 0.3 MG/DL
BUN SERPL-MCNC: 11 MG/DL
CALCIUM SERPL-MCNC: 8.8 MG/DL
CHLORIDE SERPL-SCNC: 105 MMOL/L
CO2 SERPL-SCNC: 24 MMOL/L
CREAT SERPL-MCNC: 0.8 MG/DL
CRP SERPL-MCNC: 13.8 MG/L
DIFFERENTIAL METHOD: ABNORMAL
EOSINOPHIL # BLD AUTO: 0.3 K/UL
EOSINOPHIL NFR BLD: 4.8 %
ERYTHROCYTE [DISTWIDTH] IN BLOOD BY AUTOMATED COUNT: 14.9 %
ERYTHROCYTE [SEDIMENTATION RATE] IN BLOOD BY WESTERGREN METHOD: 29 MM/HR
EST. GFR  (AFRICAN AMERICAN): >60 ML/MIN/1.73 M^2
EST. GFR  (NON AFRICAN AMERICAN): >60 ML/MIN/1.73 M^2
GLUCOSE SERPL-MCNC: 151 MG/DL
HCT VFR BLD AUTO: 35 %
HGB BLD-MCNC: 10.9 G/DL
IMM GRANULOCYTES # BLD AUTO: 0.08 K/UL
IMM GRANULOCYTES NFR BLD AUTO: 1.2 %
LYMPHOCYTES # BLD AUTO: 1.7 K/UL
LYMPHOCYTES NFR BLD: 25.4 %
MCH RBC QN AUTO: 32.7 PG
MCHC RBC AUTO-ENTMCNC: 31.1 G/DL
MCV RBC AUTO: 105 FL
MONOCYTES # BLD AUTO: 0.5 K/UL
MONOCYTES NFR BLD: 7.9 %
NEUTROPHILS # BLD AUTO: 4 K/UL
NEUTROPHILS NFR BLD: 59.8 %
NRBC BLD-RTO: 0 /100 WBC
PLATELET # BLD AUTO: 276 K/UL
PMV BLD AUTO: 9.4 FL
POTASSIUM SERPL-SCNC: 4.2 MMOL/L
PROT SERPL-MCNC: 6.8 G/DL
RBC # BLD AUTO: 3.33 M/UL
SODIUM SERPL-SCNC: 138 MMOL/L
WBC # BLD AUTO: 6.69 K/UL

## 2018-08-07 PROCEDURE — 80053 COMPREHEN METABOLIC PANEL: CPT

## 2018-08-07 PROCEDURE — 85651 RBC SED RATE NONAUTOMATED: CPT

## 2018-08-07 PROCEDURE — 86140 C-REACTIVE PROTEIN: CPT

## 2018-08-07 PROCEDURE — 85025 COMPLETE CBC W/AUTO DIFF WBC: CPT

## 2018-08-08 DIAGNOSIS — G89.18 POST-OP PAIN: Primary | ICD-10-CM

## 2018-08-08 LAB
FUNGUS SPEC CULT: NORMAL

## 2018-08-08 RX ORDER — HYDROCODONE BITARTRATE AND ACETAMINOPHEN 10; 325 MG/1; MG/1
1 TABLET ORAL EVERY 6 HOURS PRN
Qty: 60 TABLET | Refills: 0 | Status: SHIPPED | OUTPATIENT
Start: 2018-08-08 | End: 2018-08-18

## 2018-08-08 NOTE — PROGRESS NOTES
Refill of norco for pain medication sent to pharmacy as patient is too far out from surgery for refill of percocet.

## 2018-08-10 ENCOUNTER — TELEPHONE (OUTPATIENT)
Dept: INFECTIOUS DISEASES | Facility: CLINIC | Age: 63
End: 2018-08-10

## 2018-08-10 ENCOUNTER — TELEPHONE (OUTPATIENT)
Dept: ORTHOPEDICS | Facility: CLINIC | Age: 63
End: 2018-08-10

## 2018-08-10 DIAGNOSIS — G89.18 POST-OP PAIN: ICD-10-CM

## 2018-08-10 RX ORDER — OXYCODONE AND ACETAMINOPHEN 5; 325 MG/1; MG/1
1 TABLET ORAL EVERY 4 HOURS PRN
Qty: 60 TABLET | Refills: 0 | Status: SHIPPED | OUTPATIENT
Start: 2018-08-10 | End: 2018-08-17

## 2018-08-10 NOTE — TELEPHONE ENCOUNTER
Per [facility] nurse, patient has been having nausea, vomiting, diarrhea, unable to keep any food down.      Called patient - left him a message to be evaluated in ED.

## 2018-08-10 NOTE — TELEPHONE ENCOUNTER
----- Message from Mel Hernandez MA sent at 8/10/2018  2:30 PM CDT -----  Contact: Self/ [phone]   He's calling about the Hydrocodone. He says it isn't working.  ----- Message -----  From: Tri Cole  Sent: 8/10/2018   2:28 PM  To: Alee Aguiar Staff    Patient would like a call back to ask some questions about his medication.

## 2018-08-13 ENCOUNTER — TELEPHONE (OUTPATIENT)
Dept: ORTHOPEDICS | Facility: CLINIC | Age: 63
End: 2018-08-13

## 2018-08-13 ENCOUNTER — TELEPHONE (OUTPATIENT)
Dept: INFECTIOUS DISEASES | Facility: CLINIC | Age: 63
End: 2018-08-13

## 2018-08-13 DIAGNOSIS — T84.54XD INFECTION ASSOCIATED WITH INTERNAL LEFT KNEE PROSTHESIS, SUBSEQUENT ENCOUNTER: Primary | ICD-10-CM

## 2018-08-13 RX ORDER — MOXIFLOXACIN HYDROCHLORIDE 400 MG/1
400 TABLET ORAL DAILY
Qty: 30 TABLET | Refills: 0 | Status: SHIPPED | OUTPATIENT
Start: 2018-08-13 | End: 2018-09-10 | Stop reason: ALTCHOICE

## 2018-08-13 NOTE — TELEPHONE ENCOUNTER
"----- Message from Orlin Jordan MD sent at 8/13/2018 10:14 AM CDT -----  Contact: Self/ [phone]  Please tell him he needs to call infectious disease TODAY for antibiotic recommendations to see if they have an alternative for him.  If he just stops on his own the infection may recur at this point.  Tell him this is VERY important and ID runs the antibiotics.    ----- Message -----  From: Jeana Rosales MA  Sent: 8/13/2018   9:58 AM  To: Orlin Jordan MD    I gave Mr. Rowe the info on the appointment and he is ok with that.  He asked that I let you know that the antibodies  that they changed him to made him so very sick.  He could not keep anything on his stomach.  He stop taking it and started taking some medication he had from the dentist.  He said he only has maybe to more left.  He doesn't know that he should do now.    ----- Message -----  From: Orlin Jordan MD  Sent: 8/13/2018   8:30 AM  To: Jeana Rosales MA    8/29 at 8:00    ----- Message -----  From: Jeana Rosales MA  Sent: 8/10/2018   3:01 PM  To: Orlin Jordan MD    When do you want to see him?    ----- Message -----  From: Stefania Vickers NP  Sent: 8/10/2018   2:51 PM  To: Jeana Rosales MA    Can you please schedule him with Dr. Jordan. I've seen him several times and he's doing great, but he wants to see "the doc" now. I'm sending in a refill of his percocet.  Rahel Aguiar  ----- Message -----  From: Mel Hernandez MA  Sent: 8/10/2018   2:30 PM  To: Stefaina Vickers NP     He's calling about the Hydrocodone. He says it isn't working.  ----- Message -----  From: Tri Cole  Sent: 8/10/2018   2:28 PM  To: Alee Aguiar Staff    Patient would like a call back to ask some questions about his medication.               "

## 2018-08-13 NOTE — TELEPHONE ENCOUNTER
See msg below.   1. Spoke pt, he's currently on ceftriazone 2g Q24. Pt complains of cool sweats, chills, nausea, and vomiting. States the symptoms started on thrusday evening.   2. Pt quite taking his IV antibiotics yesterday and feels better.  3. Pt wants to have picc line remove and start oral antibiotics instead of IV abx.   4. Pt complains of pain and weakness on his arm.  Please advice

## 2018-08-13 NOTE — TELEPHONE ENCOUNTER
Ms. Hills spoke with the patient to reinforce the importance of following the antibiotic regimen as prescribed by ID. He states that he cannot tolerate the side effects of the current regimen. I sent a message to Dr. Mercado in ID for assistance with alternatives.

## 2018-08-13 NOTE — TELEPHONE ENCOUNTER
"----- Message from Orlin Jordan MD sent at 8/13/2018 10:14 AM CDT -----  Contact: Self/ [phone]  Please tell him he needs to call infectious disease TODAY for antibiotic recommendations to see if they have an alternative for him.  If he just stops on his own the infection may recur at this point.  Tell him this is VERY important and ID runs the antibiotics.    ----- Message -----  From: Jeana Rosales MA  Sent: 8/13/2018   9:58 AM  To: Orlin Jordan MD    I gave Mr. Rowe the info on the appointment and he is ok with that.  He asked that I let you know that the antibodies  that they changed him to made him so very sick.  He could not keep anything on his stomach.  He stop taking it and started taking some medication he had from the dentist.  He said he only has maybe to more left.  He doesn't know that he should do now.    ----- Message -----  From: Orlin Jordan MD  Sent: 8/13/2018   8:30 AM  To: Jeana Rosales MA    8/29 at 8:00    ----- Message -----  From: Jeana Rosales MA  Sent: 8/10/2018   3:01 PM  To: Orlin Jordan MD    When do you want to see him?    ----- Message -----  From: Stefania Vickers NP  Sent: 8/10/2018   2:51 PM  To: Jeana Rosales MA    Can you please schedule him with Dr. Jordan. I've seen him several times and he's doing great, but he wants to see "the doc" now. I'm sending in a refill of his percocet.  Rahel Aguiar  ----- Message -----  From: Mel Hernandez MA  Sent: 8/10/2018   2:30 PM  To: Stefania Vickers NP     He's calling about the Hydrocodone. He says it isn't working.  ----- Message -----  From: Tri Cole  Sent: 8/10/2018   2:28 PM  To: Alee Aguiar Staff    Patient would like a call back to ask some questions about his medication.               "

## 2018-08-13 NOTE — TELEPHONE ENCOUNTER
Patient started on ceftriaxone on 8/9/2018  Subsequently developed fevers, chills, rigors  Repeated dose on Friday, symptoms persisted  Patient stopped antibiotics with resolution of symptoms  He started taking home oral antibiotics from dentist    Will have home health RN remove picc line  Start moxifloxacin 400 mg PO qdaily    Follow-up on Friday 8/17

## 2018-08-13 NOTE — TELEPHONE ENCOUNTER
----- Message from Mara Aiken sent at 8/13/2018 10:59 AM CDT -----  Contact: SELF [phone]  ..Needs Advice    Reason for call:      Communication Preference:phone   Additional Information: pt states the antibiotic  he's on is making him very sick he's been sick for two days states he would like something else and also states he feels very bad

## 2018-08-17 ENCOUNTER — OFFICE VISIT (OUTPATIENT)
Dept: INFECTIOUS DISEASES | Facility: CLINIC | Age: 63
End: 2018-08-17
Payer: COMMERCIAL

## 2018-08-17 VITALS — HEIGHT: 67 IN | WEIGHT: 280.63 LBS | BODY MASS INDEX: 44.04 KG/M2

## 2018-08-17 DIAGNOSIS — E66.01 OBESITY, CLASS III, BMI 40-49.9 (MORBID OBESITY): ICD-10-CM

## 2018-08-17 DIAGNOSIS — I10 ESSENTIAL HYPERTENSION: ICD-10-CM

## 2018-08-17 DIAGNOSIS — E78.5 HYPERLIPIDEMIA, UNSPECIFIED HYPERLIPIDEMIA TYPE: ICD-10-CM

## 2018-08-17 DIAGNOSIS — T84.54XD INFECTION ASSOCIATED WITH INTERNAL LEFT KNEE PROSTHESIS, SUBSEQUENT ENCOUNTER: Primary | ICD-10-CM

## 2018-08-17 DIAGNOSIS — A28.0 PASTEURELLA INFECTION: ICD-10-CM

## 2018-08-17 PROCEDURE — 99999 PR PBB SHADOW E&M-EST. PATIENT-LVL III: CPT | Mod: PBBFAC,,, | Performed by: INTERNAL MEDICINE

## 2018-08-17 PROCEDURE — 3008F BODY MASS INDEX DOCD: CPT | Mod: CPTII,S$GLB,, | Performed by: INTERNAL MEDICINE

## 2018-08-17 PROCEDURE — 99214 OFFICE O/P EST MOD 30 MIN: CPT | Mod: S$GLB,,, | Performed by: INTERNAL MEDICINE

## 2018-08-17 NOTE — PROGRESS NOTES
Subjective:      Patient ID: Yosef Rowe is a 62 y.o. male.     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      Summary of illness:  Patient was initially on ceftriaxone after 7/3/2018 washout, subsequently developed purulent drainage - repeat washout performed 7/7.  There was concern for poor efficacy with ceftriaxone - patient was switched to amp-sulbactam after second washout on 7/7.     Subjective:  On last visit, patient did not want to be on continuous infusion - he was switched to ceftriaxone.  Patient reports getting nausea, vomiting - he was giving himself saline and heparin flushes, but never gave himself ceftriaxone.  He was then switched to moxifloxacin 400 mg PO qdaily.  Patient reports feeling well, no complaints.  No fevers, chills, sweats.  Denied any redness, swelling, pain in his left knee.       Review of Systems   Constitution: Positive for weakness and night sweats. Negative for chills, decreased appetite, fever, malaise/fatigue, weight gain and weight loss.   HENT: Negative for congestion, ear pain, hearing loss, hoarse voice, sore throat and tinnitus.    Eyes: Negative for blurred vision, redness and visual disturbance.   Cardiovascular: Negative for chest pain, leg swelling and palpitations.   Respiratory: Negative for cough, hemoptysis, shortness of breath and sputum production.    Hematologic/Lymphatic: Negative for adenopathy. Bruises/bleeds easily.   Skin: Negative for dry skin, itching, rash and suspicious lesions.   Musculoskeletal: Negative for back pain, joint pain, myalgias and neck pain.   Gastrointestinal: Negative for abdominal pain, constipation, diarrhea, heartburn, nausea and vomiting.   Genitourinary:  Positive for flank pain and frequency. Negative for dysuria, hematuria, hesitancy and urgency.   Neurological: Negative for dizziness, headaches, numbness and paresthesias.   Psychiatric/Behavioral: Negative for depression and memory loss. The patient has insomnia. The patient is not nervous/anxious.      Objective:   Physical Exam   Constitutional: He is oriented to person, place, and time. He appears well-developed and well-nourished. No distress.   HENT:   Head: Normocephalic and atraumatic.   Eyes: Conjunctivae and EOM are normal.   Neck: Normal range of motion. Neck supple.   Pulmonary/Chest: Effort normal. No respiratory distress.   Abdominal: Soft. He exhibits no distension.   Musculoskeletal: Normal range of motion. He exhibits no edema.   Left knee incision - dressed.  No surrounding warmth, swelling, pain   Neurological: He is alert and oriented to person, place, and time.   Skin: Skin is warm and dry. No rash noted. He is not diaphoretic. No erythema.   Psychiatric: He has a normal mood and affect. His behavior is normal.   Vitals reviewed.       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

## 2018-08-27 ENCOUNTER — TELEPHONE (OUTPATIENT)
Dept: ORTHOPEDICS | Facility: CLINIC | Age: 63
End: 2018-08-27

## 2018-08-27 DIAGNOSIS — G89.18 POST-OP PAIN: Primary | ICD-10-CM

## 2018-08-27 RX ORDER — HYDROCODONE BITARTRATE AND ACETAMINOPHEN 5; 325 MG/1; MG/1
1 TABLET ORAL EVERY 6 HOURS PRN
Qty: 30 TABLET | Refills: 0 | Status: SHIPPED | OUTPATIENT
Start: 2018-08-27 | End: 2018-09-04 | Stop reason: SDUPTHER

## 2018-08-27 NOTE — TELEPHONE ENCOUNTER
----- Message from Mel Hernandez MA sent at 8/27/2018  9:35 AM CDT -----  Contact: self      ----- Message -----  From: Octavio Forbes  Sent: 8/27/2018   9:29 AM  To: Alee Aguiar Staff    Patient states need a prescription for percocet call into his pharmacy [facility] [phone] (Phone) [phone] (Fax)

## 2018-08-27 NOTE — TELEPHONE ENCOUNTER
Unable to refill percocet as he is greater than 6 weeks post op at this point. Rx for hydrocodone sent in and Dr. Jordan notified.

## 2018-08-29 ENCOUNTER — LAB VISIT (OUTPATIENT)
Dept: LAB | Facility: HOSPITAL | Age: 63
End: 2018-08-29
Attending: ORTHOPAEDIC SURGERY
Payer: COMMERCIAL

## 2018-08-29 ENCOUNTER — OFFICE VISIT (OUTPATIENT)
Dept: ORTHOPEDICS | Facility: CLINIC | Age: 63
End: 2018-08-29
Payer: COMMERCIAL

## 2018-08-29 VITALS — HEIGHT: 67 IN | WEIGHT: 281.5 LBS | BODY MASS INDEX: 44.18 KG/M2

## 2018-08-29 DIAGNOSIS — Z96.651 S/P RIGHT UNICOMPARTMENTAL KNEE REPLACEMENT: ICD-10-CM

## 2018-08-29 DIAGNOSIS — Z96.651 S/P RIGHT UNICOMPARTMENTAL KNEE REPLACEMENT: Primary | ICD-10-CM

## 2018-08-29 LAB — CRP SERPL-MCNC: 5.3 MG/L

## 2018-08-29 PROCEDURE — 99024 POSTOP FOLLOW-UP VISIT: CPT | Mod: S$GLB,,, | Performed by: ORTHOPAEDIC SURGERY

## 2018-08-29 PROCEDURE — 86140 C-REACTIVE PROTEIN: CPT

## 2018-08-29 PROCEDURE — 99999 PR PBB SHADOW E&M-EST. PATIENT-LVL III: CPT | Mod: PBBFAC,,, | Performed by: ORTHOPAEDIC SURGERY

## 2018-09-04 DIAGNOSIS — G89.18 POST-OP PAIN: ICD-10-CM

## 2018-09-04 LAB
ACID FAST MOD KINY STN SPEC: NORMAL
MYCOBACTERIUM SPEC QL CULT: NORMAL

## 2018-09-04 RX ORDER — HYDROCODONE BITARTRATE AND ACETAMINOPHEN 5; 325 MG/1; MG/1
1 TABLET ORAL EVERY 6 HOURS PRN
Qty: 30 TABLET | Refills: 0 | Status: SHIPPED | OUTPATIENT
Start: 2018-09-04 | End: 2018-09-14

## 2018-09-05 ENCOUNTER — OFFICE VISIT (OUTPATIENT)
Dept: BARIATRICS | Facility: CLINIC | Age: 63
End: 2018-09-05
Payer: COMMERCIAL

## 2018-09-05 ENCOUNTER — TELEPHONE (OUTPATIENT)
Dept: INFECTIOUS DISEASES | Facility: CLINIC | Age: 63
End: 2018-09-05

## 2018-09-05 VITALS
SYSTOLIC BLOOD PRESSURE: 120 MMHG | WEIGHT: 278.25 LBS | HEIGHT: 67 IN | DIASTOLIC BLOOD PRESSURE: 70 MMHG | BODY MASS INDEX: 43.67 KG/M2 | HEART RATE: 74 BPM

## 2018-09-05 DIAGNOSIS — T84.59XA INFECTED PROSTHETIC KNEE JOINT, INITIAL ENCOUNTER: Primary | ICD-10-CM

## 2018-09-05 DIAGNOSIS — E66.01 CLASS 3 SEVERE OBESITY DUE TO EXCESS CALORIES WITH SERIOUS COMORBIDITY AND BODY MASS INDEX (BMI) OF 40.0 TO 44.9 IN ADULT: Primary | ICD-10-CM

## 2018-09-05 DIAGNOSIS — Z96.659 INFECTED PROSTHETIC KNEE JOINT, INITIAL ENCOUNTER: Primary | ICD-10-CM

## 2018-09-05 PROCEDURE — 99213 OFFICE O/P EST LOW 20 MIN: CPT | Mod: S$GLB,,, | Performed by: INTERNAL MEDICINE

## 2018-09-05 PROCEDURE — 99999 PR PBB SHADOW E&M-EST. PATIENT-LVL III: CPT | Mod: PBBFAC,,, | Performed by: INTERNAL MEDICINE

## 2018-09-05 PROCEDURE — 3008F BODY MASS INDEX DOCD: CPT | Mod: CPTII,S$GLB,, | Performed by: INTERNAL MEDICINE

## 2018-09-05 PROCEDURE — 3074F SYST BP LT 130 MM HG: CPT | Mod: CPTII,S$GLB,, | Performed by: INTERNAL MEDICINE

## 2018-09-05 PROCEDURE — 3078F DIAST BP <80 MM HG: CPT | Mod: CPTII,S$GLB,, | Performed by: INTERNAL MEDICINE

## 2018-09-05 RX ORDER — DOXYCYCLINE HYCLATE 100 MG
100 TABLET ORAL EVERY 12 HOURS
Qty: 180 TABLET | Refills: 3 | Status: SHIPPED | OUTPATIENT
Start: 2018-09-05 | End: 2019-03-19

## 2018-09-05 RX ORDER — DIETHYLPROPION HYDROCHLORIDE 75 MG/1
75 TABLET, EXTENDED RELEASE ORAL DAILY
Qty: 30 TABLET | Refills: 2 | Status: SHIPPED | OUTPATIENT
Start: 2018-09-05 | End: 2018-12-26

## 2018-09-05 NOTE — TELEPHONE ENCOUNTER
Patient missed appointment today  Will complete course of moxifloxacin and then transition him to doxycycline 100 mg PO BID for chronic suppressive therapy.    Patient will follow-up in 2 months    Bibi Mercado MD MPH  [facility]

## 2018-09-05 NOTE — PATIENT INSTRUCTIONS
Patient warned of common side effects of diethylpropion including anxiety, insomnia, palpitations and increased blood pressure. It was also explained that it is for short-term usage along with diet and exercise, and that stopping the medication without making lifestyle changes will result in regain of weight. Patient states understanding.    Weight loss medications are controlled substances.  They require routine follow up. Prescription or pills that are lost or destroyed will not be replaced.     [email or web address] for prepared meal options.     3 meals a day made up of the following:  Unlimited green vegetables, tomatoes, mushrooms, spaghetti squash, cauliflower, meat, poultry, seafood, eggs and hard cheeses.   Milk and plain yogurt  Dressings, seasonings, condiments, etc should have less than 2 g sugars.   Beans (1-1.5 cups) or nuts (1/4 cup) can have 1 x a day.   1-2 servings of citrus fruits, berries, pineapple or melon a day (1/2 cup)  Avoid fried foods    No grains, rice, pasta, potatoes, bread, corn, peas, oatmeal, grits, tortillas, crackers, chips    No soda, sweet tea, juices or lemonade    [email or web address] for recipes. Moderate carb intake    Exercise per PT    Dinner in Under 30 minutes and 400 calories.     Baked Chicken breast with Broccoli Cheese Casserole  2-3 chicken breast (approximately 6-8 oz each)    2 tsp each garlic and herb Mrs. Dash seasoning   2 tsp your favorite creole seasoning  2 tablespoons of balsamic vinegar  2 - 10 oz packages of frozen broccoli  1 egg   ½ cup skim milk  ½ tsp paprika   ½ tsp cayenne pepper   1 can fat free cream of mushroom soup.   1 .5 cups of shredded cheddar cheese    Pre-heat oven to 450 degrees. Toss 2-3 chicken breast (approximately 6-8 oz each) in 2 tsp each garlic and herb Mrs. Dash seasoning, 2 tsp your favorite creole seasoning, and 2 tablespoons of balsamic vinegar. This can also be done up to 24 hours ahead of time, and the chicken can be left  in the refrigerator to [facility]. Place on a baking sheet sprayed with non-stick cooking spray and bake on 450 degrees for 10 min, then reduce heat to 350 degrees and cook an addition 10-15 minutes until chicken is cooked through.   While chicken is baking, microwave safe dish, thaw 2 - 10 oz packages of frozen broccoli. Drain any excess water, season with salt, pepper, all-purpose seasoning of your choice. In another bowl, whisk together 1 egg, ½ cup skim milk, ½ tsp paprika, ½ tsp cayenne pepper and 1 can fat free cream of mushroom soup. Combine broccoli, soup mixture, 1 cup of shredded cheddar cheese in baking dish sprayed with cooking spray. Top with remaining cheese. Bake in the oven with chicken for about 20 min or until set cheese is melted and vincent.     Makes 4 servings. 389 calories per serving.10 g fat, 13 g carbs, 54g protein     Sheet Pan [place] Shrimp  1 pound large shrimp, shelled, peeled and deveined.   2 tablespoon olive oil  The zest and the juice of 1 lime  ½ tsp chili powder  ½-1 tsp cayenne pepper  1 tsp cumin  ½ tsp dry oregano  1 red bell pepper  1 green bell pepper  1 red onion  2 cups mushrooms, quartered  1 avocado  Whole [place] lettuce leaves  Preheat oven to 375 degrees.  In a bowl combine shrimp, lime juice, lime zest, cumin, cayenne pepper, chili powder, and a pinch of salt. Set aside.   Slice peppers and onions into thin strips. Toss in 1 tablespoon of olive oil. Sprinkle with salt and pepper and arrange in a single layer over 1/3 of a large sheet pan  Toss mushrooms with remaining olive oil, oregano, salt and pepper. Arrange in single layer on sheet pan. Place sheet pan in oven for about 15-20 minutes until vegetables are softened, cooked about ¾ of the way through. Remove the sheet pan from oven.  Change setting on oven to low broil.  If needed, rearrange vegetables to make room for shrimp. Arrange the shrimp in a single layer on the sheet pan and return pan to oven. After 5  minutes, flip shrimp. Cook 3-5 additional minutes, or until  Shrimp are opaque.   Serve shrimp and cooked vegetables on lettuce leaves with sliced avocado.   Makes 4 servings. Calories per servin; 23g fat, 19 g carbohydrates, 27g protein.    Zoodles Primavera with Seasoned Ricotta  8 cups zucchini noodles. These can be purchased already prepared, or you can make them on your own with whole zucchini and a vegetable spiralizer.   1.5 cups cherry tomatoes, quartered  2 cups sliced mushrooms  1 cup chopped fresh broccoli  1 cup frozen peas and carrots  2 cloves garlic, finely chopped  16 oz part skim ricotta cheese  2 oz Neufchatel cream cream cheese, cut into small cubes  Juice and zest of 1 lemon  1 pinch red pepper flakes    2 tsp Italian seasoning  4-5 leaves fresh basil, thinly sliced  1 tablespoon of olive oil  Parmesan cheese for topping (optional)     In a bowl, combine ricotta cheese, 1 tsp Italian seasoning, ½ teaspoon lemon zest. Season with salt and pepper to taste. Mix well. Fold in half of fresh basil. Set aside.   Heat a large skillet to medium high. Add olive oil. Sautee mushrooms until starting to become tender. Season them with salt and pepper while cooking.  Add broccoli and peas and carrots. Reduce heat to medium. Cover pan and cook for 4-5 minutes until broccoli is tender and peas and carrots are warmed through. Add [place] cheese, Italian seasoning, red pepper flakes, 1 tsp lemon zest and lemon juice. Stir until a smooth sauce is formed. Add zucchini noodles (zoodles) to skillet and toss in sauce, then add cherry tomatoes.  Separate zoodle and vegetables into 4 equal portions. Serve each with a ¼ cup serving of seasoned ricotta cheese. Sprinkle with grated parmesan cheese or fresh basil,  if desired.   Makes 4 servings. Calories per servin calories, 32 g carbs, 33 g protein, 18 g fat

## 2018-09-05 NOTE — PROGRESS NOTES
"Subjective:       Patient ID: Yosef Rowe is a 62 y.o. male.    Chief Complaint: Follow-up    Pt here today for follow up . Has lost 4 more lbs, 14 lbs in total.. Has been on phentermine x 3 months. Last filled 8/17/18,   checked today . He is doing some exercise. He is going to be getting a knee replacement. Since I saw him and he had a knee replacement and infection of the prosthestic joint. He is able to resume PT today. He does have some LE edema. He says his pants size is smaller. Recently going through divorce and living alone. Finding meal preparation for one to be difficult.       Review of Systems   Constitutional: Negative for chills and fever.   Respiratory: Positive for apnea and shortness of breath.         Wears CPAP nightly   Cardiovascular: Positive for leg swelling. Negative for chest pain.   Gastrointestinal: Negative for constipation and diarrhea.        + indigestion   Genitourinary: Negative for difficulty urinating and dysuria.   Musculoskeletal: Positive for arthralgias and back pain.   Neurological: Negative for dizziness and light-headedness.   Psychiatric/Behavioral: Negative for dysphoric mood. The patient is not nervous/anxious.        Objective:     /70   Pulse 74   Ht 5' 7" (1.702 m)   Wt 126.2 kg (278 lb 3.5 oz)   BMI 43.58 kg/m²     Physical Exam   Constitutional: He is oriented to person, place, and time. He appears well-developed. No distress.   Morbidly obese     HENT:   Head: Normocephalic and atraumatic.   Eyes: Pupils are equal, round, and reactive to light. No scleral icterus.   Neck: Normal range of motion. Neck supple.   Cardiovascular: Normal rate, regular rhythm and normal heart sounds. Exam reveals no gallop and no friction rub.   No murmur heard.  Pulmonary/Chest: Effort normal and breath sounds normal. No respiratory distress. He has no wheezes.   Musculoskeletal: Normal range of motion. He exhibits edema.        Legs:  Trace LE edema   Neurological: He is " alert and oriented to person, place, and time.   Skin: Skin is warm and dry.   Psychiatric: He has a normal mood and affect. His behavior is normal. Judgment normal.   Vitals reviewed.      Assessment:       1. Class 3 severe obesity due to excess calories with serious comorbidity and body mass index (BMI) of 40.0 to 44.9 in adult        Plan:           Yosef was seen today for follow-up.    Diagnoses and all orders for this visit:    Class 3 severe obesity due to excess calories with serious comorbidity and body mass index (BMI) of 40.0 to 44.9 in adult  -     diethylpropion 75 mg TbSR; Take 75 mg by mouth once daily.          Patient warned of common side effects of diethylpropion including anxiety, insomnia, palpitations and increased blood pressure. It was also explained that it is for short-term usage along with diet and exercise, and that stopping the medication without making lifestyle changes will result in regain of weight. Patient states understanding.    Weight loss medications are controlled substances.  They require routine follow up. Prescription or pills that are lost or destroyed will not be replaced.         3 meals a day made up of the following:  Unlimited green vegetables, tomatoes, mushrooms, spaghetti squash, cauliflower, meat, poultry, seafood, eggs and hard cheeses.   Milk and plain yogurt  Dressings, seasonings, condiments, etc should have less than 2 g sugars.   Beans (1-1.5 cups) or nuts (1/4 cup) can have 1 x a day.   1-2 servings of citrus fruits, berries, pineapple or melon a day (1/2 cup)  Avoid fried foods    No grains, rice, pasta, potatoes, bread, corn, peas, oatmeal, grits, tortillas, crackers, chips    No soda, sweet tea, juices or lemonade    [email or web address] for recipes. Moderate carb intake    Under 30 min recipes given    [email or web address] for prepared meal options.     Exercise per PT

## 2018-09-08 LAB
ACID FAST MOD KINY STN SPEC: NORMAL
ACID FAST MOD KINY STN SPEC: NORMAL
MYCOBACTERIUM SPEC QL CULT: NORMAL
MYCOBACTERIUM SPEC QL CULT: NORMAL

## 2018-09-10 ENCOUNTER — OFFICE VISIT (OUTPATIENT)
Dept: INTERNAL MEDICINE | Facility: CLINIC | Age: 63
End: 2018-09-10
Payer: COMMERCIAL

## 2018-09-10 ENCOUNTER — LAB VISIT (OUTPATIENT)
Dept: LAB | Facility: HOSPITAL | Age: 63
End: 2018-09-10
Attending: INTERNAL MEDICINE
Payer: COMMERCIAL

## 2018-09-10 VITALS
WEIGHT: 274.69 LBS | DIASTOLIC BLOOD PRESSURE: 72 MMHG | BODY MASS INDEX: 43.11 KG/M2 | TEMPERATURE: 98 F | SYSTOLIC BLOOD PRESSURE: 129 MMHG | RESPIRATION RATE: 18 BRPM | HEART RATE: 79 BPM | HEIGHT: 67 IN

## 2018-09-10 DIAGNOSIS — M79.89 LEFT LEG SWELLING: ICD-10-CM

## 2018-09-10 DIAGNOSIS — R63.4 WEIGHT LOSS: ICD-10-CM

## 2018-09-10 DIAGNOSIS — I25.84 CORONARY ATHEROSCLEROSIS DUE TO CALCIFIED CORONARY LESION: ICD-10-CM

## 2018-09-10 DIAGNOSIS — N64.4 NIPPLE PAIN: ICD-10-CM

## 2018-09-10 DIAGNOSIS — R53.82 CHRONIC FATIGUE: ICD-10-CM

## 2018-09-10 DIAGNOSIS — G47.00 INSOMNIA, UNSPECIFIED TYPE: ICD-10-CM

## 2018-09-10 DIAGNOSIS — I25.10 CORONARY ATHEROSCLEROSIS DUE TO CALCIFIED CORONARY LESION: ICD-10-CM

## 2018-09-10 DIAGNOSIS — R63.0 LOSS OF APPETITE: ICD-10-CM

## 2018-09-10 DIAGNOSIS — Z96.659 INFECTED PROSTHETIC KNEE JOINT, INITIAL ENCOUNTER: ICD-10-CM

## 2018-09-10 DIAGNOSIS — R63.0 LOSS OF APPETITE: Primary | ICD-10-CM

## 2018-09-10 DIAGNOSIS — T84.59XA INFECTED PROSTHETIC KNEE JOINT, INITIAL ENCOUNTER: ICD-10-CM

## 2018-09-10 LAB
ALBUMIN SERPL BCP-MCNC: 4 G/DL
ALP SERPL-CCNC: 76 U/L
ALT SERPL W/O P-5'-P-CCNC: 47 U/L
ANION GAP SERPL CALC-SCNC: 10 MMOL/L
AST SERPL-CCNC: 52 U/L
BILIRUB SERPL-MCNC: 0.7 MG/DL
BUN SERPL-MCNC: 15 MG/DL
CALCIUM SERPL-MCNC: 9.7 MG/DL
CHLORIDE SERPL-SCNC: 104 MMOL/L
CK SERPL-CCNC: 37 U/L
CO2 SERPL-SCNC: 25 MMOL/L
CREAT SERPL-MCNC: 1 MG/DL
EST. GFR  (AFRICAN AMERICAN): >60 ML/MIN/1.73 M^2
EST. GFR  (NON AFRICAN AMERICAN): >60 ML/MIN/1.73 M^2
GLUCOSE SERPL-MCNC: 94 MG/DL
POTASSIUM SERPL-SCNC: 4.1 MMOL/L
PROT SERPL-MCNC: 7.4 G/DL
SODIUM SERPL-SCNC: 139 MMOL/L
T4 FREE SERPL-MCNC: 0.95 NG/DL
TSH SERPL DL<=0.005 MIU/L-ACNC: 0.86 UIU/ML
VIT B12 SERPL-MCNC: 304 PG/ML

## 2018-09-10 PROCEDURE — 82607 VITAMIN B-12: CPT

## 2018-09-10 PROCEDURE — 3078F DIAST BP <80 MM HG: CPT | Mod: CPTII,S$GLB,, | Performed by: INTERNAL MEDICINE

## 2018-09-10 PROCEDURE — 84439 ASSAY OF FREE THYROXINE: CPT

## 2018-09-10 PROCEDURE — 84443 ASSAY THYROID STIM HORMONE: CPT

## 2018-09-10 PROCEDURE — 82550 ASSAY OF CK (CPK): CPT

## 2018-09-10 PROCEDURE — 36415 COLL VENOUS BLD VENIPUNCTURE: CPT | Mod: PO

## 2018-09-10 PROCEDURE — 3074F SYST BP LT 130 MM HG: CPT | Mod: CPTII,S$GLB,, | Performed by: INTERNAL MEDICINE

## 2018-09-10 PROCEDURE — 99215 OFFICE O/P EST HI 40 MIN: CPT | Mod: S$GLB,,, | Performed by: INTERNAL MEDICINE

## 2018-09-10 PROCEDURE — 3008F BODY MASS INDEX DOCD: CPT | Mod: CPTII,S$GLB,, | Performed by: INTERNAL MEDICINE

## 2018-09-10 PROCEDURE — 80053 COMPREHEN METABOLIC PANEL: CPT

## 2018-09-10 PROCEDURE — 99999 PR PBB SHADOW E&M-EST. PATIENT-LVL III: CPT | Mod: PBBFAC,,, | Performed by: INTERNAL MEDICINE

## 2018-09-11 NOTE — PROGRESS NOTES
Subjective:       Patient ID: Yosef Rowe is a 62 y.o. male.    Chief Complaint: Breast Problem (left-tender to touch & movement)  The patient is coming in complaining of pain in his left nipple with any sort of   contact with his clothing or otherwise for the last six weeks.  He has had no   trauma to that area.  He has been feeling poorly for probably the last two   months, but particularly the last month and a half.  The patient had a   prosthetic knee placed on the left on June 22 with the surgery going very well.    He required a cat for companionship early in July and had acute onset of fever   and knee pain on July 2.  He was seen in the Emergency Room, admitted at   [facility], started on Rocephin and from what I can gather from the note, left   before PICC line could be placed for continuation of the IV antibiotics.  He   says that he was debrided by the ER doctor, but I believe it was a partner of   the orthopedic surgeon who had done his surgery was Dr. Arguello.  The patient was   then readmitted on July 7 for three days, placed initially on Rocephin and then   switched to ampicillin, sulbactam, which he said he continued through the PICC   line for approximately six weeks.  He then was switched moxifloxacin and then   ultimately to doxycycline.  He has done well following the second   hospitalization for his knee infection.  The patient is currently also on a   bariatric program for the last three months and is on diethylpropion for that   time.  The patient has had no chest pain, shortness of breath, change in his   bowels or urinary problems.  He said, however, his urine is quite dark and foul   smelling.    PHYSICAL EXAMINATION:  VITAL SIGNS:  Normal including temperature.  SKIN:  Fair and healthy.  His leg wound is healing well.  He has some remaining   swelling, redness, but the wound itself looks clean and the remaining eschar is   healthy.  The patient does have some redness of the skin below the  knee and   swelling of the leg, but no tenderness.  HEENT:  Clear.  NECK:  Shows no stiffness, adenopathy or thyroid enlargement.  CHEST:  Clear.  HEART:  There is a grade II aortic outflow murmur.  Rhythm appears to be   regular.  ABDOMEN:  Obese, nontender, no organomegaly.  He did have brief tenderness in   the left lower quadrant that was nonrepetitive.  He had no guarding.  Bowel   sounds were active.  EXTREMITIES:  Otherwise normal.  NEUROLOGIC:  Appears normal.    IMPRESSION:  1.  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  2.  Left nipple pain.  The nipple has one small area that is engorged, not   inflamed and slightly tender.  The rest of nipple appears to be normal.  I told   him that most likely is a mild case of gynecomastia and very likely would   resolve.  I did not think that we need to work that up currently.  3.  His sense of poor health.  He has a number of psychiatric reasons for that   and is currently seeing a psychiatrist and is getting medication from them.  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  He also is   on a bariatric weight loss program with medication support, which could account   for his weight loss and appetite decline and he was feeling poorly prior to the   surgery.  4.  New systolic murmur.  In the light of his joint infection, I am doing an   echocardiogram.  5.  Leg swelling consistent with his knee replacement and the consequences of   the infection.  I am doing a venous ultrasound to make certain he does not have   a clot there.  6.  Sleep problem.  I have told him to speak with his psychiatrist whose first   name is Hedy.  He does not know the last name since she has gotten    and changed her name.  I did not want to prescribe anything new for him since he   was on the Ambien medication  he says is no longer effective and he did not find   any benefit from Restoril in the past.  It seems the next step would be using   another agent for sleep, which I thought was most appropriately rendered by his   psychiatric caregiver.      JDC/HN  dd: 09/10/2018 21:46:14 (CDT)  td: 09/11/2018 13:01:54 (CDT)  Doc ID   #[id number]  Job ID #[id number]    CC:     Dict [id number]  HPI  Review of Systems   Constitutional: Positive for appetite change, fatigue and unexpected weight change. Negative for activity change.   HENT: Negative for dental problem, hearing loss, mouth sores, postnasal drip and sinus pressure.    Eyes: Negative for discharge and visual disturbance.   Respiratory: Negative for cough and shortness of breath.    Cardiovascular: Positive for leg swelling. Negative for chest pain and palpitations.   Gastrointestinal: Negative for abdominal pain, blood in stool, constipation, diarrhea and nausea.   Genitourinary: Negative for dysuria, hematuria and testicular pain.   Musculoskeletal: Positive for arthralgias and myalgias. Negative for back pain, joint swelling and neck pain.   Skin: Negative for rash.   Neurological: Negative for weakness and headaches.   Psychiatric/Behavioral: Positive for agitation, dysphoric mood and sleep disturbance.       Objective:      Physical Exam   Constitutional: He is oriented to person, place, and time. He appears well-developed and well-nourished.   HENT:   Head: Normocephalic.   Right Ear: External ear normal.   Left Ear: External ear normal.   Mouth/Throat: Oropharynx is clear and moist.   Eyes: EOM are normal. Pupils are equal, round, and reactive to light. Right eye exhibits no discharge.   Neck: Normal range of motion. No JVD present. No tracheal deviation present. No thyromegaly present.   Cardiovascular: Normal rate, regular rhythm and intact distal pulses. Exam reveals no gallop.   Murmur heard.  Pulmonary/Chest: Effort normal and breath sounds normal. He has no wheezes. He  has no rales.   Abdominal: Soft. Bowel sounds are normal. He exhibits no mass. There is no tenderness.   Genitourinary: Prostate normal and penis normal. Rectal exam shows guaiac negative stool.   Musculoskeletal: Normal range of motion. He exhibits edema.   Lymphadenopathy:     He has no cervical adenopathy.   Neurological: He is oriented to person, place, and time. He displays normal reflexes. No cranial nerve deficit. Coordination normal.   Skin: Skin is warm. No rash noted. No pallor.   Psychiatric: He has a normal mood and affect.       Assessment:       1. Loss of appetite    2. Weight loss    3. Chronic fatigue    4. Infected prosthetic left unicompartmental knee replacement    5. Left leg swelling    6. Coronary atherosclerosis due to calcified coronary lesion    7. Insomnia, unspecified type    8. Nipple pain        Plan:

## 2018-09-12 ENCOUNTER — TELEPHONE (OUTPATIENT)
Dept: BARIATRICS | Facility: CLINIC | Age: 63
End: 2018-09-12

## 2018-09-12 NOTE — TELEPHONE ENCOUNTER
----- Message from Sugey Wang sent at 9/12/2018 12:37 PM CDT -----  Contact: Pt.Self   Pt. States he would like to speak with nurse in reference to having medication switched back to original or to different one due to having stomach problems with prescription diethylpropion 75 mg TbSR please call Pt. back @ [phone] Thank You

## 2018-09-13 ENCOUNTER — CLINICAL SUPPORT (OUTPATIENT)
Dept: CARDIOLOGY | Facility: CLINIC | Age: 63
End: 2018-09-13
Attending: INTERNAL MEDICINE
Payer: COMMERCIAL

## 2018-09-13 ENCOUNTER — LAB VISIT (OUTPATIENT)
Dept: LAB | Facility: HOSPITAL | Age: 63
End: 2018-09-13
Attending: INTERNAL MEDICINE
Payer: COMMERCIAL

## 2018-09-13 DIAGNOSIS — I25.84 CORONARY ATHEROSCLEROSIS DUE TO CALCIFIED CORONARY LESION: ICD-10-CM

## 2018-09-13 DIAGNOSIS — R53.82 CHRONIC FATIGUE: ICD-10-CM

## 2018-09-13 DIAGNOSIS — R63.0 LOSS OF APPETITE: ICD-10-CM

## 2018-09-13 DIAGNOSIS — R63.4 WEIGHT LOSS: ICD-10-CM

## 2018-09-13 DIAGNOSIS — I25.10 CORONARY ATHEROSCLEROSIS DUE TO CALCIFIED CORONARY LESION: ICD-10-CM

## 2018-09-13 LAB
BILIRUB UR QL STRIP: NEGATIVE
CLARITY UR REFRACT.AUTO: CLEAR
COLOR UR AUTO: YELLOW
DIASTOLIC DYSFUNCTION: NO
ESTIMATED PA SYSTOLIC PRESSURE: 18.84
GLUCOSE UR QL STRIP: NEGATIVE
HGB UR QL STRIP: NEGATIVE
KETONES UR QL STRIP: NEGATIVE
LEUKOCYTE ESTERASE UR QL STRIP: NEGATIVE
NITRITE UR QL STRIP: NEGATIVE
PH UR STRIP: 5 [PH] (ref 5–8)
PROT UR QL STRIP: NEGATIVE
RETIRED EF AND QEF - SEE NOTES: 60 (ref 55–65)
SP GR UR STRIP: 1.01 (ref 1–1.03)
TRICUSPID VALVE REGURGITATION: NORMAL
URN SPEC COLLECT METH UR: NORMAL
UROBILINOGEN UR STRIP-ACNC: NEGATIVE EU/DL

## 2018-09-13 PROCEDURE — 93306 TTE W/DOPPLER COMPLETE: CPT | Mod: S$GLB,,, | Performed by: INTERNAL MEDICINE

## 2018-09-13 PROCEDURE — 81003 URINALYSIS AUTO W/O SCOPE: CPT

## 2018-09-13 PROCEDURE — 93970 EXTREMITY STUDY: CPT | Mod: S$GLB,,, | Performed by: INTERNAL MEDICINE

## 2018-09-17 NOTE — TELEPHONE ENCOUNTER
I do not see the fill for the diethylpropion showing up, but he cannot get another rx at this time, as you can only fill one med in a month. He has been on antibiotics recently, so if he has not done so already, he may want to start on a probiotic.

## 2018-09-28 ENCOUNTER — TELEPHONE (OUTPATIENT)
Dept: INTERNAL MEDICINE | Facility: CLINIC | Age: 63
End: 2018-09-28

## 2018-09-28 NOTE — TELEPHONE ENCOUNTER
----- Message from Miguel Melchor sent at 9/28/2018 12:46 PM CDT -----  Contact: Self [phone]  Pt will like to speak to the doctor or nursein regards to medication, pt will like Dr. Dexter to call in prescription for an bad cold.

## 2018-09-28 NOTE — TELEPHONE ENCOUNTER
Just sniffling and sneezing and runny nose.  I told him to try zyrtec and if not improving over weekend make an appt for eval.  He expressed understanding.

## 2018-10-19 DIAGNOSIS — F51.01 PRIMARY INSOMNIA: ICD-10-CM

## 2018-10-19 RX ORDER — ZOLPIDEM TARTRATE 5 MG/1
TABLET ORAL
Qty: 30 TABLET | Refills: 0 | Status: SHIPPED | OUTPATIENT
Start: 2018-10-19 | End: 2018-11-20 | Stop reason: SDUPTHER

## 2018-11-13 ENCOUNTER — OFFICE VISIT (OUTPATIENT)
Dept: ORTHOPEDICS | Facility: CLINIC | Age: 63
End: 2018-11-13
Payer: COMMERCIAL

## 2018-11-13 ENCOUNTER — OFFICE VISIT (OUTPATIENT)
Dept: INFECTIOUS DISEASES | Facility: CLINIC | Age: 63
End: 2018-11-13
Payer: COMMERCIAL

## 2018-11-13 ENCOUNTER — HOSPITAL ENCOUNTER (OUTPATIENT)
Dept: RADIOLOGY | Facility: HOSPITAL | Age: 63
Discharge: HOME | End: 2018-11-13
Attending: ORTHOPAEDIC SURGERY
Payer: COMMERCIAL

## 2018-11-13 VITALS
SYSTOLIC BLOOD PRESSURE: 134 MMHG | BODY MASS INDEX: 42.21 KG/M2 | DIASTOLIC BLOOD PRESSURE: 74 MMHG | HEART RATE: 64 BPM | HEIGHT: 67 IN | TEMPERATURE: 99 F | HEIGHT: 67 IN | WEIGHT: 269.81 LBS | WEIGHT: 268.94 LBS | BODY MASS INDEX: 42.35 KG/M2

## 2018-11-13 DIAGNOSIS — T84.54XD INFECTION ASSOCIATED WITH INTERNAL LEFT KNEE PROSTHESIS, SUBSEQUENT ENCOUNTER: Primary | ICD-10-CM

## 2018-11-13 DIAGNOSIS — E66.01 OBESITY, CLASS III, BMI 40-49.9 (MORBID OBESITY): ICD-10-CM

## 2018-11-13 DIAGNOSIS — M25.562 LEFT KNEE PAIN, UNSPECIFIED CHRONICITY: Primary | ICD-10-CM

## 2018-11-13 DIAGNOSIS — Z96.652 S/P LEFT UNICOMPARTMENTAL KNEE REPLACEMENT: ICD-10-CM

## 2018-11-13 DIAGNOSIS — M25.562 LEFT KNEE PAIN, UNSPECIFIED CHRONICITY: ICD-10-CM

## 2018-11-13 DIAGNOSIS — A28.0 PASTEURELLA INFECTION: ICD-10-CM

## 2018-11-13 PROCEDURE — 3075F SYST BP GE 130 - 139MM HG: CPT | Mod: CPTII,S$GLB,, | Performed by: INTERNAL MEDICINE

## 2018-11-13 PROCEDURE — 99999 PR PBB SHADOW E&M-EST. PATIENT-LVL III: CPT | Mod: PBBFAC,,, | Performed by: ORTHOPAEDIC SURGERY

## 2018-11-13 PROCEDURE — 3008F BODY MASS INDEX DOCD: CPT | Mod: CPTII,S$GLB,, | Performed by: INTERNAL MEDICINE

## 2018-11-13 PROCEDURE — 73560 X-RAY EXAM OF KNEE 1 OR 2: CPT | Mod: 26,LT,, | Performed by: RADIOLOGY

## 2018-11-13 PROCEDURE — 99213 OFFICE O/P EST LOW 20 MIN: CPT | Mod: S$GLB,,, | Performed by: INTERNAL MEDICINE

## 2018-11-13 PROCEDURE — 3008F BODY MASS INDEX DOCD: CPT | Mod: CPTII,S$GLB,, | Performed by: ORTHOPAEDIC SURGERY

## 2018-11-13 PROCEDURE — 73560 X-RAY EXAM OF KNEE 1 OR 2: CPT | Mod: TC,LT

## 2018-11-13 PROCEDURE — 99213 OFFICE O/P EST LOW 20 MIN: CPT | Mod: S$GLB,,, | Performed by: ORTHOPAEDIC SURGERY

## 2018-11-13 PROCEDURE — 99999 PR PBB SHADOW E&M-EST. PATIENT-LVL III: CPT | Mod: PBBFAC,,, | Performed by: INTERNAL MEDICINE

## 2018-11-13 PROCEDURE — 3078F DIAST BP <80 MM HG: CPT | Mod: CPTII,S$GLB,, | Performed by: INTERNAL MEDICINE

## 2018-11-13 NOTE — PROGRESS NOTES
Subjective:      Patient ID: Yosef Rowe is a 63 y.o. male.    Chief Complaint: Follow-up for prosthetic joint infection    History of Present Illness  62-year old male with history obesity, HTN, HLD, left TKA on 6/22 complicated by Pasteurella prosthetic knee infection s/p washout and polyethylene liner exchange on 7/3 s/prepeat washout and poly exchange 7/7, presents for follow-up.      Summary of illness:  Patient was initially on ceftriaxone after 7/3/2018 washout, subsequently developed purulent drainage - repeat washout performed 7/7.  There was concern for poor efficacy with ceftriaxone - patient was switched to amp-sulbactam after second washout on 7/7. Patient did not tolerated amp-sulbactam due to nausea/vomiting, switched to moxifoxacin 8/17/2018 for 4 week course. He subsequently transitioned to doxycycline for chronic suppression.    Subjective:  Patient reports feeling well no complaints.  Denied any fevers, chills.  No redness, swelling, pain in his knees.        Review of Systems   Constitution: Positive for night sweats. Negative for chills, decreased appetite, fever, weakness, malaise/fatigue, weight gain and weight loss.   HENT: Negative for congestion, ear pain, hearing loss, hoarse voice, sore throat and tinnitus.    Eyes: Negative for blurred vision, redness and visual disturbance.   Cardiovascular: Negative for chest pain, leg swelling and palpitations.   Respiratory: Negative for cough, hemoptysis, shortness of breath and sputum production.    Hematologic/Lymphatic: Negative for adenopathy. Does not bruise/bleed easily.   Skin: Negative for dry skin, itching, rash and suspicious lesions.   Musculoskeletal: Negative for back pain, joint pain, myalgias and neck pain.   Gastrointestinal: Negative for abdominal pain, constipation, diarrhea, heartburn, nausea and vomiting.   Genitourinary: Negative for dysuria, flank pain, frequency, hematuria, hesitancy and urgency.   Neurological: Negative for  dizziness, headaches, numbness and paresthesias.   Psychiatric/Behavioral: Negative for depression and memory loss. The patient has insomnia. The patient is not nervous/anxious.      Objective:   Physical Exam   Constitutional: He is oriented to person, place, and time. He appears well-developed and well-nourished. No distress.   HENT:   Head: Normocephalic and atraumatic.   Eyes: Conjunctivae and EOM are normal.   Neck: Normal range of motion. Neck supple.   Cardiovascular: Normal rate.   Pulmonary/Chest: Effort normal. No respiratory distress.   Abdominal: Soft. He exhibits no distension.   Musculoskeletal: Normal range of motion. He exhibits no edema.   Left knee incision healed, no surrounding redness, swelling, pain.  Good ROM   Neurological: He is alert and oriented to person, place, and time.   Skin: Skin is warm and dry. No rash noted. He is not diaphoretic. No erythema.   Psychiatric: He has a normal mood and affect. His behavior is normal.   Vitals reviewed.        Ref Range & Units 2mo ago  (8/29/18) 3mo ago  (8/7/18) 3mo ago  (7/31/18) 3mo ago  (7/24/18)   Sed Rate 0 - 23 mm/Hr 6  29 Abnormally high  R 53 Abnormally high  R 85 Abnormally high  R        Ref Range & Units 2mo ago  (8/29/18) 3mo ago  (8/7/18) 3mo ago  (7/31/18) 3mo ago  (7/24/18)   CRP 0.0 - 8.2 mg/L 5.3  13.8 Abnormally high   22.7 Abnormally high   47.2 Abnormally high           7/3/2018                Pasteurella multocida       CULTURE, AEROBIC  (SPECIFY SOURCE) (Corrected)       Ceftriaxone .012          Doxycycline 1.0          Erythromycin 2  Intermediate       Moxifloxacin .016          Penicillin .38  Sensitive                   Assessment:   62-year-old male  - Obesity, HTN, HLD  - 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

## 2018-11-13 NOTE — PROGRESS NOTES
CC:  L knee replacement    Hx:  Yosef Rowe presents for routine follow up of left partial knee replacement.  His procedure was performed 5 months ago (DOS: 6/22/18).  He developed a pasteurella infection post-op treated by I&D with poly exchange x 2.  Inflammatory markers last visit were normal.  He denies pain, erythema in left knee. He denies any effusions.  He is on doxycycline.    ROS:  Denies fevers/chills.  Denies distal edema or distal paresthesias.  Denies warmth or erythema in the knee.      PE:  Skin is unremarkable over both knees other than well healed anterio-medial incision.  No warmth, erythema, or effusion bilaterally.  Stable to varus/valgus stress bilaterally.  No distal edema bilaterally.  No pain ROM either hip.  ROM 0-125    AP and lateral radiographs of the left knee were ordered and personally reviewed with the patient today.  These show no signs of loosening      IMP: S/P left partial knee replacement - no signs residual/recurrent infection.    Plan: RTC for followup in 1 year.    I have personally interviewed and evaluated the patient.  I have reviewed the resident physician's note and I concur with the findings.

## 2018-11-18 DIAGNOSIS — F41.1 GENERALIZED ANXIETY DISORDER: ICD-10-CM

## 2018-11-18 DIAGNOSIS — F33.1 MODERATE EPISODE OF RECURRENT MAJOR DEPRESSIVE DISORDER: ICD-10-CM

## 2018-11-19 ENCOUNTER — TELEPHONE (OUTPATIENT)
Dept: INTERNAL MEDICINE | Facility: CLINIC | Age: 63
End: 2018-11-19

## 2018-11-19 RX ORDER — DULOXETIN HYDROCHLORIDE 60 MG/1
CAPSULE, DELAYED RELEASE ORAL
Qty: 30 CAPSULE | Refills: 0 | OUTPATIENT
Start: 2018-11-19

## 2018-11-19 NOTE — TELEPHONE ENCOUNTER
----- Message from Manjula Moran sent at 11/16/2018  1:53 PM CST -----  Contact: [email or web address] Direct [phone], Fax# [phone]  Danielle is calling in regards to putting in an order on 11/14/2018 for the patient's diabetic supplies and she would like to know what's the status of it please? She would like a call back.

## 2018-11-19 NOTE — TELEPHONE ENCOUNTER
Patient not dx with diabetes.  Had some elevated glucoses close to his last surgery.  Last tested 9/10/18 and glucose normal.  Not on diabetic meds.    She will refax orders to our fax [phone].  It originally went to [phone] and I told her not sure we got it.

## 2018-11-20 DIAGNOSIS — F51.01 PRIMARY INSOMNIA: ICD-10-CM

## 2018-11-20 RX ORDER — ZOLPIDEM TARTRATE 5 MG/1
TABLET ORAL
Qty: 30 TABLET | Refills: 0 | Status: SHIPPED | OUTPATIENT
Start: 2018-11-20 | End: 2018-12-17 | Stop reason: SDUPTHER

## 2018-11-20 NOTE — TELEPHONE ENCOUNTER
Form not for diabetic supplies. It was for cpap supplies.    Form received and giving to dr akers to sign when he returns on next Monday.

## 2018-11-23 DIAGNOSIS — F41.1 GENERALIZED ANXIETY DISORDER: ICD-10-CM

## 2018-11-23 DIAGNOSIS — F33.42 RECURRENT MAJOR DEPRESSIVE DISORDER, IN FULL REMISSION: ICD-10-CM

## 2018-11-23 RX ORDER — DULOXETIN HYDROCHLORIDE 60 MG/1
CAPSULE, DELAYED RELEASE ORAL
Qty: 30 CAPSULE | Refills: 0 | Status: SHIPPED | OUTPATIENT
Start: 2018-11-23 | End: 2018-12-17 | Stop reason: SDUPTHER

## 2018-11-26 ENCOUNTER — TELEPHONE (OUTPATIENT)
Dept: INTERNAL MEDICINE | Facility: CLINIC | Age: 63
End: 2018-11-26

## 2018-11-26 NOTE — TELEPHONE ENCOUNTER
We didn't get first request.    I explained to patient we got 2nd request forms last week and  was out till today.  Please send me back forms I put in your box to sign.

## 2018-11-26 NOTE — TELEPHONE ENCOUNTER
----- Message from Manjula Moran sent at 11/26/2018  2:32 PM CST -----  Contact: Pt Mobile/Home [phone]   Patient is calling in regards to his C-pap machine mask and hose is not working. He said that a company by the name of [employer] sent papers over one week ago and he would like to know what's the status on it.

## 2018-12-17 DIAGNOSIS — F41.1 GENERALIZED ANXIETY DISORDER: ICD-10-CM

## 2018-12-17 DIAGNOSIS — F33.42 RECURRENT MAJOR DEPRESSIVE DISORDER, IN FULL REMISSION: ICD-10-CM

## 2018-12-17 DIAGNOSIS — F51.01 PRIMARY INSOMNIA: ICD-10-CM

## 2018-12-17 RX ORDER — ZOLPIDEM TARTRATE 5 MG/1
5 TABLET ORAL NIGHTLY PRN
Qty: 30 TABLET | Refills: 3 | Status: SHIPPED | OUTPATIENT
Start: 2018-12-17 | End: 2019-03-19

## 2018-12-17 RX ORDER — DULOXETIN HYDROCHLORIDE 60 MG/1
CAPSULE, DELAYED RELEASE ORAL
Qty: 90 CAPSULE | Refills: 0 | Status: SHIPPED | OUTPATIENT
Start: 2018-12-17 | End: 2019-01-17 | Stop reason: SDUPTHER

## 2018-12-17 RX ORDER — ZOLPIDEM TARTRATE 5 MG/1
TABLET ORAL
Qty: 30 TABLET | Refills: 0 | OUTPATIENT
Start: 2018-12-17

## 2018-12-21 DIAGNOSIS — E66.01 CLASS 3 SEVERE OBESITY DUE TO EXCESS CALORIES WITH SERIOUS COMORBIDITY AND BODY MASS INDEX (BMI) OF 40.0 TO 44.9 IN ADULT: ICD-10-CM

## 2018-12-21 RX ORDER — DIETHYLPROPION HYDROCHLORIDE 75 MG/1
75 TABLET, EXTENDED RELEASE ORAL DAILY
Qty: 30 TABLET | Refills: 2 | OUTPATIENT
Start: 2018-12-21

## 2018-12-26 ENCOUNTER — OFFICE VISIT (OUTPATIENT)
Dept: BARIATRICS | Facility: CLINIC | Age: 63
End: 2018-12-26
Payer: COMMERCIAL

## 2018-12-26 VITALS
HEIGHT: 67 IN | SYSTOLIC BLOOD PRESSURE: 120 MMHG | DIASTOLIC BLOOD PRESSURE: 60 MMHG | HEART RATE: 62 BPM | WEIGHT: 281.06 LBS | BODY MASS INDEX: 44.11 KG/M2

## 2018-12-26 DIAGNOSIS — E66.01 CLASS 3 SEVERE OBESITY DUE TO EXCESS CALORIES WITH SERIOUS COMORBIDITY AND BODY MASS INDEX (BMI) OF 40.0 TO 44.9 IN ADULT: ICD-10-CM

## 2018-12-26 PROCEDURE — 3008F BODY MASS INDEX DOCD: CPT | Mod: CPTII,S$GLB,, | Performed by: INTERNAL MEDICINE

## 2018-12-26 PROCEDURE — 3074F SYST BP LT 130 MM HG: CPT | Mod: CPTII,S$GLB,, | Performed by: INTERNAL MEDICINE

## 2018-12-26 PROCEDURE — 99213 OFFICE O/P EST LOW 20 MIN: CPT | Mod: S$GLB,,, | Performed by: INTERNAL MEDICINE

## 2018-12-26 PROCEDURE — 99999 PR PBB SHADOW E&M-EST. PATIENT-LVL III: CPT | Mod: PBBFAC,,, | Performed by: INTERNAL MEDICINE

## 2018-12-26 PROCEDURE — 3078F DIAST BP <80 MM HG: CPT | Mod: CPTII,S$GLB,, | Performed by: INTERNAL MEDICINE

## 2018-12-26 RX ORDER — PHENTERMINE HYDROCHLORIDE 37.5 MG/1
37.5 TABLET ORAL
Qty: 30 TABLET | Refills: 2 | Status: SHIPPED | OUTPATIENT
Start: 2018-12-26 | End: 2019-01-25

## 2018-12-26 NOTE — PROGRESS NOTES
"Subjective:       Patient ID: Yosef Rowe is a 63 y.o. male.    Chief Complaint: Follow-up    Pt here today for follow up . Has gained 3 more lbs, net neg 9 lbs in total.. Has been on diethylpropion  x 3 months. Last filled 11/20/18,   checked today . He is doing some exercise. He is done with PT for his knee. He was losing more weight on the phentermine, but did have some upset stomach with it. He's also on doxy. Does states he was down by 10 lbs, but gained it back during xmas.       Review of Systems   Constitutional: Negative for chills and fever.   Respiratory: Positive for apnea and shortness of breath.         Wears CPAP nightly   Cardiovascular: Positive for leg swelling. Negative for chest pain.   Gastrointestinal: Negative for constipation and diarrhea.        + indigestion   Genitourinary: Negative for difficulty urinating and dysuria.   Musculoskeletal: Positive for arthralgias and back pain.   Neurological: Negative for dizziness and light-headedness.   Psychiatric/Behavioral: Negative for dysphoric mood. The patient is not nervous/anxious.        Objective:     /60   Pulse 62   Ht 5' 7" (1.702 m)   Wt 127.5 kg (281 lb 1.4 oz)   BMI 44.02 kg/m²     Physical Exam   Constitutional: He is oriented to person, place, and time. He appears well-developed. No distress.   Morbidly obese     HENT:   Head: Normocephalic and atraumatic.   Eyes: Pupils are equal, round, and reactive to light. No scleral icterus.   Neck: Normal range of motion. Neck supple.   Cardiovascular: Normal rate, regular rhythm and normal heart sounds. Exam reveals no gallop and no friction rub.   No murmur heard.  Pulmonary/Chest: Effort normal and breath sounds normal. No respiratory distress. He has no wheezes.   Musculoskeletal: Normal range of motion. He exhibits edema.        Legs:  Trace LE edema   Neurological: He is alert and oriented to person, place, and time.   Skin: Skin is warm and dry.   Psychiatric: He has a " normal mood and affect. His behavior is normal. Judgment normal.   Vitals reviewed.      Assessment:       No diagnosis found.    Plan:           There are no diagnoses linked to this encounter.      Patient warned of common side effects of phentermine including anxiety, insomnia, palpitations and increased blood pressure. It was also explained that it is for short-term usage along with diet and exercise, and that stopping the medication without making lifestyle changes will result in regain of weight. Patient states understanding.     Weight loss medications are controlled substances.  They require routine follow up. Prescription or pills that are lost or destroyed will not be replaced.       Start phentermine with 1/2 pill a day for at least 1 week to see if that will control your appetite.  Go up to a full pill when needed.      3 meals a day made up of the following:  Unlimited green vegetables, tomatoes, mushrooms, spaghetti squash, cauliflower, meat, poultry, seafood, eggs and hard cheeses.   Milk and plain yogurt  Dressings, seasonings, condiments, etc should have less than 2 g sugars.   Beans (1-1.5 cups) or nuts (1/4 cup) can have 1 x a day.   1-2 servings of citrus fruits, berries, pineapple or melon a day (1/2 cup)  Avoid fried foods    No grains, rice, pasta, potatoes, bread, corn, peas, oatmeal, grits, tortillas, crackers, chips    No soda, sweet tea, juices or lemonade    [email or web address] for recipes. Moderate carb intake

## 2018-12-26 NOTE — PATIENT INSTRUCTIONS
Patient warned of common side effects of phentermine including anxiety, insomnia, palpitations and increased blood pressure. It was also explained that it is for short-term usage along with diet and exercise, and that stopping the medication without making lifestyle changes will result in regain of weight. Patient states understanding.     Weight loss medications are controlled substances.  They require routine follow up. Prescription or pills that are lost or destroyed will not be replaced.       Start phentermine with 1/2 pill a day for at least 1 week to see if that will control your appetite.  Go up to a full pill when needed.      3 meals a day made up of the following:  Unlimited green vegetables, tomatoes, mushrooms, spaghetti squash, cauliflower, meat, poultry, seafood, eggs and hard cheeses.   Milk and plain yogurt  Dressings, seasonings, condiments, etc should have less than 2 g sugars.   Beans (1-1.5 cups) or nuts (1/4 cup) can have 1 x a day.   1-2 servings of citrus fruits, berries, pineapple or melon a day (1/2 cup)  Avoid fried foods    No grains, rice, pasta, potatoes, bread, corn, peas, oatmeal, grits, tortillas, crackers, chips    No soda, sweet tea, juices or lemonade    [email or web address] for recipes. Moderate carb intake\\    Exercise 45 min 4 days a week.     1200- 1500 calorie Sample meal plan   80-120g protein per day.   Protein drinks and bars: 0-4 grams sugar   Drink lots of water throughout the day and exercise!   MENU # 1   Breakfast: 2 eggs, 1 turkey sausage jory, 1 apple   Snack: P3 protein pack  Lunch: 2 roll-ups (sliced turkey, low-fat sliced cheese, mustard), 12 baby carrots dipped in 1 Tbsp natural peanut butter   Mid-Day Snack: ¼ cup unsalted almonds, ½ cup fruit   Dinner: 1 chicken thigh simmered in tomato sauce + 2 Tbsp mozzarella cheese, ½ cup black beans, 1/2 cup steamed carrots   Evening Snack: 1/2 cup grapes with 4 cubes low-fat cheddar cheese    ___________________________________________________   MENU # 2   Breakfast: 200 calorie protein drink   Mid-morning snack : ¼ cup unsalted nuts, medium banana   Lunch: 3oz tuna or chicken salad made with 2 tbsp light rodriguez, over salad with tomatoes and cucumbers.   Snack: low-fat cheese stick   Dinner: 3oz hamburger jory, slice of low-fat cheese, 1 cup yellow squash and zucchini sauteed in nonstick cookspray  Snack: 6oz light yogurt   _______________________________________________________   Menu #3   Breakfast: 6oz plain Greek yogurt + fruit (½ banana OR ½ cup fruit - pineapple, blueberries, strawberries, peach), may add Splenda to bre.   Lunch: Grilled chicken breast w/ slice low-fat pepper keesha cheese, 1/2 cup grilled/baked asparagus and small salad with Salad Spritzer.   Mid-Day snack: 200 calorie protein drink   Dinner: 4oz Grilled fish, ½ cup white beans, ½ cup cooked spinach   Evening Snack: fudgsicle no-sugar-added   Menu # 4   Breakfast: 1 scoop vanilla protein powder + 4oz skim milk + 4oz coffee   Snack: Pure protein bar   Lunch: 2 Lettuce tacos: 3oz seasoned ground turkey wrapped in a Ben lettuce leaves with 1 Tbsp shredded cheese and dollop low-fat sour cream   Snack: ½ cup cottage cheese, ½ cup pineapple chunks   Dinner: Shrimp omelet: 2 eggs, ½ cup shrimp, green onions, and shredded cheese   ______________________________________________________   Menu #5   Breakfast: 1 cup low-fat cottage cheese, ½ cup peaches (no sugar added)   Snack: 1 apple, 4 cubes cheese   Lunch: 3oz baked pork chop, 1 cup okra and tomato stew   Snack: 1/2 cup black beans + salsa + dollop light sour cream   Dinner: Caprese chicken salad: 3 oz chicken breast, 1oz fresh mozzarella, sliced tomato, 1 Tbsp olive oil, basil   Snack: sugar-free popsicle   _______________________________________________________  Menu #6   Breakfast: ½ cup part-skim ricotta cheese, 2 Tbsp sugar-free strawberry preserves, sprinkle of slivered  almonds   Snack: 1 orange + string cheese  Lunch: 3 oz canned chicken, 1oz shredded cheddar cheese, ½ cup black beans, 2 Tbsp salsa   Snack: 200 calorie Protein drink   Dinner: 4 oz grilled salmon steak, over mixed green salad with 2 tbsp light dressing   _______________________________________________________  Menu #7  Breakfast: crust-less quiche (bake 1 egg mixed w/ low fat cheese, broccoli and low sodium ham in a muffin cup until cooked inside)  Snack: 1 light yogurt  Lunch: protein shake (1 scoop powder + 8 oz skim milk, blended w/ ice)  Snack: small apple w/ 2 tbsp PB2 (powdered peanut butter)  Dinner: 2 Turkey meatballs w/ low sugar marinara sauce, 1/2 cup spiralized zucchini (sauteed on stove top w/ nonstick cookspray)

## 2019-01-17 DIAGNOSIS — F33.42 RECURRENT MAJOR DEPRESSIVE DISORDER, IN FULL REMISSION: ICD-10-CM

## 2019-01-17 DIAGNOSIS — F41.1 GENERALIZED ANXIETY DISORDER: ICD-10-CM

## 2019-01-17 RX ORDER — DULOXETIN HYDROCHLORIDE 60 MG/1
CAPSULE, DELAYED RELEASE ORAL
Qty: 90 CAPSULE | Refills: 0 | Status: SHIPPED | OUTPATIENT
Start: 2019-01-17 | End: 2019-03-19 | Stop reason: SDUPTHER

## 2019-03-19 ENCOUNTER — OFFICE VISIT (OUTPATIENT)
Dept: PSYCHIATRY | Facility: CLINIC | Age: 64
End: 2019-03-19
Payer: COMMERCIAL

## 2019-03-19 VITALS
WEIGHT: 290 LBS | BODY MASS INDEX: 45.42 KG/M2 | HEART RATE: 73 BPM | DIASTOLIC BLOOD PRESSURE: 77 MMHG | SYSTOLIC BLOOD PRESSURE: 163 MMHG

## 2019-03-19 DIAGNOSIS — G47.00 INSOMNIA, UNSPECIFIED TYPE: Primary | ICD-10-CM

## 2019-03-19 DIAGNOSIS — F41.1 GENERALIZED ANXIETY DISORDER: ICD-10-CM

## 2019-03-19 DIAGNOSIS — F33.42 RECURRENT MAJOR DEPRESSIVE DISORDER, IN FULL REMISSION: ICD-10-CM

## 2019-03-19 PROCEDURE — 99214 PR OFFICE/OUTPT VISIT, EST, LEVL IV, 30-39 MIN: ICD-10-PCS | Mod: S$GLB,,, | Performed by: INTERNAL MEDICINE

## 2019-03-19 PROCEDURE — 3008F BODY MASS INDEX DOCD: CPT | Mod: CPTII,S$GLB,, | Performed by: INTERNAL MEDICINE

## 2019-03-19 PROCEDURE — 3078F PR MOST RECENT DIASTOLIC BLOOD PRESSURE < 80 MM HG: ICD-10-PCS | Mod: CPTII,S$GLB,, | Performed by: INTERNAL MEDICINE

## 2019-03-19 PROCEDURE — 3078F DIAST BP <80 MM HG: CPT | Mod: CPTII,S$GLB,, | Performed by: INTERNAL MEDICINE

## 2019-03-19 PROCEDURE — 99214 OFFICE O/P EST MOD 30 MIN: CPT | Mod: S$GLB,,, | Performed by: INTERNAL MEDICINE

## 2019-03-19 PROCEDURE — 3008F PR BODY MASS INDEX (BMI) DOCUMENTED: ICD-10-PCS | Mod: CPTII,S$GLB,, | Performed by: INTERNAL MEDICINE

## 2019-03-19 PROCEDURE — 3077F PR MOST RECENT SYSTOLIC BLOOD PRESSURE >= 140 MM HG: ICD-10-PCS | Mod: CPTII,S$GLB,, | Performed by: INTERNAL MEDICINE

## 2019-03-19 PROCEDURE — 99999 PR PBB SHADOW E&M-EST. PATIENT-LVL III: ICD-10-PCS | Mod: PBBFAC,,, | Performed by: INTERNAL MEDICINE

## 2019-03-19 PROCEDURE — 3077F SYST BP >= 140 MM HG: CPT | Mod: CPTII,S$GLB,, | Performed by: INTERNAL MEDICINE

## 2019-03-19 PROCEDURE — 99999 PR PBB SHADOW E&M-EST. PATIENT-LVL III: CPT | Mod: PBBFAC,,, | Performed by: INTERNAL MEDICINE

## 2019-03-19 RX ORDER — TRAZODONE HYDROCHLORIDE 50 MG/1
50-150 TABLET ORAL NIGHTLY PRN
Qty: 90 TABLET | Refills: 3 | Status: SHIPPED | OUTPATIENT
Start: 2019-03-19 | End: 2019-06-18 | Stop reason: SDUPTHER

## 2019-03-19 RX ORDER — DULOXETIN HYDROCHLORIDE 60 MG/1
60 CAPSULE, DELAYED RELEASE ORAL DAILY
Qty: 90 CAPSULE | Refills: 3 | Status: SHIPPED | OUTPATIENT
Start: 2019-03-19 | End: 2019-05-01

## 2019-03-19 NOTE — PROGRESS NOTES
"OUTPATIENT PSYCHIATRY RETURN VISIT    ENCOUNTER DATE:  3/19/2019  SITE:  [facility]  LENGTH OF SESSION:  30 minutes    CHIEF COMPLAINT:  Insomnia    HISTORY OF PRESENTING ILLNESS:  Yosef Rowe is a 63 y.o. male with history of MDD, SOLITARIO, and insomnia who presents for follow up appointment.      Plan at last appointment on 4/4/2018:  · Patient is now in remission from depression and anxiety.    · Will continue Cymbalta 60mg daily for mood.  · Will continue Ambien 5mg qHS PRN for insomnia.    HPI as told by patient:  Says he is doing "ok."  Still going through divorce with wife.  She filed last June.  States she now wants him back and they have been in contact recently.  States he has to pay house note, his bills, and her bills.  Work is "slow."  Drives [employer] some.  Feels mood has been fine through this.  Says he does not let it bother him anymore.  Lost weight to 260 and now back up to 290.  Eats when he feels like it wherever he is.  Says he is not drinking much.  Will have a few glasses of wine with dinner or several vodka tonics about 3-4 nights per week.  Does not feel he has a problem with drinking.  Does not feel Ambien is working anymore.  Says it does not work even if he takes 2 tablets.  Denies SI.      Medication side effects:  No  Medication compliance:  Yes    PSYCHIATRIC REVIEW OF SYSTEMS:  Trouble with sleep:  Yes  Appetite changes:  Denies  Weight changes:  Gain  Lack of energy:  Denies  Anhedonia:  Denies  Somatic symptoms:  Denies  Libido:  Not discussed  Anxiety/panic:  Denies  Guilty/hopeless:  Denies  Self-injurious behavior/risky behavior:  Denies  Any drugs:  Denies  Alcohol:  As above.    MEDICAL REVIEW OF SYSTEMS:  Complete review of systems performed covering Constitutional, Musculoskeletal, Neurologic.  All systems negative except for that covered in HPI.    PAST PSYCHIATRIC, MEDICAL, AND SOCIAL HISTORY REVIEWED  The patient's past medical, family and social " "history have been reviewed and updated as appropriate within the electronic medical record - see encounter notes.    MEDICATIONS:    Current Outpatient Medications:     DULoxetine (CYMBALTA) 60 MG capsule, Take 1 capsule (60 mg total) by mouth once daily., Disp: 90 capsule, Rfl: 3    traZODone (DESYREL) 50 MG tablet, Take 1-3 tablets ( mg total) by mouth nightly as needed for Insomnia., Disp: 90 tablet, Rfl: 3    ALLERGIES:  Review of patient's allergies indicates:   Allergen Reactions    No known drug allergies      PSYCHIATRIC EXAM:  Vitals:    03/19/19 0833   BP: (!) 163/77   Pulse: 73   Weight: 131.5 kg (290 lb 0.2 oz)   Appearance:  Well groomed, appearing healthy and of stated age  Behavior:  Cooperative, pleasant, no psychomotor agitation or retardation  Speech:  Normal rate, rhythm, prosody, and volume  Mood:  "Fine"  Affect:  Congruent  Thought Process:  Linear, logical, goal directed  Thought Content:  Negative for suicidal ideation, homicidal ideation, delusions or hallucinations.  Associations:  Intact  Memory:  Grossly Intact  Level of Consciousness/Orientation:  Grossly intact  Fund of Knowledge:  Good  Attention:  Good  Language:  Fluent, able to name abstract and concrete objects  Insight:  Fair  Judgment:  Intact  Psychomotor signs:  No involuntary movements or tremor  Gait:  Normal    RELEVANT LABS/STUDIES:    Lab Results   Component Value Date    WBC 6.69 08/07/2018    HGB 10.9 (L) 08/07/2018    HCT 35.0 (L) 08/07/2018     (H) 08/07/2018     08/07/2018     BMP  Lab Results   Component Value Date     09/10/2018    K 4.1 09/10/2018     09/10/2018    CO2 25 09/10/2018    BUN 15 09/10/2018    CREATININE 1.0 09/10/2018    CALCIUM 9.7 09/10/2018    ANIONGAP 10 09/10/2018    ESTGFRAFRICA >60.0 09/10/2018    EGFRNONAA >60.0 09/10/2018     Lab Results   Component Value Date    ALT 47 (H) 09/10/2018    AST 52 (H) 09/10/2018    ALKPHOS 76 09/10/2018    BILITOT 0.7 " 09/10/2018     Lab Results   Component Value Date    TSH 0.859 09/10/2018       IMPRESSION:    Yosfe Rowe is a 63 y.o. male with history of anxiety, depression, and insomnia who presents for follow up appointment.    Status/Progress:  Based on the examination today, the patient's problem(s) is/are inadequately controlled.  New problems have not been presented today.    Risk Parameters:  Patient reports no suicidal ideation  Patient reports no homicidal ideation  Patient reports no self-injurious behavior  Patient reports no violent behavior    DIAGNOSES:    ICD-10-CM ICD-9-CM   1. Insomnia, unspecified type G47.00 780.52   2. Recurrent major depressive disorder, in full remission F33.42 296.36   3. Generalized anxiety disorder F41.1 300.02     PLAN:  · Will continue Cymbalta 60mg daily for mood.      · Would like to avoid Ambien since patient is drinking most nights.  Will try Trazodone 50-100mg qHS.    · Discussed my concern with his drinking and recommendations to reduce his alcohol intake.  · Elevated LFT's on last CMP in 9/2018, however this was during period of his septic joint.  Will send his PCP a message for follow up.  Do not think elevated LFT's are due to Cymbalta, however would need to rule out alcohol and fatty liver as causes.  Patient also with macrocytic anemia on last labs which could be due to drinking.   · Discussed with patient informed consent, risks versus benefits, alternative treatments, side effect profile and the inherent unpredictability of individual responses to these treatments.  The patient expresses understanding of the above and displays the capacity to agree with this current plan.    RETURN TO CLINIC:  Follow-up in about 3 months (around 6/19/2019).

## 2019-03-19 NOTE — Clinical Note
Dr. Dexter,I saw this mutual patient in Psychiatry clinic today.  I recommended that he come see you soon.  He had elevated LFT's on last CMP in 9/2018.  I do not think this is from Cymbalta since he has been on it for awhile, however I do think it could be due to alcohol or fatty liver (although I do realize he had a septic joint at the time).  He is drinking several drinks 3-4 times per week (at least).  I also noticed chronic macrocytosis and wonder if it is from alcohol.  He has gained a good bit of weight and his blood pressure was also high during appointment.Thanks so much,Hedy Kaufman

## 2019-04-16 NOTE — TELEPHONE ENCOUNTER
----- Message from Petra Martins sent at 9/29/2017 11:06 AM CDT -----  Contact: [phone]  Patient is returning a phone call.  Who left a message for the patient: nurse  Does patient know what this is regarding:  no  Comments:      Patient, Elvin Burnett calling for medication refill. Medication(s) submitted for a refill request and is pending approval from the Provider. Caller has been advised that their call does not guarantee an immediate refill. This refill will be reviewed within 24-72 hours by a qualified provider who will determine whether he or she can refill the medication. Patient has contacted the pharmacy? No    Call Back Number: [phone]    Can a detailed message be left? Yes_No_---: No    Additional information:     Patient is completely out of medications She was hoping to have this refilled today as she is in a lot of pain. Patientâs preferred pharmacy has been noted and populated.      [facility] pharmacy  [address].   [phone]

## 2019-05-01 DIAGNOSIS — F41.1 GENERALIZED ANXIETY DISORDER: ICD-10-CM

## 2019-05-01 DIAGNOSIS — F33.42 RECURRENT MAJOR DEPRESSIVE DISORDER, IN FULL REMISSION: ICD-10-CM

## 2019-05-01 RX ORDER — DULOXETIN HYDROCHLORIDE 60 MG/1
CAPSULE, DELAYED RELEASE ORAL
Qty: 90 CAPSULE | Refills: 0 | Status: SHIPPED | OUTPATIENT
Start: 2019-05-01 | End: 2019-06-18 | Stop reason: SDUPTHER

## 2019-05-21 DIAGNOSIS — Z12.11 COLON CANCER SCREENING: ICD-10-CM

## 2019-05-29 ENCOUNTER — TELEPHONE (OUTPATIENT)
Dept: INTERNAL MEDICINE | Facility: CLINIC | Age: 64
End: 2019-05-29

## 2019-05-29 NOTE — TELEPHONE ENCOUNTER
I told him i'd check with dr and let him know outcome.  Says has never tried anything for ed and would like a rx.    Please advise.  Thanks yanna

## 2019-05-29 NOTE — TELEPHONE ENCOUNTER
----- Message from Manjula Moran sent at 5/29/2019  4:10 PM CDT -----  Contact: Pt self Mobile/Home [phone]   Patient would like a call back in regard to him wanting you to write him a script for erectile dysfunction.    Patient's Pharmacy: [id number] - [place] [id number] AT [facility] [address]  Phone# [phone],Fax# [phone]

## 2019-05-30 RX ORDER — SILDENAFIL 50 MG/1
50 TABLET, FILM COATED ORAL DAILY PRN
Qty: 3 TABLET | Refills: 2 | Status: SHIPPED | OUTPATIENT
Start: 2019-05-30 | End: 2021-06-16

## 2019-05-30 NOTE — TELEPHONE ENCOUNTER
Left msg on voicemail his name stating trial rx sent for 3 pills because of cost.  [facility] if [facility].  insurance will not cover

## 2019-06-05 ENCOUNTER — TELEPHONE (OUTPATIENT)
Dept: INTERNAL MEDICINE | Facility: CLINIC | Age: 64
End: 2019-06-05

## 2019-06-05 DIAGNOSIS — Z11.3 SCREENING FOR STDS (SEXUALLY TRANSMITTED DISEASES): Primary | ICD-10-CM

## 2019-06-05 NOTE — TELEPHONE ENCOUNTER
----- Message from Madison Aiken sent at 6/5/2019 10:41 AM CDT -----  Contact: self/ [phone]  Patient would like lab orders for HIV testing, please link orders. Patient scheduled an appointment for 6/6/19

## 2019-06-06 ENCOUNTER — LAB VISIT (OUTPATIENT)
Dept: LAB | Facility: HOSPITAL | Age: 64
End: 2019-06-06
Attending: INTERNAL MEDICINE
Payer: COMMERCIAL

## 2019-06-06 DIAGNOSIS — Z11.3 SCREENING FOR STDS (SEXUALLY TRANSMITTED DISEASES): ICD-10-CM

## 2019-06-06 LAB — HIV 1+2 AB+HIV1 P24 AG SERPL QL IA: NEGATIVE

## 2019-06-06 PROCEDURE — 86703 HIV-1/HIV-2 1 RESULT ANTBDY: CPT

## 2019-06-06 PROCEDURE — 36415 COLL VENOUS BLD VENIPUNCTURE: CPT | Mod: PO

## 2019-06-06 PROCEDURE — 86592 SYPHILIS TEST NON-TREP QUAL: CPT

## 2019-06-07 LAB — RPR SER QL: NORMAL

## 2019-06-11 ENCOUNTER — TELEPHONE (OUTPATIENT)
Dept: INTERNAL MEDICINE | Facility: CLINIC | Age: 64
End: 2019-06-11

## 2019-06-18 ENCOUNTER — OFFICE VISIT (OUTPATIENT)
Dept: PSYCHIATRY | Facility: CLINIC | Age: 64
End: 2019-06-18
Payer: COMMERCIAL

## 2019-06-18 VITALS
SYSTOLIC BLOOD PRESSURE: 187 MMHG | HEART RATE: 60 BPM | HEIGHT: 67 IN | DIASTOLIC BLOOD PRESSURE: 86 MMHG | BODY MASS INDEX: 46.66 KG/M2 | WEIGHT: 297.31 LBS

## 2019-06-18 DIAGNOSIS — R79.89 ELEVATED LFTS: ICD-10-CM

## 2019-06-18 DIAGNOSIS — D75.89 MACROCYTOSIS: ICD-10-CM

## 2019-06-18 DIAGNOSIS — F10.90 HEAVY ALCOHOL USE: ICD-10-CM

## 2019-06-18 DIAGNOSIS — G47.00 INSOMNIA, UNSPECIFIED TYPE: Primary | ICD-10-CM

## 2019-06-18 DIAGNOSIS — D53.9 MACROCYTIC ANEMIA: ICD-10-CM

## 2019-06-18 DIAGNOSIS — F33.42 RECURRENT MAJOR DEPRESSIVE DISORDER, IN FULL REMISSION: ICD-10-CM

## 2019-06-18 DIAGNOSIS — F41.1 GENERALIZED ANXIETY DISORDER: ICD-10-CM

## 2019-06-18 PROCEDURE — 3008F PR BODY MASS INDEX (BMI) DOCUMENTED: ICD-10-PCS | Mod: CPTII,S$GLB,, | Performed by: INTERNAL MEDICINE

## 2019-06-18 PROCEDURE — 3079F PR MOST RECENT DIASTOLIC BLOOD PRESSURE 80-89 MM HG: ICD-10-PCS | Mod: CPTII,S$GLB,, | Performed by: INTERNAL MEDICINE

## 2019-06-18 PROCEDURE — 99999 PR PBB SHADOW E&M-EST. PATIENT-LVL III: ICD-10-PCS | Mod: PBBFAC,,, | Performed by: INTERNAL MEDICINE

## 2019-06-18 PROCEDURE — 99213 PR OFFICE/OUTPT VISIT, EST, LEVL III, 20-29 MIN: ICD-10-PCS | Mod: S$GLB,,, | Performed by: INTERNAL MEDICINE

## 2019-06-18 PROCEDURE — 99999 PR PBB SHADOW E&M-EST. PATIENT-LVL III: CPT | Mod: PBBFAC,,, | Performed by: INTERNAL MEDICINE

## 2019-06-18 PROCEDURE — 90833 PSYTX W PT W E/M 30 MIN: CPT | Mod: S$GLB,,, | Performed by: INTERNAL MEDICINE

## 2019-06-18 PROCEDURE — 99213 OFFICE O/P EST LOW 20 MIN: CPT | Mod: S$GLB,,, | Performed by: INTERNAL MEDICINE

## 2019-06-18 PROCEDURE — 3077F SYST BP >= 140 MM HG: CPT | Mod: CPTII,S$GLB,, | Performed by: INTERNAL MEDICINE

## 2019-06-18 PROCEDURE — 3077F PR MOST RECENT SYSTOLIC BLOOD PRESSURE >= 140 MM HG: ICD-10-PCS | Mod: CPTII,S$GLB,, | Performed by: INTERNAL MEDICINE

## 2019-06-18 PROCEDURE — 3008F BODY MASS INDEX DOCD: CPT | Mod: CPTII,S$GLB,, | Performed by: INTERNAL MEDICINE

## 2019-06-18 PROCEDURE — 90833 PR PSYCHOTHERAPY W/PATIENT W/E&M, 30 MIN (ADD ON): ICD-10-PCS | Mod: S$GLB,,, | Performed by: INTERNAL MEDICINE

## 2019-06-18 PROCEDURE — 3079F DIAST BP 80-89 MM HG: CPT | Mod: CPTII,S$GLB,, | Performed by: INTERNAL MEDICINE

## 2019-06-18 RX ORDER — TRAZODONE HYDROCHLORIDE 100 MG/1
100 TABLET ORAL NIGHTLY PRN
Qty: 90 TABLET | Refills: 3 | Status: SHIPPED | OUTPATIENT
Start: 2019-06-18 | End: 2020-01-31 | Stop reason: SDUPTHER

## 2019-06-18 RX ORDER — DULOXETIN HYDROCHLORIDE 60 MG/1
60 CAPSULE, DELAYED RELEASE ORAL DAILY
Qty: 90 CAPSULE | Refills: 3 | Status: SHIPPED | OUTPATIENT
Start: 2019-06-18 | End: 2020-01-02

## 2019-06-18 NOTE — Clinical Note
Dr. Dexter,I saw this mutual patient in psychiatry today.  His blood pressure has been high which I asked that he discuss with you.  He says he is not taking medication.  He also had elevated LFT's and macrocytic anemia on last labs 9/2018 so I ordered repeat CBC and CMP but told him to discuss this with you.  He has history of heavy drinking but is working on stopping, so hopefully this will resolve some of these issues.Thanks so much,Hedy Kaufman

## 2019-06-18 NOTE — PROGRESS NOTES
"OUTPATIENT PSYCHIATRY RETURN VISIT    ENCOUNTER DATE:  6/18/2019  SITE:  [facility]  LENGTH OF SESSION:  30 minutes    CHIEF COMPLAINT:  Follow-up    HISTORY OF PRESENTING ILLNESS:  Yosef Rowe is a 63 y.o. male with history of MDD, SOLITARIO, and insomnia who presents for follow up appointment.      Plan at last appointment on 3/19/2019:  · Will continue Cymbalta 60mg daily for mood.      · Would like to avoid Ambien since patient is drinking most nights.  Will try Trazodone 50-100mg qHS.    · Discussed my concern with his drinking and recommendations to reduce his alcohol intake.  · Elevated LFT's on last CMP in 9/2018, however this was during period of his septic joint.  Will send his PCP a message for follow up.  Do not think elevated LFT's are due to Cymbalta, however would need to rule out alcohol and fatty liver as causes.  Patient also with macrocytic anemia on last labs which could be due to drinking.     HPI as told by patient:  Says he is doing well.  He is moving back home with his wife.  Says "time" is what happened.  Feeling lonely.  She had some blood clots in her leg and scared to be alone.  Their dog of 12 years is not eating and losing weight.  This has been difficult.  Moving back in this coming weekend.  Went to a counselor together at [facility] in [place].  He was extremely helpful.  The agreement to him coming home is that he will give up drinking.  He has already given up hard liquor.  Now only drinking occasional wine or beer - maybe 4-5 drinks per week.  Son (who has not spoken to him in 1 year) is coming home in 2 weekends - he is unsure if he will see him.  He has new grandson.  He feels mood is stable.  Denies depression or anxiety.  Sleeping well.    Psychotherapy:  · Target symptoms: alcohol abuse, depression, anxiety   · Why chosen therapy is appropriate versus another modality: relevant to diagnosis, patient responds to this modality  · Outcome monitoring " "methods: self-report, observation  · Therapeutic intervention type: supportive psychotherapy  · Topics discussed/themes: relationships difficulties, parenting issues, symptom recognition  · The patient's response to the intervention is accepting. The patient's progress toward treatment goals is good.   · Duration of intervention: 16 minutes.    Medication side effects:  No  Medication compliance:  Yes    PSYCHIATRIC REVIEW OF SYSTEMS:  Trouble with sleep:  Improved with Trazodone  Appetite changes:  Denies  Weight changes:  Gain  Lack of energy:  Denies  Anhedonia:  Denies  Somatic symptoms:  Denies  Libido:  Not discussed  Anxiety/panic:  Denies  Guilty/hopeless:  Denies  Self-injurious behavior/risky behavior:  Denies  Any drugs:  Denies  Alcohol:  As above.    MEDICAL REVIEW OF SYSTEMS:  Complete review of systems performed covering Constitutional, Musculoskeletal, Neurologic.  All systems negative except for that covered in HPI.    PAST PSYCHIATRIC, MEDICAL, AND SOCIAL HISTORY REVIEWED  The patient's past medical, family and social history have been reviewed and updated as appropriate within the electronic medical record - see encounter notes.    MEDICATIONS:    Current Outpatient Medications:     DULoxetine (CYMBALTA) 60 MG capsule, Take 1 capsule (60 mg total) by mouth once daily., Disp: 90 capsule, Rfl: 3    sildenafil (VIAGRA) 50 MG tablet, Take 1 tablet (50 mg total) by mouth daily as needed for Erectile Dysfunction., Disp: 3 tablet, Rfl: 2    traZODone (DESYREL) 100 MG tablet, Take 1 tablet (100 mg total) by mouth nightly as needed for Insomnia., Disp: 90 tablet, Rfl: 3    ALLERGIES:  Review of patient's allergies indicates:   Allergen Reactions    No known drug allergies      PSYCHIATRIC EXAM:  Vitals:    06/18/19 1009   BP: (!) 187/86   Pulse: 60   Weight: 134.8 kg (297 lb 4.6 oz)   Height: 5' 7" (1.702 m)   Appearance:  Well groomed, appearing healthy and of stated age  Behavior:  Cooperative, " "pleasant, no psychomotor agitation or retardation  Speech:  Normal rate, rhythm, prosody, and volume  Mood:  "Good"  Affect:  Congruent  Thought Process:  Linear, logical, goal directed  Thought Content:  Negative for suicidal ideation, homicidal ideation, delusions or hallucinations.  Associations:  Intact  Memory:  Grossly Intact  Level of Consciousness/Orientation:  Grossly intact  Fund of Knowledge:  Good  Attention:  Good  Language:  Fluent, able to name abstract and concrete objects  Insight:  Fair  Judgment:  Intact  Psychomotor signs:  No involuntary movements or tremor  Gait:  Normal    RELEVANT LABS/STUDIES:    Lab Results   Component Value Date    WBC 6.69 08/07/2018    HGB 10.9 (L) 08/07/2018    HCT 35.0 (L) 08/07/2018     (H) 08/07/2018     08/07/2018     BMP  Lab Results   Component Value Date     09/10/2018    K 4.1 09/10/2018     09/10/2018    CO2 25 09/10/2018    BUN 15 09/10/2018    CREATININE 1.0 09/10/2018    CALCIUM 9.7 09/10/2018    ANIONGAP 10 09/10/2018    ESTGFRAFRICA >60.0 09/10/2018    EGFRNONAA >60.0 09/10/2018     Lab Results   Component Value Date    ALT 47 (H) 09/10/2018    AST 52 (H) 09/10/2018    ALKPHOS 76 09/10/2018    BILITOT 0.7 09/10/2018     Lab Results   Component Value Date    TSH 0.859 09/10/2018       IMPRESSION:    Yosef Rowe is a 63 y.o. male with history of anxiety, depression, and insomnia who presents for follow up appointment.    Status/Progress:  Based on the examination today, the patient's problem(s) is/are improved.  New problems have not been presented today.    Risk Parameters:  Patient reports no suicidal ideation  Patient reports no homicidal ideation  Patient reports no self-injurious behavior  Patient reports no violent behavior    DIAGNOSES:    ICD-10-CM ICD-9-CM   1. Insomnia, unspecified type G47.00 780.52   2. Generalized anxiety disorder F41.1 300.02   3. Recurrent major depressive disorder, in full remission F33.42 296.36 "   4. Elevated LFTs R94.5 790.6   5. Macrocytosis D75.89 289.89   6. Macrocytic anemia D53.9 281.9   7. Heavy alcohol use Z78.9 V49.89     PLAN:  · Will continue Cymbalta 60mg daily for mood and Trazodone 100mg qHS for insomnia.      · Patient is working on giving up drinking completely in order to move back in with his wife.  Discussed recommendation for abstinence.    · Again discussed recommendation for repeat CMP and CBC due to elevated LFT's and macrocytic anemia on last labs 9/2018.  I will place orders today and he also plans to see his PCP.    · Discussed elevated blood pressure today which he plans to discuss with his PCP.  He has been prescribed a blood pressure medication in the past but is not currently taking.  He reports his blood pressure is well controlled at home.  · Discussed with patient informed consent, risks versus benefits, alternative treatments, side effect profile and the inherent unpredictability of individual responses to these treatments.  The patient expresses understanding of the above and displays the capacity to agree with this current plan.    RETURN TO CLINIC:  Follow up in about 6 months (around 12/18/2019).

## 2019-09-03 NOTE — SUBJECTIVE & OBJECTIVE
"Principal Problem:Infected prosthetic knee joint, initial encounter    Principal Orthopedic Problem: Same    Interval History: Patient seen and examined at bedside.  No acute events overnight.  Pain controlled.        Review of patient's allergies indicates:   Allergen Reactions    No known drug allergies        Current Facility-Administered Medications   Medication    0.9%  NaCl infusion    aspirin EC tablet 81 mg    atorvastatin tablet 20 mg    bisacodyl suppository 10 mg    ceFAZolin injection 2 g    celecoxib capsule 200 mg    DULoxetine DR capsule 60 mg    famotidine tablet 20 mg    lactulose 20 gram/30 mL solution Soln 20 g    morphine injection 2 mg    morphine injection 2 mg    morphine injection 2 mg    mupirocin 2 % ointment 1 g    ondansetron injection 4 mg    oxyCODONE immediate release tablet 10 mg    oxyCODONE immediate release tablet 15 mg    oxyCODONE immediate release tablet 5 mg    polyethylene glycol packet 17 g    senna-docusate 8.6-50 mg per tablet 1 tablet    sodium chloride 0.9% flush 3 mL    tranexamic acid (CYKLOKAPRON) 3,000 mg in sodium chloride 0.9% 100 mL    zolpidem tablet 5 mg     Objective:     Vital Signs (Most Recent):  Temp: 98.7 °F (37.1 °C) (07/04/18 0438)  Pulse: 84 (07/04/18 0438)  Resp: 18 (07/04/18 0438)  BP: (!) 145/70 (07/04/18 0438)  SpO2: 97 % (07/04/18 0438) Vital Signs (24h Range):  Temp:  [97.8 °F (36.6 °C)-99.3 °F (37.4 °C)] 98.7 °F (37.1 °C)  Pulse:  [] 84  Resp:  [15-29] 18  SpO2:  [91 %-100 %] 97 %  BP: (112-194)/() 145/70     Weight: 127 kg (280 lb)  Height: 5' 7" (170.2 cm)  Body mass index is 43.85 kg/m².      Intake/Output Summary (Last 24 hours) at 07/04/18 0739  Last data filed at 07/04/18 0558   Gross per 24 hour   Intake             2731 ml   Output              200 ml   Net             2531 ml       Ortho/SPM Exam     Physical Exam:  AAOx4  NAD  RRR  No increased WOB  Aquacel c/d/i  SILT and motor intact T/SP/DP  WWP " 3.) During the past 4 weeks, how would you rate your health?:  Fair     6 b.) How many servings of High Fiber / Whole Grain Foods to you have each day ( 1 serving = 1 cup cold cereal, 1/2 cup cooked cereal, 1 slice bread):  1 per day     7.) Have you had a fall two or more times in the past year?:  Yes extremities  FCDs in place and functioning        Significant Labs: All pertinent labs within the past 24 hours have been reviewed.    Significant Imaging: I have reviewed and interpreted all pertinent imaging results/findings.

## 2019-10-30 NOTE — ANESTHESIA POSTPROCEDURE EVALUATION
"Anesthesia Post Evaluation    Patient: Yosef Rowe    Procedure(s) Performed: Procedure(s) (LRB):  REPLACEMENT, POLYETHYLENE LINER (Left)  IRRIGATION AND DEBRIDEMENT, LOWER EXTREMITY (Left)    Final Anesthesia Type: general  Patient location during evaluation: PACU  Patient participation: Yes- Able to Participate  Level of consciousness: awake and alert  Post-procedure vital signs: reviewed and stable  Pain management: adequate  Airway patency: patent  PONV status at discharge: No PONV  Anesthetic complications: no      Cardiovascular status: hemodynamically stable  Respiratory status: room air, spontaneous ventilation and unassisted  Hydration status: euvolemic  Follow-up not needed.        Visit Vitals  BP (!) 160/72   Pulse 89   Temp 36.5 °C (97.7 °F) (Temporal)   Resp 15   Ht 5' 7" (1.702 m)   Wt 124.7 kg (275 lb)   SpO2 96%   BMI 43.07 kg/m²       Pain/Ha Score: Pain Assessment Performed: Yes (7/7/2018  4:00 PM)  Presence of Pain: complains of pain/discomfort (7/7/2018  4:00 PM)  Pain Rating Prior to Med Admin: 9 (7/7/2018  3:32 PM)  Pain Rating Post Med Admin: 6 (7/7/2018  4:00 PM)  Ha Score: 10 (7/7/2018  4:00 PM)      " 10/30/2019                 [facility]  [address]  [facility] [address]  Phone: [phone]   10/30/2019    Patient: Hannah Gutierrez   YOB: 2011   Date of Visit: 10/30/2019       To Whom it May Concern:    Hannah Gutierrez was seen in my clinic on 10/30/2019. He may return to school on 11-4-2019.    If you have any questions or concerns, please don't hesitate to call.    Sincerely,         Carolyn Lucero MA

## 2019-12-31 DIAGNOSIS — F33.42 RECURRENT MAJOR DEPRESSIVE DISORDER, IN FULL REMISSION: ICD-10-CM

## 2019-12-31 DIAGNOSIS — F41.1 GENERALIZED ANXIETY DISORDER: ICD-10-CM

## 2020-01-02 RX ORDER — DULOXETIN HYDROCHLORIDE 60 MG/1
CAPSULE, DELAYED RELEASE ORAL
Qty: 90 CAPSULE | Refills: 0 | Status: SHIPPED | OUTPATIENT
Start: 2020-01-02 | End: 2020-04-01

## 2020-01-31 ENCOUNTER — TELEPHONE (OUTPATIENT)
Dept: BEHAVIORAL HEALTH | Facility: CLINIC | Age: 65
End: 2020-01-31

## 2020-01-31 DIAGNOSIS — G47.00 INSOMNIA, UNSPECIFIED TYPE: ICD-10-CM

## 2020-01-31 RX ORDER — TRAZODONE HYDROCHLORIDE 100 MG/1
100 TABLET ORAL NIGHTLY PRN
Qty: 90 TABLET | Refills: 3 | Status: SHIPPED | OUTPATIENT
Start: 2020-01-31 | End: 2020-09-16 | Stop reason: SDUPTHER

## 2020-01-31 NOTE — TELEPHONE ENCOUNTER
----- Message from Michelle Foreamn sent at 1/30/2020  4:47 PM CST -----  Contact: Pt  traZODone (DESYREL) 100 MG tablet    [id number] - [place] AT [facility] [address] [phone] (Phone)  [phone] (Fax    The Pharmacy will not fill the medication for Pt.  The Pharmacy states the refill is 10 days early. The pt need medication as soon as possible, because he is out

## 2020-03-31 DIAGNOSIS — F41.1 GENERALIZED ANXIETY DISORDER: ICD-10-CM

## 2020-03-31 DIAGNOSIS — F33.42 RECURRENT MAJOR DEPRESSIVE DISORDER, IN FULL REMISSION: ICD-10-CM

## 2020-04-01 RX ORDER — DULOXETIN HYDROCHLORIDE 60 MG/1
CAPSULE, DELAYED RELEASE ORAL
Qty: 90 CAPSULE | Refills: 0 | Status: SHIPPED | OUTPATIENT
Start: 2020-04-01 | End: 2020-08-10 | Stop reason: SDUPTHER

## 2020-08-10 DIAGNOSIS — F41.1 GENERALIZED ANXIETY DISORDER: ICD-10-CM

## 2020-08-10 DIAGNOSIS — F33.42 RECURRENT MAJOR DEPRESSIVE DISORDER, IN FULL REMISSION: ICD-10-CM

## 2020-08-10 RX ORDER — DULOXETIN HYDROCHLORIDE 60 MG/1
60 CAPSULE, DELAYED RELEASE ORAL DAILY
Qty: 90 CAPSULE | Refills: 0 | Status: SHIPPED | OUTPATIENT
Start: 2020-08-10 | End: 2020-08-10

## 2020-09-16 ENCOUNTER — TELEPHONE (OUTPATIENT)
Dept: PSYCHIATRY | Facility: CLINIC | Age: 65
End: 2020-09-16

## 2020-09-16 DIAGNOSIS — G47.00 INSOMNIA, UNSPECIFIED TYPE: ICD-10-CM

## 2020-09-16 RX ORDER — TRAZODONE HYDROCHLORIDE 150 MG/1
150 TABLET ORAL NIGHTLY PRN
Qty: 90 TABLET | Refills: 1 | Status: SHIPPED | OUTPATIENT
Start: 2020-09-16 | End: 2020-11-09

## 2020-09-16 NOTE — TELEPHONE ENCOUNTER
Patient at lunch and unable to do today's virtual appointment.  Increase Trazodone to 150mg qHS to better target insomnia.  Reschedule appointment in the next few weeks.  He reports his mood is great.

## 2020-09-18 ENCOUNTER — TELEPHONE (OUTPATIENT)
Dept: INTERNAL MEDICINE | Facility: CLINIC | Age: 65
End: 2020-09-18

## 2020-09-18 DIAGNOSIS — Z11.4 SCREENING FOR HIV (HUMAN IMMUNODEFICIENCY VIRUS): ICD-10-CM

## 2020-09-18 DIAGNOSIS — Z11.59 ENCOUNTER FOR HEPATITIS C SCREENING TEST FOR LOW RISK PATIENT: ICD-10-CM

## 2020-09-18 DIAGNOSIS — Z00.00 PHYSICAL EXAM, ANNUAL: Primary | ICD-10-CM

## 2020-09-18 DIAGNOSIS — Z12.5 PROSTATE CANCER SCREENING: ICD-10-CM

## 2020-09-18 DIAGNOSIS — I10 ESSENTIAL HYPERTENSION: ICD-10-CM

## 2020-09-18 DIAGNOSIS — E78.5 HYPERLIPIDEMIA, UNSPECIFIED HYPERLIPIDEMIA TYPE: ICD-10-CM

## 2020-10-02 ENCOUNTER — TELEPHONE (OUTPATIENT)
Dept: INTERNAL MEDICINE | Facility: CLINIC | Age: 65
End: 2020-10-02

## 2020-10-02 NOTE — TELEPHONE ENCOUNTER
Dr leroy hahn    I called [facility] and spoke to them.  They provide his cpap machine.  He is compliant using the machine per her reports.      He got new machine in january and insurance is asking for recent office note because of audit. has [employer] advantage.    I explained dr akers hasn't seen him since 2018 but he does have an appt on 10/14/20 with dr harper.    She needs 10/14/20 office note faxed to [facility]  [phone] once you see him..   She said to be sure to document he does use the machine daily and gets good sleep  Etc for insurance to cover the cpap machine.      Thanks yanna ramirez/ dr akers

## 2020-10-02 NOTE — TELEPHONE ENCOUNTER
----- Message from Irma Romeo sent at 10/2/2020 10:48 AM CDT -----  Contact: melany [phone]  Pt needs note for cpap machine faxed to [phone]

## 2020-10-07 ENCOUNTER — TELEPHONE (OUTPATIENT)
Dept: INTERNAL MEDICINE | Facility: CLINIC | Age: 65
End: 2020-10-07

## 2020-10-07 NOTE — TELEPHONE ENCOUNTER
I spoke to pt, need to reschedule laina 10-.    New appt scheduled for 11-4-2020    Pt verbalized understanding

## 2020-11-02 ENCOUNTER — OFFICE VISIT (OUTPATIENT)
Dept: PSYCHIATRY | Facility: CLINIC | Age: 65
End: 2020-11-02
Payer: MEDICARE

## 2020-11-02 VITALS
WEIGHT: 304.88 LBS | DIASTOLIC BLOOD PRESSURE: 78 MMHG | BODY MASS INDEX: 47.75 KG/M2 | SYSTOLIC BLOOD PRESSURE: 160 MMHG | HEART RATE: 71 BPM

## 2020-11-02 DIAGNOSIS — F33.42 RECURRENT MAJOR DEPRESSIVE DISORDER, IN FULL REMISSION: ICD-10-CM

## 2020-11-02 DIAGNOSIS — Z87.898 HISTORY OF HEAVY ALCOHOL CONSUMPTION: ICD-10-CM

## 2020-11-02 DIAGNOSIS — F41.1 GENERALIZED ANXIETY DISORDER: ICD-10-CM

## 2020-11-02 DIAGNOSIS — G47.00 INSOMNIA, UNSPECIFIED TYPE: Primary | ICD-10-CM

## 2020-11-02 PROCEDURE — 3077F PR MOST RECENT SYSTOLIC BLOOD PRESSURE >= 140 MM HG: ICD-10-PCS | Mod: S$GLB,,, | Performed by: INTERNAL MEDICINE

## 2020-11-02 PROCEDURE — 99214 PR OFFICE/OUTPT VISIT, EST, LEVL IV, 30-39 MIN: ICD-10-PCS | Mod: S$GLB,,, | Performed by: INTERNAL MEDICINE

## 2020-11-02 PROCEDURE — 3078F PR MOST RECENT DIASTOLIC BLOOD PRESSURE < 80 MM HG: ICD-10-PCS | Mod: S$GLB,,, | Performed by: INTERNAL MEDICINE

## 2020-11-02 PROCEDURE — 99999 PR PBB SHADOW E&M-EST. PATIENT-LVL III: ICD-10-PCS | Mod: PBBFAC,,, | Performed by: INTERNAL MEDICINE

## 2020-11-02 PROCEDURE — 3008F PR BODY MASS INDEX (BMI) DOCUMENTED: ICD-10-PCS | Mod: S$GLB,,, | Performed by: INTERNAL MEDICINE

## 2020-11-02 PROCEDURE — 99999 PR PBB SHADOW E&M-EST. PATIENT-LVL III: CPT | Mod: PBBFAC,,, | Performed by: INTERNAL MEDICINE

## 2020-11-02 PROCEDURE — 99214 OFFICE O/P EST MOD 30 MIN: CPT | Mod: S$GLB,,, | Performed by: INTERNAL MEDICINE

## 2020-11-02 PROCEDURE — 1159F MED LIST DOCD IN RCRD: CPT | Mod: S$GLB,,, | Performed by: INTERNAL MEDICINE

## 2020-11-02 PROCEDURE — 3078F DIAST BP <80 MM HG: CPT | Mod: S$GLB,,, | Performed by: INTERNAL MEDICINE

## 2020-11-02 PROCEDURE — 3008F BODY MASS INDEX DOCD: CPT | Mod: S$GLB,,, | Performed by: INTERNAL MEDICINE

## 2020-11-02 PROCEDURE — 3077F SYST BP >= 140 MM HG: CPT | Mod: S$GLB,,, | Performed by: INTERNAL MEDICINE

## 2020-11-02 PROCEDURE — 1159F PR MEDICATION LIST DOCUMENTED IN MEDICAL RECORD: ICD-10-PCS | Mod: S$GLB,,, | Performed by: INTERNAL MEDICINE

## 2020-11-02 RX ORDER — ESZOPICLONE 1 MG/1
1 TABLET, FILM COATED ORAL NIGHTLY
Qty: 30 TABLET | Refills: 0 | Status: SHIPPED | OUTPATIENT
Start: 2020-11-02 | End: 2020-12-02

## 2020-11-02 NOTE — PROGRESS NOTES
OUTPATIENT PSYCHIATRY RETURN VISIT    ENCOUNTER DATE:  11/8/2020  SITE:  [facility]  LENGTH OF SESSION:  20 minutes    CHIEF COMPLAINT:  Insomnia      HISTORY OF PRESENTING ILLNESS:  Yosef Rowe is a 65 y.o. male with history of MDD, SOLITARIO, and insomnia who presents for follow up appointment.      Plan at last appointment on 6/18/2019:  · Will continue Cymbalta 60mg daily for mood and Trazodone 100mg qHS for insomnia.      · Patient is working on giving up drinking completely in order to move back in with his wife.  Discussed recommendation for abstinence.    · Again discussed recommendation for repeat CMP and CBC due to elevated LFT's and macrocytic anemia on last labs 9/2018.  I will place orders today and he also plans to see his PCP.    · Discussed elevated blood pressure today which he plans to discuss with his PCP.  He has been prescribed a blood pressure medication in the past but is not currently taking.  He reports his blood pressure is well controlled at home.  · Discussed with patient informed consent, risks versus benefits, alternative treatments, side effect profile and the inherent unpredictability of individual responses to these treatments.  The patient expresses understanding of the above and displays the capacity to agree with this current plan.    History as told by patient:  Says things are good.  Back at home with his wife.  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  Played professinoal for a bit.  Reports significantly reduced drinking - drinking a few beers every other day.  Continues to have difficulty sleeping.  Trouble falling asleep mostly.  Sometimes takes hours.  Trazodone has not been helpful even at 150mg daily.  Denies symptoms of depression and anxiety.  Has upcoming labs scheduled for Wednesday.    Medication side effects:  No  Medication compliance:  Yes    PSYCHIATRIC  REVIEW OF SYSTEMS:  Trouble with sleep:  Yes, as above  Appetite changes:  Denies  Weight changes:  Denies  Lack of energy:  Denies  Anhedonia:  Denies  Somatic symptoms:  Denies  Libido:  Not discussed  Anxiety/panic:  Denies  Guilty/hopeless:  Denies  Self-injurious behavior/risky behavior:  Denies  Any drugs:  Denies  Alcohol:  As above.    MEDICAL REVIEW OF SYSTEMS:  Complete review of systems performed covering Constitutional, Musculoskeletal, Neurologic.  All systems negative except for that covered in HPI.    PAST PSYCHIATRIC, MEDICAL, AND SOCIAL HISTORY REVIEWED  The patient's past medical, family and social history have been reviewed and updated as appropriate within the electronic medical record - see encounter notes.    MEDICATIONS:    Current Outpatient Medications:     atorvastatin (LIPITOR) 40 MG tablet, Take 1 tablet (40 mg total) by mouth once daily., Disp: 90 tablet, Rfl: 5    DULoxetine (CYMBALTA) 60 MG capsule, TAKE 1 CAPSULE(60 MG) BY MOUTH EVERY DAY, Disp: 90 capsule, Rfl: 0    eszopiclone (LUNESTA) 1 MG Tab, Take 1 tablet (1 mg total) by mouth nightly., Disp: 30 tablet, Rfl: 0    fluticasone propionate (FLONASE) 50 mcg/actuation nasal spray, 1 spray (50 mcg total) by Each Nostril route once daily., Disp: 16 g, Rfl: 5    sildenafil (VIAGRA) 50 MG tablet, Take 1 tablet (50 mg total) by mouth daily as needed for Erectile Dysfunction., Disp: 3 tablet, Rfl: 2    traZODone (DESYREL) 150 MG tablet, Take 1 tablet (150 mg total) by mouth nightly as needed for Insomnia. (Patient not taking: Reported on 11/4/2020), Disp: 90 tablet, Rfl: 1    ALLERGIES:  Review of patient's allergies indicates:   Allergen Reactions    No known drug allergies      PSYCHIATRIC EXAM:  Vitals:    11/02/20 1013   BP: (!) 160/78   Pulse: 71   Weight: (!) 138.3 kg (304 lb 14.3 oz)   Appearance:  Well groomed, appearing healthy and of stated age  Behavior:  Cooperative, pleasant, no psychomotor agitation or  "retardation  Speech:  Normal rate, rhythm, prosody, and volume  Mood:  "Good"  Affect:  Congruent  Thought Process:  Linear, logical, goal directed  Thought Content:  Negative for suicidal ideation, homicidal ideation, delusions or hallucinations.  Associations:  Intact  Memory:  Grossly Intact  Level of Consciousness/Orientation:  Grossly intact  Fund of Knowledge:  Good  Attention:  Good  Language:  Fluent, able to name abstract and concrete objects  Insight:  Fair  Judgment:  Intact  Psychomotor signs:  No involuntary movements or tremor  Gait:  Normal    RELEVANT LABS/STUDIES:    Lab Results   Component Value Date    WBC 9.72 11/04/2020    HGB 16.2 11/04/2020    HCT 48.9 11/04/2020     (H) 11/04/2020     11/04/2020     BMP  Lab Results   Component Value Date     11/04/2020    K 4.4 11/04/2020     11/04/2020    CO2 28 11/04/2020    BUN 14 11/04/2020    CREATININE 0.9 11/04/2020    CALCIUM 9.6 11/04/2020    ANIONGAP 10 11/04/2020    ESTGFRAFRICA >60.0 11/04/2020    EGFRNONAA >60.0 11/04/2020     Lab Results   Component Value Date    ALT 60 (H) 11/04/2020    AST 37 11/04/2020    ALKPHOS 52 (L) 11/04/2020    BILITOT 0.7 11/04/2020     Lab Results   Component Value Date    TSH 1.154 11/04/2020       IMPRESSION:    Yosef Rowe is a 65 y.o. male with history of anxiety, depression, and insomnia who presents for follow up appointment.    Status/Progress:  Based on the examination today, the patient's problem(s) is/are inadequately controlled.  New problems have not been presented today.    Risk Parameters:  Patient reports no suicidal ideation  Patient reports no homicidal ideation  Patient reports no self-injurious behavior  Patient reports no violent behavior    DIAGNOSES:    ICD-10-CM ICD-9-CM   1. Insomnia, unspecified type  G47.00 780.52   2. History of heavy alcohol consumption  Z87.898 305.03   3. Recurrent major depressive disorder, in full remission  F33.42 296.36   4. Generalized " anxiety disorder  F41.1 300.02     PLAN:  · Stop Trazodone since it has not been helpful.  Start Lunesta 1mg qHS - will slowly increase to effective dosage.    · Mood stable.  Will continue Cymbalta 60mg daily.  · Patient has significantly reduced his drinking.  He has upcoming labs to recheck liver function and blood counts.  · Discussed with patient informed consent, risks versus benefits, alternative treatments, side effect profile and the inherent unpredictability of individual responses to these treatments.  The patient expresses understanding of the above and displays the capacity to agree with this current plan.    RETURN TO CLINIC:  Follow up in about 2 weeks (around 11/16/2020).

## 2020-11-04 ENCOUNTER — OFFICE VISIT (OUTPATIENT)
Dept: INTERNAL MEDICINE | Facility: CLINIC | Age: 65
End: 2020-11-04
Payer: MEDICARE

## 2020-11-04 ENCOUNTER — LAB VISIT (OUTPATIENT)
Dept: LAB | Facility: HOSPITAL | Age: 65
End: 2020-11-04
Attending: STUDENT IN AN ORGANIZED HEALTH CARE EDUCATION/TRAINING PROGRAM
Payer: MEDICARE

## 2020-11-04 VITALS
SYSTOLIC BLOOD PRESSURE: 154 MMHG | TEMPERATURE: 98 F | RESPIRATION RATE: 16 BRPM | HEART RATE: 71 BPM | HEIGHT: 67 IN | BODY MASS INDEX: 47.99 KG/M2 | WEIGHT: 305.75 LBS | DIASTOLIC BLOOD PRESSURE: 80 MMHG

## 2020-11-04 DIAGNOSIS — J30.2 SEASONAL ALLERGIC RHINITIS, UNSPECIFIED TRIGGER: ICD-10-CM

## 2020-11-04 DIAGNOSIS — E78.5 HYPERLIPIDEMIA, UNSPECIFIED HYPERLIPIDEMIA TYPE: ICD-10-CM

## 2020-11-04 DIAGNOSIS — Z11.4 SCREENING FOR HIV (HUMAN IMMUNODEFICIENCY VIRUS): ICD-10-CM

## 2020-11-04 DIAGNOSIS — Z12.5 PROSTATE CANCER SCREENING: ICD-10-CM

## 2020-11-04 DIAGNOSIS — N39.41 URGENCY INCONTINENCE: ICD-10-CM

## 2020-11-04 DIAGNOSIS — M54.50 CHRONIC BILATERAL LOW BACK PAIN WITHOUT SCIATICA: ICD-10-CM

## 2020-11-04 DIAGNOSIS — F33.41 MAJOR DEPRESSIVE DISORDER, RECURRENT EPISODE, IN PARTIAL REMISSION: ICD-10-CM

## 2020-11-04 DIAGNOSIS — G89.29 CHRONIC BILATERAL LOW BACK PAIN WITHOUT SCIATICA: ICD-10-CM

## 2020-11-04 DIAGNOSIS — Z23 NEED FOR VACCINATION WITH 13-POLYVALENT PNEUMOCOCCAL CONJUGATE VACCINE: ICD-10-CM

## 2020-11-04 DIAGNOSIS — F41.1 GENERALIZED ANXIETY DISORDER: ICD-10-CM

## 2020-11-04 DIAGNOSIS — N40.1 BENIGN PROSTATIC HYPERPLASIA WITH URINARY OBSTRUCTION: ICD-10-CM

## 2020-11-04 DIAGNOSIS — N13.8 BENIGN PROSTATIC HYPERPLASIA WITH URINARY OBSTRUCTION: ICD-10-CM

## 2020-11-04 DIAGNOSIS — I10 ESSENTIAL HYPERTENSION: ICD-10-CM

## 2020-11-04 DIAGNOSIS — Z12.11 SCREENING FOR COLORECTAL CANCER: ICD-10-CM

## 2020-11-04 DIAGNOSIS — Z23 NEED FOR SHINGLES VACCINE: ICD-10-CM

## 2020-11-04 DIAGNOSIS — N13.9 URINARY OUTFLOW OBSTRUCTION: ICD-10-CM

## 2020-11-04 DIAGNOSIS — Z11.59 ENCOUNTER FOR HEPATITIS C SCREENING TEST FOR LOW RISK PATIENT: ICD-10-CM

## 2020-11-04 DIAGNOSIS — Z12.12 SCREENING FOR COLORECTAL CANCER: ICD-10-CM

## 2020-11-04 DIAGNOSIS — Z13.220 SCREENING FOR LIPID DISORDERS: ICD-10-CM

## 2020-11-04 DIAGNOSIS — G47.33 OBSTRUCTIVE SLEEP APNEA SYNDROME: ICD-10-CM

## 2020-11-04 DIAGNOSIS — G47.00 INSOMNIA, UNSPECIFIED TYPE: ICD-10-CM

## 2020-11-04 DIAGNOSIS — I25.84 CORONARY ATHEROSCLEROSIS DUE TO CALCIFIED CORONARY LESION: ICD-10-CM

## 2020-11-04 DIAGNOSIS — Z23 NEED FOR TETANUS BOOSTER: ICD-10-CM

## 2020-11-04 DIAGNOSIS — Z00.00 PHYSICAL EXAM, ANNUAL: Primary | ICD-10-CM

## 2020-11-04 DIAGNOSIS — E66.01 OBESITY, CLASS III, BMI 40-49.9 (MORBID OBESITY): ICD-10-CM

## 2020-11-04 DIAGNOSIS — I25.10 CORONARY ATHEROSCLEROSIS DUE TO CALCIFIED CORONARY LESION: ICD-10-CM

## 2020-11-04 DIAGNOSIS — Z00.00 PHYSICAL EXAM, ANNUAL: ICD-10-CM

## 2020-11-04 DIAGNOSIS — M17.12 PRIMARY OSTEOARTHRITIS OF LEFT KNEE: ICD-10-CM

## 2020-11-04 LAB
ALBUMIN SERPL BCP-MCNC: 3.9 G/DL (ref 3.5–5.2)
ALP SERPL-CCNC: 52 U/L (ref 55–135)
ALT SERPL W/O P-5'-P-CCNC: 60 U/L (ref 10–44)
ANION GAP SERPL CALC-SCNC: 10 MMOL/L (ref 8–16)
AST SERPL-CCNC: 37 U/L (ref 10–40)
BASOPHILS # BLD AUTO: 0.04 K/UL (ref 0–0.2)
BASOPHILS NFR BLD: 0.4 % (ref 0–1.9)
BILIRUB SERPL-MCNC: 0.7 MG/DL (ref 0.1–1)
BUN SERPL-MCNC: 14 MG/DL (ref 8–23)
CALCIUM SERPL-MCNC: 9.6 MG/DL (ref 8.7–10.5)
CHLORIDE SERPL-SCNC: 101 MMOL/L (ref 95–110)
CHOLEST SERPL-MCNC: 241 MG/DL (ref 120–199)
CHOLEST/HDLC SERPL: 4.2 {RATIO} (ref 2–5)
CO2 SERPL-SCNC: 28 MMOL/L (ref 23–29)
COMPLEXED PSA SERPL-MCNC: 1 NG/ML (ref 0–4)
CREAT SERPL-MCNC: 0.9 MG/DL (ref 0.5–1.4)
DIFFERENTIAL METHOD: ABNORMAL
EOSINOPHIL # BLD AUTO: 0.1 K/UL (ref 0–0.5)
EOSINOPHIL NFR BLD: 0.6 % (ref 0–8)
ERYTHROCYTE [DISTWIDTH] IN BLOOD BY AUTOMATED COUNT: 12.1 % (ref 11.5–14.5)
EST. GFR  (AFRICAN AMERICAN): >60 ML/MIN/1.73 M^2
EST. GFR  (NON AFRICAN AMERICAN): >60 ML/MIN/1.73 M^2
GLUCOSE SERPL-MCNC: 97 MG/DL (ref 70–110)
HCT VFR BLD AUTO: 48.9 % (ref 40–54)
HDLC SERPL-MCNC: 58 MG/DL (ref 40–75)
HDLC SERPL: 24.1 % (ref 20–50)
HGB BLD-MCNC: 16.2 G/DL (ref 14–18)
IMM GRANULOCYTES # BLD AUTO: 0.07 K/UL (ref 0–0.04)
IMM GRANULOCYTES NFR BLD AUTO: 0.7 % (ref 0–0.5)
LDLC SERPL CALC-MCNC: 156.2 MG/DL (ref 63–159)
LYMPHOCYTES # BLD AUTO: 1.7 K/UL (ref 1–4.8)
LYMPHOCYTES NFR BLD: 17.4 % (ref 18–48)
MCH RBC QN AUTO: 34.8 PG (ref 27–31)
MCHC RBC AUTO-ENTMCNC: 33.1 G/DL (ref 32–36)
MCV RBC AUTO: 105 FL (ref 82–98)
MONOCYTES # BLD AUTO: 0.7 K/UL (ref 0.3–1)
MONOCYTES NFR BLD: 7.6 % (ref 4–15)
NEUTROPHILS # BLD AUTO: 7.1 K/UL (ref 1.8–7.7)
NEUTROPHILS NFR BLD: 73.3 % (ref 38–73)
NONHDLC SERPL-MCNC: 183 MG/DL
NRBC BLD-RTO: 0 /100 WBC
PLATELET # BLD AUTO: 240 K/UL (ref 150–350)
PMV BLD AUTO: 9.8 FL (ref 9.2–12.9)
POTASSIUM SERPL-SCNC: 4.4 MMOL/L (ref 3.5–5.1)
PROT SERPL-MCNC: 7.2 G/DL (ref 6–8.4)
RBC # BLD AUTO: 4.66 M/UL (ref 4.6–6.2)
SODIUM SERPL-SCNC: 139 MMOL/L (ref 136–145)
TRIGL SERPL-MCNC: 134 MG/DL (ref 30–150)
TSH SERPL DL<=0.005 MIU/L-ACNC: 1.15 UIU/ML (ref 0.4–4)
WBC # BLD AUTO: 9.72 K/UL (ref 3.9–12.7)

## 2020-11-04 PROCEDURE — G0009 PNEUMOCOCCAL CONJUGATE VACCINE 13-VALENT LESS THAN 5YO & GREATER THAN: ICD-10-PCS | Mod: S$GLB,,, | Performed by: STUDENT IN AN ORGANIZED HEALTH CARE EDUCATION/TRAINING PROGRAM

## 2020-11-04 PROCEDURE — 90662 FLU VACCINE - HIGH DOSE (65+) PRESERVATIVE FREE IM: ICD-10-PCS | Mod: S$GLB,,, | Performed by: STUDENT IN AN ORGANIZED HEALTH CARE EDUCATION/TRAINING PROGRAM

## 2020-11-04 PROCEDURE — 85025 COMPLETE CBC W/AUTO DIFF WBC: CPT

## 2020-11-04 PROCEDURE — G0008 FLU VACCINE - HIGH DOSE (65+) PRESERVATIVE FREE IM: ICD-10-PCS | Mod: S$GLB,,, | Performed by: STUDENT IN AN ORGANIZED HEALTH CARE EDUCATION/TRAINING PROGRAM

## 2020-11-04 PROCEDURE — 99397 PER PM REEVAL EST PAT 65+ YR: CPT | Mod: 25,S$GLB,, | Performed by: STUDENT IN AN ORGANIZED HEALTH CARE EDUCATION/TRAINING PROGRAM

## 2020-11-04 PROCEDURE — 99999 PR PBB SHADOW E&M-EST. PATIENT-LVL IV: CPT | Mod: PBBFAC,,, | Performed by: STUDENT IN AN ORGANIZED HEALTH CARE EDUCATION/TRAINING PROGRAM

## 2020-11-04 PROCEDURE — 99397 PR PREVENTIVE VISIT,EST,65 & OVER: ICD-10-PCS | Mod: 25,S$GLB,, | Performed by: STUDENT IN AN ORGANIZED HEALTH CARE EDUCATION/TRAINING PROGRAM

## 2020-11-04 PROCEDURE — G0009 ADMIN PNEUMOCOCCAL VACCINE: HCPCS | Mod: S$GLB,,, | Performed by: STUDENT IN AN ORGANIZED HEALTH CARE EDUCATION/TRAINING PROGRAM

## 2020-11-04 PROCEDURE — 84443 ASSAY THYROID STIM HORMONE: CPT

## 2020-11-04 PROCEDURE — 90670 PCV13 VACCINE IM: CPT | Mod: S$GLB,,, | Performed by: STUDENT IN AN ORGANIZED HEALTH CARE EDUCATION/TRAINING PROGRAM

## 2020-11-04 PROCEDURE — 3079F PR MOST RECENT DIASTOLIC BLOOD PRESSURE 80-89 MM HG: ICD-10-PCS | Mod: S$GLB,,, | Performed by: STUDENT IN AN ORGANIZED HEALTH CARE EDUCATION/TRAINING PROGRAM

## 2020-11-04 PROCEDURE — 90670 PNEUMOCOCCAL CONJUGATE VACCINE 13-VALENT LESS THAN 5YO & GREATER THAN: ICD-10-PCS | Mod: S$GLB,,, | Performed by: STUDENT IN AN ORGANIZED HEALTH CARE EDUCATION/TRAINING PROGRAM

## 2020-11-04 PROCEDURE — G0008 ADMIN INFLUENZA VIRUS VAC: HCPCS | Mod: S$GLB,,, | Performed by: STUDENT IN AN ORGANIZED HEALTH CARE EDUCATION/TRAINING PROGRAM

## 2020-11-04 PROCEDURE — 36415 COLL VENOUS BLD VENIPUNCTURE: CPT | Mod: PO

## 2020-11-04 PROCEDURE — 3079F DIAST BP 80-89 MM HG: CPT | Mod: S$GLB,,, | Performed by: STUDENT IN AN ORGANIZED HEALTH CARE EDUCATION/TRAINING PROGRAM

## 2020-11-04 PROCEDURE — 80061 LIPID PANEL: CPT

## 2020-11-04 PROCEDURE — 80053 COMPREHEN METABOLIC PANEL: CPT

## 2020-11-04 PROCEDURE — 84153 ASSAY OF PSA TOTAL: CPT

## 2020-11-04 PROCEDURE — 99214 OFFICE O/P EST MOD 30 MIN: CPT | Mod: PBBFAC,PO | Performed by: STUDENT IN AN ORGANIZED HEALTH CARE EDUCATION/TRAINING PROGRAM

## 2020-11-04 PROCEDURE — 99999 PR PBB SHADOW E&M-EST. PATIENT-LVL IV: ICD-10-PCS | Mod: PBBFAC,,, | Performed by: STUDENT IN AN ORGANIZED HEALTH CARE EDUCATION/TRAINING PROGRAM

## 2020-11-04 PROCEDURE — 90662 IIV NO PRSV INCREASED AG IM: CPT | Mod: S$GLB,,, | Performed by: STUDENT IN AN ORGANIZED HEALTH CARE EDUCATION/TRAINING PROGRAM

## 2020-11-04 PROCEDURE — 86703 HIV-1/HIV-2 1 RESULT ANTBDY: CPT

## 2020-11-04 PROCEDURE — 80074 ACUTE HEPATITIS PANEL: CPT

## 2020-11-04 PROCEDURE — 3077F PR MOST RECENT SYSTOLIC BLOOD PRESSURE >= 140 MM HG: ICD-10-PCS | Mod: S$GLB,,, | Performed by: STUDENT IN AN ORGANIZED HEALTH CARE EDUCATION/TRAINING PROGRAM

## 2020-11-04 PROCEDURE — 3077F SYST BP >= 140 MM HG: CPT | Mod: S$GLB,,, | Performed by: STUDENT IN AN ORGANIZED HEALTH CARE EDUCATION/TRAINING PROGRAM

## 2020-11-04 RX ORDER — ZOSTER VACCINE RECOMBINANT, ADJUVANTED 50 MCG/0.5
0.5 KIT INTRAMUSCULAR ONCE
Qty: 1 EACH | Refills: 1 | Status: SHIPPED | OUTPATIENT
Start: 2020-11-04 | End: 2020-11-04

## 2020-11-04 RX ORDER — FLUTICASONE PROPIONATE 50 MCG
1 SPRAY, SUSPENSION (ML) NASAL DAILY
Qty: 16 G | Refills: 5 | Status: SHIPPED | OUTPATIENT
Start: 2020-11-04 | End: 2021-06-16

## 2020-11-04 NOTE — PROGRESS NOTES
Subjective:       Patient ID: Yosef Rowe is a 65 y.o. male presenting to discuss:    Chief Complaint: Annual Exam    HPI   Mr. Rowe is a 64 yo M with OA, chronic low back pain with lumbar radiculopathy, s/p L-knee replacement (and treatemnt for infected hardware), SOLITARIO, depression, HTN, HLD, Anomalous origin of right coronary artery, CA-atherosclerosis, BPH, urinary urgency, ED, morbid obesity, IVORY, and Tobacco use presenting for annual physical:    OA, chronic low back pain with lumbar radiculopathy, s/p L-knee replacement (and treatemnt for infected hardware):  - Cymbalta      SOLITARIO, depression:  - Follows with Psychiatry (Dr. Hedy Kaufman)   - Lunesta (Off Trazodone 2/2 to lack of effect)     HTN, HLD, Anomalous origin of right coronary artery, CA-atherosclerosis:  - Previously followed by Cardiology; requesting new referral to reestablish   - Still poorly controled     BPH, urinary urgency, ED:  - Has not followed with Urology at [facility] since 2015; is now following with Dr. Acosta at [facility]     morbid obesity,Pickwickian syndrome (per charted PFT's), IVORY:  - Requires significant weight loss; has tired and failed several restrictive diets; no interest in bariatric Medicaine or surgery     Tobacco use:  - just cigars; never cigarettes    Post nasal drip:  - Seeing Dr. Lee; recently placed on a PPI   - Has never tried Flonase     Family, social, surgical Hx reviewed     Health Maintenance:  Prostate CA screening: Due   Colorectal CA screening: Overdue  Cholesterol: Due  Vaccines: Influenza (Due); Tetanus (Due); prevnar 13 (Due); Zoster (Due)  Exercise and Diet: Needs significant improvement     Past Medical History:   Diagnosis Date    Anxiety     Arthritis     Depression     when turned 49-lost job and mother unwell at that time    Hyperlipidemia     Lumbar radiculopathy     BRETT- 2011    Osteoarthritis     Sleep apnea     Urinary tract infection      Past Surgical History:    Procedure Laterality Date    IRRIGATION AND DEBRIDEMENT OF LOWER EXTREMITY Left 7/3/2018    Procedure: IRRIGATION AND DEBRIDEMENT, LOWER EXTREMITY, poly exchange left uni knee replacement. Stryler. 9L NS;  Surgeon: Ruperto Olivarez MD;  Location: [address];  Service: Orthopedics;  Laterality: Left;    IRRIGATION AND DEBRIDEMENT OF LOWER EXTREMITY Left 7/7/2018    Procedure: IRRIGATION AND DEBRIDEMENT, LOWER EXTREMITY;  Surgeon: Orlin Jordan MD;  Location: [facility] OR [address] [place] [address];  Service: Orthopedics;  Laterality: Left;    JOINT REPLACEMENT      knee arthroscopicc surgeries      Right knee- 2007  and Left knee- 2008    KNEE SURGERY      Left wrist Surgery      2000 for a torn ligament from Golf    TOTAL KNEE REPLACEMENT USING COMPUTER NAVIGATION Left 6/22/2018    Procedure: REPLACEMENT-KNEE WITH NAVIGATION-- unicondylar-- medial;  Surgeon: Orlin Jordan MD;  Location: [facility] OR [address] [place] [address];  Service: Orthopedics;  Laterality: Left;     Family History   Problem Relation Age of Onset    Parkinsonism Mother      Social History     Socioeconomic History    Marital status:      Spouse name: Not on file    Number of children: Not on file    Years of education: Not on file    Highest education level: Not on file   Occupational History    Not on file   Social Needs    Financial resource strain: Not on file    Food insecurity     Worry: Not on file     Inability: Not on file    Transportation needs     Medical: Not on file     Non-medical: Not on file   Tobacco Use    Smoking status: Current Some Day Smoker     Types: Cigars    Smokeless tobacco: Never Used    Tobacco comment: occasional   Substance and Sexual Activity    Alcohol use: Yes     Alcohol/week: 10.0 standard drinks     Types: 10 Glasses of wine per week     Comment: Daily, last use yesterday    Drug use: No    Sexual activity: Yes     Partners: Female   Lifestyle    Physical activity     Days per week: Not on file     Minutes per  session: Not on file    Stress: Not on file   Relationships    Social connections     Talks on phone: Not on file     Gets together: Not on file     Attends Anabaptism service: Not on file     Active member of club or organization: Not on file     Attends meetings of clubs or organizations: Not on file     Relationship status: Not on file   Other Topics Concern    Not on file   Social History Narrative    Not on file     Review of patient's allergies indicates:   Allergen Reactions    No known drug allergies      Yosef Rowe had no medications administered during this visit.      Review of Systems   Constitutional: Negative for appetite change, chills and fever.   HENT: Negative.    Respiratory: Negative for cough, chest tightness and shortness of breath.    Cardiovascular: Negative for chest pain, palpitations and leg swelling.   Gastrointestinal: Negative for abdominal distention, abdominal pain, blood in stool, constipation, diarrhea, nausea and vomiting.   Endocrine: Negative.    Genitourinary: Negative for difficulty urinating, dysuria, frequency and hematuria.   Musculoskeletal: Negative.    Integumentary:  Negative.   Neurological: Negative.    Psychiatric/Behavioral: Negative.          Objective:      Vitals:    11/04/20 1005   BP: (!) 154/80   Pulse: 71   Resp: 16   Temp: 97.9 °F (36.6 °C)      Physical Exam  Vitals signs reviewed.   Constitutional:       General: He is not in acute distress.     Appearance: Normal appearance.   HENT:      Head: Normocephalic and atraumatic.      Comments: Facial features are symmetric      Nose: Nose normal. No congestion or rhinorrhea.      Mouth/Throat:      Mouth: Mucous membranes are moist.      Pharynx: Oropharynx is clear. No oropharyngeal exudate or posterior oropharyngeal erythema.   Eyes:      General: No scleral icterus.     Extraocular Movements: Extraocular movements intact.      Conjunctiva/sclera: Conjunctivae normal.   Neck:      Musculoskeletal:  Normal range of motion.   Cardiovascular:      Rate and Rhythm: Normal rate and regular rhythm.      Pulses: Normal pulses.      Heart sounds: Normal heart sounds.   Pulmonary:      Effort: Pulmonary effort is normal. No respiratory distress.      Breath sounds: Normal breath sounds.   Abdominal:      General: Bowel sounds are normal. There is no distension.      Palpations: Abdomen is soft. There is no mass.      Tenderness: There is no abdominal tenderness. There is no guarding.      Hernia: No hernia is present.   Musculoskeletal: Normal range of motion.         General: No deformity or signs of injury.      Comments: Gait normal    Skin:     General: Skin is warm and dry.      Findings: No rash.      Comments: Bilateral vertical knee replacement scars noted    Neurological:      General: No focal deficit present.      Mental Status: He is alert and oriented to person, place, and time. Mental status is at baseline.   Psychiatric:         Mood and Affect: Mood normal.         Behavior: Behavior normal.         Thought Content: Thought content normal.       Current Outpatient Medications on File Prior to Visit   Medication Sig Dispense Refill    DULoxetine (CYMBALTA) 60 MG capsule TAKE 1 CAPSULE(60 MG) BY MOUTH EVERY DAY 90 capsule 0    eszopiclone (LUNESTA) 1 MG Tab Take 1 tablet (1 mg total) by mouth nightly. 30 tablet 0    sildenafil (VIAGRA) 50 MG tablet Take 1 tablet (50 mg total) by mouth daily as needed for Erectile Dysfunction. 3 tablet 2    traZODone (DESYREL) 150 MG tablet Take 1 tablet (150 mg total) by mouth nightly as needed for Insomnia. (Patient not taking: Reported on 11/4/2020) 90 tablet 1     No current facility-administered medications on file prior to visit.          Assessment:       1. Physical exam, annual    2. Need for tetanus booster    3. Need for shingles vaccine    4. Screening for lipid disorders    5. Encounter for hepatitis C screening test for low risk patient    6. Screening  for colorectal cancer    7. Prostate cancer screening    8. Need for vaccination with 13-polyvalent pneumococcal conjugate vaccine    9. Coronary atherosclerosis due to calcified coronary lesion    10. Primary osteoarthritis of left knee    11. Hyperlipidemia, unspecified hyperlipidemia type    12. Obesity, Class III, BMI 40-49.9 (morbid obesity)    13. Generalized anxiety disorder    14. Major depressive disorder, recurrent episode, in partial remission    15. BPH (benign prostatic hypertrophy) with urinary obstruction    16. Urgency incontinence    17. Chronic bilateral low back pain without sciatica    18. Urinary outflow obstruction    19. Obstructive sleep apnea syndrome    20. Essential hypertension    21. Insomnia, unspecified type        Plan:       Physical exam, annual  Need for tetanus booster  Need for shingles vaccine  Screening for lipid disorders  Encounter for hepatitis C screening test for low risk patient   - Screening labs ordered and vaccinations brought UTD  Need for vaccination with 13-polyvalent pneumococcal conjugate vaccine  Screening for colorectal cancer  -     Ambulatory referral/consult to Gastroenterology; Future; Expected date: 11/11/2020   - 3 year overdue for screening colonoscopy     Prostate cancer screening   - PSA placed     Coronary atherosclerosis:  HTN:  HLD:  - Has a number of risk factors for heart diease and/or MI's  - Will refer to Cardiology to establish and for any formal investigations warranted     OA, back pain, ect:  - Continue Cymbalta     SOLITARIO/Depression:  - Stable; will continue to follow with Psychiatry     BPH, Urinary incontinence, ED:  - Stable   - Will continue to follow with Urology     IVORY:  - Needs significant weight reduction; not interested in assistance at this time     Seasonal allergies:  - Flonase prescribed     Return to clinic in one year for annual exam or sooner if dictated by labs or illness.      Other orders  -     Pneumococcal Conjugate  Vaccine (13 Valent) (IM)  -     diphth,pertus,acell,,tetanus (BOOSTRIX) 2.5-8-5 Lf-mcg-Lf/0.5mL Susp; Inject 0.5 mLs into the muscle once. for 1 dose  Dispense: 1 vial; Refill: 0  -     fluticasone propionate (FLONASE) 50 mcg/actuation nasal spray; 1 spray (50 mcg total) by Each Nostril route once daily.  Dispense: 16 g; Refill: 5  -     varicella-zoster gE-AS01B, PF, (SHINGRIX, PF,) 50 mcg/0.5 mL injection; Inject 0.5 mLs into the muscle once. for 1 dose  Dispense: 1 each; Refill: 1

## 2020-11-05 LAB
HAV IGM SERPL QL IA: NEGATIVE
HBV CORE IGM SERPL QL IA: NEGATIVE
HBV SURFACE AG SERPL QL IA: NEGATIVE
HCV AB SERPL QL IA: NEGATIVE
HIV 1+2 AB+HIV1 P24 AG SERPL QL IA: NEGATIVE

## 2020-11-06 ENCOUNTER — TELEPHONE (OUTPATIENT)
Dept: INTERNAL MEDICINE | Facility: CLINIC | Age: 65
End: 2020-11-06

## 2020-11-06 RX ORDER — ATORVASTATIN CALCIUM 40 MG/1
40 TABLET, FILM COATED ORAL DAILY
Qty: 90 TABLET | Refills: 5 | Status: SHIPPED | OUTPATIENT
Start: 2020-11-06 | End: 2020-11-09

## 2020-11-06 NOTE — TELEPHONE ENCOUNTER
I spoke to pt, I asked if he looked over his results and comments from Dr. Escobar.  Pt states he did see that the results were in. He will look over results and comments and contact the me if he have any questions or concerns

## 2020-11-06 NOTE — TELEPHONE ENCOUNTER
----- Message from Bill Escobar MD sent at 11/6/2020  7:53 AM CST -----  Please ensure Mr. Rowe received my Portal message and offer 6 month follow up if he would like to reassess his overall risk. Thank you for your time.

## 2020-11-08 PROBLEM — Z87.898 HISTORY OF HEAVY ALCOHOL CONSUMPTION: Status: ACTIVE | Noted: 2020-11-08

## 2020-11-09 ENCOUNTER — PATIENT MESSAGE (OUTPATIENT)
Dept: PSYCHIATRY | Facility: CLINIC | Age: 65
End: 2020-11-09

## 2020-11-09 ENCOUNTER — OFFICE VISIT (OUTPATIENT)
Dept: CARDIOLOGY | Facility: CLINIC | Age: 65
End: 2020-11-09
Payer: MEDICARE

## 2020-11-09 VITALS
SYSTOLIC BLOOD PRESSURE: 150 MMHG | BODY MASS INDEX: 46.49 KG/M2 | DIASTOLIC BLOOD PRESSURE: 74 MMHG | WEIGHT: 306.75 LBS | HEIGHT: 68 IN | OXYGEN SATURATION: 98 % | HEART RATE: 58 BPM

## 2020-11-09 DIAGNOSIS — Z87.898 HISTORY OF HEAVY ALCOHOL CONSUMPTION: ICD-10-CM

## 2020-11-09 DIAGNOSIS — F33.42 RECURRENT MAJOR DEPRESSIVE DISORDER, IN FULL REMISSION: ICD-10-CM

## 2020-11-09 DIAGNOSIS — R69 DIAGNOSIS DEFERRED: ICD-10-CM

## 2020-11-09 DIAGNOSIS — R06.02 SHORTNESS OF BREATH: Primary | ICD-10-CM

## 2020-11-09 DIAGNOSIS — E78.5 HYPERLIPIDEMIA, UNSPECIFIED HYPERLIPIDEMIA TYPE: ICD-10-CM

## 2020-11-09 DIAGNOSIS — G47.33 OBSTRUCTIVE SLEEP APNEA SYNDROME: ICD-10-CM

## 2020-11-09 DIAGNOSIS — E66.01 OBESITY, CLASS III, BMI 40-49.9 (MORBID OBESITY): ICD-10-CM

## 2020-11-09 DIAGNOSIS — Q24.5 ANOMALOUS ORIGIN OF RIGHT CORONARY ARTERY: ICD-10-CM

## 2020-11-09 DIAGNOSIS — G47.00 INSOMNIA, UNSPECIFIED TYPE: ICD-10-CM

## 2020-11-09 DIAGNOSIS — F41.1 GENERALIZED ANXIETY DISORDER: ICD-10-CM

## 2020-11-09 DIAGNOSIS — I25.10 CORONARY ATHEROSCLEROSIS DUE TO CALCIFIED CORONARY LESION: ICD-10-CM

## 2020-11-09 DIAGNOSIS — R53.82 CHRONIC FATIGUE: ICD-10-CM

## 2020-11-09 DIAGNOSIS — I25.84 CORONARY ATHEROSCLEROSIS DUE TO CALCIFIED CORONARY LESION: ICD-10-CM

## 2020-11-09 DIAGNOSIS — I10 ESSENTIAL HYPERTENSION: ICD-10-CM

## 2020-11-09 PROCEDURE — 99999 PR PBB SHADOW E&M-EST. PATIENT-LVL IV: CPT | Mod: PBBFAC,,, | Performed by: INTERNAL MEDICINE

## 2020-11-09 PROCEDURE — 3008F PR BODY MASS INDEX (BMI) DOCUMENTED: ICD-10-PCS | Mod: S$GLB,,, | Performed by: INTERNAL MEDICINE

## 2020-11-09 PROCEDURE — 93010 ELECTROCARDIOGRAM REPORT: CPT | Mod: S$GLB,,, | Performed by: INTERNAL MEDICINE

## 2020-11-09 PROCEDURE — 3077F PR MOST RECENT SYSTOLIC BLOOD PRESSURE >= 140 MM HG: ICD-10-PCS | Mod: S$GLB,,, | Performed by: INTERNAL MEDICINE

## 2020-11-09 PROCEDURE — 93010 EKG 12-LEAD: ICD-10-PCS | Mod: S$GLB,,, | Performed by: INTERNAL MEDICINE

## 2020-11-09 PROCEDURE — 93005 ELECTROCARDIOGRAM TRACING: CPT | Mod: S$GLB,,, | Performed by: INTERNAL MEDICINE

## 2020-11-09 PROCEDURE — 1101F PT FALLS ASSESS-DOCD LE1/YR: CPT | Mod: S$GLB,,, | Performed by: INTERNAL MEDICINE

## 2020-11-09 PROCEDURE — 1126F PR PAIN SEVERITY QUANTIFIED, NO PAIN PRESENT: ICD-10-PCS | Mod: S$GLB,,, | Performed by: INTERNAL MEDICINE

## 2020-11-09 PROCEDURE — 99204 PR OFFICE/OUTPT VISIT, NEW, LEVL IV, 45-59 MIN: ICD-10-PCS | Mod: S$GLB,,, | Performed by: INTERNAL MEDICINE

## 2020-11-09 PROCEDURE — 3008F BODY MASS INDEX DOCD: CPT | Mod: S$GLB,,, | Performed by: INTERNAL MEDICINE

## 2020-11-09 PROCEDURE — 99204 OFFICE O/P NEW MOD 45 MIN: CPT | Mod: S$GLB,,, | Performed by: INTERNAL MEDICINE

## 2020-11-09 PROCEDURE — 1126F AMNT PAIN NOTED NONE PRSNT: CPT | Mod: S$GLB,,, | Performed by: INTERNAL MEDICINE

## 2020-11-09 PROCEDURE — 99999 PR PBB SHADOW E&M-EST. PATIENT-LVL IV: ICD-10-PCS | Mod: PBBFAC,,, | Performed by: INTERNAL MEDICINE

## 2020-11-09 PROCEDURE — 3078F PR MOST RECENT DIASTOLIC BLOOD PRESSURE < 80 MM HG: ICD-10-PCS | Mod: S$GLB,,, | Performed by: INTERNAL MEDICINE

## 2020-11-09 PROCEDURE — 1159F MED LIST DOCD IN RCRD: CPT | Mod: S$GLB,,, | Performed by: INTERNAL MEDICINE

## 2020-11-09 PROCEDURE — 3078F DIAST BP <80 MM HG: CPT | Mod: S$GLB,,, | Performed by: INTERNAL MEDICINE

## 2020-11-09 PROCEDURE — 93005 EKG 12-LEAD: ICD-10-PCS | Mod: S$GLB,,, | Performed by: INTERNAL MEDICINE

## 2020-11-09 PROCEDURE — 1159F PR MEDICATION LIST DOCUMENTED IN MEDICAL RECORD: ICD-10-PCS | Mod: S$GLB,,, | Performed by: INTERNAL MEDICINE

## 2020-11-09 PROCEDURE — 3077F SYST BP >= 140 MM HG: CPT | Mod: S$GLB,,, | Performed by: INTERNAL MEDICINE

## 2020-11-09 PROCEDURE — 1101F PR PT FALLS ASSESS DOC 0-1 FALLS W/OUT INJ PAST YR: ICD-10-PCS | Mod: S$GLB,,, | Performed by: INTERNAL MEDICINE

## 2020-11-09 RX ORDER — LOSARTAN POTASSIUM 25 MG/1
25 TABLET ORAL DAILY
Qty: 30 TABLET | Refills: 6 | Status: SHIPPED | OUTPATIENT
Start: 2020-11-09 | End: 2022-04-05 | Stop reason: SDUPTHER

## 2020-11-09 RX ORDER — ATORVASTATIN CALCIUM 20 MG/1
20 TABLET, FILM COATED ORAL DAILY
COMMUNITY
End: 2020-11-09 | Stop reason: SDUPTHER

## 2020-11-09 RX ORDER — LOSARTAN POTASSIUM 25 MG/1
25 TABLET ORAL DAILY
COMMUNITY
End: 2020-11-09 | Stop reason: SDUPTHER

## 2020-11-09 RX ORDER — ATORVASTATIN CALCIUM 20 MG/1
20 TABLET, FILM COATED ORAL DAILY
Qty: 30 TABLET | Refills: 6 | Status: SHIPPED | OUTPATIENT
Start: 2020-11-09 | End: 2021-06-16 | Stop reason: SDUPTHER

## 2020-11-09 RX ORDER — TAMSULOSIN HYDROCHLORIDE 0.4 MG/1
0.4 CAPSULE ORAL DAILY
COMMUNITY
End: 2022-08-08

## 2020-11-09 RX ORDER — ASPIRIN 81 MG/1
81 TABLET ORAL
COMMUNITY
End: 2021-06-16

## 2020-11-09 NOTE — PROGRESS NOTES
Subjective:   Patient ID:  Yosef Rowe is a 65 y.o. male who presents for evaulation of Hypertension and Hyperlipidemia      HPI: This is a former patient of Dr. Contreras/ Hedy Tan, last seen in 2017, who presents for cardiology evaluation and follow up.  In the past he as also seen by [facility] (Dr. Cooper).    Mr. Rowe is a 63yo male with anomalous coronary artery origin (intra-arterial RCA), CACS of 23, obesity, and sleep apnea here for follow up. He has been complaining of persistent BINGHAM since before Maria E. He continues to feel BINGHAM after about 100 yards of walking. Mr. Rowe denies chest pain with exertion or at rest, palpitations, dizziness, syncope, claudication, PND, or orthopnea. He has some mild leg edema. He was supposed to be on atorvastatin 20mg and ARB . He is not taking aspirin, but in the past was on Lasix..  on 6/21/2017. Creatinine is 0.9 on 9/28/2017. Hepatic transaminases are within normal limits. He says he does not regularly exercise and says is not considerably active. He has IVORY and uses his CPAP nightly.     In the past PFT's did not show significant restriction.  Recently patient was treated for phlegm production, cough and wheezing with steroids and antibiotics.    In 2017 negative PET stress test.    His currently not-smoking, drinks 2 alcoholic beverages daily.    ECG is normal.    Past Medical History:   Diagnosis Date    Anxiety     Arthritis     Depression     when turned 49-lost job and mother unwell at that time    Hyperlipidemia     Lumbar radiculopathy     BRETT- 2011    Osteoarthritis     Sleep apnea     Urinary tract infection        Past Surgical History:   Procedure Laterality Date    IRRIGATION AND DEBRIDEMENT OF LOWER EXTREMITY Left 7/3/2018    Procedure: IRRIGATION AND DEBRIDEMENT, LOWER EXTREMITY, poly exchange left uni knee replacement. Stryler. 9L NS;  Surgeon: Ruperto Olivarez MD;  Location: [facility] OR [address] [place] [address];  Service: Orthopedics;  Laterality: Left;     IRRIGATION AND DEBRIDEMENT OF LOWER EXTREMITY Left 7/7/2018    Procedure: IRRIGATION AND DEBRIDEMENT, LOWER EXTREMITY;  Surgeon: Orlin Jordan MD;  [facility];  Service: Orthopedics;  Laterality: Left;    JOINT REPLACEMENT      knee arthroscopicc surgeries      Right knee- 2007  and Left knee- 2008    KNEE SURGERY      Left wrist Surgery      2000 for a torn ligament from Golf    TOTAL KNEE REPLACEMENT USING COMPUTER NAVIGATION Left 6/22/2018    Procedure: REPLACEMENT-KNEE WITH NAVIGATION-- unicondylar-- medial;  Surgeon: Orlin Jordan MD;  [facility];  Service: Orthopedics;  Laterality: Left;       Social History     Socioeconomic History    Marital status:      Spouse name: Not on file    Number of children: Not on file    Years of education: Not on file    Highest education level: Not on file   Occupational History    Not on file   Social Needs    Financial resource strain: Not on file    Food insecurity     Worry: Not on file     Inability: Not on file    Transportation needs     Medical: Not on file     Non-medical: Not on file   Tobacco Use    Smoking status: Current Some Day Smoker     Types: Cigars    Smokeless tobacco: Never Used    Tobacco comment: occasional   Substance and Sexual Activity    Alcohol use: Yes     Alcohol/week: 10.0 standard drinks     Types: 10 Glasses of wine per week     Comment: Daily, last use yesterday    Drug use: No    Sexual activity: Yes     Partners: Female   Lifestyle    Physical activity     Days per week: Not on file     Minutes per session: Not on file    Stress: Not on file   Relationships    Social connections     Talks on phone: Not on file     Gets together: Not on file     Attends Orthodoxy service: Not on file     Active member of club or organization: Not on file     Attends meetings of clubs or organizations: Not on file     Relationship status: Not on file   Other Topics Concern    Not on file  "  Social History Narrative    Not on file       Review of patient's allergies indicates:   Allergen Reactions    No known drug allergies        Lab Results   Component Value Date     11/04/2020    K 4.4 11/04/2020     11/04/2020    CO2 28 11/04/2020    BUN 14 11/04/2020    CREATININE 0.9 11/04/2020    GLU 97 11/04/2020    HGBA1C 5.1 07/07/2018    MG 1.8 07/03/2018    AST 37 11/04/2020    ALT 60 (H) 11/04/2020    ALBUMIN 3.9 11/04/2020    PROT 7.2 11/04/2020    BILITOT 0.7 11/04/2020    WBC 9.72 11/04/2020    HGB 16.2 11/04/2020    HCT 48.9 11/04/2020     (H) 11/04/2020     11/04/2020    INR 0.9 06/14/2018    PSA 1.0 11/04/2020    TSH 1.154 11/04/2020    CHOL 241 (H) 11/04/2020    HDL 58 11/04/2020    LDLCALC 156.2 11/04/2020    TRIG 134 11/04/2020       Review of Systems   Constitution: Positive for weight gain.   Cardiovascular: Positive for dyspnea on exertion.   Respiratory: Positive for cough, shortness of breath, sputum production and wheezing.    Musculoskeletal: Positive for arthritis, back pain and joint pain.   Neurological: Positive for numbness and paresthesias.   Psychiatric/Behavioral: Positive for depression.       Objective:   Physical Exam   Constitutional: He is oriented to person, place, and time. He appears well-developed and well-nourished.   BP (!) 150/74   Pulse (!) 58   Ht 5' 7.5" (1.715 m)   Wt (!) 139.1 kg (306 lb 12.3 oz)   SpO2 98%   BMI 47.34 kg/m²      HENT:   Head: Normocephalic.   Eyes: Pupils are equal, round, and reactive to light.   Neck: Normal range of motion. Neck supple. Carotid bruit is not present. No thyromegaly present.   Cardiovascular: Normal rate, regular rhythm, normal heart sounds and intact distal pulses. Exam reveals no gallop and no friction rub.   No murmur heard.  Pulses:       Carotid pulses are 2+ on the right side and 2+ on the left side.       Radial pulses are 2+ on the right side and 2+ on the left side.        Femoral pulses " are 2+ on the right side and 2+ on the left side.       Popliteal pulses are 2+ on the right side and 2+ on the left side.        Dorsalis pedis pulses are 2+ on the right side and 2+ on the left side.        Posterior tibial pulses are 2+ on the right side and 2+ on the left side.   Pulmonary/Chest: Effort normal and breath sounds normal. No respiratory distress. He has no wheezes. He has no rales. He exhibits no tenderness.   Abdominal: Soft. Bowel sounds are normal.   obese   Musculoskeletal: Normal range of motion.         General: Edema present.      Comments: Trace on the left.  Varicose veins and scars post bilateral knee replacement.   Neurological: He is alert and oriented to person, place, and time.   Skin: Skin is warm and dry.   Psychiatric: He has a normal mood and affect.   Nursing note and vitals reviewed.      Current Outpatient Medications   Medication Sig    aspirin (ECOTRIN) 81 MG EC tablet Take 81 mg by mouth as needed.    atorvastatin (LIPITOR) 20 MG tablet Take 1 tablet (20 mg total) by mouth once daily.    DULoxetine (CYMBALTA) 60 MG capsule TAKE 1 CAPSULE(60 MG) BY MOUTH EVERY DAY    eszopiclone (LUNESTA) 1 MG Tab Take 1 tablet (1 mg total) by mouth nightly.    fluticasone propionate (FLONASE) 50 mcg/actuation nasal spray 1 spray (50 mcg total) by Each Nostril route once daily.    losartan (COZAAR) 25 MG tablet Take 1 tablet (25 mg total) by mouth once daily.    sildenafil (VIAGRA) 50 MG tablet Take 1 tablet (50 mg total) by mouth daily as needed for Erectile Dysfunction.    tamsulosin (FLOMAX) 0.4 mg Cap Take 0.4 mg by mouth once daily.     No current facility-administered medications for this visit.        Assessment and Plan:     Problem List Items Addressed This Visit        Cardiology Problems    Essential hypertension    Relevant Medications    losartan (COZAAR) 25 MG tablet    atorvastatin (LIPITOR) 20 MG tablet    Other Relevant Orders    Lipid Panel    Hepatic Function Panel     Basic Metabolic Panel    Coronary atherosclerosis due to calcified coronary lesion    Relevant Medications    losartan (COZAAR) 25 MG tablet    atorvastatin (LIPITOR) 20 MG tablet    Other Relevant Orders    Lipid Panel    Hepatic Function Panel    Basic Metabolic Panel    Anomalous origin of right coronary artery    Relevant Medications    losartan (COZAAR) 25 MG tablet    atorvastatin (LIPITOR) 20 MG tablet    Other Relevant Orders    Lipid Panel    Hepatic Function Panel    Basic Metabolic Panel    Hyperlipidemia    Relevant Medications    losartan (COZAAR) 25 MG tablet    atorvastatin (LIPITOR) 20 MG tablet    Other Relevant Orders    Lipid Panel    Hepatic Function Panel    Basic Metabolic Panel       Other    Insomnia    Sleep apnea    Relevant Medications    losartan (COZAAR) 25 MG tablet    atorvastatin (LIPITOR) 20 MG tablet    Other Relevant Orders    Lipid Panel    Hepatic Function Panel    Basic Metabolic Panel    Recurrent major depressive disorder, in full remission    Generalized anxiety disorder    Shortness of breath - Primary    Relevant Medications    losartan (COZAAR) 25 MG tablet    atorvastatin (LIPITOR) 20 MG tablet    Other Relevant Orders    Lipid Panel    Hepatic Function Panel    Basic Metabolic Panel    Obesity, Class III, BMI 40-49.9 (morbid obesity)    Relevant Medications    losartan (COZAAR) 25 MG tablet    atorvastatin (LIPITOR) 20 MG tablet    Other Relevant Orders    Lipid Panel    Hepatic Function Panel    Basic Metabolic Panel    Chronic fatigue    History of heavy alcohol consumption          Patient's Medications   New Prescriptions    No medications on file   Previous Medications    ASPIRIN (ECOTRIN) 81 MG EC TABLET    Take 81 mg by mouth as needed.    DULOXETINE (CYMBALTA) 60 MG CAPSULE    TAKE 1 CAPSULE(60 MG) BY MOUTH EVERY DAY    ESZOPICLONE (LUNESTA) 1 MG TAB    Take 1 tablet (1 mg total) by mouth nightly.    FLUTICASONE PROPIONATE (FLONASE) 50 MCG/ACTUATION NASAL  SPRAY    1 spray (50 mcg total) by Each Nostril route once daily.    SILDENAFIL (VIAGRA) 50 MG TABLET    Take 1 tablet (50 mg total) by mouth daily as needed for Erectile Dysfunction.    TAMSULOSIN (FLOMAX) 0.4 MG CAP    Take 0.4 mg by mouth once daily.   Modified Medications    Modified Medication Previous Medication    ATORVASTATIN (LIPITOR) 20 MG TABLET atorvastatin (LIPITOR) 20 MG tablet       Take 1 tablet (20 mg total) by mouth once daily.    Take 20 mg by mouth once daily.    LOSARTAN (COZAAR) 25 MG TABLET losartan (COZAAR) 25 MG tablet       Take 1 tablet (25 mg total) by mouth once daily.    Take 25 mg by mouth once daily.   Discontinued Medications    ATORVASTATIN (LIPITOR) 40 MG TABLET    Take 1 tablet (40 mg total) by mouth once daily.    TRAZODONE (DESYREL) 150 MG TABLET    Take 1 tablet (150 mg total) by mouth nightly as needed for Insomnia.     Symptoms are likely multifactorial and possibly related to obesity hypoventilation syndrome, sleep apnea and diastolic heart failure and deconditioning.  Recommend Lipitor 20 mg daily and Losartan 25 mg daily. May need to restart diuretic.  Weight loss, exercise and life style modifications discussed at length.  Elevate legs, law salt diet and support socks recommended.  Blood work in 2 months and review.  Consider pulmonary evaluation with a full set of PFT's.  Thank you for allowing me to participate in the care of this patient. Please do not hesitate to contact me with any questions or concerns.  Follow up with PCP and cardiology as needed.

## 2020-11-09 NOTE — LETTER
November 9, 2020      Bill Escobar MD  [address]  [place] [address]           [facility]  [address] [facility] [address]  [place] [address]  Phone: [phone]          Patient: Yosef Rowe   MR Number: [id number]   YOB: 1955   Date of Visit: 11/9/2020       Dear Dr. Bill Escobar:    Thank you for referring Yosef Rowe to me for evaluation. Attached you will find relevant portions of my assessment and plan of care.    If you have questions, please do not hesitate to call me. I look forward to following Yosef Rowe along with you.    Sincerely,    Grecia Felder MD    Enclosure  CC:  No Recipients    If you would like to receive this communication electronically, please contact [email or web address] or [phone] to request more information on [employer] Link access.    For providers and/or their staff who would like to refer a patient to [facility], please contact us through our one-stop-shop provider referral line, [facility] , at [phone].    If you feel you have received this communication in error or would no longer like to receive these types of communications, please e-mail [email or web address]

## 2020-11-10 ENCOUNTER — TELEPHONE (OUTPATIENT)
Dept: CARDIOLOGY | Facility: CLINIC | Age: 65
End: 2020-11-10

## 2020-11-10 DIAGNOSIS — I10 ESSENTIAL HYPERTENSION: Primary | ICD-10-CM

## 2020-11-10 DIAGNOSIS — R69 DIAGNOSIS DEFERRED: ICD-10-CM

## 2020-11-10 RX ORDER — DULOXETIN HYDROCHLORIDE 60 MG/1
60 CAPSULE, DELAYED RELEASE ORAL DAILY
Qty: 90 CAPSULE | Refills: 3 | Status: SHIPPED | OUTPATIENT
Start: 2020-11-10 | End: 2021-09-07 | Stop reason: SDUPTHER

## 2020-11-11 NOTE — ED NOTES
Report given to pre-op personnel. Pt to pre-op 23 once returned to ED   Provider Procedure Text (A): After obtaining clear surgical margins the defect was repaired by another provider.

## 2020-11-16 ENCOUNTER — OFFICE VISIT (OUTPATIENT)
Dept: PSYCHIATRY | Facility: CLINIC | Age: 65
End: 2020-11-16
Payer: MEDICARE

## 2020-11-16 DIAGNOSIS — F33.42 RECURRENT MAJOR DEPRESSIVE DISORDER, IN FULL REMISSION: ICD-10-CM

## 2020-11-16 DIAGNOSIS — F41.1 GENERALIZED ANXIETY DISORDER: ICD-10-CM

## 2020-11-16 DIAGNOSIS — G47.00 INSOMNIA, UNSPECIFIED TYPE: Primary | ICD-10-CM

## 2020-11-16 PROCEDURE — 99213 PR OFFICE/OUTPT VISIT, EST, LEVL III, 20-29 MIN: ICD-10-PCS | Mod: 95,,, | Performed by: INTERNAL MEDICINE

## 2020-11-16 PROCEDURE — 1159F MED LIST DOCD IN RCRD: CPT | Mod: ,,, | Performed by: INTERNAL MEDICINE

## 2020-11-16 PROCEDURE — 99213 OFFICE O/P EST LOW 20 MIN: CPT | Mod: 95,,, | Performed by: INTERNAL MEDICINE

## 2020-11-16 PROCEDURE — 1159F PR MEDICATION LIST DOCUMENTED IN MEDICAL RECORD: ICD-10-PCS | Mod: ,,, | Performed by: INTERNAL MEDICINE

## 2020-11-16 NOTE — PROGRESS NOTES
OUTPATIENT PSYCHIATRY RETURN VISIT    ENCOUNTER DATE:  11/16/2020  SITE:  [facility]  LENGTH OF SESSION:  5 minutes    The patient location is:  [place] ([place])  The chief complaint leading to consultation is:  Insomnia    Visit type:  audiovisual    Face to Face time with patient:  5 minutes  10 minutes of total time spent on the encounter, which includes face to face time and non-face to face time preparing to see the patient (eg, review of tests), Obtaining and/or reviewing separately obtained history, Documenting clinical information in the electronic or other health record, Independently interpreting results (not separately reported) and communicating results to the patient/family/caregiver, or Care coordination (not separately reported).     Each patient to whom he or she provides medical services by telemedicine is:  (1) informed of the relationship between the physician and patient and the respective role of any other health care provider with respect to management of the patient; and (2) notified that he or she may decline to receive medical services by telemedicine and may withdraw from such care at any time.    CHIEF COMPLAINT:  Insomnia      HISTORY OF PRESENTING ILLNESS:  Yosef Rowe is a 65 y.o. male with history of MDD, SOLITARIO, and insomnia who presents for follow up appointment.      Plan at last appointment on 11/2/2020:  · Stop Trazodone since it has not been helpful.  Start Lunesta 1mg qHS - will slowly increase to effective dosage.    · Mood stable.  Will continue Cymbalta 60mg daily.  · Patient has significantly reduced his drinking.  He has upcoming labs to recheck liver function and blood counts.  · Discussed with patient informed consent, risks versus benefits, alternative treatments, side effect profile and the inherent unpredictability of individual responses to these treatments.  The patient expresses understanding of the above and displays the capacity to agree  with this current plan.    History as told by patient:  Taking Lunesta 1mg qHS which has not been much more helpful than the Trazodone.  Denies side effects.  Mood continues to remain stable.      Medication side effects:  No  Medication compliance:  Yes    PSYCHIATRIC REVIEW OF SYSTEMS:  Trouble with sleep:  Yes, as above  Appetite changes:  Denies  Weight changes:  Denies  Lack of energy:  Denies  Anhedonia:  Denies  Somatic symptoms:  Denies  Libido:  Not discussed  Anxiety/panic:  Denies  Guilty/hopeless:  Denies  Self-injurious behavior/risky behavior:  Denies  Any drugs:  Denies  Alcohol:  As above.    MEDICAL REVIEW OF SYSTEMS:  Complete review of systems performed covering Constitutional, Musculoskeletal, Neurologic.  All systems negative except for that covered in HPI.    PAST PSYCHIATRIC, MEDICAL, AND SOCIAL HISTORY REVIEWED  The patient's past medical, family and social history have been reviewed and updated as appropriate within the electronic medical record - see encounter notes.    MEDICATIONS:    Current Outpatient Medications:     aspirin (ECOTRIN) 81 MG EC tablet, Take 81 mg by mouth as needed., Disp: , Rfl:     atorvastatin (LIPITOR) 20 MG tablet, Take 1 tablet (20 mg total) by mouth once daily., Disp: 30 tablet, Rfl: 6    DULoxetine (CYMBALTA) 60 MG capsule, Take 1 capsule (60 mg total) by mouth once daily., Disp: 90 capsule, Rfl: 3    eszopiclone (LUNESTA) 1 MG Tab, Take 1 tablet (1 mg total) by mouth nightly., Disp: 30 tablet, Rfl: 0    fluticasone propionate (FLONASE) 50 mcg/actuation nasal spray, 1 spray (50 mcg total) by Each Nostril route once daily., Disp: 16 g, Rfl: 5    losartan (COZAAR) 25 MG tablet, Take 1 tablet (25 mg total) by mouth once daily., Disp: 30 tablet, Rfl: 6    sildenafil (VIAGRA) 50 MG tablet, Take 1 tablet (50 mg total) by mouth daily as needed for Erectile Dysfunction., Disp: 3 tablet, Rfl: 2    tamsulosin (FLOMAX) 0.4 mg Cap, Take 0.4 mg by mouth once daily.,  "Disp: , Rfl:     ALLERGIES:  Review of patient's allergies indicates:   Allergen Reactions    No known drug allergies      PSYCHIATRIC EXAM:  There were no vitals filed for this visit.Appearance:  Well groomed, appearing healthy and of stated age  Behavior:  Cooperative, pleasant, no psychomotor agitation or retardation  Speech:  Normal rate, rhythm, prosody, and volume  Mood:  "Good"  Affect:  Congruent  Thought Process:  Linear, logical, goal directed  Thought Content:  Negative for suicidal ideation, homicidal ideation, delusions or hallucinations.  Associations:  Intact  Memory:  Grossly Intact  Level of Consciousness/Orientation:  Grossly intact  Fund of Knowledge:  Good  Attention:  Good  Language:  Fluent, able to name abstract and concrete objects  Insight:  Fair  Judgment:  Intact  Psychomotor signs:  No involuntary movements or tremor  Gait:  Normal    RELEVANT LABS/STUDIES:    Lab Results   Component Value Date    WBC 9.72 11/04/2020    HGB 16.2 11/04/2020    HCT 48.9 11/04/2020     (H) 11/04/2020     11/04/2020     BMP  Lab Results   Component Value Date     11/04/2020    K 4.4 11/04/2020     11/04/2020    CO2 28 11/04/2020    BUN 14 11/04/2020    CREATININE 0.9 11/04/2020    CALCIUM 9.6 11/04/2020    ANIONGAP 10 11/04/2020    ESTGFRAFRICA >60.0 11/04/2020    EGFRNONAA >60.0 11/04/2020     Lab Results   Component Value Date    ALT 60 (H) 11/04/2020    AST 37 11/04/2020    ALKPHOS 52 (L) 11/04/2020    BILITOT 0.7 11/04/2020     Lab Results   Component Value Date    TSH 1.154 11/04/2020       IMPRESSION:    Yosef Rowe is a 65 y.o. male with history of anxiety, depression, and insomnia who presents for follow up appointment.    Status/Progress:  Based on the examination today, the patient's problem(s) is/are inadequately controlled.  New problems have not been presented today.    Risk Parameters:  Patient reports no suicidal ideation  Patient reports no homicidal " ideation  Patient reports no self-injurious behavior  Patient reports no violent behavior    DIAGNOSES:    ICD-10-CM ICD-9-CM   1. Insomnia, unspecified type  G47.00 780.52   2. Recurrent major depressive disorder, in full remission  F33.42 296.36   3. Generalized anxiety disorder  F41.1 300.02       PLAN:  · Increase Lunesta to 2mg qHS x week.  He will then send me a message letting me know if this dose is more helpful before I send refills.  · Mood stable.  Will continue Cymbalta 60mg daily.  · Patient has significantly reduced his drinking.  He has upcoming labs to recheck liver function and blood counts.  · Discussed with patient informed consent, risks versus benefits, alternative treatments, side effect profile and the inherent unpredictability of individual responses to these treatments.  The patient expresses understanding of the above and displays the capacity to agree with this current plan.    RETURN TO CLINIC:  Follow up in about 4 weeks (around 12/14/2020).

## 2020-12-03 ENCOUNTER — PATIENT MESSAGE (OUTPATIENT)
Dept: PSYCHIATRY | Facility: CLINIC | Age: 65
End: 2020-12-03

## 2020-12-03 DIAGNOSIS — G47.00 INSOMNIA, UNSPECIFIED TYPE: Primary | ICD-10-CM

## 2020-12-08 RX ORDER — ESZOPICLONE 2 MG/1
2 TABLET, FILM COATED ORAL NIGHTLY
Qty: 30 TABLET | Refills: 2 | Status: SHIPPED | OUTPATIENT
Start: 2020-12-08 | End: 2021-01-07

## 2021-01-08 ENCOUNTER — LAB VISIT (OUTPATIENT)
Dept: LAB | Facility: HOSPITAL | Age: 66
End: 2021-01-08
Attending: INTERNAL MEDICINE
Payer: MEDICARE

## 2021-01-08 DIAGNOSIS — E78.5 HYPERLIPIDEMIA, UNSPECIFIED HYPERLIPIDEMIA TYPE: ICD-10-CM

## 2021-01-08 DIAGNOSIS — E66.01 OBESITY, CLASS III, BMI 40-49.9 (MORBID OBESITY): ICD-10-CM

## 2021-01-08 DIAGNOSIS — Q24.5 ANOMALOUS ORIGIN OF RIGHT CORONARY ARTERY: ICD-10-CM

## 2021-01-08 DIAGNOSIS — I25.84 CORONARY ATHEROSCLEROSIS DUE TO CALCIFIED CORONARY LESION: ICD-10-CM

## 2021-01-08 DIAGNOSIS — I10 ESSENTIAL HYPERTENSION: ICD-10-CM

## 2021-01-08 DIAGNOSIS — G47.33 OBSTRUCTIVE SLEEP APNEA SYNDROME: ICD-10-CM

## 2021-01-08 DIAGNOSIS — I25.10 CORONARY ATHEROSCLEROSIS DUE TO CALCIFIED CORONARY LESION: ICD-10-CM

## 2021-01-08 DIAGNOSIS — R06.02 SHORTNESS OF BREATH: ICD-10-CM

## 2021-01-08 LAB
ALBUMIN SERPL BCP-MCNC: 3.8 G/DL (ref 3.5–5.2)
ALP SERPL-CCNC: 59 U/L (ref 55–135)
ALT SERPL W/O P-5'-P-CCNC: 74 U/L (ref 10–44)
ANION GAP SERPL CALC-SCNC: 7 MMOL/L (ref 8–16)
AST SERPL-CCNC: 60 U/L (ref 10–40)
BILIRUB DIRECT SERPL-MCNC: 0.3 MG/DL (ref 0.1–0.3)
BILIRUB SERPL-MCNC: 0.8 MG/DL (ref 0.1–1)
BUN SERPL-MCNC: 14 MG/DL (ref 8–23)
CALCIUM SERPL-MCNC: 9.1 MG/DL (ref 8.7–10.5)
CHLORIDE SERPL-SCNC: 103 MMOL/L (ref 95–110)
CHOLEST SERPL-MCNC: 242 MG/DL (ref 120–199)
CHOLEST/HDLC SERPL: 4.7 {RATIO} (ref 2–5)
CO2 SERPL-SCNC: 29 MMOL/L (ref 23–29)
CREAT SERPL-MCNC: 0.9 MG/DL (ref 0.5–1.4)
EST. GFR  (AFRICAN AMERICAN): >60 ML/MIN/1.73 M^2
EST. GFR  (NON AFRICAN AMERICAN): >60 ML/MIN/1.73 M^2
GLUCOSE SERPL-MCNC: 105 MG/DL (ref 70–110)
HDLC SERPL-MCNC: 52 MG/DL (ref 40–75)
HDLC SERPL: 21.5 % (ref 20–50)
LDLC SERPL CALC-MCNC: 162.2 MG/DL (ref 63–159)
NONHDLC SERPL-MCNC: 190 MG/DL
POTASSIUM SERPL-SCNC: 4.7 MMOL/L (ref 3.5–5.1)
PROT SERPL-MCNC: 7.1 G/DL (ref 6–8.4)
SODIUM SERPL-SCNC: 139 MMOL/L (ref 136–145)
TRIGL SERPL-MCNC: 139 MG/DL (ref 30–150)

## 2021-01-08 PROCEDURE — 36415 COLL VENOUS BLD VENIPUNCTURE: CPT | Mod: PO

## 2021-01-08 PROCEDURE — 80061 LIPID PANEL: CPT

## 2021-01-08 PROCEDURE — 80076 HEPATIC FUNCTION PANEL: CPT

## 2021-01-08 PROCEDURE — 80048 BASIC METABOLIC PNL TOTAL CA: CPT

## 2021-01-11 ENCOUNTER — TELEPHONE (OUTPATIENT)
Dept: CARDIOLOGY | Facility: CLINIC | Age: 66
End: 2021-01-11

## 2021-01-11 ENCOUNTER — TELEPHONE (OUTPATIENT)
Dept: INTERNAL MEDICINE | Facility: CLINIC | Age: 66
End: 2021-01-11

## 2021-03-24 ENCOUNTER — PATIENT MESSAGE (OUTPATIENT)
Dept: PSYCHIATRY | Facility: CLINIC | Age: 66
End: 2021-03-24

## 2021-03-29 ENCOUNTER — OFFICE VISIT (OUTPATIENT)
Dept: PSYCHIATRY | Facility: CLINIC | Age: 66
End: 2021-03-29
Payer: MEDICARE

## 2021-03-29 DIAGNOSIS — G47.00 INSOMNIA, UNSPECIFIED TYPE: Primary | ICD-10-CM

## 2021-03-29 DIAGNOSIS — R79.89 ELEVATED LIVER FUNCTION TESTS: ICD-10-CM

## 2021-03-29 DIAGNOSIS — F33.42 RECURRENT MAJOR DEPRESSIVE DISORDER, IN FULL REMISSION: ICD-10-CM

## 2021-03-29 DIAGNOSIS — F41.1 GENERALIZED ANXIETY DISORDER: ICD-10-CM

## 2021-03-29 PROCEDURE — 99214 PR OFFICE/OUTPT VISIT, EST, LEVL IV, 30-39 MIN: ICD-10-PCS | Mod: 95,,, | Performed by: INTERNAL MEDICINE

## 2021-03-29 PROCEDURE — 1159F PR MEDICATION LIST DOCUMENTED IN MEDICAL RECORD: ICD-10-PCS | Mod: ,,, | Performed by: INTERNAL MEDICINE

## 2021-03-29 PROCEDURE — 99214 OFFICE O/P EST MOD 30 MIN: CPT | Mod: 95,,, | Performed by: INTERNAL MEDICINE

## 2021-03-29 PROCEDURE — 1159F MED LIST DOCD IN RCRD: CPT | Mod: ,,, | Performed by: INTERNAL MEDICINE

## 2021-03-31 ENCOUNTER — PATIENT MESSAGE (OUTPATIENT)
Dept: PSYCHIATRY | Facility: CLINIC | Age: 66
End: 2021-03-31

## 2021-03-31 DIAGNOSIS — G47.00 INSOMNIA, UNSPECIFIED TYPE: Primary | ICD-10-CM

## 2021-03-31 RX ORDER — DOXEPIN HYDROCHLORIDE 25 MG/1
25 CAPSULE ORAL NIGHTLY PRN
Qty: 30 CAPSULE | Refills: 1 | Status: SHIPPED | OUTPATIENT
Start: 2021-03-31 | End: 2021-06-07

## 2021-06-14 ENCOUNTER — NURSE TRIAGE (OUTPATIENT)
Dept: ADMINISTRATIVE | Facility: CLINIC | Age: 66
End: 2021-06-14

## 2021-06-16 ENCOUNTER — TELEPHONE (OUTPATIENT)
Dept: INTERNAL MEDICINE | Facility: CLINIC | Age: 66
End: 2021-06-16

## 2021-06-16 ENCOUNTER — OFFICE VISIT (OUTPATIENT)
Dept: INTERNAL MEDICINE | Facility: CLINIC | Age: 66
End: 2021-06-16
Payer: MEDICARE

## 2021-06-16 VITALS
HEIGHT: 68 IN | WEIGHT: 309.31 LBS | RESPIRATION RATE: 16 BRPM | SYSTOLIC BLOOD PRESSURE: 134 MMHG | HEART RATE: 60 BPM | TEMPERATURE: 98 F | BODY MASS INDEX: 46.88 KG/M2 | DIASTOLIC BLOOD PRESSURE: 78 MMHG

## 2021-06-16 DIAGNOSIS — E66.01 OBESITY, CLASS III, BMI 40-49.9 (MORBID OBESITY): Primary | ICD-10-CM

## 2021-06-16 DIAGNOSIS — E78.5 HYPERLIPIDEMIA, UNSPECIFIED HYPERLIPIDEMIA TYPE: ICD-10-CM

## 2021-06-16 DIAGNOSIS — M48.061 SPINAL STENOSIS OF LUMBAR REGION, UNSPECIFIED WHETHER NEUROGENIC CLAUDICATION PRESENT: ICD-10-CM

## 2021-06-16 DIAGNOSIS — R06.02 SHORTNESS OF BREATH: ICD-10-CM

## 2021-06-16 DIAGNOSIS — Z12.12 SCREENING FOR COLORECTAL CANCER: ICD-10-CM

## 2021-06-16 DIAGNOSIS — Z12.11 SCREENING FOR COLORECTAL CANCER: ICD-10-CM

## 2021-06-16 DIAGNOSIS — I25.84 CORONARY ATHEROSCLEROSIS DUE TO CALCIFIED CORONARY LESION: ICD-10-CM

## 2021-06-16 DIAGNOSIS — N39.41 URGENCY INCONTINENCE: ICD-10-CM

## 2021-06-16 DIAGNOSIS — Q24.5 ANOMALOUS ORIGIN OF RIGHT CORONARY ARTERY: ICD-10-CM

## 2021-06-16 DIAGNOSIS — N40.1 BENIGN PROSTATIC HYPERPLASIA WITH URINARY OBSTRUCTION: ICD-10-CM

## 2021-06-16 DIAGNOSIS — I25.10 CORONARY ATHEROSCLEROSIS DUE TO CALCIFIED CORONARY LESION: ICD-10-CM

## 2021-06-16 DIAGNOSIS — I10 ESSENTIAL HYPERTENSION: ICD-10-CM

## 2021-06-16 DIAGNOSIS — M54.9 BACK PAIN, UNSPECIFIED BACK LOCATION, UNSPECIFIED BACK PAIN LATERALITY, UNSPECIFIED CHRONICITY: ICD-10-CM

## 2021-06-16 DIAGNOSIS — G47.33 OBSTRUCTIVE SLEEP APNEA SYNDROME: ICD-10-CM

## 2021-06-16 DIAGNOSIS — R06.02 SHORT OF BREATH ON EXERTION: ICD-10-CM

## 2021-06-16 DIAGNOSIS — N13.8 BENIGN PROSTATIC HYPERPLASIA WITH URINARY OBSTRUCTION: ICD-10-CM

## 2021-06-16 PROCEDURE — 99214 OFFICE O/P EST MOD 30 MIN: CPT | Mod: S$GLB,,, | Performed by: STUDENT IN AN ORGANIZED HEALTH CARE EDUCATION/TRAINING PROGRAM

## 2021-06-16 PROCEDURE — 3008F PR BODY MASS INDEX (BMI) DOCUMENTED: ICD-10-PCS | Mod: S$GLB,,, | Performed by: STUDENT IN AN ORGANIZED HEALTH CARE EDUCATION/TRAINING PROGRAM

## 2021-06-16 PROCEDURE — 99999 PR PBB SHADOW E&M-EST. PATIENT-LVL V: CPT | Mod: PBBFAC,,, | Performed by: STUDENT IN AN ORGANIZED HEALTH CARE EDUCATION/TRAINING PROGRAM

## 2021-06-16 PROCEDURE — 1101F PT FALLS ASSESS-DOCD LE1/YR: CPT | Mod: S$GLB,,, | Performed by: STUDENT IN AN ORGANIZED HEALTH CARE EDUCATION/TRAINING PROGRAM

## 2021-06-16 PROCEDURE — 1159F MED LIST DOCD IN RCRD: CPT | Mod: S$GLB,,, | Performed by: STUDENT IN AN ORGANIZED HEALTH CARE EDUCATION/TRAINING PROGRAM

## 2021-06-16 PROCEDURE — 1125F PR PAIN SEVERITY QUANTIFIED, PAIN PRESENT: ICD-10-PCS | Mod: S$GLB,,, | Performed by: STUDENT IN AN ORGANIZED HEALTH CARE EDUCATION/TRAINING PROGRAM

## 2021-06-16 PROCEDURE — 99999 PR PBB SHADOW E&M-EST. PATIENT-LVL V: ICD-10-PCS | Mod: PBBFAC,,, | Performed by: STUDENT IN AN ORGANIZED HEALTH CARE EDUCATION/TRAINING PROGRAM

## 2021-06-16 PROCEDURE — 3008F BODY MASS INDEX DOCD: CPT | Mod: S$GLB,,, | Performed by: STUDENT IN AN ORGANIZED HEALTH CARE EDUCATION/TRAINING PROGRAM

## 2021-06-16 PROCEDURE — 1125F AMNT PAIN NOTED PAIN PRSNT: CPT | Mod: S$GLB,,, | Performed by: STUDENT IN AN ORGANIZED HEALTH CARE EDUCATION/TRAINING PROGRAM

## 2021-06-16 PROCEDURE — 3288F PR FALLS RISK ASSESSMENT DOCUMENTED: ICD-10-PCS | Mod: S$GLB,,, | Performed by: STUDENT IN AN ORGANIZED HEALTH CARE EDUCATION/TRAINING PROGRAM

## 2021-06-16 PROCEDURE — 1159F PR MEDICATION LIST DOCUMENTED IN MEDICAL RECORD: ICD-10-PCS | Mod: S$GLB,,, | Performed by: STUDENT IN AN ORGANIZED HEALTH CARE EDUCATION/TRAINING PROGRAM

## 2021-06-16 PROCEDURE — 1101F PR PT FALLS ASSESS DOC 0-1 FALLS W/OUT INJ PAST YR: ICD-10-PCS | Mod: S$GLB,,, | Performed by: STUDENT IN AN ORGANIZED HEALTH CARE EDUCATION/TRAINING PROGRAM

## 2021-06-16 PROCEDURE — 99214 PR OFFICE/OUTPT VISIT, EST, LEVL IV, 30-39 MIN: ICD-10-PCS | Mod: S$GLB,,, | Performed by: STUDENT IN AN ORGANIZED HEALTH CARE EDUCATION/TRAINING PROGRAM

## 2021-06-16 PROCEDURE — 3288F FALL RISK ASSESSMENT DOCD: CPT | Mod: S$GLB,,, | Performed by: STUDENT IN AN ORGANIZED HEALTH CARE EDUCATION/TRAINING PROGRAM

## 2021-06-16 RX ORDER — TRAMADOL HYDROCHLORIDE 100 MG/1
100 TABLET, EXTENDED RELEASE ORAL DAILY
Qty: 30 TABLET | Refills: 2 | Status: SHIPPED | OUTPATIENT
Start: 2021-06-16 | End: 2021-06-16

## 2021-06-16 RX ORDER — TRAMADOL HYDROCHLORIDE 50 MG/1
50 TABLET ORAL EVERY 6 HOURS PRN
Qty: 90 TABLET | Refills: 1 | Status: SHIPPED | OUTPATIENT
Start: 2021-06-16 | End: 2022-08-08

## 2021-06-16 RX ORDER — ATORVASTATIN CALCIUM 20 MG/1
20 TABLET, FILM COATED ORAL DAILY
Qty: 90 TABLET | Refills: 6 | Status: SHIPPED | OUTPATIENT
Start: 2021-06-16

## 2021-06-30 ENCOUNTER — PATIENT MESSAGE (OUTPATIENT)
Dept: ADMINISTRATIVE | Facility: HOSPITAL | Age: 66
End: 2021-06-30

## 2021-06-30 ENCOUNTER — PATIENT OUTREACH (OUTPATIENT)
Dept: ADMINISTRATIVE | Facility: HOSPITAL | Age: 66
End: 2021-06-30

## 2021-07-23 ENCOUNTER — PATIENT OUTREACH (OUTPATIENT)
Dept: ADMINISTRATIVE | Facility: OTHER | Age: 66
End: 2021-07-23

## 2021-07-26 ENCOUNTER — OFFICE VISIT (OUTPATIENT)
Dept: UROLOGY | Facility: CLINIC | Age: 66
End: 2021-07-26
Payer: MEDICARE

## 2021-07-26 VITALS
BODY MASS INDEX: 47.89 KG/M2 | WEIGHT: 305.13 LBS | DIASTOLIC BLOOD PRESSURE: 63 MMHG | HEIGHT: 67 IN | HEART RATE: 58 BPM | SYSTOLIC BLOOD PRESSURE: 124 MMHG

## 2021-07-26 DIAGNOSIS — N13.8 BENIGN PROSTATIC HYPERPLASIA WITH URINARY OBSTRUCTION: ICD-10-CM

## 2021-07-26 DIAGNOSIS — N40.1 BENIGN PROSTATIC HYPERPLASIA WITH URINARY OBSTRUCTION: ICD-10-CM

## 2021-07-26 DIAGNOSIS — N39.41 URGENCY INCONTINENCE: ICD-10-CM

## 2021-07-26 PROCEDURE — 1101F PR PT FALLS ASSESS DOC 0-1 FALLS W/OUT INJ PAST YR: ICD-10-PCS | Mod: S$GLB,,, | Performed by: UROLOGY

## 2021-07-26 PROCEDURE — 3288F PR FALLS RISK ASSESSMENT DOCUMENTED: ICD-10-PCS | Mod: S$GLB,,, | Performed by: UROLOGY

## 2021-07-26 PROCEDURE — 1159F MED LIST DOCD IN RCRD: CPT | Mod: S$GLB,,, | Performed by: UROLOGY

## 2021-07-26 PROCEDURE — 3078F DIAST BP <80 MM HG: CPT | Mod: S$GLB,,, | Performed by: UROLOGY

## 2021-07-26 PROCEDURE — 3074F SYST BP LT 130 MM HG: CPT | Mod: S$GLB,,, | Performed by: UROLOGY

## 2021-07-26 PROCEDURE — 99204 OFFICE O/P NEW MOD 45 MIN: CPT | Mod: S$GLB,,, | Performed by: UROLOGY

## 2021-07-26 PROCEDURE — 99204 PR OFFICE/OUTPT VISIT, NEW, LEVL IV, 45-59 MIN: ICD-10-PCS | Mod: S$GLB,,, | Performed by: UROLOGY

## 2021-07-26 PROCEDURE — 99999 PR PBB SHADOW E&M-EST. PATIENT-LVL III: CPT | Mod: PBBFAC,,, | Performed by: UROLOGY

## 2021-07-26 PROCEDURE — 3008F PR BODY MASS INDEX (BMI) DOCUMENTED: ICD-10-PCS | Mod: S$GLB,,, | Performed by: UROLOGY

## 2021-07-26 PROCEDURE — 3288F FALL RISK ASSESSMENT DOCD: CPT | Mod: S$GLB,,, | Performed by: UROLOGY

## 2021-07-26 PROCEDURE — 99999 PR PBB SHADOW E&M-EST. PATIENT-LVL III: ICD-10-PCS | Mod: PBBFAC,,, | Performed by: UROLOGY

## 2021-07-26 PROCEDURE — 1126F PR PAIN SEVERITY QUANTIFIED, NO PAIN PRESENT: ICD-10-PCS | Mod: S$GLB,,, | Performed by: UROLOGY

## 2021-07-26 PROCEDURE — 3074F PR MOST RECENT SYSTOLIC BLOOD PRESSURE < 130 MM HG: ICD-10-PCS | Mod: S$GLB,,, | Performed by: UROLOGY

## 2021-07-26 PROCEDURE — 1159F PR MEDICATION LIST DOCUMENTED IN MEDICAL RECORD: ICD-10-PCS | Mod: S$GLB,,, | Performed by: UROLOGY

## 2021-07-26 PROCEDURE — 1126F AMNT PAIN NOTED NONE PRSNT: CPT | Mod: S$GLB,,, | Performed by: UROLOGY

## 2021-07-26 PROCEDURE — 3078F PR MOST RECENT DIASTOLIC BLOOD PRESSURE < 80 MM HG: ICD-10-PCS | Mod: S$GLB,,, | Performed by: UROLOGY

## 2021-07-26 PROCEDURE — 1101F PT FALLS ASSESS-DOCD LE1/YR: CPT | Mod: S$GLB,,, | Performed by: UROLOGY

## 2021-07-26 PROCEDURE — 3008F BODY MASS INDEX DOCD: CPT | Mod: S$GLB,,, | Performed by: UROLOGY

## 2021-07-26 RX ORDER — TOLTERODINE 4 MG/1
4 CAPSULE, EXTENDED RELEASE ORAL DAILY
Qty: 30 CAPSULE | Refills: 11 | Status: SHIPPED | OUTPATIENT
Start: 2021-07-26 | End: 2022-08-08

## 2021-07-26 RX ORDER — TAMSULOSIN HYDROCHLORIDE 0.4 MG/1
0.4 CAPSULE ORAL DAILY
Qty: 30 CAPSULE | Refills: 11 | Status: SHIPPED | OUTPATIENT
Start: 2021-07-26 | End: 2022-07-26

## 2021-08-17 RX ORDER — TRAZODONE HYDROCHLORIDE 50 MG/1
50 TABLET ORAL NIGHTLY PRN
Qty: 30 TABLET | Refills: 0 | Status: SHIPPED | OUTPATIENT
Start: 2021-08-17 | End: 2021-09-07 | Stop reason: SDUPTHER

## 2021-09-07 ENCOUNTER — TELEPHONE (OUTPATIENT)
Dept: PSYCHIATRY | Facility: HOSPITAL | Age: 66
End: 2021-09-07

## 2021-09-07 ENCOUNTER — PATIENT MESSAGE (OUTPATIENT)
Dept: PSYCHIATRY | Facility: HOSPITAL | Age: 66
End: 2021-09-07

## 2021-09-07 DIAGNOSIS — F41.1 GENERALIZED ANXIETY DISORDER: ICD-10-CM

## 2021-09-07 DIAGNOSIS — F33.42 RECURRENT MAJOR DEPRESSIVE DISORDER, IN FULL REMISSION: ICD-10-CM

## 2021-09-07 DIAGNOSIS — G47.00 INSOMNIA, UNSPECIFIED TYPE: ICD-10-CM

## 2021-09-07 RX ORDER — DULOXETIN HYDROCHLORIDE 60 MG/1
60 CAPSULE, DELAYED RELEASE ORAL DAILY
Qty: 30 CAPSULE | Refills: 0 | Status: SHIPPED | OUTPATIENT
Start: 2021-09-07 | End: 2021-11-08

## 2021-09-07 RX ORDER — DULOXETIN HYDROCHLORIDE 60 MG/1
60 CAPSULE, DELAYED RELEASE ORAL DAILY
Qty: 30 CAPSULE | Refills: 0 | Status: SHIPPED | OUTPATIENT
Start: 2021-09-07 | End: 2021-09-07 | Stop reason: SDUPTHER

## 2021-09-07 RX ORDER — TRAZODONE HYDROCHLORIDE 50 MG/1
50 TABLET ORAL NIGHTLY PRN
Qty: 30 TABLET | Refills: 0 | Status: SHIPPED | OUTPATIENT
Start: 2021-09-07 | End: 2022-08-08

## 2021-09-07 RX ORDER — DOXEPIN HYDROCHLORIDE 25 MG/1
25 CAPSULE ORAL NIGHTLY PRN
Qty: 30 CAPSULE | Refills: 0 | Status: SHIPPED | OUTPATIENT
Start: 2021-09-07 | End: 2022-08-08

## 2022-02-16 ENCOUNTER — OFFICE VISIT (OUTPATIENT)
Dept: FAMILY MEDICINE | Facility: CLINIC | Age: 67
End: 2022-02-16
Payer: MEDICARE

## 2022-02-16 VITALS
OXYGEN SATURATION: 96 % | HEART RATE: 63 BPM | DIASTOLIC BLOOD PRESSURE: 86 MMHG | SYSTOLIC BLOOD PRESSURE: 146 MMHG | WEIGHT: 311.31 LBS | BODY MASS INDEX: 48.86 KG/M2 | HEIGHT: 67 IN

## 2022-02-16 DIAGNOSIS — L40.0 PLAQUE PSORIASIS: ICD-10-CM

## 2022-02-16 DIAGNOSIS — L03.011 CELLULITIS OF FINGER OF RIGHT HAND: Primary | ICD-10-CM

## 2022-02-16 DIAGNOSIS — I10 ESSENTIAL HYPERTENSION: ICD-10-CM

## 2022-02-16 PROCEDURE — 3079F DIAST BP 80-89 MM HG: CPT | Mod: CPTII,S$GLB,, | Performed by: FAMILY MEDICINE

## 2022-02-16 PROCEDURE — 1160F PR REVIEW ALL MEDS BY PRESCRIBER/CLIN PHARMACIST DOCUMENTED: ICD-10-PCS | Mod: CPTII,S$GLB,, | Performed by: FAMILY MEDICINE

## 2022-02-16 PROCEDURE — 3077F SYST BP >= 140 MM HG: CPT | Mod: CPTII,S$GLB,, | Performed by: FAMILY MEDICINE

## 2022-02-16 PROCEDURE — 1159F MED LIST DOCD IN RCRD: CPT | Mod: CPTII,S$GLB,, | Performed by: FAMILY MEDICINE

## 2022-02-16 PROCEDURE — 3079F PR MOST RECENT DIASTOLIC BLOOD PRESSURE 80-89 MM HG: ICD-10-PCS | Mod: CPTII,S$GLB,, | Performed by: FAMILY MEDICINE

## 2022-02-16 PROCEDURE — 3008F BODY MASS INDEX DOCD: CPT | Mod: CPTII,S$GLB,, | Performed by: FAMILY MEDICINE

## 2022-02-16 PROCEDURE — 99213 PR OFFICE/OUTPT VISIT, EST, LEVL III, 20-29 MIN: ICD-10-PCS | Mod: S$GLB,,, | Performed by: FAMILY MEDICINE

## 2022-02-16 PROCEDURE — 1101F PT FALLS ASSESS-DOCD LE1/YR: CPT | Mod: CPTII,S$GLB,, | Performed by: FAMILY MEDICINE

## 2022-02-16 PROCEDURE — 3008F PR BODY MASS INDEX (BMI) DOCUMENTED: ICD-10-PCS | Mod: CPTII,S$GLB,, | Performed by: FAMILY MEDICINE

## 2022-02-16 PROCEDURE — 99999 PR PBB SHADOW E&M-EST. PATIENT-LVL III: ICD-10-PCS | Mod: PBBFAC,,, | Performed by: FAMILY MEDICINE

## 2022-02-16 PROCEDURE — 1101F PR PT FALLS ASSESS DOC 0-1 FALLS W/OUT INJ PAST YR: ICD-10-PCS | Mod: CPTII,S$GLB,, | Performed by: FAMILY MEDICINE

## 2022-02-16 PROCEDURE — 1159F PR MEDICATION LIST DOCUMENTED IN MEDICAL RECORD: ICD-10-PCS | Mod: CPTII,S$GLB,, | Performed by: FAMILY MEDICINE

## 2022-02-16 PROCEDURE — 3288F PR FALLS RISK ASSESSMENT DOCUMENTED: ICD-10-PCS | Mod: CPTII,S$GLB,, | Performed by: FAMILY MEDICINE

## 2022-02-16 PROCEDURE — 3077F PR MOST RECENT SYSTOLIC BLOOD PRESSURE >= 140 MM HG: ICD-10-PCS | Mod: CPTII,S$GLB,, | Performed by: FAMILY MEDICINE

## 2022-02-16 PROCEDURE — 99213 OFFICE O/P EST LOW 20 MIN: CPT | Mod: S$GLB,,, | Performed by: FAMILY MEDICINE

## 2022-02-16 PROCEDURE — 1125F AMNT PAIN NOTED PAIN PRSNT: CPT | Mod: CPTII,S$GLB,, | Performed by: FAMILY MEDICINE

## 2022-02-16 PROCEDURE — 1160F RVW MEDS BY RX/DR IN RCRD: CPT | Mod: CPTII,S$GLB,, | Performed by: FAMILY MEDICINE

## 2022-02-16 PROCEDURE — 99999 PR PBB SHADOW E&M-EST. PATIENT-LVL III: CPT | Mod: PBBFAC,,, | Performed by: FAMILY MEDICINE

## 2022-02-16 PROCEDURE — 3288F FALL RISK ASSESSMENT DOCD: CPT | Mod: CPTII,S$GLB,, | Performed by: FAMILY MEDICINE

## 2022-02-16 PROCEDURE — 1125F PR PAIN SEVERITY QUANTIFIED, PAIN PRESENT: ICD-10-PCS | Mod: CPTII,S$GLB,, | Performed by: FAMILY MEDICINE

## 2022-02-16 RX ORDER — CEPHALEXIN 500 MG/1
500 CAPSULE ORAL EVERY 12 HOURS
Qty: 14 CAPSULE | Refills: 0 | Status: SHIPPED | OUTPATIENT
Start: 2022-02-16 | End: 2022-02-23

## 2022-02-16 RX ORDER — TRIAMCINOLONE ACETONIDE 1 MG/G
OINTMENT TOPICAL 2 TIMES DAILY
Qty: 30 G | Refills: 0 | Status: SHIPPED | OUTPATIENT
Start: 2022-02-16 | End: 2022-08-08

## 2022-02-16 NOTE — PROGRESS NOTES
Subjective:       Patient ID: Yosef Rowe is a 66 y.o. male.    Chief Complaint: Hand Pain    Patient Active Problem List   Diagnosis    ED (erectile dysfunction) of organic origin    Knee pain    Insomnia    Jock itch    Penile lesion    Sleep apnea    Essential hypertension    Osteoarthritis, knee    LBP (low back pain)    Urinary outflow obstruction    Urgency incontinence    BPH (benign prostatic hypertrophy) with urinary obstruction    Recurrent major depressive disorder, in full remission    Generalized anxiety disorder    Coronary atherosclerosis due to calcified coronary lesion    Shortness of breath    Obesity, Class III, BMI 40-49.9 (morbid obesity)    Anomalous origin of right coronary artery    Hyperlipidemia    Tobacco abuse    Varicose veins of both lower extremities    Edema    Hypernatremia    Primary osteoarthritis of left knee    Infected prosthetic left unicompartmental knee replacement    Left leg swelling    Infection of prosthetic left knee joint    Nipple pain    Chronic fatigue    Loss of appetite    Weight loss    History of heavy alcohol consumption    Elevated liver function tests      HPI  65 yo male presents today with swelling of right hand and redness x 2-3 days. Reports that he has this dry plaque on his dorsal wrist on R for past couple of months. Used OTC non steroidal ointments and creams without relief. Used Lotrimin few days ago and then noted swelling. No fever/chills.    No pain, no itching. Reports no animal bite or broken skin in area. No drainage. 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     Review of Systems   All other systems reviewed and are negative.         Results for orders placed or performed in visit on 01/08/21   Lipid Panel   Result Value Ref Range    Cholesterol 242 (H) 120 - 199 mg/dL    Triglycerides 139 30 - 150 mg/dL    HDL 52 40 - 75 mg/dL    LDL Cholesterol 162.2 (H) 63.0 - 159.0 mg/dL    HDL/Cholesterol Ratio 21.5 20.0 -  50.0 %    Total Cholesterol/HDL Ratio 4.7 2.0 - 5.0    Non-HDL Cholesterol 190 mg/dL   Hepatic Function Panel   Result Value Ref Range    Total Protein 7.1 6.0 - 8.4 g/dL    Albumin 3.8 3.5 - 5.2 g/dL    Total Bilirubin 0.8 0.1 - 1.0 mg/dL    Bilirubin, Direct 0.3 0.1 - 0.3 mg/dL    AST 60 (H) 10 - 40 U/L    ALT 74 (H) 10 - 44 U/L    Alkaline Phosphatase 59 55 - 135 U/L   Basic Metabolic Panel   Result Value Ref Range    Sodium 139 136 - 145 mmol/L    Potassium 4.7 3.5 - 5.1 mmol/L    Chloride 103 95 - 110 mmol/L    CO2 29 23 - 29 mmol/L    Glucose 105 70 - 110 mg/dL    BUN 14 8 - 23 mg/dL    Creatinine 0.9 0.5 - 1.4 mg/dL    Calcium 9.1 8.7 - 10.5 mg/dL    Anion Gap 7 (L) 8 - 16 mmol/L    eGFR if African American >60.0 >60 mL/min/1.73 m^2    eGFR if non African American >60.0 >60 mL/min/1.73 m^2     Objective:     Vitals:    02/16/22 1041   BP: (!) 146/86   Pulse: 63        Physical Exam  Musculoskeletal:      Comments: Right hand more swollen than left, Warmth same, both warm. Mild redness to right. Cap refill same bilaterally, swelling makes it hard to enclose  on right as much as left but still able to have full ROM. Forearm without swelling. No local pain. Thickened 3.5 cm scaley plaque on R wrist, dorsum. 2+ radial pulse bilaterally.          Assessment:       1. Cellulitis of finger of right hand    2. Plaque psoriasis    3. Essential hypertension        Plan:         Given swelling, erythema and warmth, will treat as cellulitis. No nearby or axillary LAD noted. No animal bite history or purulent discharge/abscess. Discussed close monitoring and seeking further eval if no improvement after 48 hrs after abx. Dominant hand is right.     After completion of abx, if plaque still present, can try topical steroid over it. Appears to be plaque psoriasis, unclear if broken skin at some point leading to cellulitic infection. Last renal function reviewed from 1/2021.    Cellulitis of finger of right hand  -      cephALEXin (KEFLEX) 500 MG capsule; Take 1 capsule (500 mg total) by mouth every 12 (twelve) hours. for 7 days  Dispense: 14 capsule; Refill: 0    Plaque psoriasis  -     triamcinolone acetonide 0.1% (KENALOG) 0.1 % ointment; Apply topically 2 (two) times daily.  Dispense: 30 g; Refill: 0    Essential hypertension  - f/u with PCP. Trend. May be due to acute incident.     Patient's questions answered. Plan reviewed with patient at the end of visit. Relevant precautions to chief complaint and reasons to seek medical care or contact the office sooner reviewed with patient.     Follow up if symptoms worsen or fail to improve.

## 2022-03-11 ENCOUNTER — PATIENT MESSAGE (OUTPATIENT)
Dept: ADMINISTRATIVE | Facility: HOSPITAL | Age: 67
End: 2022-03-11
Payer: MEDICARE

## 2022-04-05 ENCOUNTER — OFFICE VISIT (OUTPATIENT)
Dept: INTERNAL MEDICINE | Facility: CLINIC | Age: 67
End: 2022-04-05
Payer: MEDICARE

## 2022-04-05 VITALS
RESPIRATION RATE: 20 BRPM | SYSTOLIC BLOOD PRESSURE: 144 MMHG | HEIGHT: 68 IN | DIASTOLIC BLOOD PRESSURE: 74 MMHG | BODY MASS INDEX: 46.27 KG/M2 | OXYGEN SATURATION: 98 % | HEART RATE: 60 BPM | TEMPERATURE: 97 F | WEIGHT: 305.31 LBS

## 2022-04-05 DIAGNOSIS — E66.01 OBESITY, CLASS III, BMI 40-49.9 (MORBID OBESITY): ICD-10-CM

## 2022-04-05 DIAGNOSIS — I25.84 CORONARY ATHEROSCLEROSIS DUE TO CALCIFIED CORONARY LESION: ICD-10-CM

## 2022-04-05 DIAGNOSIS — I10 ESSENTIAL HYPERTENSION: ICD-10-CM

## 2022-04-05 DIAGNOSIS — I25.10 CORONARY ATHEROSCLEROSIS DUE TO CALCIFIED CORONARY LESION: ICD-10-CM

## 2022-04-05 DIAGNOSIS — E55.9 HYPOVITAMINOSIS D: ICD-10-CM

## 2022-04-05 DIAGNOSIS — Z12.5 PROSTATE CANCER SCREENING: ICD-10-CM

## 2022-04-05 DIAGNOSIS — Q24.5 ANOMALOUS ORIGIN OF RIGHT CORONARY ARTERY: ICD-10-CM

## 2022-04-05 DIAGNOSIS — E78.5 HYPERLIPIDEMIA, UNSPECIFIED HYPERLIPIDEMIA TYPE: ICD-10-CM

## 2022-04-05 DIAGNOSIS — G47.33 OBSTRUCTIVE SLEEP APNEA SYNDROME: ICD-10-CM

## 2022-04-05 DIAGNOSIS — R06.02 SHORTNESS OF BREATH: ICD-10-CM

## 2022-04-05 DIAGNOSIS — Z13.6 SCREENING FOR AAA (ABDOMINAL AORTIC ANEURYSM): ICD-10-CM

## 2022-04-05 DIAGNOSIS — Z00.00 PHYSICAL EXAM, ANNUAL: Primary | ICD-10-CM

## 2022-04-05 DIAGNOSIS — R73.09 ABNORMAL GLUCOSE: ICD-10-CM

## 2022-04-05 PROCEDURE — 1100F PR PT FALLS ASSESS DOC 2+ FALLS/FALL W/INJURY/YR: ICD-10-PCS | Mod: CPTII,S$GLB,, | Performed by: STUDENT IN AN ORGANIZED HEALTH CARE EDUCATION/TRAINING PROGRAM

## 2022-04-05 PROCEDURE — 3008F PR BODY MASS INDEX (BMI) DOCUMENTED: ICD-10-PCS | Mod: CPTII,S$GLB,, | Performed by: STUDENT IN AN ORGANIZED HEALTH CARE EDUCATION/TRAINING PROGRAM

## 2022-04-05 PROCEDURE — 99999 PR PBB SHADOW E&M-EST. PATIENT-LVL III: ICD-10-PCS | Mod: PBBFAC,,, | Performed by: STUDENT IN AN ORGANIZED HEALTH CARE EDUCATION/TRAINING PROGRAM

## 2022-04-05 PROCEDURE — G0009 ADMIN PNEUMOCOCCAL VACCINE: HCPCS | Mod: S$GLB,,, | Performed by: STUDENT IN AN ORGANIZED HEALTH CARE EDUCATION/TRAINING PROGRAM

## 2022-04-05 PROCEDURE — 3077F PR MOST RECENT SYSTOLIC BLOOD PRESSURE >= 140 MM HG: ICD-10-PCS | Mod: CPTII,S$GLB,, | Performed by: STUDENT IN AN ORGANIZED HEALTH CARE EDUCATION/TRAINING PROGRAM

## 2022-04-05 PROCEDURE — 90732 PPSV23 VACC 2 YRS+ SUBQ/IM: CPT | Mod: S$GLB,,, | Performed by: STUDENT IN AN ORGANIZED HEALTH CARE EDUCATION/TRAINING PROGRAM

## 2022-04-05 PROCEDURE — 99999 PR PBB SHADOW E&M-EST. PATIENT-LVL III: CPT | Mod: PBBFAC,,, | Performed by: STUDENT IN AN ORGANIZED HEALTH CARE EDUCATION/TRAINING PROGRAM

## 2022-04-05 PROCEDURE — 3288F FALL RISK ASSESSMENT DOCD: CPT | Mod: CPTII,S$GLB,, | Performed by: STUDENT IN AN ORGANIZED HEALTH CARE EDUCATION/TRAINING PROGRAM

## 2022-04-05 PROCEDURE — 3078F DIAST BP <80 MM HG: CPT | Mod: CPTII,S$GLB,, | Performed by: STUDENT IN AN ORGANIZED HEALTH CARE EDUCATION/TRAINING PROGRAM

## 2022-04-05 PROCEDURE — 99214 PR OFFICE/OUTPT VISIT, EST, LEVL IV, 30-39 MIN: ICD-10-PCS | Mod: 25,S$GLB,, | Performed by: STUDENT IN AN ORGANIZED HEALTH CARE EDUCATION/TRAINING PROGRAM

## 2022-04-05 PROCEDURE — 1159F MED LIST DOCD IN RCRD: CPT | Mod: CPTII,S$GLB,, | Performed by: STUDENT IN AN ORGANIZED HEALTH CARE EDUCATION/TRAINING PROGRAM

## 2022-04-05 PROCEDURE — 3077F SYST BP >= 140 MM HG: CPT | Mod: CPTII,S$GLB,, | Performed by: STUDENT IN AN ORGANIZED HEALTH CARE EDUCATION/TRAINING PROGRAM

## 2022-04-05 PROCEDURE — 3008F BODY MASS INDEX DOCD: CPT | Mod: CPTII,S$GLB,, | Performed by: STUDENT IN AN ORGANIZED HEALTH CARE EDUCATION/TRAINING PROGRAM

## 2022-04-05 PROCEDURE — 1126F AMNT PAIN NOTED NONE PRSNT: CPT | Mod: CPTII,S$GLB,, | Performed by: STUDENT IN AN ORGANIZED HEALTH CARE EDUCATION/TRAINING PROGRAM

## 2022-04-05 PROCEDURE — G0009 PNEUMOCOCCAL POLYSACCHARIDE VACCINE 23-VALENT =>2YO SQ IM: ICD-10-PCS | Mod: S$GLB,,, | Performed by: STUDENT IN AN ORGANIZED HEALTH CARE EDUCATION/TRAINING PROGRAM

## 2022-04-05 PROCEDURE — 1159F PR MEDICATION LIST DOCUMENTED IN MEDICAL RECORD: ICD-10-PCS | Mod: CPTII,S$GLB,, | Performed by: STUDENT IN AN ORGANIZED HEALTH CARE EDUCATION/TRAINING PROGRAM

## 2022-04-05 PROCEDURE — 90732 PNEUMOCOCCAL POLYSACCHARIDE VACCINE 23-VALENT =>2YO SQ IM: ICD-10-PCS | Mod: S$GLB,,, | Performed by: STUDENT IN AN ORGANIZED HEALTH CARE EDUCATION/TRAINING PROGRAM

## 2022-04-05 PROCEDURE — 3288F PR FALLS RISK ASSESSMENT DOCUMENTED: ICD-10-PCS | Mod: CPTII,S$GLB,, | Performed by: STUDENT IN AN ORGANIZED HEALTH CARE EDUCATION/TRAINING PROGRAM

## 2022-04-05 PROCEDURE — 1100F PTFALLS ASSESS-DOCD GE2>/YR: CPT | Mod: CPTII,S$GLB,, | Performed by: STUDENT IN AN ORGANIZED HEALTH CARE EDUCATION/TRAINING PROGRAM

## 2022-04-05 PROCEDURE — 1126F PR PAIN SEVERITY QUANTIFIED, NO PAIN PRESENT: ICD-10-PCS | Mod: CPTII,S$GLB,, | Performed by: STUDENT IN AN ORGANIZED HEALTH CARE EDUCATION/TRAINING PROGRAM

## 2022-04-05 PROCEDURE — 3078F PR MOST RECENT DIASTOLIC BLOOD PRESSURE < 80 MM HG: ICD-10-PCS | Mod: CPTII,S$GLB,, | Performed by: STUDENT IN AN ORGANIZED HEALTH CARE EDUCATION/TRAINING PROGRAM

## 2022-04-05 PROCEDURE — 99214 OFFICE O/P EST MOD 30 MIN: CPT | Mod: 25,S$GLB,, | Performed by: STUDENT IN AN ORGANIZED HEALTH CARE EDUCATION/TRAINING PROGRAM

## 2022-04-05 RX ORDER — LOSARTAN POTASSIUM 25 MG/1
25 TABLET ORAL DAILY
Qty: 30 TABLET | Refills: 6 | Status: SHIPPED | OUTPATIENT
Start: 2022-04-05 | End: 2022-08-08

## 2022-04-05 NOTE — PROGRESS NOTES
Subjective:      Chief Complaint: Annual Exam    HPI   Mr. Rowe is a 65 yo M with OA, chronic low back pain with lumbar radiculopathy, s/p L-knee replacement (and treatemnt for infected hardware), SOLITARIO, depression, HTN, HLD, Anomalous origin of right coronary artery, CA-atherosclerosis, BPH, urinary urgency, ED, morbid obesity, IVORY, and Tobacco use presenting for  annual physical:    BPH:  -lost to urology follow-up  -has Flomax but not currently taking    HTN:  - losartan self discontinued prior to annual physical last year  - now hypertensive; willing to reinitiate    The 10-year ASCVD risk score (Bobo MOBLEY Jr., et al., 2013) is: 28.9%    Values used to calculate the score:      Age: 66 years      Sex: Male      Is Non- : No      Diabetic: No      Tobacco smoker: Yes      Systolic Blood Pressure: 144 mmHg      Is BP treated: Yes      HDL Cholesterol: 52 mg/dL      Total Cholesterol: 242 mg/dL      Family, social, surgical Hx reviewed     Health Maintenance:  Prostate CA screening:  Due for eval  Colorectal CA screening:  Cologuard ordered 6/21 but never returned  Cholesterol:  Due for eval  Vaccines:  Due for completion of pneumococcal series, annual influenza, Shingrix , Tdap, and COVID series  Diet and Exercise: Discussed current recommendations     Past Medical History:   Diagnosis Date    Anxiety     Arthritis     Depression     when turned 49-lost job and mother unwell at that time    Hyperlipidemia     Lumbar radiculopathy     BRETT- 2011    Osteoarthritis     Sleep apnea     Urinary tract infection      Past Surgical History:   Procedure Laterality Date    IRRIGATION AND DEBRIDEMENT OF LOWER EXTREMITY Left 7/3/2018    Procedure: IRRIGATION AND DEBRIDEMENT, LOWER EXTREMITY, poly exchange left uni knee replacement. Stryler. 9L NS;  Surgeon: Ruperto Olivarez MD;  Location: [facility] OR [address] [place] [address];  Service: Orthopedics;  Laterality: Left;    IRRIGATION AND DEBRIDEMENT OF LOWER EXTREMITY  Left 7/7/2018    Procedure: IRRIGATION AND DEBRIDEMENT, LOWER EXTREMITY;  Surgeon: Orlin Jordan MD;  Location: [facility] OR [facility];  Service: Orthopedics;  Laterality: Left;    JOINT REPLACEMENT      knee arthroscopicc surgeries      Right knee- 2007  and Left knee- 2008    KNEE SURGERY      Left wrist Surgery      2000 for a torn ligament from Golf    TOTAL KNEE REPLACEMENT USING COMPUTER NAVIGATION Left 6/22/2018    Procedure: REPLACEMENT-KNEE WITH NAVIGATION-- unicondylar-- medial;  Surgeon: Orlin Jordan MD;  Location: [facility] OR [facility];  Service: Orthopedics;  Laterality: Left;     Family History   Problem Relation Age of Onset    Parkinsonism Mother     Heart attack Neg Hx     Heart disease Neg Hx     Heart failure Neg Hx     Hyperlipidemia Neg Hx     Hypertension Neg Hx     Stroke Neg Hx      Social History     Socioeconomic History    Marital status:    Tobacco Use    Smoking status: Current Some Day Smoker     Types: Cigars    Smokeless tobacco: Never Used    Tobacco comment: maybe one a month   Substance and Sexual Activity    Alcohol use: Yes     Alcohol/week: 5.0 standard drinks     Types: 5 Standard drinks or equivalent per week     Comment: Daily, last use yesterday    Drug use: No    Sexual activity: Yes     Partners: Female     Review of patient's allergies indicates:   Allergen Reactions    No known drug allergies      Yosef Rowe had no medications administered during this visit.    Review of Systems   Constitutional: Negative for appetite change, chills and fever.   HENT: Negative.    Respiratory: Negative for cough, chest tightness and shortness of breath.    Cardiovascular: Negative for chest pain, palpitations and leg swelling.   Gastrointestinal: Negative for abdominal distention, abdominal pain, blood in stool, constipation, diarrhea, nausea and vomiting.   Endocrine: Negative.    Genitourinary: Negative for difficulty urinating, dysuria, frequency and hematuria.    Musculoskeletal: Negative.    Integumentary:  Negative.   Neurological: Negative.    Psychiatric/Behavioral: Negative.          Objective:      Vitals:    04/05/22 1429   BP: (!) 144/74   Pulse: 60   Resp: 20   Temp: 97.2 °F (36.2 °C)      Physical Exam  Vitals reviewed.   Constitutional:       General: He is not in acute distress.     Appearance: Normal appearance.   HENT:      Head: Normocephalic and atraumatic.      Comments: Facial features are symmetric      Nose: Nose normal. No congestion or rhinorrhea.      Mouth/Throat:      Mouth: Mucous membranes are moist.      Pharynx: Oropharynx is clear. No oropharyngeal exudate or posterior oropharyngeal erythema.   Eyes:      General: No scleral icterus.     Extraocular Movements: Extraocular movements intact.      Conjunctiva/sclera: Conjunctivae normal.   Cardiovascular:      Rate and Rhythm: Normal rate and regular rhythm.      Pulses: Normal pulses.      Heart sounds: Normal heart sounds.   Pulmonary:      Effort: Pulmonary effort is normal. No respiratory distress.      Breath sounds: Normal breath sounds.   Musculoskeletal:         General: No deformity or signs of injury. Normal range of motion.      Cervical back: Normal range of motion.      Comments: Gait normal    Skin:     General: Skin is warm and dry.      Findings: No rash.      Comments: A number of ecchymoses and abrasions noted to bilateral knees and forearms from recent mechanical fall noted   Neurological:      General: No focal deficit present.      Mental Status: He is alert and oriented to person, place, and time. Mental status is at baseline.   Psychiatric:         Mood and Affect: Mood normal.         Behavior: Behavior normal.         Thought Content: Thought content normal.       Current Outpatient Medications on File Prior to Visit   Medication Sig Dispense Refill    DULoxetine (CYMBALTA) 60 MG capsule TAKE 1 CAPSULE(60 MG) BY MOUTH EVERY DAY 30 capsule 5    atorvastatin (LIPITOR) 20  MG tablet Take 1 tablet (20 mg total) by mouth once daily. (Patient not taking: Reported on 4/5/2022) 90 tablet 6    doxepin (SINEQUAN) 25 MG capsule Take 1 capsule (25 mg total) by mouth nightly as needed (Insomnia). Please call [phone] to schedule follow up appointment. (Patient not taking: Reported on 4/5/2022) 30 capsule 0    tamsulosin (FLOMAX) 0.4 mg Cap Take 0.4 mg by mouth once daily.      tamsulosin (FLOMAX) 0.4 mg Cap Take 1 capsule (0.4 mg total) by mouth once daily. (Patient not taking: No sig reported) 30 capsule 11    tolterodine (DETROL LA) 4 MG 24 hr capsule Take 1 capsule (4 mg total) by mouth once daily. 30 capsule 11    traMADoL (ULTRAM) 50 mg tablet Take 1 tablet (50 mg total) by mouth every 6 (six) hours as needed for Pain. (Patient not taking: No sig reported) 90 tablet 1    traZODone (DESYREL) 50 MG tablet Take 1 tablet (50 mg total) by mouth nightly as needed for Insomnia. (Patient not taking: No sig reported) 30 tablet 0    triamcinolone acetonide 0.1% (KENALOG) 0.1 % ointment Apply topically 2 (two) times daily. (Patient not taking: Reported on 4/5/2022) 30 g 0    [DISCONTINUED] losartan (COZAAR) 25 MG tablet Take 1 tablet (25 mg total) by mouth once daily. (Patient not taking: No sig reported) 30 tablet 6     No current facility-administered medications on file prior to visit.         Assessment:       1. Physical exam, annual    2. Essential hypertension    3. Hyperlipidemia, unspecified hyperlipidemia type    4. Abnormal glucose    5. Hypovitaminosis D    6. Prostate cancer screening    7. Coronary atherosclerosis due to calcified coronary lesion    8. Shortness of breath    9. Obstructive sleep apnea syndrome    10. Obesity, Class III, BMI 40-49.9 (morbid obesity)    11. Anomalous origin of right coronary artery    12. Screening for AAA (abdominal aortic aneurysm)        Plan:       Physical exam, annual  -     CBC Auto Differential; Future; Expected date: 04/05/2022  -      Comprehensive Metabolic Panel; Future; Expected date: 04/05/2022  -     Hemoglobin A1C; Future; Expected date: 04/05/2022  -     Lipid Panel; Future; Expected date: 04/05/2022  -     T4; Future; Expected date: 04/05/2022  -     TSH; Future; Expected date: 04/05/2022  -     PSA, Screening; Future; Expected date: 04/05/2022  -     Vitamin D; Future; Expected date: 04/05/2022   - annual screening labs ordered    Essential hypertension  -     CBC Auto Differential; Future; Expected date: 04/05/2022  -     Comprehensive Metabolic Panel; Future; Expected date: 04/05/2022  -     T4; Future; Expected date: 04/05/2022  -     TSH; Future; Expected date: 04/05/2022  -     losartan (COZAAR) 25 MG tablet; Take 1 tablet (25 mg total) by mouth once daily.  Dispense: 30 tablet; Refill: 6   - losartan re-initiated   - one-month follow-up set for re-evaluation; can cancel if called in home values or normotensive    Hyperlipidemia, unspecified hyperlipidemia type  -     Lipid Panel; Future; Expected date: 04/05/2022   - also self discontinued statin   - will recheck and consider reinitiating versus increasing dose    Abnormal glucose  -     Hemoglobin A1C; Future; Expected date: 04/05/2022    Hypovitaminosis D  -     Vitamin D; Future; Expected date: 04/05/2022    Prostate cancer screening  -     PSA, Screening; Future; Expected date: 04/05/2022    Coronary atherosclerosis due to calcified coronary lesion  -     losartan (COZAAR) 25 MG tablet; Take 1 tablet (25 mg total) by mouth once daily.  Dispense: 30 tablet; Refill: 6    Shortness of breath  -     losartan (COZAAR) 25 MG tablet; Take 1 tablet (25 mg total) by mouth once daily.  Dispense: 30 tablet; Refill: 6    Obstructive sleep apnea syndrome  -     losartan (COZAAR) 25 MG tablet; Take 1 tablet (25 mg total) by mouth once daily.  Dispense: 30 tablet; Refill: 6    Obesity, Class III, BMI 40-49.9 (morbid obesity)  -     losartan (COZAAR) 25 MG tablet; Take 1 tablet (25 mg total)  by mouth once daily.  Dispense: 30 tablet; Refill: 6    Anomalous origin of right coronary artery  -     losartan (COZAAR) 25 MG tablet; Take 1 tablet (25 mg total) by mouth once daily.  Dispense: 30 tablet; Refill: 6    Screening for AAA (abdominal aortic aneurysm)  -      Abdominal Aorta; Future; Expected date: 04/05/2022    Other orders  -     Pneumococcal Polysaccharide Vaccine (23 Valent) (SQ/IM)

## 2022-04-08 ENCOUNTER — LAB VISIT (OUTPATIENT)
Dept: LAB | Facility: HOSPITAL | Age: 67
End: 2022-04-08
Attending: STUDENT IN AN ORGANIZED HEALTH CARE EDUCATION/TRAINING PROGRAM
Payer: MEDICARE

## 2022-04-08 DIAGNOSIS — I10 ESSENTIAL HYPERTENSION: ICD-10-CM

## 2022-04-08 DIAGNOSIS — E78.5 HYPERLIPIDEMIA, UNSPECIFIED HYPERLIPIDEMIA TYPE: ICD-10-CM

## 2022-04-08 DIAGNOSIS — Z00.00 PHYSICAL EXAM, ANNUAL: ICD-10-CM

## 2022-04-08 DIAGNOSIS — R73.09 ABNORMAL GLUCOSE: ICD-10-CM

## 2022-04-08 DIAGNOSIS — Z12.5 PROSTATE CANCER SCREENING: ICD-10-CM

## 2022-04-08 DIAGNOSIS — E55.9 HYPOVITAMINOSIS D: ICD-10-CM

## 2022-04-08 LAB
25(OH)D3+25(OH)D2 SERPL-MCNC: 21 NG/ML (ref 30–96)
ALBUMIN SERPL BCP-MCNC: 3.8 G/DL (ref 3.5–5.2)
ALP SERPL-CCNC: 53 U/L (ref 55–135)
ALT SERPL W/O P-5'-P-CCNC: 39 U/L (ref 10–44)
ANION GAP SERPL CALC-SCNC: 10 MMOL/L (ref 8–16)
AST SERPL-CCNC: 30 U/L (ref 10–40)
BASOPHILS # BLD AUTO: 0.05 K/UL (ref 0–0.2)
BASOPHILS NFR BLD: 0.5 % (ref 0–1.9)
BILIRUB SERPL-MCNC: 0.8 MG/DL (ref 0.1–1)
BUN SERPL-MCNC: 14 MG/DL (ref 8–23)
CALCIUM SERPL-MCNC: 9.6 MG/DL (ref 8.7–10.5)
CHLORIDE SERPL-SCNC: 104 MMOL/L (ref 95–110)
CHOLEST SERPL-MCNC: 269 MG/DL (ref 120–199)
CHOLEST/HDLC SERPL: 4 {RATIO} (ref 2–5)
CO2 SERPL-SCNC: 27 MMOL/L (ref 23–29)
COMPLEXED PSA SERPL-MCNC: 1.2 NG/ML (ref 0–4)
CREAT SERPL-MCNC: 0.7 MG/DL (ref 0.5–1.4)
DIFFERENTIAL METHOD: ABNORMAL
EOSINOPHIL # BLD AUTO: 0.2 K/UL (ref 0–0.5)
EOSINOPHIL NFR BLD: 2.1 % (ref 0–8)
ERYTHROCYTE [DISTWIDTH] IN BLOOD BY AUTOMATED COUNT: 12.6 % (ref 11.5–14.5)
EST. GFR  (AFRICAN AMERICAN): >60 ML/MIN/1.73 M^2
EST. GFR  (NON AFRICAN AMERICAN): >60 ML/MIN/1.73 M^2
ESTIMATED AVG GLUCOSE: 111 MG/DL (ref 68–131)
GLUCOSE SERPL-MCNC: 117 MG/DL (ref 70–110)
HBA1C MFR BLD: 5.5 % (ref 4–5.6)
HCT VFR BLD AUTO: 45.2 % (ref 40–54)
HDLC SERPL-MCNC: 68 MG/DL (ref 40–75)
HDLC SERPL: 25.3 % (ref 20–50)
HGB BLD-MCNC: 14.9 G/DL (ref 14–18)
IMM GRANULOCYTES # BLD AUTO: 0.06 K/UL (ref 0–0.04)
IMM GRANULOCYTES NFR BLD AUTO: 0.6 % (ref 0–0.5)
LDLC SERPL CALC-MCNC: 180 MG/DL (ref 63–159)
LYMPHOCYTES # BLD AUTO: 1.7 K/UL (ref 1–4.8)
LYMPHOCYTES NFR BLD: 17.8 % (ref 18–48)
MCH RBC QN AUTO: 33.5 PG (ref 27–31)
MCHC RBC AUTO-ENTMCNC: 33 G/DL (ref 32–36)
MCV RBC AUTO: 102 FL (ref 82–98)
MONOCYTES # BLD AUTO: 0.7 K/UL (ref 0.3–1)
MONOCYTES NFR BLD: 7.3 % (ref 4–15)
NEUTROPHILS # BLD AUTO: 6.8 K/UL (ref 1.8–7.7)
NEUTROPHILS NFR BLD: 71.7 % (ref 38–73)
NONHDLC SERPL-MCNC: 201 MG/DL
NRBC BLD-RTO: 0 /100 WBC
PLATELET # BLD AUTO: 242 K/UL (ref 150–450)
PMV BLD AUTO: 10 FL (ref 9.2–12.9)
POTASSIUM SERPL-SCNC: 4.5 MMOL/L (ref 3.5–5.1)
PROT SERPL-MCNC: 7.1 G/DL (ref 6–8.4)
RBC # BLD AUTO: 4.45 M/UL (ref 4.6–6.2)
SODIUM SERPL-SCNC: 141 MMOL/L (ref 136–145)
T4 SERPL-MCNC: 6.6 UG/DL (ref 4.5–11.5)
TRIGL SERPL-MCNC: 105 MG/DL (ref 30–150)
TSH SERPL DL<=0.005 MIU/L-ACNC: 0.96 UIU/ML (ref 0.4–4)
WBC # BLD AUTO: 9.49 K/UL (ref 3.9–12.7)

## 2022-04-08 PROCEDURE — 84443 ASSAY THYROID STIM HORMONE: CPT | Performed by: STUDENT IN AN ORGANIZED HEALTH CARE EDUCATION/TRAINING PROGRAM

## 2022-04-08 PROCEDURE — 84153 ASSAY OF PSA TOTAL: CPT | Performed by: STUDENT IN AN ORGANIZED HEALTH CARE EDUCATION/TRAINING PROGRAM

## 2022-04-08 PROCEDURE — 85025 COMPLETE CBC W/AUTO DIFF WBC: CPT | Performed by: STUDENT IN AN ORGANIZED HEALTH CARE EDUCATION/TRAINING PROGRAM

## 2022-04-08 PROCEDURE — 83036 HEMOGLOBIN GLYCOSYLATED A1C: CPT | Performed by: STUDENT IN AN ORGANIZED HEALTH CARE EDUCATION/TRAINING PROGRAM

## 2022-04-08 PROCEDURE — 82306 VITAMIN D 25 HYDROXY: CPT | Performed by: STUDENT IN AN ORGANIZED HEALTH CARE EDUCATION/TRAINING PROGRAM

## 2022-04-08 PROCEDURE — 80053 COMPREHEN METABOLIC PANEL: CPT | Performed by: STUDENT IN AN ORGANIZED HEALTH CARE EDUCATION/TRAINING PROGRAM

## 2022-04-08 PROCEDURE — 36415 COLL VENOUS BLD VENIPUNCTURE: CPT | Mod: PO | Performed by: STUDENT IN AN ORGANIZED HEALTH CARE EDUCATION/TRAINING PROGRAM

## 2022-04-08 PROCEDURE — 84436 ASSAY OF TOTAL THYROXINE: CPT | Performed by: STUDENT IN AN ORGANIZED HEALTH CARE EDUCATION/TRAINING PROGRAM

## 2022-04-08 PROCEDURE — 80061 LIPID PANEL: CPT | Performed by: STUDENT IN AN ORGANIZED HEALTH CARE EDUCATION/TRAINING PROGRAM

## 2022-04-11 DIAGNOSIS — E78.5 HYPERLIPIDEMIA, UNSPECIFIED HYPERLIPIDEMIA TYPE: Primary | ICD-10-CM

## 2022-04-11 RX ORDER — ROSUVASTATIN CALCIUM 20 MG/1
20 TABLET, COATED ORAL DAILY
Qty: 90 TABLET | Refills: 3 | Status: SHIPPED | OUTPATIENT
Start: 2022-04-11 | End: 2023-04-05 | Stop reason: SDUPTHER

## 2022-04-13 ENCOUNTER — HOSPITAL ENCOUNTER (OUTPATIENT)
Dept: RADIOLOGY | Facility: HOSPITAL | Age: 67
Discharge: HOME | End: 2022-04-13
Attending: STUDENT IN AN ORGANIZED HEALTH CARE EDUCATION/TRAINING PROGRAM
Payer: MEDICARE

## 2022-04-13 ENCOUNTER — TELEPHONE (OUTPATIENT)
Dept: INTERNAL MEDICINE | Facility: CLINIC | Age: 67
End: 2022-04-13
Payer: MEDICARE

## 2022-04-13 DIAGNOSIS — Z13.6 SCREENING FOR AAA (ABDOMINAL AORTIC ANEURYSM): ICD-10-CM

## 2022-04-13 DIAGNOSIS — I71.40 ABDOMINAL AORTIC ANEURYSM (AAA) WITHOUT RUPTURE: Primary | ICD-10-CM

## 2022-04-13 PROCEDURE — 76775 US EXAM ABDO BACK WALL LIM: CPT | Mod: TC

## 2022-04-13 PROCEDURE — 76775 US EXAM ABDO BACK WALL LIM: CPT | Mod: 26,,, | Performed by: RADIOLOGY

## 2022-04-13 PROCEDURE — 76775 US ABDOMINAL AORTA: ICD-10-PCS | Mod: 26,,, | Performed by: RADIOLOGY

## 2022-04-13 NOTE — TELEPHONE ENCOUNTER
----- Message from Bill Escobar MD sent at 4/13/2022  2:16 PM CDT -----  Please see vascular referral and CTA order  Thank you for your time

## 2022-04-13 NOTE — TELEPHONE ENCOUNTER
Dr. Escobar viewed results of abdominal US with pt and ordered CTA.  A message was sent to the referral coordinator to call pt and assist with scheduling an appt

## 2022-04-13 NOTE — TELEPHONE ENCOUNTER
I spoke to pt and notified him of the following per Dr. Escobar:Your annual screening labs have returned and are overall reassuring  -however, your LDL cholesterol remains extremely high for which I would advocate read initiating statin therapy (  I will transition atorvastatin to rosuvastatin or more favorable side effect profile).  -would like to recheck your cholesterol in approximately 1-2 months to assess effect of Crestor at lowest dose within the high-intensity category  Rx was sent to [facility] on [address]  I offered to schedule pt's f/u labs, pt will call back to schedule.  Pt was notified the above information is posted on the portal for his viewing.  Pt verbalized understanding

## 2022-07-06 ENCOUNTER — NURSE TRIAGE (OUTPATIENT)
Dept: ADMINISTRATIVE | Facility: CLINIC | Age: 67
End: 2022-07-06
Payer: MEDICARE

## 2022-07-06 NOTE — TELEPHONE ENCOUNTER
Pt is calling regarding his Cymbalta prescription. Reports the pharmacy was able to give him a tablet for tonight but he will be out tomorrow. Would like refill information. He is also open to a virtual visit if needed. Would like follow up in this regard.     Reason for Disposition   [1] Prescription refill request for ESSENTIAL medicine (i.e., likelihood of harm to patient if not taken) AND [2] triager unable to refill per department policy    Protocols used: MEDICATION QUESTION CALL-A-AH

## 2022-08-08 ENCOUNTER — OFFICE VISIT (OUTPATIENT)
Dept: PSYCHIATRY | Facility: CLINIC | Age: 67
End: 2022-08-08
Payer: MEDICARE

## 2022-08-08 DIAGNOSIS — G47.00 INSOMNIA, UNSPECIFIED TYPE: ICD-10-CM

## 2022-08-08 DIAGNOSIS — F41.1 GENERALIZED ANXIETY DISORDER: ICD-10-CM

## 2022-08-08 DIAGNOSIS — F33.42 RECURRENT MAJOR DEPRESSIVE DISORDER, IN FULL REMISSION: Primary | ICD-10-CM

## 2022-08-08 PROCEDURE — 99214 PR OFFICE/OUTPT VISIT, EST, LEVL IV, 30-39 MIN: ICD-10-PCS | Mod: 95,,, | Performed by: INTERNAL MEDICINE

## 2022-08-08 PROCEDURE — 1160F RVW MEDS BY RX/DR IN RCRD: CPT | Mod: CPTII,95,, | Performed by: INTERNAL MEDICINE

## 2022-08-08 PROCEDURE — 3044F HG A1C LEVEL LT 7.0%: CPT | Mod: CPTII,95,, | Performed by: INTERNAL MEDICINE

## 2022-08-08 PROCEDURE — 4010F PR ACE/ARB THEARPY RXD/TAKEN: ICD-10-PCS | Mod: CPTII,95,, | Performed by: INTERNAL MEDICINE

## 2022-08-08 PROCEDURE — 3044F PR MOST RECENT HEMOGLOBIN A1C LEVEL <7.0%: ICD-10-PCS | Mod: CPTII,95,, | Performed by: INTERNAL MEDICINE

## 2022-08-08 PROCEDURE — 4010F ACE/ARB THERAPY RXD/TAKEN: CPT | Mod: CPTII,95,, | Performed by: INTERNAL MEDICINE

## 2022-08-08 PROCEDURE — 1159F MED LIST DOCD IN RCRD: CPT | Mod: CPTII,95,, | Performed by: INTERNAL MEDICINE

## 2022-08-08 PROCEDURE — 1159F PR MEDICATION LIST DOCUMENTED IN MEDICAL RECORD: ICD-10-PCS | Mod: CPTII,95,, | Performed by: INTERNAL MEDICINE

## 2022-08-08 PROCEDURE — 99214 OFFICE O/P EST MOD 30 MIN: CPT | Mod: 95,,, | Performed by: INTERNAL MEDICINE

## 2022-08-08 PROCEDURE — 1160F PR REVIEW ALL MEDS BY PRESCRIBER/CLIN PHARMACIST DOCUMENTED: ICD-10-PCS | Mod: CPTII,95,, | Performed by: INTERNAL MEDICINE

## 2022-08-08 RX ORDER — DULOXETIN HYDROCHLORIDE 60 MG/1
60 CAPSULE, DELAYED RELEASE ORAL DAILY
Qty: 90 CAPSULE | Refills: 3 | Status: SHIPPED | OUTPATIENT
Start: 2022-08-08 | End: 2022-09-07 | Stop reason: SDUPTHER

## 2022-08-08 NOTE — PROGRESS NOTES
OUTPATIENT PSYCHIATRY RETURN VISIT    ENCOUNTER DATE:  8/8/2022  SITE:  [facility]  LENGTH OF SESSION:  10 minutes    The patient location is:  [place], not in healthcare facility  The chief complaint leading to consultation is:  Follow up    Visit type:  audiovisual    Face to Face time with patient: 10 minutes  15 minutes of total time spent on the encounter, which includes face to face time and non-face to face time preparing to see the patient (eg, review of tests), Obtaining and/or reviewing separately obtained history, Documenting clinical information in the electronic or other health record, Independently interpreting results (not separately reported) and communicating results to the patient/family/caregiver, or Care coordination (not separately reported).     Each patient to whom he or she provides medical services by telemedicine is:  (1) informed of the relationship between the physician and patient and the respective role of any other health care provider with respect to management of the patient; and (2) notified that he or she may decline to receive medical services by telemedicine and may withdraw from such care at any time.    CHIEF COMPLAINT:  Follow-up      HISTORY OF PRESENTING ILLNESS:  Yosef Rowe is a 66 y.o. male with history of MDD, SOLITARIO, and insomnia who presents for follow up appointment.      Plan at last appointment on 3/29/2021:  · Will change Lunesta to Doxepin 25mg qHS for sleep since he has never tried a tricyclic.    · Mood stable.  Will continue Cymbalta 60mg daily.  · Discussed elevated LFT's (ALT>AST) in January.  He suspects this was from his heavy drinking, however also educated him on possibility of fatty liver.  Will get repeat LFT's now since he has not been drinking for the last month.    · Discussed with patient informed consent, risks versus benefits, alternative treatments, side effect profile and the inherent unpredictability of individual  responses to these treatments.  The patient expresses understanding of the above and displays the capacity to agree with this current plan.    History as told by patient:  Doing very well.  Mood has been good.  Denies depression or anxiety.  Not taking sleep medication - says none of it helped so why take it.  Work is ok - not making as much money with decline in economy.  He and wife doing fine.  He drinks alcohol very rarely now and LFT's improved on last check in April.  Tolerating Cymbalta well.      Medication side effects:  No  Medication compliance:  Yes    PSYCHIATRIC REVIEW OF SYSTEMS:  Trouble with sleep:  Chronic  Appetite changes:  Denies  Weight changes:  Denies  Lack of energy:  Denies  Anhedonia:  Denies  Somatic symptoms:  Denies  Libido:  Not discussed  Anxiety/panic:  Denies  Guilty/hopeless:  Denies  Self-injurious behavior/risky behavior:  Denies  Any drugs:  Denies  Alcohol:  Denies    MEDICAL REVIEW OF SYSTEMS:  Complete review of systems performed covering Constitutional, Musculoskeletal, Neurologic.  All systems negative except for that covered in HPI.    PAST PSYCHIATRIC, MEDICAL, AND SOCIAL HISTORY REVIEWED  The patient's past medical, family and social history have been reviewed and updated as appropriate within the electronic medical record - see encounter notes.    MEDICATIONS:    Current Outpatient Medications:     atorvastatin (LIPITOR) 20 MG tablet, Take 1 tablet (20 mg total) by mouth once daily. (Patient not taking: Reported on 4/5/2022), Disp: 90 tablet, Rfl: 6    doxepin (SINEQUAN) 25 MG capsule, Take 1 capsule (25 mg total) by mouth nightly as needed (Insomnia). Please call [phone] to schedule follow up appointment. (Patient not taking: Reported on 4/5/2022), Disp: 30 capsule, Rfl: 0    DULoxetine (CYMBALTA) 60 MG capsule, Take 1 capsule (60 mg total) by mouth once daily., Disp: 90 capsule, Rfl: 3    losartan (COZAAR) 25 MG tablet, Take 1 tablet (25 mg total) by mouth  "once daily., Disp: 30 tablet, Rfl: 6    rosuvastatin (CRESTOR) 20 MG tablet, Take 1 tablet (20 mg total) by mouth once daily., Disp: 90 tablet, Rfl: 3    tamsulosin (FLOMAX) 0.4 mg Cap, Take 0.4 mg by mouth once daily., Disp: , Rfl:     tolterodine (DETROL LA) 4 MG 24 hr capsule, Take 1 capsule (4 mg total) by mouth once daily., Disp: 30 capsule, Rfl: 11    traMADoL (ULTRAM) 50 mg tablet, Take 1 tablet (50 mg total) by mouth every 6 (six) hours as needed for Pain. (Patient not taking: No sig reported), Disp: 90 tablet, Rfl: 1    traZODone (DESYREL) 50 MG tablet, Take 1 tablet (50 mg total) by mouth nightly as needed for Insomnia. (Patient not taking: No sig reported), Disp: 30 tablet, Rfl: 0    triamcinolone acetonide 0.1% (KENALOG) 0.1 % ointment, Apply topically 2 (two) times daily. (Patient not taking: Reported on 4/5/2022), Disp: 30 g, Rfl: 0    ALLERGIES:  Review of patient's allergies indicates:   Allergen Reactions    No known drug allergies      PSYCHIATRIC EXAM:  There were no vitals filed for this visit.   Appearance:  Well groomed, appearing healthy and of stated age  Behavior:  Cooperative, pleasant, no psychomotor agitation or retardation  Speech:  Normal rate, rhythm, prosody, and volume  Mood:  "Good"  Affect:  Congruent  Thought Process:  Linear, logical, goal directed  Thought Content:  Negative for suicidal ideation, homicidal ideation, delusions or hallucinations.  Associations:  Intact  Memory:  Grossly Intact  Level of Consciousness/Orientation:  Grossly intact  Fund of Knowledge:  Good  Attention:  Good  Language:  Fluent, able to name abstract and concrete objects  Insight:  Fair  Judgment:  Intact  Psychomotor signs:  No abnormal movements of face  Gait:  Unable to assess via virtual visit      RELEVANT LABS/STUDIES:    Lab Results   Component Value Date    WBC 9.49 04/08/2022    HGB 14.9 04/08/2022    HCT 45.2 04/08/2022     (H) 04/08/2022     04/08/2022     BMP  Lab " Results   Component Value Date     04/08/2022    K 4.5 04/08/2022     04/08/2022    CO2 27 04/08/2022    BUN 14 04/08/2022    CREATININE 0.7 04/08/2022    CALCIUM 9.6 04/08/2022    ANIONGAP 10 04/08/2022    ESTGFRAFRICA >60.0 04/08/2022    EGFRNONAA >60.0 04/08/2022     Lab Results   Component Value Date    ALT 39 04/08/2022    AST 30 04/08/2022    ALKPHOS 53 (L) 04/08/2022    BILITOT 0.8 04/08/2022     Lab Results   Component Value Date    TSH 0.956 04/08/2022       IMPRESSION:    Yosef Rowe is a 66 y.o. male with history of anxiety, depression, and insomnia who presents for follow up appointment.    Status/Progress:  Based on the examination today, the patient's problem(s) is/are stable.  New problems have not been presented today.    Risk Parameters:  Patient reports no suicidal ideation  Patient reports no homicidal ideation  Patient reports no self-injurious behavior  Patient reports no violent behavior      DIAGNOSES:    ICD-10-CM ICD-9-CM   1. Recurrent major depressive disorder, in full remission  F33.42 296.36   2. Generalized anxiety disorder  F41.1 300.02   3. Insomnia, unspecified type  G47.00 780.52       PLAN:  · Mood is stable on Cymbalta.  Will continue Cymbalta 60mg daily.  · He would prefer to avoid sleep medications at this time.    · Discussed with patient informed consent, risks versus benefits, alternative treatments, side effect profile and the inherent unpredictability of individual responses to these treatments.  The patient expresses understanding of the above and displays the capacity to agree with this current plan.    RETURN TO CLINIC:  Follow up in about 1 year (around 8/8/2023).

## 2022-11-16 ENCOUNTER — PATIENT MESSAGE (OUTPATIENT)
Dept: ADMINISTRATIVE | Facility: HOSPITAL | Age: 67
End: 2022-11-16
Payer: MEDICARE

## 2023-01-03 ENCOUNTER — PATIENT OUTREACH (OUTPATIENT)
Dept: ADMINISTRATIVE | Facility: HOSPITAL | Age: 68
End: 2023-01-03
Payer: MEDICARE

## 2023-01-03 NOTE — PROGRESS NOTES
Spoke with pt and he stated that he has that he has the cologuard and will complete it soon  Chart reviewed  Immunizations reconciled

## 2023-03-02 ENCOUNTER — PATIENT OUTREACH (OUTPATIENT)
Dept: ADMINISTRATIVE | Facility: HOSPITAL | Age: 68
End: 2023-03-02
Payer: MEDICARE

## 2023-03-02 ENCOUNTER — PATIENT MESSAGE (OUTPATIENT)
Dept: ADMINISTRATIVE | Facility: HOSPITAL | Age: 68
End: 2023-03-02
Payer: MEDICARE

## 2023-03-21 ENCOUNTER — TELEPHONE (OUTPATIENT)
Dept: INTERNAL MEDICINE | Facility: CLINIC | Age: 68
End: 2023-03-21

## 2023-03-21 ENCOUNTER — PATIENT OUTREACH (OUTPATIENT)
Dept: ADMINISTRATIVE | Facility: HOSPITAL | Age: 68
End: 2023-03-21
Payer: MEDICARE

## 2023-03-21 DIAGNOSIS — G89.29 CHRONIC BILATERAL LOW BACK PAIN WITHOUT SCIATICA: Primary | ICD-10-CM

## 2023-03-21 DIAGNOSIS — M54.50 CHRONIC BILATERAL LOW BACK PAIN WITHOUT SCIATICA: Primary | ICD-10-CM

## 2023-03-21 NOTE — TELEPHONE ENCOUNTER
----- Message from Manasa Delatorre sent at 3/21/2023 10:21 AM CDT -----  Contact: Self/[phone]  Pt said that he is calling in regards to needing to get a return call from the nurse needing to see someone about his back pain. Please advise

## 2023-03-21 NOTE — TELEPHONE ENCOUNTER
Last OV 4-5-22  Annual visit scheduled 4-4-23    I spoke to pt, he is c/o severe back pain.  Requesting a referral.    Please advise, thanks

## 2023-03-21 NOTE — PROGRESS NOTES
Health Maintenance Due   Topic Date Due    COVID-19 Vaccine (1) Never done    TETANUS VACCINE  Never done    Aspirin/Antiplatelet Therapy  Never done    Shingles Vaccine (1 of 2) Never done    Colorectal Cancer Screening  12/20/2017    High Dose Statin  06/16/2022    Influenza Vaccine (1) 09/01/2022     Chart reviewed.   Immunizations: Reconciled  Orders placed: N/A  Upcoming appts to satisfy JEOVANY topics: N/A  Appointment note placed for colonoscopy.

## 2023-03-27 ENCOUNTER — PES CALL (OUTPATIENT)
Dept: ADMINISTRATIVE | Facility: CLINIC | Age: 68
End: 2023-03-27
Payer: MEDICARE

## 2023-04-03 NOTE — PROGRESS NOTES
Subjective:      Chief Complaint: Annual Exam and Obesity    HPI  Mr. Rowe is a 68 yo M with OA, chronic low back pain with lumbar radiculopathy, s/p L-knee replacement (and treatemnt for infected hardware), SOLITARIO, depression, HTN, HLD, Anomalous origin of right coronary artery, CA-atherosclerosis, BPH, urinary urgency, ED, morbid obesity, IVORY, and Tobacco use presenting for annual physical:    Hyperlipidemia:   -1 year ago, transitioned atorvastatin to rosuvastatin; subsequently lost to follow-up with panel     Chronic low back pain:  -experiencing a recent exacerbation of chronic low back pain with occasional radiculopathy (not @ present)  -has prior plain films and lumbar MRI on file showing a number of degenerative processes  -continues to teach golf professionally  -BMI 48.31; he is well aware that his weight is at least an exacerbating factor if not the origin of his low back pain. Unfortunately, his insurer will limit medical interventions for weight loss.    Family, social, surgical Hx reviewed     Health Maintenance:  Due for colorectal cancer screening, routine screening labs, and routine vaccinations (defers all)    Past Medical History:   Diagnosis Date    Anxiety     Arthritis     Depression     when turned 49-lost job and mother unwell at that time    Hyperlipidemia     Lumbar radiculopathy     BRETT- 2011    Osteoarthritis     Sleep apnea     Urinary tract infection      Past Surgical History:   Procedure Laterality Date    IRRIGATION AND DEBRIDEMENT OF LOWER EXTREMITY Left 7/3/2018    Procedure: IRRIGATION AND DEBRIDEMENT, LOWER EXTREMITY, poly exchange left uni knee replacement. Stryler. 9L NS;  Surgeon: Ruperto Olivarez MD;  Location: [facility] OR [address] [place] [address];  Service: Orthopedics;  Laterality: Left;    IRRIGATION AND DEBRIDEMENT OF LOWER EXTREMITY Left 7/7/2018    Procedure: IRRIGATION AND DEBRIDEMENT, LOWER EXTREMITY;  Surgeon: Orlin Jordan MD;  Location: [facility] OR [address] [place] [address];  Service: Orthopedics;   Laterality: Left;    JOINT REPLACEMENT      knee arthroscopicc surgeries      Right knee- 2007  and Left knee- 2008    KNEE SURGERY      Left wrist Surgery      2000 for a torn ligament from Golf    TOTAL KNEE REPLACEMENT USING COMPUTER NAVIGATION Left 6/22/2018    Procedure: REPLACEMENT-KNEE WITH NAVIGATION-- unicondylar-- medial;  Surgeon: Orlin Jordan MD;  Location: [facility] OR [address] [place] [address];  Service: Orthopedics;  Laterality: Left;     Family History   Problem Relation Age of Onset    Parkinsonism Mother     Heart attack Neg Hx     Heart disease Neg Hx     Heart failure Neg Hx     Hyperlipidemia Neg Hx     Hypertension Neg Hx     Stroke Neg Hx      Social History     Socioeconomic History    Marital status:    Tobacco Use    Smoking status: Some Days     Types: Cigars    Smokeless tobacco: Never    Tobacco comments:     maybe one a month   Substance and Sexual Activity    Alcohol use: Yes     Alcohol/week: 5.0 standard drinks     Types: 5 Standard drinks or equivalent per week     Comment: Daily, last use yesterday    Drug use: No    Sexual activity: Yes     Partners: Female     Review of patient's allergies indicates:   Allergen Reactions    No known drug allergies      Yosef Rowe had no medications administered during this visit.    Review of Systems   Constitutional:  Negative for appetite change, chills and fever.   HENT: Negative.     Respiratory:  Negative for cough, chest tightness and shortness of breath.    Cardiovascular:  Negative for chest pain, palpitations and leg swelling.   Gastrointestinal:  Negative for abdominal distention, abdominal pain, blood in stool, constipation, diarrhea, nausea and vomiting.   Endocrine: Negative.    Genitourinary:  Negative for difficulty urinating, dysuria, frequency and hematuria.   Musculoskeletal:  Positive for back pain.   Integumentary:  Negative.   Neurological: Negative.    Psychiatric/Behavioral: Negative.         Objective:      Vitals:    04/04/23 0931    BP: 138/68   Pulse: (!) 55   Resp: 16   Temp: 97.5 °F (36.4 °C)      Physical Exam  Vitals reviewed.   Constitutional:       General: He is not in acute distress.     Appearance: Normal appearance.   HENT:      Head: Normocephalic and atraumatic.      Comments: Facial features are symmetric      Nose: Nose normal. No congestion or rhinorrhea.      Mouth/Throat:      Mouth: Mucous membranes are moist.      Pharynx: Oropharynx is clear. No oropharyngeal exudate or posterior oropharyngeal erythema.   Eyes:      General: No scleral icterus.     Extraocular Movements: Extraocular movements intact.      Conjunctiva/sclera: Conjunctivae normal.   Cardiovascular:      Rate and Rhythm: Normal rate and regular rhythm.      Pulses: Normal pulses.      Heart sounds: Normal heart sounds.   Pulmonary:      Effort: Pulmonary effort is normal. No respiratory distress.      Breath sounds: Normal breath sounds.   Musculoskeletal:         General: No deformity or signs of injury. Normal range of motion.      Cervical back: Normal range of motion.      Comments: Gait normal    Skin:     General: Skin is warm and dry.      Findings: No rash.   Neurological:      General: No focal deficit present.      Mental Status: He is alert and oriented to person, place, and time. Mental status is at baseline.   Psychiatric:         Mood and Affect: Mood normal.         Behavior: Behavior normal.         Thought Content: Thought content normal.     Current Outpatient Medications on File Prior to Visit   Medication Sig Dispense Refill    DULoxetine (CYMBALTA) 60 MG capsule Take 1 capsule (60 mg total) by mouth once daily. 90 capsule 3    atorvastatin (LIPITOR) 20 MG tablet Take 1 tablet (20 mg total) by mouth once daily. (Patient not taking: Reported on 4/5/2022) 90 tablet 6    rosuvastatin (CRESTOR) 20 MG tablet Take 1 tablet (20 mg total) by mouth once daily. (Patient not taking: Reported on 4/4/2023) 90 tablet 3     No current  facility-administered medications on file prior to visit.         Assessment:       1. Physical exam, annual    2. Obstructive sleep apnea syndrome    3. Essential hypertension    4. Hyperlipidemia, unspecified hyperlipidemia type    5. Chronic bilateral low back pain without sciatica    6. Abnormal fasting glucose    7. Vitamin D deficiency    8. Screening for colorectal cancer        Plan:       Physical exam, annual  -     CBC Auto Differential; Future; Expected date: 04/04/2023  -     Comprehensive Metabolic Panel; Future; Expected date: 04/04/2023  -     Hemoglobin A1C; Future; Expected date: 04/04/2023  -     Lipid Panel; Future; Expected date: 04/04/2023  -     T4; Future; Expected date: 04/04/2023  -     TSH; Future; Expected date: 04/04/2023  -     Vitamin D; Future; Expected date: 04/04/2023  -     Microalbumin/Creatinine Ratio, Urine; Future; Expected date: 04/04/2023   - annual screening labs ordered    - vaccinations deferred    Obstructive sleep apnea syndrome  -     CPAP/BIPAP SUPPLIES   - paperwork provided for CPAP supplies; not currently interested in establishing with [facility] sleep medicine department     Essential hypertension  -     normotensive but borderline   - should improve with intentional weight loss     Hyperlipidemia, unspecified hyperlipidemia type  -     Lipid Panel; Future; Expected date: 04/04/2023    Chronic bilateral low back pain without sciatica  -     Ambulatory referral/consult to Sports Medicine; Future; Expected date: 04/11/2023  -     X-Ray Lumbar Spine 5 View; Future; Expected date: 04/04/2023   - will acquire more recent multi view plain films and facilitate establishment of sports medicine (chosen preferentially over the back and spine clinic due to his profession); recommendations and interventions appreciated     Abnormal fasting glucose  -     Hemoglobin A1C; Future; Expected date: 04/04/2023    Vitamin D deficiency  -     Vitamin D; Future; Expected date:  04/04/2023    Screening for colorectal cancer  -     Ambulatory referral/consult to Endo Procedure ; Future; Expected date: 04/05/2023     Return to clinic in one year for annual exam or sooner if dictated by labs or illness.

## 2023-04-04 ENCOUNTER — HOSPITAL ENCOUNTER (OUTPATIENT)
Dept: RADIOLOGY | Facility: HOSPITAL | Age: 68
Discharge: HOME | End: 2023-04-04
Attending: STUDENT IN AN ORGANIZED HEALTH CARE EDUCATION/TRAINING PROGRAM
Payer: MEDICARE

## 2023-04-04 ENCOUNTER — OFFICE VISIT (OUTPATIENT)
Dept: INTERNAL MEDICINE | Facility: CLINIC | Age: 68
End: 2023-04-04
Payer: MEDICARE

## 2023-04-04 VITALS
WEIGHT: 313.06 LBS | OXYGEN SATURATION: 96 % | RESPIRATION RATE: 16 BRPM | DIASTOLIC BLOOD PRESSURE: 68 MMHG | HEART RATE: 55 BPM | HEIGHT: 68 IN | BODY MASS INDEX: 47.45 KG/M2 | TEMPERATURE: 98 F | SYSTOLIC BLOOD PRESSURE: 138 MMHG

## 2023-04-04 DIAGNOSIS — M54.50 CHRONIC BILATERAL LOW BACK PAIN WITHOUT SCIATICA: ICD-10-CM

## 2023-04-04 DIAGNOSIS — G89.29 CHRONIC BILATERAL LOW BACK PAIN WITHOUT SCIATICA: ICD-10-CM

## 2023-04-04 DIAGNOSIS — Z00.00 PHYSICAL EXAM, ANNUAL: Primary | ICD-10-CM

## 2023-04-04 DIAGNOSIS — E55.9 VITAMIN D DEFICIENCY: ICD-10-CM

## 2023-04-04 DIAGNOSIS — E78.5 HYPERLIPIDEMIA, UNSPECIFIED HYPERLIPIDEMIA TYPE: ICD-10-CM

## 2023-04-04 DIAGNOSIS — I10 ESSENTIAL HYPERTENSION: ICD-10-CM

## 2023-04-04 DIAGNOSIS — R73.01 ABNORMAL FASTING GLUCOSE: ICD-10-CM

## 2023-04-04 DIAGNOSIS — G47.33 OBSTRUCTIVE SLEEP APNEA SYNDROME: ICD-10-CM

## 2023-04-04 DIAGNOSIS — Z12.12 SCREENING FOR COLORECTAL CANCER: ICD-10-CM

## 2023-04-04 DIAGNOSIS — Z12.11 SCREENING FOR COLORECTAL CANCER: ICD-10-CM

## 2023-04-04 PROCEDURE — 3075F SYST BP GE 130 - 139MM HG: CPT | Mod: CPTII,S$GLB,, | Performed by: STUDENT IN AN ORGANIZED HEALTH CARE EDUCATION/TRAINING PROGRAM

## 2023-04-04 PROCEDURE — 99214 OFFICE O/P EST MOD 30 MIN: CPT | Mod: S$GLB,,, | Performed by: STUDENT IN AN ORGANIZED HEALTH CARE EDUCATION/TRAINING PROGRAM

## 2023-04-04 PROCEDURE — 72110 X-RAY EXAM L-2 SPINE 4/>VWS: CPT | Mod: 26,,, | Performed by: RADIOLOGY

## 2023-04-04 PROCEDURE — 72110 X-RAY EXAM L-2 SPINE 4/>VWS: CPT | Mod: TC,PO

## 2023-04-04 PROCEDURE — 3078F DIAST BP <80 MM HG: CPT | Mod: CPTII,S$GLB,, | Performed by: STUDENT IN AN ORGANIZED HEALTH CARE EDUCATION/TRAINING PROGRAM

## 2023-04-04 PROCEDURE — 1125F PR PAIN SEVERITY QUANTIFIED, PAIN PRESENT: ICD-10-PCS | Mod: CPTII,S$GLB,, | Performed by: STUDENT IN AN ORGANIZED HEALTH CARE EDUCATION/TRAINING PROGRAM

## 2023-04-04 PROCEDURE — 72110 XR LUMBAR SPINE COMPLETE 5 VIEW: ICD-10-PCS | Mod: 26,,, | Performed by: RADIOLOGY

## 2023-04-04 PROCEDURE — 99999 PR PBB SHADOW E&M-EST. PATIENT-LVL IV: ICD-10-PCS | Mod: PBBFAC,,, | Performed by: STUDENT IN AN ORGANIZED HEALTH CARE EDUCATION/TRAINING PROGRAM

## 2023-04-04 PROCEDURE — 1125F AMNT PAIN NOTED PAIN PRSNT: CPT | Mod: CPTII,S$GLB,, | Performed by: STUDENT IN AN ORGANIZED HEALTH CARE EDUCATION/TRAINING PROGRAM

## 2023-04-04 PROCEDURE — 3075F PR MOST RECENT SYSTOLIC BLOOD PRESS GE 130-139MM HG: ICD-10-PCS | Mod: CPTII,S$GLB,, | Performed by: STUDENT IN AN ORGANIZED HEALTH CARE EDUCATION/TRAINING PROGRAM

## 2023-04-04 PROCEDURE — 3078F PR MOST RECENT DIASTOLIC BLOOD PRESSURE < 80 MM HG: ICD-10-PCS | Mod: CPTII,S$GLB,, | Performed by: STUDENT IN AN ORGANIZED HEALTH CARE EDUCATION/TRAINING PROGRAM

## 2023-04-04 PROCEDURE — 1159F MED LIST DOCD IN RCRD: CPT | Mod: CPTII,S$GLB,, | Performed by: STUDENT IN AN ORGANIZED HEALTH CARE EDUCATION/TRAINING PROGRAM

## 2023-04-04 PROCEDURE — 99999 PR PBB SHADOW E&M-EST. PATIENT-LVL IV: CPT | Mod: PBBFAC,,, | Performed by: STUDENT IN AN ORGANIZED HEALTH CARE EDUCATION/TRAINING PROGRAM

## 2023-04-04 PROCEDURE — 3008F PR BODY MASS INDEX (BMI) DOCUMENTED: ICD-10-PCS | Mod: CPTII,S$GLB,, | Performed by: STUDENT IN AN ORGANIZED HEALTH CARE EDUCATION/TRAINING PROGRAM

## 2023-04-04 PROCEDURE — 3008F BODY MASS INDEX DOCD: CPT | Mod: CPTII,S$GLB,, | Performed by: STUDENT IN AN ORGANIZED HEALTH CARE EDUCATION/TRAINING PROGRAM

## 2023-04-04 PROCEDURE — 1159F PR MEDICATION LIST DOCUMENTED IN MEDICAL RECORD: ICD-10-PCS | Mod: CPTII,S$GLB,, | Performed by: STUDENT IN AN ORGANIZED HEALTH CARE EDUCATION/TRAINING PROGRAM

## 2023-04-04 PROCEDURE — 99214 PR OFFICE/OUTPT VISIT, EST, LEVL IV, 30-39 MIN: ICD-10-PCS | Mod: S$GLB,,, | Performed by: STUDENT IN AN ORGANIZED HEALTH CARE EDUCATION/TRAINING PROGRAM

## 2023-04-04 NOTE — PROGRESS NOTES
DATE: 4/12/2023  PATIENT: Yosef Rowe    Supervising Physician: Matthew Peters M.D.    CHIEF COMPLAINT: low back pain    HISTORY:  Yosef Rowe is a 67 y.o. male gold  here for initial evaluation of low back pain (Back - 5).  The pain has been present for years. The patient describes the pain as dull and aching.  The pain is worse in the morning and improved by walking and stretch. There is no associated numbness and tingling. There is no subjective weakness. Prior treatments have included advil and tylenol, but no PT, BRETT or surgery.    The patient denies myelopathic symptoms such as handwriting changes or difficulty with buttons/coins/keys. Denies perineal paresthesias, bowel/bladder dysfunction.    PAST MEDICAL/SURGICAL HISTORY:  Past Medical History:   Diagnosis Date    Anxiety     Arthritis     Depression     when turned 49-lost job and mother unwell at that time    Hyperlipidemia     Lumbar radiculopathy     BRETT- 2011    Osteoarthritis     Sleep apnea     Urinary tract infection      Past Surgical History:   Procedure Laterality Date    IRRIGATION AND DEBRIDEMENT OF LOWER EXTREMITY Left 7/3/2018    Procedure: IRRIGATION AND DEBRIDEMENT, LOWER EXTREMITY, poly exchange left uni knee replacement. Stryler. 9L NS;  Surgeon: Rueprto Olivarez MD;  Location: [address];  Service: Orthopedics;  Laterality: Left;    IRRIGATION AND DEBRIDEMENT OF LOWER EXTREMITY Left 7/7/2018    Procedure: IRRIGATION AND DEBRIDEMENT, LOWER EXTREMITY;  Surgeon: Orlin Jordan MD;  Location: [facility] OR [address] [place] [address];  Service: Orthopedics;  Laterality: Left;    JOINT REPLACEMENT      knee arthroscopicc surgeries      Right knee- 2007  and Left knee- 2008    KNEE SURGERY      Left wrist Surgery      2000 for a torn ligament from Golf    TOTAL KNEE REPLACEMENT USING COMPUTER NAVIGATION Left 6/22/2018    Procedure: REPLACEMENT-KNEE WITH NAVIGATION-- unicondylar-- medial;  Surgeon: Orlin Jordan MD;  Location: [facility] OR [address] [place] [address];   "Service: Orthopedics;  Laterality: Left;       Medications:   Current Outpatient Medications on File Prior to Visit   Medication Sig Dispense Refill    atorvastatin (LIPITOR) 20 MG tablet Take 1 tablet (20 mg total) by mouth once daily. 90 tablet 6    DULoxetine (CYMBALTA) 60 MG capsule Take 1 capsule (60 mg total) by mouth once daily. 90 capsule 3    [DISCONTINUED] rosuvastatin (CRESTOR) 20 MG tablet Take 1 tablet (20 mg total) by mouth once daily. (Patient not taking: Reported on 4/12/2023) 90 tablet 3     No current facility-administered medications on file prior to visit.       Social History:   Social History     Socioeconomic History    Marital status:    Tobacco Use    Smoking status: Some Days     Types: Cigars    Smokeless tobacco: Never    Tobacco comments:     maybe one a month   Substance and Sexual Activity    Alcohol use: Yes     Alcohol/week: 5.0 standard drinks     Types: 5 Standard drinks or equivalent per week     Comment: Daily, last use yesterday    Drug use: No    Sexual activity: Yes     Partners: Female       REVIEW OF SYSTEMS:  Constitution: Negative. Negative for chills, fever and night sweats.   Cardiovascular: Negative for chest pain and syncope.   Respiratory: Negative for cough and shortness of breath.   Gastrointestinal: See HPI. Negative for nausea/vomiting. Negative for abdominal pain.  Genitourinary: See HPI. Negative for discoloration or dysuria.  Skin: Negative for dry skin, itching and rash.   Hematologic/Lymphatic: Negative for bleeding problem. Does not bruise/bleed easily.   Musculoskeletal: Negative for falls and muscle weakness.   Neurological: See HPI. No seizures.   Endocrine: Negative for polydipsia, polyphagia and polyuria.   Allergic/Immunologic: Negative for hives and persistent infections.     EXAM:  Ht 5' 7.5" (1.715 m)   Wt (!) 139.2 kg (306 lb 14.1 oz)   BMI 47.35 kg/m²     General: The patient is a very pleasant 67 y.o. male in no apparent distress, the " patient is oriented to person, place and time.  Psych: Normal mood and affect  HEENT: Vision grossly intact, hearing intact to the spoken word.  Lungs: Respirations unlabored.  Gait: Normal station and gait, no difficulty with toe or heel walk.   Skin: Dorsal lumbar skin negative for rashes, lesions, hairy patches and surgical scars. There is mild lumbar tenderness to palpation.  Range of motion: Lumbar range of motion is acceptable.  Spinal Balance: Global saggital and coronal spinal balance acceptable, not significant for scoliosis and kyphosis.  Musculoskeletal: No pain with the range of motion of the bilateral hips. No trochanteric tenderness to palpation.  Vascular: Bilateral lower extremities warm and well perfused, dorsalis pedis pulses 2+ bilaterally.  Neurological: Normal strength and tone in all major motor groups in the bilateral lower extremities. Normal sensation to light touch in the L2-S1 dermatomes bilaterally.  Deep tendon reflexes symmetric 2+ in the bilateral lower extremities.  Negative Babinski bilaterally. Straight leg raise negative bilaterally.    IMAGING:      Today I personally reviewed AP, Lat and Flex/Ex  upright L-spine films that demonstrate moderate DDD.       Body mass index is 47.35 kg/m².    Hemoglobin A1C   Date Value Ref Range Status   04/04/2023 5.5 4.0 - 5.6 % Final     Comment:     ADA Screening Guidelines:  5.7-6.4%  Consistent with prediabetes  >or=6.5%  Consistent with diabetes    High levels of fetal hemoglobin interfere with the HbA1C  assay. Heterozygous hemoglobin variants (HbS, HgC, etc)do  not significantly interfere with this assay.   However, presence of multiple variants may affect accuracy.     04/08/2022 5.5 4.0 - 5.6 % Final     Comment:     ADA Screening Guidelines:  5.7-6.4%  Consistent with prediabetes  >or=6.5%  Consistent with diabetes    High levels of fetal hemoglobin interfere with the HbA1C  assay. Heterozygous hemoglobin variants (HbS, HgC, etc)do  not  significantly interfere with this assay.   However, presence of multiple variants may affect accuracy.     07/07/2018 5.1 4.0 - 5.6 % Final     Comment:     ADA Screening Guidelines:  5.7-6.4%  Consistent with prediabetes  >or=6.5%  Consistent with diabetes  High levels of fetal hemoglobin interfere with the HbA1C  assay. Heterozygous hemoglobin variants (HbS, HgC, etc)do  not significantly interfere with this assay.   However, presence of multiple variants may affect accuracy.             ASSESSMENT/PLAN:    Yosef was seen today for low-back pain.    Diagnoses and all orders for this visit:    Chronic bilateral low back pain without sciatica  -     Ambulatory referral/consult to Sports Medicine  -     gabapentin (NEURONTIN) 300 MG capsule; Take 1 capsule (300 mg total) by mouth every evening.  -     Ambulatory referral/consult to Physical/Occupational Therapy; Future  -     cyclobenzaprine (FLEXERIL) 10 MG tablet; Take 1 tablet (10 mg total) by mouth 3 (three) times daily as needed for Muscle spasms.      Today we discussed at length all of the different treatment options including anti-inflammatories, acetaminophen, rest, ice, heat, physical therapy including strengthening and stretching exercises, home exercises, ROM, aerobic conditioning, aqua therapy, other modalities including ultrasound, massage, and dry needling, epidural steroid injections and finally surgical intervention.    PT orders placed. The patient may follow up in 4-6 weeks if his pain persists. I will order a MRI at that time and refer for MBBs.

## 2023-04-05 DIAGNOSIS — E78.5 HYPERLIPIDEMIA, UNSPECIFIED HYPERLIPIDEMIA TYPE: Primary | ICD-10-CM

## 2023-04-05 RX ORDER — ROSUVASTATIN CALCIUM 20 MG/1
20 TABLET, COATED ORAL DAILY
Qty: 90 TABLET | Refills: 3 | Status: SHIPPED | OUTPATIENT
Start: 2023-04-05 | End: 2023-04-12

## 2023-04-12 ENCOUNTER — OFFICE VISIT (OUTPATIENT)
Dept: ORTHOPEDICS | Facility: CLINIC | Age: 68
End: 2023-04-12
Payer: MEDICARE

## 2023-04-12 VITALS — BODY MASS INDEX: 46.51 KG/M2 | WEIGHT: 306.88 LBS | HEIGHT: 68 IN

## 2023-04-12 DIAGNOSIS — G89.29 CHRONIC BILATERAL LOW BACK PAIN WITHOUT SCIATICA: ICD-10-CM

## 2023-04-12 DIAGNOSIS — M54.50 CHRONIC BILATERAL LOW BACK PAIN WITHOUT SCIATICA: ICD-10-CM

## 2023-04-12 PROCEDURE — 3066F PR DOCUMENTATION OF TREATMENT FOR NEPHROPATHY: ICD-10-PCS | Mod: CPTII,S$GLB,, | Performed by: REGISTERED NURSE

## 2023-04-12 PROCEDURE — 99204 PR OFFICE/OUTPT VISIT, NEW, LEVL IV, 45-59 MIN: ICD-10-PCS | Mod: S$GLB,,, | Performed by: REGISTERED NURSE

## 2023-04-12 PROCEDURE — 3008F PR BODY MASS INDEX (BMI) DOCUMENTED: ICD-10-PCS | Mod: CPTII,S$GLB,, | Performed by: REGISTERED NURSE

## 2023-04-12 PROCEDURE — 99999 PR PBB SHADOW E&M-EST. PATIENT-LVL III: ICD-10-PCS | Mod: PBBFAC,,, | Performed by: REGISTERED NURSE

## 2023-04-12 PROCEDURE — 1101F PT FALLS ASSESS-DOCD LE1/YR: CPT | Mod: CPTII,S$GLB,, | Performed by: REGISTERED NURSE

## 2023-04-12 PROCEDURE — 1159F PR MEDICATION LIST DOCUMENTED IN MEDICAL RECORD: ICD-10-PCS | Mod: CPTII,S$GLB,, | Performed by: REGISTERED NURSE

## 2023-04-12 PROCEDURE — 3044F HG A1C LEVEL LT 7.0%: CPT | Mod: CPTII,S$GLB,, | Performed by: REGISTERED NURSE

## 2023-04-12 PROCEDURE — 3044F PR MOST RECENT HEMOGLOBIN A1C LEVEL <7.0%: ICD-10-PCS | Mod: CPTII,S$GLB,, | Performed by: REGISTERED NURSE

## 2023-04-12 PROCEDURE — 3288F FALL RISK ASSESSMENT DOCD: CPT | Mod: CPTII,S$GLB,, | Performed by: REGISTERED NURSE

## 2023-04-12 PROCEDURE — 3066F NEPHROPATHY DOC TX: CPT | Mod: CPTII,S$GLB,, | Performed by: REGISTERED NURSE

## 2023-04-12 PROCEDURE — 99999 PR PBB SHADOW E&M-EST. PATIENT-LVL III: CPT | Mod: PBBFAC,,, | Performed by: REGISTERED NURSE

## 2023-04-12 PROCEDURE — 99204 OFFICE O/P NEW MOD 45 MIN: CPT | Mod: S$GLB,,, | Performed by: REGISTERED NURSE

## 2023-04-12 PROCEDURE — 3008F BODY MASS INDEX DOCD: CPT | Mod: CPTII,S$GLB,, | Performed by: REGISTERED NURSE

## 2023-04-12 PROCEDURE — 1101F PR PT FALLS ASSESS DOC 0-1 FALLS W/OUT INJ PAST YR: ICD-10-PCS | Mod: CPTII,S$GLB,, | Performed by: REGISTERED NURSE

## 2023-04-12 PROCEDURE — 3060F POS MICROALBUMINURIA REV: CPT | Mod: CPTII,S$GLB,, | Performed by: REGISTERED NURSE

## 2023-04-12 PROCEDURE — 1125F PR PAIN SEVERITY QUANTIFIED, PAIN PRESENT: ICD-10-PCS | Mod: CPTII,S$GLB,, | Performed by: REGISTERED NURSE

## 2023-04-12 PROCEDURE — 1159F MED LIST DOCD IN RCRD: CPT | Mod: CPTII,S$GLB,, | Performed by: REGISTERED NURSE

## 2023-04-12 PROCEDURE — 1125F AMNT PAIN NOTED PAIN PRSNT: CPT | Mod: CPTII,S$GLB,, | Performed by: REGISTERED NURSE

## 2023-04-12 PROCEDURE — 3060F PR POS MICROALBUMINURIA RESULT DOCUMENTED/REVIEW: ICD-10-PCS | Mod: CPTII,S$GLB,, | Performed by: REGISTERED NURSE

## 2023-04-12 PROCEDURE — 3288F PR FALLS RISK ASSESSMENT DOCUMENTED: ICD-10-PCS | Mod: CPTII,S$GLB,, | Performed by: REGISTERED NURSE

## 2023-04-12 RX ORDER — CYCLOBENZAPRINE HCL 10 MG
10 TABLET ORAL 3 TIMES DAILY PRN
Qty: 60 TABLET | Refills: 4 | Status: SHIPPED | OUTPATIENT
Start: 2023-04-12 | End: 2023-05-25 | Stop reason: SDUPTHER

## 2023-04-12 RX ORDER — GABAPENTIN 300 MG/1
300 CAPSULE ORAL NIGHTLY
Qty: 30 CAPSULE | Refills: 11 | Status: SHIPPED | OUTPATIENT
Start: 2023-04-12 | End: 2024-04-11

## 2023-04-17 ENCOUNTER — TELEPHONE (OUTPATIENT)
Dept: INTERNAL MEDICINE | Facility: CLINIC | Age: 68
End: 2023-04-17
Payer: MEDICARE

## 2023-04-17 NOTE — TELEPHONE ENCOUNTER
----- Message from Bill Escobar MD sent at 4/5/2023  1:06 PM CDT -----  Please see one-month repeat lipid panel  Thank you for your time.

## 2023-04-17 NOTE — TELEPHONE ENCOUNTER
I spoke to pt, he states he viewed ab results and started Rx Crestor.  I tried to assist with scheduling lab appt.  Pt states he will call back.

## 2023-05-09 ENCOUNTER — CLINICAL SUPPORT (OUTPATIENT)
Dept: ENDOSCOPY | Facility: HOSPITAL | Age: 68
End: 2023-05-09
Attending: STUDENT IN AN ORGANIZED HEALTH CARE EDUCATION/TRAINING PROGRAM
Payer: MEDICARE

## 2023-05-09 VITALS — HEIGHT: 67 IN | WEIGHT: 300 LBS | BODY MASS INDEX: 47.09 KG/M2

## 2023-05-09 DIAGNOSIS — Z12.12 SCREENING FOR COLORECTAL CANCER: ICD-10-CM

## 2023-05-09 DIAGNOSIS — Z12.11 SCREENING FOR COLORECTAL CANCER: ICD-10-CM

## 2023-05-09 RX ORDER — SODIUM, POTASSIUM,MAG SULFATES 17.5-3.13G
SOLUTION, RECONSTITUTED, ORAL ORAL
Qty: 1 KIT | Refills: 0 | Status: SHIPPED | OUTPATIENT
Start: 2023-05-09

## 2023-05-15 ENCOUNTER — PATIENT MESSAGE (OUTPATIENT)
Dept: ORTHOPEDICS | Facility: CLINIC | Age: 68
End: 2023-05-15
Payer: MEDICARE

## 2023-05-18 ENCOUNTER — PATIENT OUTREACH (OUTPATIENT)
Dept: ADMINISTRATIVE | Facility: HOSPITAL | Age: 68
End: 2023-05-18
Payer: MEDICARE

## 2023-05-18 ENCOUNTER — PATIENT MESSAGE (OUTPATIENT)
Dept: ADMINISTRATIVE | Facility: HOSPITAL | Age: 68
End: 2023-05-18
Payer: MEDICARE

## 2023-05-25 ENCOUNTER — PATIENT MESSAGE (OUTPATIENT)
Dept: ORTHOPEDICS | Facility: CLINIC | Age: 68
End: 2023-05-25
Payer: MEDICARE

## 2023-05-25 DIAGNOSIS — G89.29 CHRONIC BILATERAL LOW BACK PAIN WITHOUT SCIATICA: ICD-10-CM

## 2023-05-25 DIAGNOSIS — M54.50 CHRONIC BILATERAL LOW BACK PAIN WITHOUT SCIATICA: ICD-10-CM

## 2023-05-25 RX ORDER — TIZANIDINE 2 MG/1
4 TABLET ORAL 3 TIMES DAILY PRN
Qty: 90 TABLET | Refills: 0 | Status: SHIPPED | OUTPATIENT
Start: 2023-05-25 | End: 2023-06-02

## 2023-05-25 RX ORDER — CYCLOBENZAPRINE HCL 10 MG
10 TABLET ORAL 3 TIMES DAILY PRN
Qty: 60 TABLET | Refills: 4 | Status: SHIPPED | OUTPATIENT
Start: 2023-05-25 | End: 2023-05-25

## 2023-06-02 RX ORDER — TIZANIDINE 2 MG/1
TABLET ORAL
Qty: 90 TABLET | Refills: 0 | Status: SHIPPED | OUTPATIENT
Start: 2023-06-02

## 2023-06-12 ENCOUNTER — TELEPHONE (OUTPATIENT)
Dept: ENDOSCOPY | Facility: HOSPITAL | Age: 68
End: 2023-06-12
Payer: MEDICARE

## 2023-06-12 DIAGNOSIS — Z12.12 SCREENING FOR COLORECTAL CANCER: Primary | ICD-10-CM

## 2023-06-12 DIAGNOSIS — Z12.11 SCREENING FOR COLORECTAL CANCER: Primary | ICD-10-CM

## 2023-06-12 NOTE — TELEPHONE ENCOUNTER
Patient called to cancel colonoscopy scheduled for 6/13/23. Offered to reschedule patient. Patient states he will call back to schedule for a colonoscopy when he knows his availability. Case request converted over to amb referral to endo procedure .  Patient verbalized an understanding.

## 2023-08-29 DIAGNOSIS — F33.42 RECURRENT MAJOR DEPRESSIVE DISORDER, IN FULL REMISSION: ICD-10-CM

## 2023-08-29 DIAGNOSIS — F41.1 GENERALIZED ANXIETY DISORDER: ICD-10-CM

## 2023-08-29 RX ORDER — DULOXETIN HYDROCHLORIDE 60 MG/1
60 CAPSULE, DELAYED RELEASE ORAL DAILY
Qty: 90 CAPSULE | Refills: 0 | Status: SHIPPED | OUTPATIENT
Start: 2023-08-29 | End: 2023-09-06 | Stop reason: SDUPTHER

## 2023-09-06 ENCOUNTER — OFFICE VISIT (OUTPATIENT)
Dept: PSYCHIATRY | Facility: CLINIC | Age: 68
End: 2023-09-06
Payer: MEDICARE

## 2023-09-06 ENCOUNTER — PATIENT MESSAGE (OUTPATIENT)
Dept: PSYCHIATRY | Facility: CLINIC | Age: 68
End: 2023-09-06

## 2023-09-06 DIAGNOSIS — F41.1 GENERALIZED ANXIETY DISORDER: ICD-10-CM

## 2023-09-06 DIAGNOSIS — M54.50 CHRONIC BILATERAL LOW BACK PAIN WITHOUT SCIATICA: ICD-10-CM

## 2023-09-06 DIAGNOSIS — F33.42 RECURRENT MAJOR DEPRESSIVE DISORDER, IN FULL REMISSION: ICD-10-CM

## 2023-09-06 DIAGNOSIS — G89.29 CHRONIC BILATERAL LOW BACK PAIN WITHOUT SCIATICA: ICD-10-CM

## 2023-09-06 DIAGNOSIS — G47.00 INSOMNIA, UNSPECIFIED TYPE: Primary | ICD-10-CM

## 2023-09-06 PROCEDURE — 3044F PR MOST RECENT HEMOGLOBIN A1C LEVEL <7.0%: ICD-10-PCS | Mod: CPTII,95,, | Performed by: INTERNAL MEDICINE

## 2023-09-06 PROCEDURE — 99214 PR OFFICE/OUTPT VISIT, EST, LEVL IV, 30-39 MIN: ICD-10-PCS | Mod: 95,,, | Performed by: INTERNAL MEDICINE

## 2023-09-06 PROCEDURE — 3044F HG A1C LEVEL LT 7.0%: CPT | Mod: CPTII,95,, | Performed by: INTERNAL MEDICINE

## 2023-09-06 PROCEDURE — 1159F MED LIST DOCD IN RCRD: CPT | Mod: CPTII,95,, | Performed by: INTERNAL MEDICINE

## 2023-09-06 PROCEDURE — 1159F PR MEDICATION LIST DOCUMENTED IN MEDICAL RECORD: ICD-10-PCS | Mod: CPTII,95,, | Performed by: INTERNAL MEDICINE

## 2023-09-06 PROCEDURE — 3060F POS MICROALBUMINURIA REV: CPT | Mod: CPTII,95,, | Performed by: INTERNAL MEDICINE

## 2023-09-06 PROCEDURE — 3066F NEPHROPATHY DOC TX: CPT | Mod: CPTII,95,, | Performed by: INTERNAL MEDICINE

## 2023-09-06 PROCEDURE — 1160F RVW MEDS BY RX/DR IN RCRD: CPT | Mod: CPTII,95,, | Performed by: INTERNAL MEDICINE

## 2023-09-06 PROCEDURE — 3060F PR POS MICROALBUMINURIA RESULT DOCUMENTED/REVIEW: ICD-10-PCS | Mod: CPTII,95,, | Performed by: INTERNAL MEDICINE

## 2023-09-06 PROCEDURE — 99214 OFFICE O/P EST MOD 30 MIN: CPT | Mod: 95,,, | Performed by: INTERNAL MEDICINE

## 2023-09-06 PROCEDURE — 3066F PR DOCUMENTATION OF TREATMENT FOR NEPHROPATHY: ICD-10-PCS | Mod: CPTII,95,, | Performed by: INTERNAL MEDICINE

## 2023-09-06 PROCEDURE — 1160F PR REVIEW ALL MEDS BY PRESCRIBER/CLIN PHARMACIST DOCUMENTED: ICD-10-PCS | Mod: CPTII,95,, | Performed by: INTERNAL MEDICINE

## 2023-09-06 RX ORDER — AMITRIPTYLINE HYDROCHLORIDE 25 MG/1
25 TABLET, FILM COATED ORAL NIGHTLY
Qty: 30 TABLET | Refills: 2 | Status: SHIPPED | OUTPATIENT
Start: 2023-09-06 | End: 2023-11-11

## 2023-09-06 RX ORDER — DULOXETIN HYDROCHLORIDE 60 MG/1
60 CAPSULE, DELAYED RELEASE ORAL DAILY
Qty: 90 CAPSULE | Refills: 3 | Status: SHIPPED | OUTPATIENT
Start: 2023-09-06

## 2023-09-06 NOTE — PROGRESS NOTES
OUTPATIENT PSYCHIATRY RETURN VISIT    ENCOUNTER DATE:  2023  SITE:  [facility], [facility]  LENGTH OF SESSION:  15 minutes    The patient location is:  [place], not in healthcare facility  Visit type:  audiovisual    Face to Face time with patient:  15 minutes  20 minutes of total time spent on the encounter, which includes face to face time and non-face to face time preparing to see the patient (eg, review of tests), Obtaining and/or reviewing separately obtained history, Documenting clinical information in the electronic or other health record, Independently interpreting results (not separately reported) and communicating results to the patient/family/caregiver, or Care coordination (not separately reported).     Each patient to whom he or she provides medical services by telemedicine is:  (1) informed of the relationship between the physician and patient and the respective role of any other health care provider with respect to management of the patient; and (2) notified that he or she may decline to receive medical services by telemedicine and may withdraw from such care at any time.    CHIEF COMPLAINT:  Insomnia      HISTORY OF PRESENTING ILLNESS:  Yosef Rowe is a 67 y.o. male with history of MDD, SOLITARIO, and insomnia who presents for follow up appointment.      Plan at last appointment on 2022:  Mood is stable on Cymbalta.  Will continue Cymbalta 60mg daily.  He would prefer to avoid sleep medications at this time.    Discussed with patient informed consent, risks versus benefits, alternative treatments, side effect profile and the inherent unpredictability of individual responses to these treatments.  The patient expresses understanding of the above and displays the capacity to agree with this current plan.    History as told by patient:  Business is very slow and horrible.  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  Things aren't great but mood has remained stable.  Work is [employer] - 4 years.  Things with wife are going well.  Sleep has been horrible.  Gets sleepy around 11-11:30pm - lays in bed for hours before falling asleep.  Then sleeps off and on.  Didn't fall asleep until 5am last night.  Can sleep until noon some days.  Can't nap during the day.  Only drinking an occasional beer.      Medication side effects:  No  Medication compliance:  Yes    PSYCHIATRIC REVIEW OF SYSTEMS:  Trouble with sleep:  Yes, has worsened again recently  Appetite changes:  Denies  Weight changes:  Denies  Lack of energy:  Denies  Anhedonia:  Denies  Somatic symptoms:  Denies  Libido:  Not discussed  Anxiety/panic:  Denies  Guilty/hopeless:  Denies  Self-injurious behavior/risky behavior:  Denies  Any drugs:  Denies  Alcohol:  Occasional beer    MEDICAL REVIEW OF SYSTEMS:  Complete review of systems performed covering Constitutional, Musculoskeletal, Neurologic.  All systems negative except for that covered in HPI.    PAST PSYCHIATRIC, MEDICAL, AND SOCIAL HISTORY REVIEWED  The patient's past medical, family and social history have been reviewed and updated as appropriate within the electronic medical record - see encounter notes.    MEDICATIONS:    Current Outpatient Medications:     amitriptyline (ELAVIL) 25 MG tablet, Take 1 tablet (25 mg total) by mouth every evening., Disp: 30 tablet, Rfl: 2    atorvastatin (LIPITOR) 20 MG tablet, Take 1 tablet (20 mg total) by mouth once daily., Disp: 90 tablet, Rfl: 6    DULoxetine (CYMBALTA) 60 MG capsule, Take 1 capsule (60 mg total) by mouth once daily., Disp: 90 capsule, Rfl: 3    gabapentin (NEURONTIN) 300 MG capsule, Take 1 capsule (300 mg total) by mouth every evening., Disp: 30 capsule, Rfl: 11    sodium,potassium,mag sulfates (SUPREP BOWEL PREP KIT) 17.5-3.13-1.6 gram SolR, Follow instructions given by Endoscopy scheduling nurse, Disp: 1 kit, Rfl:  "0    tiZANidine (ZANAFLEX) 2 MG tablet, TAKE 2 TABLETS(4 MG) BY MOUTH THREE TIMES DAILY AS NEEDED, Disp: 90 tablet, Rfl: 0    ALLERGIES:  Review of patient's allergies indicates:   Allergen Reactions    No known drug allergies      PSYCHIATRIC EXAM:  There were no vitals filed for this visit.   Appearance:  Well groomed, appearing healthy and of stated age  Behavior:  Cooperative, pleasant, no psychomotor agitation or retardation  Speech:  Normal rate, rhythm, prosody, and volume  Mood:  "Not great"  Affect:  Euthymic  Thought Process:  Linear, logical, goal directed  Thought Content:  Negative for suicidal ideation, homicidal ideation, delusions or hallucinations.  Associations:  Intact  Memory:  Grossly Intact  Level of Consciousness/Orientation:  Grossly intact  Fund of Knowledge:  Good  Attention:  Good  Language:  Fluent, able to name abstract and concrete objects  Insight:  Fair  Judgment:  Intact  Psychomotor signs:  No abnormal movements of face  Gait:  Unable to assess via virtual visit      RELEVANT LABS/STUDIES:    Lab Results   Component Value Date    WBC 8.66 04/04/2023    HGB 16.1 04/04/2023    HCT 50.1 04/04/2023     (H) 04/04/2023     04/04/2023     BMP  Lab Results   Component Value Date     04/04/2023    K 4.8 04/04/2023     04/04/2023    CO2 26 04/04/2023    BUN 14 04/04/2023    CREATININE 0.9 04/04/2023    CALCIUM 9.6 04/04/2023    ANIONGAP 10 04/04/2023    ESTGFRAFRICA >60.0 04/08/2022    EGFRNONAA >60.0 04/08/2022     Lab Results   Component Value Date    ALT 44 04/04/2023    AST 38 04/04/2023    ALKPHOS 63 04/04/2023    BILITOT 0.6 04/04/2023     Lab Results   Component Value Date    TSH 0.786 04/04/2023       IMPRESSION:    Yosef Rowe is a 67 y.o. male with history of anxiety, depression, and insomnia who presents for follow up appointment.    Status/Progress:  Based on the examination today, the patient's problem(s) is/are inadequately controlled.  New problems " have not been presented today.    Risk Parameters:  Patient reports no suicidal ideation  Patient reports no homicidal ideation  Patient reports no self-injurious behavior  Patient reports no violent behavior      DIAGNOSES:    ICD-10-CM ICD-9-CM   1. Insomnia, unspecified type  G47.00 780.52   2. Chronic bilateral low back pain without sciatica  M54.50 724.2    G89.29 338.29   3. Recurrent major depressive disorder, in full remission  F33.42 296.36   4. Generalized anxiety disorder  F41.1 300.02       PLAN:  Start Elavil 12.5mg-25mg qHS for insomnia and chronic pain.    Continue Cymbalta 60mg daily.  Sleep hygiene information sent to patient's portal.  Discussed with patient informed consent, risks versus benefits, alternative treatments, side effect profile and the inherent unpredictability of individual responses to these treatments.  The patient expresses understanding of the above and displays the capacity to agree with this current plan.    RETURN TO CLINIC:  Follow up in about 3 months (around 12/6/2023).

## 2023-09-28 ENCOUNTER — OFFICE VISIT (OUTPATIENT)
Dept: INTERNAL MEDICINE | Facility: CLINIC | Age: 68
End: 2023-09-28
Payer: MEDICARE

## 2023-09-28 VITALS
DIASTOLIC BLOOD PRESSURE: 80 MMHG | RESPIRATION RATE: 16 BRPM | HEIGHT: 67 IN | BODY MASS INDEX: 47.64 KG/M2 | SYSTOLIC BLOOD PRESSURE: 138 MMHG | WEIGHT: 303.56 LBS | HEART RATE: 77 BPM

## 2023-09-28 DIAGNOSIS — I10 ESSENTIAL HYPERTENSION: ICD-10-CM

## 2023-09-28 DIAGNOSIS — G47.00 INSOMNIA, UNSPECIFIED TYPE: Primary | ICD-10-CM

## 2023-09-28 DIAGNOSIS — I25.84 CORONARY ATHEROSCLEROSIS DUE TO CALCIFIED CORONARY LESION: ICD-10-CM

## 2023-09-28 DIAGNOSIS — N13.8 BENIGN PROSTATIC HYPERPLASIA WITH URINARY OBSTRUCTION: ICD-10-CM

## 2023-09-28 DIAGNOSIS — I83.93 VARICOSE VEINS OF BOTH LOWER EXTREMITIES, UNSPECIFIED WHETHER COMPLICATED: ICD-10-CM

## 2023-09-28 DIAGNOSIS — Z00.00 ENCOUNTER FOR PREVENTIVE HEALTH EXAMINATION: ICD-10-CM

## 2023-09-28 DIAGNOSIS — Z87.891 EX-CIGAR SMOKER: ICD-10-CM

## 2023-09-28 DIAGNOSIS — M54.40 LOW BACK PAIN WITH SCIATICA, SCIATICA LATERALITY UNSPECIFIED, UNSPECIFIED BACK PAIN LATERALITY, UNSPECIFIED CHRONICITY: ICD-10-CM

## 2023-09-28 DIAGNOSIS — Z00.00 ENCOUNTER FOR MEDICARE ANNUAL WELLNESS EXAM: ICD-10-CM

## 2023-09-28 DIAGNOSIS — N13.9 URINARY OUTFLOW OBSTRUCTION: ICD-10-CM

## 2023-09-28 DIAGNOSIS — I25.10 CORONARY ATHEROSCLEROSIS DUE TO CALCIFIED CORONARY LESION: ICD-10-CM

## 2023-09-28 DIAGNOSIS — E78.5 HYPERLIPIDEMIA, UNSPECIFIED HYPERLIPIDEMIA TYPE: ICD-10-CM

## 2023-09-28 DIAGNOSIS — F41.1 GENERALIZED ANXIETY DISORDER: ICD-10-CM

## 2023-09-28 DIAGNOSIS — G47.30 SLEEP APNEA, UNSPECIFIED TYPE: ICD-10-CM

## 2023-09-28 DIAGNOSIS — E66.01 OBESITY, CLASS III, BMI 40-49.9 (MORBID OBESITY): ICD-10-CM

## 2023-09-28 DIAGNOSIS — F33.42 RECURRENT MAJOR DEPRESSIVE DISORDER, IN FULL REMISSION: ICD-10-CM

## 2023-09-28 DIAGNOSIS — N40.1 BENIGN PROSTATIC HYPERPLASIA WITH URINARY OBSTRUCTION: ICD-10-CM

## 2023-09-28 PROBLEM — Z96.659 INFECTED PROSTHETIC KNEE JOINT, INITIAL ENCOUNTER: Status: RESOLVED | Noted: 2018-07-03 | Resolved: 2023-09-28

## 2023-09-28 PROBLEM — R63.4 WEIGHT LOSS: Status: RESOLVED | Noted: 2018-09-10 | Resolved: 2023-09-28

## 2023-09-28 PROBLEM — M17.12 PRIMARY OSTEOARTHRITIS OF LEFT KNEE: Status: RESOLVED | Noted: 2018-06-22 | Resolved: 2023-09-28

## 2023-09-28 PROBLEM — R63.0 LOSS OF APPETITE: Status: RESOLVED | Noted: 2018-09-10 | Resolved: 2023-09-28

## 2023-09-28 PROBLEM — R53.82 CHRONIC FATIGUE: Status: RESOLVED | Noted: 2018-09-10 | Resolved: 2023-09-28

## 2023-09-28 PROBLEM — N64.4 NIPPLE PAIN: Status: RESOLVED | Noted: 2018-09-10 | Resolved: 2023-09-28

## 2023-09-28 PROBLEM — R60.9 EDEMA: Status: RESOLVED | Noted: 2018-05-29 | Resolved: 2023-09-28

## 2023-09-28 PROBLEM — T84.54XA INFECTION OF PROSTHETIC LEFT KNEE JOINT: Status: RESOLVED | Noted: 2018-07-04 | Resolved: 2023-09-28

## 2023-09-28 PROBLEM — T84.59XA INFECTED PROSTHETIC KNEE JOINT, INITIAL ENCOUNTER: Status: RESOLVED | Noted: 2018-07-03 | Resolved: 2023-09-28

## 2023-09-28 PROCEDURE — 3066F PR DOCUMENTATION OF TREATMENT FOR NEPHROPATHY: ICD-10-PCS | Mod: CPTII,S$GLB,, | Performed by: NURSE PRACTITIONER

## 2023-09-28 PROCEDURE — G0439 PPPS, SUBSEQ VISIT: HCPCS | Mod: S$GLB,,, | Performed by: NURSE PRACTITIONER

## 2023-09-28 PROCEDURE — 1159F PR MEDICATION LIST DOCUMENTED IN MEDICAL RECORD: ICD-10-PCS | Mod: CPTII,S$GLB,, | Performed by: NURSE PRACTITIONER

## 2023-09-28 PROCEDURE — 3075F SYST BP GE 130 - 139MM HG: CPT | Mod: CPTII,S$GLB,, | Performed by: NURSE PRACTITIONER

## 2023-09-28 PROCEDURE — 99999 PR PBB SHADOW E&M-EST. PATIENT-LVL V: ICD-10-PCS | Mod: PBBFAC,,, | Performed by: NURSE PRACTITIONER

## 2023-09-28 PROCEDURE — 1125F PR PAIN SEVERITY QUANTIFIED, PAIN PRESENT: ICD-10-PCS | Mod: CPTII,S$GLB,, | Performed by: NURSE PRACTITIONER

## 2023-09-28 PROCEDURE — 1101F PR PT FALLS ASSESS DOC 0-1 FALLS W/OUT INJ PAST YR: ICD-10-PCS | Mod: CPTII,S$GLB,, | Performed by: NURSE PRACTITIONER

## 2023-09-28 PROCEDURE — 3008F PR BODY MASS INDEX (BMI) DOCUMENTED: ICD-10-PCS | Mod: CPTII,S$GLB,, | Performed by: NURSE PRACTITIONER

## 2023-09-28 PROCEDURE — 3008F BODY MASS INDEX DOCD: CPT | Mod: CPTII,S$GLB,, | Performed by: NURSE PRACTITIONER

## 2023-09-28 PROCEDURE — 1170F PR FUNCTIONAL STATUS ASSESSED: ICD-10-PCS | Mod: CPTII,S$GLB,, | Performed by: NURSE PRACTITIONER

## 2023-09-28 PROCEDURE — 3060F PR POS MICROALBUMINURIA RESULT DOCUMENTED/REVIEW: ICD-10-PCS | Mod: CPTII,S$GLB,, | Performed by: NURSE PRACTITIONER

## 2023-09-28 PROCEDURE — 1159F MED LIST DOCD IN RCRD: CPT | Mod: CPTII,S$GLB,, | Performed by: NURSE PRACTITIONER

## 2023-09-28 PROCEDURE — 1160F PR REVIEW ALL MEDS BY PRESCRIBER/CLIN PHARMACIST DOCUMENTED: ICD-10-PCS | Mod: CPTII,S$GLB,, | Performed by: NURSE PRACTITIONER

## 2023-09-28 PROCEDURE — 3044F PR MOST RECENT HEMOGLOBIN A1C LEVEL <7.0%: ICD-10-PCS | Mod: CPTII,S$GLB,, | Performed by: NURSE PRACTITIONER

## 2023-09-28 PROCEDURE — G0439 PR MEDICARE ANNUAL WELLNESS SUBSEQUENT VISIT: ICD-10-PCS | Mod: S$GLB,,, | Performed by: NURSE PRACTITIONER

## 2023-09-28 PROCEDURE — 1125F AMNT PAIN NOTED PAIN PRSNT: CPT | Mod: CPTII,S$GLB,, | Performed by: NURSE PRACTITIONER

## 2023-09-28 PROCEDURE — 3079F DIAST BP 80-89 MM HG: CPT | Mod: CPTII,S$GLB,, | Performed by: NURSE PRACTITIONER

## 2023-09-28 PROCEDURE — 1160F RVW MEDS BY RX/DR IN RCRD: CPT | Mod: CPTII,S$GLB,, | Performed by: NURSE PRACTITIONER

## 2023-09-28 PROCEDURE — 3075F PR MOST RECENT SYSTOLIC BLOOD PRESS GE 130-139MM HG: ICD-10-PCS | Mod: CPTII,S$GLB,, | Performed by: NURSE PRACTITIONER

## 2023-09-28 PROCEDURE — 3288F PR FALLS RISK ASSESSMENT DOCUMENTED: ICD-10-PCS | Mod: CPTII,S$GLB,, | Performed by: NURSE PRACTITIONER

## 2023-09-28 PROCEDURE — 1101F PT FALLS ASSESS-DOCD LE1/YR: CPT | Mod: CPTII,S$GLB,, | Performed by: NURSE PRACTITIONER

## 2023-09-28 PROCEDURE — 3066F NEPHROPATHY DOC TX: CPT | Mod: CPTII,S$GLB,, | Performed by: NURSE PRACTITIONER

## 2023-09-28 PROCEDURE — 3079F PR MOST RECENT DIASTOLIC BLOOD PRESSURE 80-89 MM HG: ICD-10-PCS | Mod: CPTII,S$GLB,, | Performed by: NURSE PRACTITIONER

## 2023-09-28 PROCEDURE — 3060F POS MICROALBUMINURIA REV: CPT | Mod: CPTII,S$GLB,, | Performed by: NURSE PRACTITIONER

## 2023-09-28 PROCEDURE — 3044F HG A1C LEVEL LT 7.0%: CPT | Mod: CPTII,S$GLB,, | Performed by: NURSE PRACTITIONER

## 2023-09-28 PROCEDURE — 3288F FALL RISK ASSESSMENT DOCD: CPT | Mod: CPTII,S$GLB,, | Performed by: NURSE PRACTITIONER

## 2023-09-28 PROCEDURE — 99999 PR PBB SHADOW E&M-EST. PATIENT-LVL V: CPT | Mod: PBBFAC,,, | Performed by: NURSE PRACTITIONER

## 2023-09-28 PROCEDURE — 1170F FXNL STATUS ASSESSED: CPT | Mod: CPTII,S$GLB,, | Performed by: NURSE PRACTITIONER

## 2023-09-28 NOTE — PATIENT INSTRUCTIONS
Counseling and Referral of Other Preventative  (Italic type indicates deductible and co-insurance are waived)    Patient Name: Yosef Rowe  Today's Date: 9/28/2023    Health Maintenance         Date Due Completion Date    COVID-19 Vaccine (1) Declined   ---    TETANUS VACCINE Declined   ---    Aspirin/Antiplatelet Therapy Declined   ---    Shingles Vaccine (1 of 2) Declined   ---    Colorectal Cancer Screening Due- copy of order & number given to call & schedule   12/20/2007 (Done)        Influenza Vaccine (1) Declined   11/4/2020    Hemoglobin A1c (Prediabetes) 04/04/2024 4/4/2023    High Dose Statin 04/12/2024 4/12/2023    Lipid Panel    Call insurance and check into obesity benefits    Gradual wt loss, mediterranean diet- follow up with PCP on obesity    Prolonged timed get up & go- follow up with PCP     04/04/2024 4/4/2023          No orders of the defined types were placed in this encounter.      The following information is provided to all patients.  This information is to help you find resources for any of the problems found today that may be affecting your health:                Living healthy guide: www.[facility].[place].gov      Understanding Diabetes: www.diabetes.org      Eating healthy: www.cdc.gov/healthyweight      CDC home safety checklist: www.cdc.gov/steadi/patient.html      Agency on Aging: www.goea.[place].gov      Alcoholics anonymous (AA): www.aa.org      Physical Activity: www.solomon.nih.gov/ec4ddqu      Tobacco use: www.quitwithusla.org

## 2023-09-28 NOTE — PROGRESS NOTES
"Yosef Rowe presented for a  Medicare AWV and comprehensive Health Risk Assessment today. The following components were reviewed and updated:    Medical history  Family History  Social history  Allergies and Current Medications  Health Risk Assessment  Health Maintenance  Care Team     ** See Completed Assessments for Annual Wellness Visit within the encounter summary.**       The following assessments were completed:  Living Situation  CAGE  Depression Screening  Timed Get Up and Go  Whisper Test  Cognitive Function Screening  Nutrition Screening  ADL Screening  PAQ Screening      Vitals:    09/28/23 1028   BP: 138/80   BP Location: Right arm   Patient Position: Sitting   Pulse: 77   Resp: 16   Weight: (!) 137.7 kg (303 lb 9.2 oz)   Height: 5' 7" (1.702 m)     Body mass index is 47.55 kg/m².  Physical Exam  Constitutional:       Comments: Younger in appearance than age   HENT:      Right Ear: There is no impacted cerumen.      Left Ear: There is no impacted cerumen.   Eyes:      General: No scleral icterus.  Cardiovascular:      Rate and Rhythm: Normal rate and regular rhythm.   Pulmonary:      Effort: Pulmonary effort is normal.      Breath sounds: Normal breath sounds.   Abdominal:      Palpations: Abdomen is soft.      Comments: protrudent   Musculoskeletal:         General: No swelling. Normal range of motion.   Skin:     General: Skin is warm and dry.   Neurological:      Mental Status: He is alert and oriented to person, place, and time.   Psychiatric:         Mood and Affect: Mood normal.         Behavior: Behavior normal.         Thought Content: Thought content normal.         Judgment: Judgment normal.            Medication review & Opioid screening performed during rooming section. No prescribed opioid medications noted.  Review for substance use disorder screening performed during rooming section. No substance abuse noted on screening.      Diagnoses and health risks identified today and associated " recommendations/orders:    1. Insomnia, unspecified type  Stable- chronic- followed by PCP    2. Sleep apnea, unspecified type  Stable- uses cpap-- followed by  sleep medicine    3. Hyperlipidemia, unspecified hyperlipidemia type  Stable on regimen & followed by PCP    4. Essential hypertension  Stable on regimen & followed by PCP    5. Coronary atherosclerosis due to calcified coronary lesion  Stable on regimen & followed by PCP    6. Generalized anxiety disorder  Stable on regimen & followed by PCP    7. Recurrent major depressive disorder, in full remission  Stable on regimen & followed by PCP-PHQ2=0    8. Varicose veins of both lower extremities, unspecified whether complicated  Stable on regimen & followed by PCP    9. Encounter for Medicare annual wellness exam  Here for Health Risk Assessment/Annual Wellness Visit.  Health maintenance reviewed and updated. Follow up in one year.    - Ambulatory Referral/Consult to Enhanced Annual Wellness Visit (eAWV)    10. Ex-cigar smoker    11. Obesity, Class III, BMI 40-49.9 (morbid obesity)  Chronic. Followed by PCP.   Centers for Disease Control and Prevention (CDC)  weight recommendations for current BMI & ideal BMI range discussed with patient.  Recommended  gradual weight loss,  mediterranean diet , start structured regular exercise limited by back pain.       12. Encounter for preventive health examination  Here for Health Risk Assessment/Annual Wellness Visit.  Health maintenance reviewed and updated. Follow up in one year.        13. BPH (benign prostatic hypertrophy) with urinary obstruction  Stable on regimen & followed by PCP    14. Urinary outflow obstruction  Stable on regimen & followed by PCP    15. Low back pain with sciatica, sciatica laterality unspecified, unspecified back pain laterality, unspecified chronicity  Stable on regimen & followed by PCP, spine clinic      Provided Yosef with a 5-10 year written screening schedule and personal prevention  plan. Recommendations were developed using the USPSTF age appropriate recommendations. Education, counseling, and referrals were provided as needed. After Visit Summary printed and given to patient which includes a list of additional screenings\tests needed. Call insurance and check into obesity benefits.Gradual wt loss, mediterranean diet- follow up with PCP on obesity.Prolonged timed get up & go- follow up with PCP. Declined referral to bariatric medicine- states has seen before. Fall prevention. Encouraged wt loss. F/U w PCP on pain- limits activity/exercise.  Follow up in about 1 year (around 9/28/2024) for HRA.    ADAL Ortega offered to discuss advanced care planning, including how to pick a person who would make decisions for you if you were unable to make them for yourself, called a health care power of , and what kind of decisions you might make such as use of life sustaining treatments such as ventilators and tube feeding when faced with a life limiting illness recorded on a living will that they will need to know. (How you want to be cared for as you near the end of your natural life)     X Patient is interested in learning more about how to make advanced directives.  I provided them paperwork and offered to discuss this with them.

## 2023-11-11 DIAGNOSIS — M54.50 CHRONIC BILATERAL LOW BACK PAIN WITHOUT SCIATICA: ICD-10-CM

## 2023-11-11 DIAGNOSIS — G89.29 CHRONIC BILATERAL LOW BACK PAIN WITHOUT SCIATICA: ICD-10-CM

## 2023-11-11 DIAGNOSIS — G47.00 INSOMNIA, UNSPECIFIED TYPE: ICD-10-CM

## 2023-11-11 RX ORDER — AMITRIPTYLINE HYDROCHLORIDE 25 MG/1
25 TABLET, FILM COATED ORAL NIGHTLY
Qty: 30 TABLET | Refills: 2 | Status: SHIPPED | OUTPATIENT
Start: 2023-11-11

## 2024-08-06 ENCOUNTER — PATIENT OUTREACH (OUTPATIENT)
Dept: ADMINISTRATIVE | Facility: HOSPITAL | Age: 69
End: 2024-08-06
Payer: MEDICARE

## 2024-08-21 ENCOUNTER — LAB VISIT (OUTPATIENT)
Dept: LAB | Facility: HOSPITAL | Age: 69
End: 2024-08-21
Attending: NURSE PRACTITIONER
Payer: MEDICARE

## 2024-08-21 ENCOUNTER — OFFICE VISIT (OUTPATIENT)
Dept: INTERNAL MEDICINE | Facility: CLINIC | Age: 69
End: 2024-08-21
Payer: MEDICARE

## 2024-08-21 VITALS
OXYGEN SATURATION: 98 % | SYSTOLIC BLOOD PRESSURE: 160 MMHG | HEIGHT: 67 IN | WEIGHT: 298.75 LBS | TEMPERATURE: 98 F | BODY MASS INDEX: 46.89 KG/M2 | RESPIRATION RATE: 18 BRPM | DIASTOLIC BLOOD PRESSURE: 92 MMHG | HEART RATE: 74 BPM

## 2024-08-21 DIAGNOSIS — E66.01 CLASS 3 SEVERE OBESITY DUE TO EXCESS CALORIES WITH SERIOUS COMORBIDITY AND BODY MASS INDEX (BMI) OF 45.0 TO 49.9 IN ADULT: ICD-10-CM

## 2024-08-21 DIAGNOSIS — R73.01 IFG (IMPAIRED FASTING GLUCOSE): ICD-10-CM

## 2024-08-21 DIAGNOSIS — Z11.1 SCREENING EXAMINATION FOR PULMONARY TUBERCULOSIS: ICD-10-CM

## 2024-08-21 DIAGNOSIS — I71.9 AORTIC ANEURYSM WITHOUT RUPTURE, UNSPECIFIED PORTION OF AORTA: ICD-10-CM

## 2024-08-21 DIAGNOSIS — Z12.11 ENCOUNTER FOR COLORECTAL CANCER SCREENING: ICD-10-CM

## 2024-08-21 DIAGNOSIS — Z12.5 ENCOUNTER FOR PROSTATE CANCER SCREENING: ICD-10-CM

## 2024-08-21 DIAGNOSIS — G47.00 INSOMNIA, UNSPECIFIED TYPE: ICD-10-CM

## 2024-08-21 DIAGNOSIS — E78.5 HYPERLIPIDEMIA, UNSPECIFIED HYPERLIPIDEMIA TYPE: ICD-10-CM

## 2024-08-21 DIAGNOSIS — L40.9 PSORIASIS: ICD-10-CM

## 2024-08-21 DIAGNOSIS — E55.9 VITAMIN D INSUFFICIENCY: ICD-10-CM

## 2024-08-21 DIAGNOSIS — F33.42 RECURRENT MAJOR DEPRESSIVE DISORDER, IN FULL REMISSION: ICD-10-CM

## 2024-08-21 DIAGNOSIS — N52.9 ERECTILE DYSFUNCTION, UNSPECIFIED ERECTILE DYSFUNCTION TYPE: ICD-10-CM

## 2024-08-21 DIAGNOSIS — Z00.00 ENCOUNTER FOR ANNUAL HEALTH EXAMINATION: Primary | ICD-10-CM

## 2024-08-21 DIAGNOSIS — I25.10 CORONARY ATHEROSCLEROSIS DUE TO CALCIFIED CORONARY LESION: ICD-10-CM

## 2024-08-21 DIAGNOSIS — Z79.899 ENCOUNTER FOR LONG-TERM (CURRENT) USE OF OTHER MEDICATIONS: ICD-10-CM

## 2024-08-21 DIAGNOSIS — I10 ESSENTIAL HYPERTENSION: ICD-10-CM

## 2024-08-21 DIAGNOSIS — G47.30 SLEEP APNEA, UNSPECIFIED TYPE: ICD-10-CM

## 2024-08-21 DIAGNOSIS — Z12.12 ENCOUNTER FOR COLORECTAL CANCER SCREENING: ICD-10-CM

## 2024-08-21 DIAGNOSIS — I25.84 CORONARY ATHEROSCLEROSIS DUE TO CALCIFIED CORONARY LESION: ICD-10-CM

## 2024-08-21 PROBLEM — E66.813 CLASS 3 SEVERE OBESITY DUE TO EXCESS CALORIES WITH SERIOUS COMORBIDITY AND BODY MASS INDEX (BMI) OF 45.0 TO 49.9 IN ADULT: Status: ACTIVE | Noted: 2017-08-17

## 2024-08-21 LAB
25(OH)D3+25(OH)D2 SERPL-MCNC: 36 NG/ML (ref 30–96)
ALBUMIN SERPL BCP-MCNC: 3.7 G/DL (ref 3.5–5.2)
ALP SERPL-CCNC: 65 U/L (ref 55–135)
ALT SERPL W/O P-5'-P-CCNC: 45 U/L (ref 10–44)
ANION GAP SERPL CALC-SCNC: 10 MMOL/L (ref 8–16)
AST SERPL-CCNC: 42 U/L (ref 10–40)
BASOPHILS # BLD AUTO: 0.06 K/UL (ref 0–0.2)
BASOPHILS NFR BLD: 0.6 % (ref 0–1.9)
BILIRUB SERPL-MCNC: 1.1 MG/DL (ref 0.1–1)
BUN SERPL-MCNC: 12 MG/DL (ref 8–23)
CALCIUM SERPL-MCNC: 9.1 MG/DL (ref 8.7–10.5)
CHLORIDE SERPL-SCNC: 105 MMOL/L (ref 95–110)
CHOLEST SERPL-MCNC: 253 MG/DL (ref 120–199)
CHOLEST/HDLC SERPL: 4.1 {RATIO} (ref 2–5)
CO2 SERPL-SCNC: 28 MMOL/L (ref 23–29)
COMPLEXED PSA SERPL-MCNC: 0.94 NG/ML (ref 0–4)
CREAT SERPL-MCNC: 0.9 MG/DL (ref 0.5–1.4)
DIFFERENTIAL METHOD BLD: ABNORMAL
EOSINOPHIL # BLD AUTO: 0.1 K/UL (ref 0–0.5)
EOSINOPHIL NFR BLD: 1.3 % (ref 0–8)
ERYTHROCYTE [DISTWIDTH] IN BLOOD BY AUTOMATED COUNT: 13.1 % (ref 11.5–14.5)
EST. GFR  (NO RACE VARIABLE): >60 ML/MIN/1.73 M^2
ESTIMATED AVG GLUCOSE: 111 MG/DL (ref 68–131)
GLUCOSE SERPL-MCNC: 107 MG/DL (ref 70–110)
HBA1C MFR BLD: 5.5 % (ref 4–5.6)
HCT VFR BLD AUTO: 49.1 % (ref 40–54)
HCV AB SERPL QL IA: NORMAL
HDLC SERPL-MCNC: 61 MG/DL (ref 40–75)
HDLC SERPL: 24.1 % (ref 20–50)
HGB BLD-MCNC: 16.2 G/DL (ref 14–18)
IMM GRANULOCYTES # BLD AUTO: 0.05 K/UL (ref 0–0.04)
IMM GRANULOCYTES NFR BLD AUTO: 0.5 % (ref 0–0.5)
LDLC SERPL CALC-MCNC: 165.8 MG/DL (ref 63–159)
LYMPHOCYTES # BLD AUTO: 2 K/UL (ref 1–4.8)
LYMPHOCYTES NFR BLD: 19.4 % (ref 18–48)
MCH RBC QN AUTO: 34.1 PG (ref 27–31)
MCHC RBC AUTO-ENTMCNC: 33 G/DL (ref 32–36)
MCV RBC AUTO: 103 FL (ref 82–98)
MONOCYTES # BLD AUTO: 0.8 K/UL (ref 0.3–1)
MONOCYTES NFR BLD: 8 % (ref 4–15)
NEUTROPHILS # BLD AUTO: 7.1 K/UL (ref 1.8–7.7)
NEUTROPHILS NFR BLD: 70.2 % (ref 38–73)
NONHDLC SERPL-MCNC: 192 MG/DL
NRBC BLD-RTO: 0 /100 WBC
PLATELET # BLD AUTO: 233 K/UL (ref 150–450)
PMV BLD AUTO: 10.5 FL (ref 9.2–12.9)
POTASSIUM SERPL-SCNC: 5.3 MMOL/L (ref 3.5–5.1)
PROT SERPL-MCNC: 6.8 G/DL (ref 6–8.4)
RBC # BLD AUTO: 4.75 M/UL (ref 4.6–6.2)
SODIUM SERPL-SCNC: 143 MMOL/L (ref 136–145)
TRIGL SERPL-MCNC: 131 MG/DL (ref 30–150)
TSH SERPL DL<=0.005 MIU/L-ACNC: 1.36 UIU/ML (ref 0.4–4)
WBC # BLD AUTO: 10.04 K/UL (ref 3.9–12.7)

## 2024-08-21 PROCEDURE — 3080F DIAST BP >= 90 MM HG: CPT | Mod: CPTII,S$GLB,, | Performed by: NURSE PRACTITIONER

## 2024-08-21 PROCEDURE — 80061 LIPID PANEL: CPT | Performed by: NURSE PRACTITIONER

## 2024-08-21 PROCEDURE — 3077F SYST BP >= 140 MM HG: CPT | Mod: CPTII,S$GLB,, | Performed by: NURSE PRACTITIONER

## 2024-08-21 PROCEDURE — 3288F FALL RISK ASSESSMENT DOCD: CPT | Mod: CPTII,S$GLB,, | Performed by: NURSE PRACTITIONER

## 2024-08-21 PROCEDURE — 99999 PR PBB SHADOW E&M-EST. PATIENT-LVL V: CPT | Mod: PBBFAC,,, | Performed by: NURSE PRACTITIONER

## 2024-08-21 PROCEDURE — 99214 OFFICE O/P EST MOD 30 MIN: CPT | Mod: S$GLB,,, | Performed by: NURSE PRACTITIONER

## 2024-08-21 PROCEDURE — 3008F BODY MASS INDEX DOCD: CPT | Mod: CPTII,S$GLB,, | Performed by: NURSE PRACTITIONER

## 2024-08-21 PROCEDURE — 86803 HEPATITIS C AB TEST: CPT | Performed by: NURSE PRACTITIONER

## 2024-08-21 PROCEDURE — 80053 COMPREHEN METABOLIC PANEL: CPT | Performed by: NURSE PRACTITIONER

## 2024-08-21 PROCEDURE — 84153 ASSAY OF PSA TOTAL: CPT | Performed by: NURSE PRACTITIONER

## 2024-08-21 PROCEDURE — 1159F MED LIST DOCD IN RCRD: CPT | Mod: CPTII,S$GLB,, | Performed by: NURSE PRACTITIONER

## 2024-08-21 PROCEDURE — 1160F RVW MEDS BY RX/DR IN RCRD: CPT | Mod: CPTII,S$GLB,, | Performed by: NURSE PRACTITIONER

## 2024-08-21 PROCEDURE — 36415 COLL VENOUS BLD VENIPUNCTURE: CPT | Mod: PO | Performed by: NURSE PRACTITIONER

## 2024-08-21 PROCEDURE — 1125F AMNT PAIN NOTED PAIN PRSNT: CPT | Mod: CPTII,S$GLB,, | Performed by: NURSE PRACTITIONER

## 2024-08-21 PROCEDURE — 1101F PT FALLS ASSESS-DOCD LE1/YR: CPT | Mod: CPTII,S$GLB,, | Performed by: NURSE PRACTITIONER

## 2024-08-21 PROCEDURE — 85025 COMPLETE CBC W/AUTO DIFF WBC: CPT | Performed by: NURSE PRACTITIONER

## 2024-08-21 PROCEDURE — 84443 ASSAY THYROID STIM HORMONE: CPT | Performed by: NURSE PRACTITIONER

## 2024-08-21 PROCEDURE — 82306 VITAMIN D 25 HYDROXY: CPT | Performed by: NURSE PRACTITIONER

## 2024-08-21 PROCEDURE — 83036 HEMOGLOBIN GLYCOSYLATED A1C: CPT | Performed by: NURSE PRACTITIONER

## 2024-08-21 RX ORDER — ERYTHROMYCIN 5 MG/G
OINTMENT OPHTHALMIC
COMMUNITY
Start: 2024-06-27

## 2024-08-21 RX ORDER — TRIAMCINOLONE ACETONIDE 1 MG/G
CREAM TOPICAL 2 TIMES DAILY
COMMUNITY
Start: 2024-08-06

## 2024-08-21 RX ORDER — ESZOPICLONE 3 MG/1
3 TABLET, FILM COATED ORAL NIGHTLY PRN
Qty: 10 TABLET | Refills: 0 | Status: SHIPPED | OUTPATIENT
Start: 2024-08-21 | End: 2024-09-20

## 2024-08-21 NOTE — PROGRESS NOTES
"Subjective:       Patient ID: Yosef Rowe is a 68 y.o. male.    Chief Complaint: Annual Wellness Visit    Yosef Rowe is a 68 y.o. male who presents today for an annual wellness visit.     Patient presents today with lab orders from Dermatology Dr. Emerson Mace requesting LFTs, Hep C and TB Gold.     Patient reports he has chronic insomnia. States his mother suffered from the same. He states he has tried "everything". Went to bed around 10 pm last night and notes he was still awake at 0500. After about 4 days he will finally get to sleep from sheer exhaustion.   Is using CPAP nightly.     Was on statin in the past but he has not taken it in "a long time".     Patient reports monitoring his BP at home and finds it is normal at home < 140/90.     Review of patient's allergies indicates:   Allergen Reactions    No known drug allergies       Medication List with Changes/Refills   Current Medications    ATORVASTATIN (LIPITOR) 20 MG TABLET    Take 1 tablet (20 mg total) by mouth once daily.    CALCIUM CARBONATE/VITAMIN D3 (VITAMIN D-3 ORAL)    Take by mouth.    DULOXETINE (CYMBALTA) 60 MG CAPSULE    Take 1 capsule (60 mg total) by mouth once daily.    ERYTHROMYCIN (ROMYCIN) OPHTHALMIC OINTMENT    APPLY 1/4 INCH IN BOTH EYES EVERY NIGHT AT BEDTIME    TRIAMCINOLONE ACETONIDE 0.1% (KENALOG) 0.1 % CREAM    Apply topically 2 (two) times daily. Rash   Discontinued Medications    AMITRIPTYLINE (ELAVIL) 25 MG TABLET    TAKE 1 TABLET(25 MG) BY MOUTH EVERY EVENING    GABAPENTIN (NEURONTIN) 300 MG CAPSULE    Take 1 capsule (300 mg total) by mouth every evening.    SODIUM,POTASSIUM,MAG SULFATES (SUPREP BOWEL PREP KIT) 17.5-3.13-1.6 GRAM SOLR    Follow instructions given by Endoscopy scheduling nurse    TIZANIDINE (ZANAFLEX) 2 MG TABLET    TAKE 2 TABLETS(4 MG) BY MOUTH THREE TIMES DAILY AS NEEDED     Health Maintenance         Date Due Completion Date    RSV Vaccine (Age 60+ and Pregnant patients) (1 - 1-dose 60+ series) Never done " "---    Colorectal Cancer Screening 12/20/2017 12/20/2007 (Done)    Override on 12/20/2007: Done    High Dose Statin 06/16/2022 6/16/2021    Hemoglobin A1c (Prediabetes) 04/04/2024 4/4/2023    TETANUS VACCINE 09/28/2024 (Originally 9/12/1973) ---    Shingles Vaccine (1 of 2) 09/28/2024 (Originally 9/12/2004) ---    Lipid Panel 04/04/2028 4/4/2023              Patient ambulates on his own without an assistive device.     On average, how many days per week do you do moderate to strenuous exercise:  (like a brisk walk or jog; this does not include your job/work)  0   We encourage you to start exercising if you do not already, continue at your current level or increase your level of activity.     Have you ever used tobacco products? Yes    Have you often been bothered by feeling down, depressed, or hopeless?  Not at all    How often do you drink alcohol?  Daily    Review of Systems   Constitutional:  Negative for chills and fever.   Respiratory:  Negative for cough and shortness of breath.    Cardiovascular:  Negative for chest pain.   Genitourinary:  Positive for erectile dysfunction. Negative for decreased urine volume and difficulty urinating.   Neurological:  Negative for dizziness and headaches.   Psychiatric/Behavioral:  Positive for sleep disturbance.      Objective:     BP (!) 160/92 (BP Location: Right arm, Patient Position: Sitting, BP Method: Large (Manual))   Pulse 74   Temp 97.6 °F (36.4 °C) (Temporal)   Resp 18   Ht 5' 7" (1.702 m)   Wt 135.5 kg (298 lb 11.6 oz)   SpO2 98%   BMI 46.79 kg/m²     Physical Exam  Vitals reviewed.   Constitutional:       Appearance: He is obese.   HENT:      Head: Normocephalic and atraumatic.   Cardiovascular:      Rate and Rhythm: Normal rate and regular rhythm.      Heart sounds: Normal heart sounds. No murmur heard.  Pulmonary:      Effort: Pulmonary effort is normal.      Breath sounds: Normal breath sounds. No wheezing.   Skin:     General: Skin is warm and dry. "   Neurological:      Mental Status: He is alert and oriented to person, place, and time.       BP Readings from Last 3 Encounters:   08/21/24 (!) 160/92   09/28/23 138/80   04/04/23 138/68     Wt Readings from Last 3 Encounters:   08/21/24 135.5 kg (298 lb 11.6 oz)   09/28/23 (!) 137.7 kg (303 lb 9.2 oz)   05/09/23 136.1 kg (300 lb)       I reviewed and independently interpreted the labs and imaging below:  Last Labs:  Glucose   Date Value Ref Range Status   04/04/2023 104 70 - 110 mg/dL Final   04/08/2022 117 (H) 70 - 110 mg/dL Final     BUN   Date Value Ref Range Status   04/04/2023 14 8 - 23 mg/dL Final   04/08/2022 14 8 - 23 mg/dL Final     Creatinine   Date Value Ref Range Status   04/04/2023 0.9 0.5 - 1.4 mg/dL Final   04/08/2022 0.7 0.5 - 1.4 mg/dL Final     Sodium   Date Value Ref Range Status   04/04/2023 138 136 - 145 mmol/L Final     Potassium   Date Value Ref Range Status   04/04/2023 4.8 3.5 - 5.1 mmol/L Final     AST   Date Value Ref Range Status   04/04/2023 38 10 - 40 U/L Final     ALT   Date Value Ref Range Status   04/04/2023 44 10 - 44 U/L Final     eGFR if    Date Value Ref Range Status   04/08/2022 >60.0 >60 mL/min/1.73 m^2 Final     eGFR if non    Date Value Ref Range Status   04/08/2022 >60.0 >60 mL/min/1.73 m^2 Final     Comment:     Calculation used to obtain the estimated glomerular filtration  rate (eGFR) is the CKD-EPI equation.        Cholesterol   Date Value Ref Range Status   04/04/2023 269 (H) 120 - 199 mg/dL Final     Comment:     The National Cholesterol Education Program (NCEP) has set the  following guidelines (reference ranges) for Cholesterol:  Optimal.....................<200 mg/dL  Borderline High.............200-239 mg/dL  High........................> or = 240 mg/dL     04/08/2022 269 (H) 120 - 199 mg/dL Final     Comment:     The National Cholesterol Education Program (NCEP) has set the  following guidelines (reference ranges) for  Cholesterol:  Optimal.....................<200 mg/dL  Borderline High.............200-239 mg/dL  High........................> or = 240 mg/dL       Hemoglobin A1C   Date Value Ref Range Status   04/04/2023 5.5 4.0 - 5.6 % Final     Comment:     ADA Screening Guidelines:  5.7-6.4%  Consistent with prediabetes  >or=6.5%  Consistent with diabetes    High levels of fetal hemoglobin interfere with the HbA1C  assay. Heterozygous hemoglobin variants (HbS, HgC, etc)do  not significantly interfere with this assay.   However, presence of multiple variants may affect accuracy.     04/08/2022 5.5 4.0 - 5.6 % Final     Comment:     ADA Screening Guidelines:  5.7-6.4%  Consistent with prediabetes  >or=6.5%  Consistent with diabetes    High levels of fetal hemoglobin interfere with the HbA1C  assay. Heterozygous hemoglobin variants (HbS, HgC, etc)do  not significantly interfere with this assay.   However, presence of multiple variants may affect accuracy.       Lab Results   Component Value Date    WBC 8.66 04/04/2023    HGB 16.1 04/04/2023    HCT 50.1 04/04/2023     04/04/2023     Lab Results   Component Value Date    TSH 0.786 04/04/2023    S5TMWKO 7.2 04/04/2023       Assessment and Plan:     1. Encounter for annual health examination    --Nutrition: Discussed the importance of moderate caffeine intake. Adhering to a low sodium, low saturated fat/low cholesterol diet.   --Exercise: Discussed the importance of regular exercise. At least 30 minutes 5 days per week.   --Immunizations reviewed.  --Discussed benefits of maintaining up to date health maintenance.  -- Encouraged good sleep hygiene.      2. Erectile dysfunction, unspecified erectile dysfunction type    Chronic, stable. States he has tried medication in the past without success. Can obtain an erection but it does not last as long as he would prefer. Advised while waiting to see Urology to try silicone rings to aid in maintaining erection.      - Ambulatory  referral/consult to Urology; Future    3. Psoriasis    Chronic, stable, follow up with Dermatology      - HEPATITIS C ANTIBODY; Future  - QUANTIFERON GOLD TB; Future    4. IFG (impaired fasting glucose)    Chronic, labs to assess stability     - Hemoglobin A1C; Future    5. Essential hypertension  - CBC Auto Differential; Future  - Comprehensive Metabolic Panel; Future  - TSH; Future  - Microalbumin/Creatinine Ratio, Urine; Future    6. Coronary atherosclerosis due to calcified coronary lesion  7. Aortic aneurysm without rupture, unspecified portion of aorta    Chronic, stable, resume statin therapy     8. Hyperlipidemia, unspecified hyperlipidemia type    Chronic, labs to assess stability     - Lipid Panel; Future    9. Vitamin D insufficiency    Chronic, labs to assess stability     - Vitamin D; Future    10. Insomnia, unspecified type    Chronic, stable, trial Lunesta at max dose. If no improvement will need f/u with PCP     - eszopiclone (LUNESTA) 3 mg Tab; Take 1 tablet (3 mg total) by mouth nightly as needed (Insomnia).  Dispense: 10 tablet; Refill: 0    11. Sleep apnea, unspecified type    Chronic, stable     12. Recurrent major depressive disorder, in full remission    Chronic, stable, continue current medication    13. Class 3 severe obesity due to excess calories with serious comorbidity and body mass index (BMI) of 45.0 to 49.9 in adult    Chronic, stable    - Hemoglobin A1C; Future    14. Screening examination for pulmonary tuberculosis  15. Encounter for long-term (current) use of other medications  - HEPATITIS C ANTIBODY; Future  - QUANTIFERON GOLD TB; Future    16. Encounter for prostate cancer screening  - PSA, Screening; Future    17. Encounter for colorectal cancer screening  - Cologuard Screening (Multitarget Stool DNA); Future  - Cologuard Screening (Multitarget Stool DNA)

## 2024-08-22 ENCOUNTER — TELEPHONE (OUTPATIENT)
Dept: INTERNAL MEDICINE | Facility: CLINIC | Age: 69
End: 2024-08-22

## 2024-08-22 DIAGNOSIS — Q24.5 ANOMALOUS ORIGIN OF RIGHT CORONARY ARTERY: ICD-10-CM

## 2024-08-22 DIAGNOSIS — I25.10 CORONARY ATHEROSCLEROSIS DUE TO CALCIFIED CORONARY LESION: ICD-10-CM

## 2024-08-22 DIAGNOSIS — E66.01 OBESITY, CLASS III, BMI 40-49.9 (MORBID OBESITY): ICD-10-CM

## 2024-08-22 DIAGNOSIS — R06.02 SHORTNESS OF BREATH: ICD-10-CM

## 2024-08-22 DIAGNOSIS — E78.5 HYPERLIPIDEMIA, UNSPECIFIED HYPERLIPIDEMIA TYPE: ICD-10-CM

## 2024-08-22 DIAGNOSIS — I25.84 CORONARY ATHEROSCLEROSIS DUE TO CALCIFIED CORONARY LESION: ICD-10-CM

## 2024-08-22 DIAGNOSIS — I10 ESSENTIAL HYPERTENSION: ICD-10-CM

## 2024-08-22 DIAGNOSIS — G47.33 OBSTRUCTIVE SLEEP APNEA SYNDROME: ICD-10-CM

## 2024-08-22 RX ORDER — ATORVASTATIN CALCIUM 20 MG/1
20 TABLET, FILM COATED ORAL DAILY
Qty: 90 TABLET | Refills: 3 | Status: SHIPPED | OUTPATIENT
Start: 2024-08-22

## 2024-08-22 NOTE — TELEPHONE ENCOUNTER
Cholesterol is slightly better than last year but he needs to resume his cholesterol medication daily. Check again in 3 months. Orders placed.     Potassium is slightly elevated - please increase your water intake.     Liver function tests are slightly elevated as well. Please watch your alcohol intake.     How has your blood pressure been doing at home?

## 2024-08-22 NOTE — TELEPHONE ENCOUNTER
Reviewed lab results with pt and he will call office back to schedule Lipid in 3 months.     Pt state hasn't check BP since yesterday in office. Advised pt to give us a call when he takes readings.

## 2024-09-03 DIAGNOSIS — G47.00 INSOMNIA, UNSPECIFIED TYPE: ICD-10-CM

## 2024-09-03 NOTE — TELEPHONE ENCOUNTER
----- Message from Lyly Small sent at 9/3/2024 11:47 AM CDT -----  Contact: [phone]  Requesting an RX refill or new RX.    Is this a refill or new RX:     RX name and strength eszopiclone (LUNESTA) 3 mg Tab    Is this a 30 day or 90 day RX: 30    Pharmacy name and phone # (      [id number] - [place] - [address] [facility] [address]  [address] [id number]  Phone: [phone] Fax: [phone]

## 2024-09-03 NOTE — TELEPHONE ENCOUNTER
No care due was identified.  [facility] Embedded Care Due Messages. Reference number: [id number].   9/03/2024 2:30:54 PM CDT

## 2024-09-04 RX ORDER — ESZOPICLONE 3 MG/1
3 TABLET, FILM COATED ORAL NIGHTLY PRN
Qty: 30 TABLET | Refills: 0 | Status: SHIPPED | OUTPATIENT
Start: 2024-09-04 | End: 2024-10-04

## 2024-09-16 ENCOUNTER — TELEPHONE (OUTPATIENT)
Dept: INTERNAL MEDICINE | Facility: CLINIC | Age: 69
End: 2024-09-16
Payer: MEDICARE

## 2024-09-16 ENCOUNTER — OFFICE VISIT (OUTPATIENT)
Dept: UROLOGY | Facility: CLINIC | Age: 69
End: 2024-09-16
Payer: MEDICARE

## 2024-09-16 VITALS
DIASTOLIC BLOOD PRESSURE: 70 MMHG | BODY MASS INDEX: 46.17 KG/M2 | WEIGHT: 294.75 LBS | SYSTOLIC BLOOD PRESSURE: 155 MMHG | HEART RATE: 78 BPM

## 2024-09-16 DIAGNOSIS — E78.49 OTHER HYPERLIPIDEMIA: ICD-10-CM

## 2024-09-16 DIAGNOSIS — N40.1 BPH WITH URINARY OBSTRUCTION: ICD-10-CM

## 2024-09-16 DIAGNOSIS — N13.8 BPH WITH URINARY OBSTRUCTION: ICD-10-CM

## 2024-09-16 DIAGNOSIS — N52.01 ERECTILE DYSFUNCTION DUE TO ARTERIAL INSUFFICIENCY: Primary | ICD-10-CM

## 2024-09-16 DIAGNOSIS — I10 ESSENTIAL HYPERTENSION: ICD-10-CM

## 2024-09-16 PROBLEM — Z87.898 HISTORY OF HEAVY ALCOHOL CONSUMPTION: Status: RESOLVED | Noted: 2020-11-08 | Resolved: 2024-09-16

## 2024-09-16 PROBLEM — E87.0 HYPERNATREMIA: Status: RESOLVED | Noted: 2018-06-14 | Resolved: 2024-09-16

## 2024-09-16 PROBLEM — Z87.891 EX-CIGAR SMOKER: Status: RESOLVED | Noted: 2018-05-29 | Resolved: 2024-09-16

## 2024-09-16 PROCEDURE — 3066F NEPHROPATHY DOC TX: CPT | Mod: CPTII,S$GLB,, | Performed by: UROLOGY

## 2024-09-16 PROCEDURE — 3008F BODY MASS INDEX DOCD: CPT | Mod: CPTII,S$GLB,, | Performed by: UROLOGY

## 2024-09-16 PROCEDURE — 1160F RVW MEDS BY RX/DR IN RCRD: CPT | Mod: CPTII,S$GLB,, | Performed by: UROLOGY

## 2024-09-16 PROCEDURE — 3044F HG A1C LEVEL LT 7.0%: CPT | Mod: CPTII,S$GLB,, | Performed by: UROLOGY

## 2024-09-16 PROCEDURE — 3060F POS MICROALBUMINURIA REV: CPT | Mod: CPTII,S$GLB,, | Performed by: UROLOGY

## 2024-09-16 PROCEDURE — 99999 PR PBB SHADOW E&M-EST. PATIENT-LVL III: CPT | Mod: PBBFAC,,, | Performed by: UROLOGY

## 2024-09-16 PROCEDURE — 1126F AMNT PAIN NOTED NONE PRSNT: CPT | Mod: CPTII,S$GLB,, | Performed by: UROLOGY

## 2024-09-16 PROCEDURE — 3078F DIAST BP <80 MM HG: CPT | Mod: CPTII,S$GLB,, | Performed by: UROLOGY

## 2024-09-16 PROCEDURE — 3077F SYST BP >= 140 MM HG: CPT | Mod: CPTII,S$GLB,, | Performed by: UROLOGY

## 2024-09-16 PROCEDURE — 99204 OFFICE O/P NEW MOD 45 MIN: CPT | Mod: S$GLB,,, | Performed by: UROLOGY

## 2024-09-16 PROCEDURE — 1159F MED LIST DOCD IN RCRD: CPT | Mod: CPTII,S$GLB,, | Performed by: UROLOGY

## 2024-09-16 RX ORDER — SILDENAFIL 100 MG/1
100 TABLET, FILM COATED ORAL DAILY PRN
Qty: 10 TABLET | Refills: 11 | Status: SHIPPED | OUTPATIENT
Start: 2024-09-16 | End: 2025-09-16

## 2024-09-16 NOTE — PROGRESS NOTES
CHIEF COMPLAINT:    Mr. Rowe is a 69 y.o. male presenting for a consultation at the request of  JASKARAN Alejandro. Patient presents with erectile dysfunction.    PRESENTING ILLNESS:    Yosef Rowe is a 69 y.o. male with erectile dysfunction. It has been present >1 year.     He has tried and failed Viagra and Cialis.  However, he states that he only tried them once.    He has LUTS. Nocturia x 3. He is pleased with how he voids.     REVIEW OF SYSTEMS:    Yosef Rowe denies headache, blurred vision, fever, nausea, vomiting, chills, abdominal pain, chest pain, sore throat, bleeding per rectum, cough, SOB, recent loss of consciousness, recent mental status changes, seizures, dizziness, or upper or lower extremity weakness.    COSME  1. 1  2. 3  3. 3  4. 1  5. 0      PATIENT HISTORY:    Past Medical History:   Diagnosis Date    Anxiety     Arthritis     Depression     when turned 49-lost job and mother unwell at that time    Ex-cigar smoker 05/29/2018    Cigar twice a week      History of heavy alcohol consumption 11/08/2020    Hyperlipidemia     Lumbar radiculopathy     BRETT- 2011    Osteoarthritis     Sleep apnea     Urinary tract infection        Past Surgical History:   Procedure Laterality Date    IRRIGATION AND DEBRIDEMENT OF LOWER EXTREMITY Left 7/3/2018    Procedure: IRRIGATION AND DEBRIDEMENT, LOWER EXTREMITY, poly exchange left uni knee replacement. Stryler. 9L NS;  Surgeon: Ruperto Olivarez MD;  Location: [address];  Service: Orthopedics;  Laterality: Left;    IRRIGATION AND DEBRIDEMENT OF LOWER EXTREMITY Left 7/7/2018    Procedure: IRRIGATION AND DEBRIDEMENT, LOWER EXTREMITY;  Surgeon: Orlin Jordan MD;  Location: [facility] OR [address] [place] [address];  Service: Orthopedics;  Laterality: Left;    JOINT REPLACEMENT      knee arthroscopicc surgeries      Right knee- 2007  and Left knee- 2008    KNEE SURGERY      Left wrist Surgery      2000 for a torn ligament from [place]    TOTAL KNEE REPLACEMENT USING COMPUTER NAVIGATION Left  6/22/2018    Procedure: REPLACEMENT-KNEE WITH NAVIGATION-- unicondylar-- medial;  Surgeon: Orlin Jordan MD;  Location: [facility] OR [address] [place] [address];  Service: Orthopedics;  Laterality: Left;       Family History   Problem Relation Name Age of Onset    Parkinsonism Mother      Heart attack Neg Hx      Heart disease Neg Hx      Heart failure Neg Hx      Hyperlipidemia Neg Hx      Hypertension Neg Hx      Stroke Neg Hx         Social History     Socioeconomic History    Marital status:    Tobacco Use    Smoking status: Former     Types: Cigars     Passive exposure: Past    Smokeless tobacco: Never    Tobacco comments:     maybe one a month   Substance and Sexual Activity    Alcohol use: Yes     Alcohol/week: 5.0 standard drinks of alcohol     Types: 5 Standard drinks or equivalent per week     Comment: Daily    Drug use: No    Sexual activity: Yes     Partners: Female     Social Determinants of Health     Financial Resource Strain: High Risk (8/19/2024)    Overall Financial Resource Strain (CARDIA)     Difficulty of Paying Living Expenses: Hard   Food Insecurity: Food Insecurity Present (8/19/2024)    Hunger Vital Sign     Worried About Running Out of Food in the Last Year: Sometimes true     Ran Out of Food in the Last Year: Often true   Transportation Needs: No Transportation Needs (9/28/2023)    PRAPARE - Transportation     Lack of Transportation (Medical): No     Lack of Transportation (Non-Medical): No   Physical Activity: Unknown (8/19/2024)    Exercise Vital Sign     Days of Exercise per Week: 1 day   Stress: Stress Concern Present (8/19/2024)    Citizen of [place] [employer] - Occupational Stress Questionnaire     Feeling of Stress : Very much   Housing Stability: Unknown (9/28/2023)    Housing Stability Vital Sign     Unable to Pay for Housing in the Last Year: No     Unstable Housing in the Last Year: No       Allergies:  No known drug allergies    Medications:    Current Outpatient Medications:      atorvastatin (LIPITOR) 20 MG tablet, Take 1 tablet (20 mg total) by mouth once daily., Disp: 90 tablet, Rfl: 3    calcium carbonate/vitamin D3 (VITAMIN D-3 ORAL), Take by mouth., Disp: , Rfl:     DULoxetine (CYMBALTA) 60 MG capsule, Take 1 capsule (60 mg total) by mouth once daily., Disp: 90 capsule, Rfl: 3    erythromycin (ROMYCIN) ophthalmic ointment, APPLY 1/4 INCH IN BOTH EYES EVERY NIGHT AT BEDTIME (Patient not taking: Reported on 9/16/2024), Disp: , Rfl:     eszopiclone (LUNESTA) 3 mg Tab, Take 1 tablet (3 mg total) by mouth nightly as needed (Insomnia). (Patient not taking: Reported on 9/16/2024), Disp: 30 tablet, Rfl: 0    triamcinolone acetonide 0.1% (KENALOG) 0.1 % cream, Apply topically 2 (two) times daily. Rash (Patient not taking: Reported on 9/16/2024), Disp: , Rfl:     PHYSICAL EXAMINATION:    The patient generally appears in good health, is appropriately interactive, and is in no apparent distress.     Eyes: anicteric sclerae, moist conjunctivae; no lid-lag; PERRLA     HENT: Atraumatic; oropharynx clear with moist mucous membranes and no mucosal ulcerations;normal hard and soft palate.  No evidence of lymphadenopathy.    Neck: Trachea midline.  No thyromegaly.    Skin: No lesions.    Mental: Cooperative with normal affect.  Is oriented to time, place, and person.    Neuro: Grossly intact.    Chest: Normal inspiratory effort.   No accessory muscles.  No audible wheezes.  Respirations symmetric on inspiration and expiration.    Heart: Regular rhythm.      Abdomen:  Soft, non-tender. No masses or organomegaly. Bladder is not palpable. No evidence of flank discomfort. No evidence of inguinal hernia.    Genitourinary: The penis is circumcised with no evidence of plaques or induration. The urethral meatus is normal. The testes, epididymides, and cord structures are normal in size and contour bilaterally. The scrotum is normal in size and contour.    Normal anal sphincter tone. No rectal mass.    The  prostate is 40 g. Normal landmarks. Lateral sulci. Median furrow intact.  No nodularity or induration. Seminal vesicles are normal.    Extremities: No clubbing, cyanosis, or edema      LABS:      Lab Results   Component Value Date    PSA 0.94 08/21/2024    PSA 1.2 04/08/2022    PSA 1.0 11/04/2020       IMPRESSION:    Encounter Diagnoses   Name Primary?    Erectile dysfunction due to arterial insufficiency Yes    BPH with urinary obstruction     Essential hypertension     Other hyperlipidemia    HTN,s table  Hyperlipidemia, stable      PLAN:    1. Will observe his LUTS as they don't bother him.  2.  Discussed options for his ED (affected by above comorbidities).  He'd like Viagra. Side effects discussed.  A new Rx was given   3. RTC 3 months    Copy to:

## 2024-09-16 NOTE — TELEPHONE ENCOUNTER
Patient call returned regarding cologuard test. Patient informed test is still in process and results are received and reviewed by provider, someone in office will call to explain labs. Patient verbalized understanding.  ----- Message from Lisa Lemus sent at 9/16/2024 10:14 AM CDT -----  Contact: Pt  [phone]  [id number]    Type: Test Results    What test was performed?  Colaguard Home test    Who ordered the test? Dr Barcenas     When and where were the test performed?  Home 2 weeks ago    Would you like a call back and or thru [facility]:  Call back     Comments:

## 2024-09-17 DIAGNOSIS — F41.1 GENERALIZED ANXIETY DISORDER: ICD-10-CM

## 2024-09-17 DIAGNOSIS — F33.42 RECURRENT MAJOR DEPRESSIVE DISORDER, IN FULL REMISSION: ICD-10-CM

## 2024-09-17 RX ORDER — DULOXETIN HYDROCHLORIDE 60 MG/1
60 CAPSULE, DELAYED RELEASE ORAL
Qty: 90 CAPSULE | Refills: 3 | OUTPATIENT
Start: 2024-09-17

## 2024-09-18 RX ORDER — DULOXETIN HYDROCHLORIDE 60 MG/1
60 CAPSULE, DELAYED RELEASE ORAL DAILY
Qty: 30 CAPSULE | Refills: 0 | Status: SHIPPED | OUTPATIENT
Start: 2024-09-18

## 2024-09-18 NOTE — TELEPHONE ENCOUNTER
----- Message from Sia Richter RN sent at 9/17/2024  4:21 PM CDT -----  Regarding: FW: Refill    ----- Message -----  From: Griselda Clay  Sent: 9/17/2024   4:05 PM CDT  To: Mandeep Knott Clinical Staff  Subject: Refill                                           Patient request a refill for DULoxetine (CYMBALTA) 60 MG capsule.  Patient hung up before providing which Pharmacy it is to be sent to.    Thank you.

## 2024-09-23 ENCOUNTER — TELEPHONE (OUTPATIENT)
Dept: INTERNAL MEDICINE | Facility: CLINIC | Age: 69
End: 2024-09-23
Payer: MEDICARE

## 2024-09-23 DIAGNOSIS — Z12.11 ENCOUNTER FOR COLORECTAL CANCER SCREENING: ICD-10-CM

## 2024-09-23 DIAGNOSIS — Z12.12 ENCOUNTER FOR COLORECTAL CANCER SCREENING: ICD-10-CM

## 2024-09-23 DIAGNOSIS — R19.5 POSITIVE COLORECTAL CANCER SCREENING USING COLOGUARD TEST: Primary | ICD-10-CM

## 2024-09-23 NOTE — TELEPHONE ENCOUNTER
Please advise patient that his cologuard test has come back positive. For this reason I have placed a referral for a colonoscopy to rule out malignancy.

## 2024-09-23 NOTE — TELEPHONE ENCOUNTER
Spoke to the patient this morning.  Informed the patient that his Cologuard was positive.  Informed the patient that a referral to have a colonoscopy was placed.   The patient stated that he will call when he is ready.

## 2024-09-25 ENCOUNTER — OFFICE VISIT (OUTPATIENT)
Dept: PSYCHIATRY | Facility: CLINIC | Age: 69
End: 2024-09-25
Payer: MEDICARE

## 2024-09-25 DIAGNOSIS — F10.21 ALCOHOL USE DISORDER, MODERATE, IN EARLY REMISSION: ICD-10-CM

## 2024-09-25 DIAGNOSIS — G47.00 INSOMNIA, UNSPECIFIED TYPE: Primary | ICD-10-CM

## 2024-09-25 DIAGNOSIS — F33.42 RECURRENT MAJOR DEPRESSIVE DISORDER, IN FULL REMISSION: ICD-10-CM

## 2024-09-25 DIAGNOSIS — F41.1 GENERALIZED ANXIETY DISORDER: ICD-10-CM

## 2024-09-25 RX ORDER — DULOXETIN HYDROCHLORIDE 60 MG/1
60 CAPSULE, DELAYED RELEASE ORAL DAILY
Qty: 30 CAPSULE | Refills: 11 | Status: SHIPPED | OUTPATIENT
Start: 2024-09-25

## 2024-09-25 NOTE — PROGRESS NOTES
OUTPATIENT PSYCHIATRY RETURN VISIT    ENCOUNTER DATE:  10/4/2024  SITE:  [facility], [facility]  LENGTH OF SESSION:  12 minutes    The patient location is:  [place], not in healthcare facility  Visit type:  audiovisual    Face to Face time with patient:  12 minutes  18 minutes of total time spent on the encounter, which includes face to face time and non-face to face time preparing to see the patient (eg, review of tests), Obtaining and/or reviewing separately obtained history, Documenting clinical information in the electronic or other health record, Independently interpreting results (not separately reported) and communicating results to the patient/family/caregiver, or Care coordination (not separately reported).     Each patient to whom he or she provides medical services by telemedicine is:  (1) informed of the relationship between the physician and patient and the respective role of any other health care provider with respect to management of the patient; and (2) notified that he or she may decline to receive medical services by telemedicine and may withdraw from such care at any time.    CHIEF COMPLAINT:  Insomnia and Marital stress      HISTORY OF PRESENTING ILLNESS:  Yosef Rowe is a 69 y.o. male with history of MDD, SOLITARIO, and insomnia who presents for follow up appointment.      Plan at last appointment on 8/8/2022:  Mood is stable on Cymbalta.  Will continue Cymbalta 60mg daily.  He would prefer to avoid sleep medications at this time.    Discussed with patient informed consent, risks versus benefits, alternative treatments, side effect profile and the inherent unpredictability of individual responses to these treatments.  The patient expresses understanding of the above and displays the capacity to agree with this current plan.    History as told by patient:  Everything has been good, depression-wise no issues at all.  Work is so-so.  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   "Got so drunk and he was getting out of his truck and fell into the street.  Says he was not drinking every day, had progressed from a few beers to a few beers and a few shots when he went to the bar.  That night it had gotten so bad that he stopped and bought a small bottle of [employer] and chugged it before going home.  Was stressed to go home to his wife - they are likely going to separate.  Says she gets onto him about everything.  Says everything is his fault "even if it rains in [place]"  He feels she is a narcissistic - wont take blame for anything, believes she is never wrong.  He asked if she could at least take 20% of the blame for their issues and she declines - says its all his fault.  One of his sons is not talking to him.  For example if he says something aloud to the TV when watching football, she will say "Stop talking to the TV! They can't hear you!"  Still has issues with sleep.  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      Medication side effects:  No  Medication compliance:  Yes    PSYCHIATRIC REVIEW OF SYSTEMS:  Trouble with sleep:  Yes, significantly worse lately  Appetite changes:  Denies  Weight changes:  Denies  Lack of energy:  Yes due to lack of sleep  Anhedonia:  Denies  Somatic symptoms:  Denies  Libido:  Not discussed  Anxiety/panic:  Denies  Guilty/hopeless:  Denies  Self-injurious behavior/risky behavior:  Denies  Any drugs:  Denies  Alcohol:  Sober x 7 months    MEDICAL REVIEW OF SYSTEMS:  Complete review of systems performed covering Constitutional, Musculoskeletal, Neurologic.  All systems negative except for that covered in HPI.    PAST PSYCHIATRIC, MEDICAL, AND SOCIAL HISTORY REVIEWED  The patient's past medical, family and social history have been reviewed and updated as appropriate within the electronic medical record - see encounter notes.    MEDICATIONS:    Current Outpatient Medications:     atorvastatin (LIPITOR) 20 MG tablet, Take 1 tablet (20 mg total) by mouth once daily., " "Disp: 90 tablet, Rfl: 3    calcium carbonate/vitamin D3 (VITAMIN D-3 ORAL), Take by mouth., Disp: , Rfl:     DULoxetine (CYMBALTA) 60 MG capsule, Take 1 capsule (60 mg total) by mouth once daily., Disp: 30 capsule, Rfl: 11    erythromycin (ROMYCIN) ophthalmic ointment, APPLY 1/4 INCH IN BOTH EYES EVERY NIGHT AT BEDTIME (Patient not taking: Reported on 9/16/2024), Disp: , Rfl:     eszopiclone (LUNESTA) 3 mg Tab, Take 1 tablet (3 mg total) by mouth nightly as needed (Insomnia). (Patient not taking: Reported on 9/16/2024), Disp: 30 tablet, Rfl: 0    sildenafiL (VIAGRA) 100 MG tablet, Take 1 tablet (100 mg total) by mouth daily as needed for Erectile Dysfunction. Dispense generic, Disp: 10 tablet, Rfl: 11    triamcinolone acetonide 0.1% (KENALOG) 0.1 % cream, Apply topically 2 (two) times daily. Rash (Patient not taking: Reported on 9/16/2024), Disp: , Rfl:     zolpidem (AMBIEN) 10 mg Tab, Take 1 tablet (10 mg total) by mouth every evening., Disp: 30 tablet, Rfl: 0    ALLERGIES:  Review of patient's allergies indicates:   Allergen Reactions    No known drug allergies      PSYCHIATRIC EXAM:  There were no vitals filed for this visit.   Appearance:  Well groomed, appearing healthy and of stated age  Behavior:  Cooperative, pleasant, no psychomotor agitation or retardation  Speech:  Normal rate, rhythm, prosody, and volume  Mood:  "Good"  Affect:  Euthymic; Irritable when talking about wife  Thought Process:  Linear, logical, goal directed  Thought Content:  Negative for suicidal ideation, homicidal ideation, delusions or hallucinations.  Associations:  Intact  Memory:  Grossly Intact  Level of Consciousness/Orientation:  Grossly intact  Fund of Knowledge:  Good  Attention:  Good  Language:  Fluent, able to name abstract and concrete objects  Insight:  Fair  Judgment:  Intact  Psychomotor signs:  No involuntary movements of face  Gait:  Unable to assess via virtual visit      RELEVANT LABS/STUDIES:    Lab Results "   Component Value Date    WBC 10.04 08/21/2024    HGB 16.2 08/21/2024    HCT 49.1 08/21/2024     (H) 08/21/2024     08/21/2024     BMP  Lab Results   Component Value Date     08/21/2024    K 5.3 (H) 08/21/2024     08/21/2024    CO2 28 08/21/2024    BUN 12 08/21/2024    CREATININE 0.9 08/21/2024    CALCIUM 9.1 08/21/2024    ANIONGAP 10 08/21/2024    ESTGFRAFRICA >60.0 04/08/2022    EGFRNONAA >60.0 04/08/2022     Lab Results   Component Value Date    ALT 45 (H) 08/21/2024    AST 42 (H) 08/21/2024    ALKPHOS 65 08/21/2024    BILITOT 1.1 (H) 08/21/2024     Lab Results   Component Value Date    TSH 1.360 08/21/2024       IMPRESSION:    Yosef Rowe is a 69 y.o. male with history of anxiety, depression, and insomnia who presents for follow up appointment.    Status/Progress:  Based on the examination today, the patient's problem(s) is/are inadequately controlled.  New problems have not been presented today.    Risk Parameters:  Patient reports no suicidal ideation  Patient reports no homicidal ideation  Patient reports no self-injurious behavior  Patient reports no violent behavior      DIAGNOSES:    ICD-10-CM ICD-9-CM   1. Insomnia, unspecified type  G47.00 780.52   2. Recurrent major depressive disorder, in full remission  F33.42 296.36   3. Generalized anxiety disorder  F41.1 300.02   4. Alcohol use disorder, in early remission  F10.21 303.93         PLAN:  Restart Elavil 12.5mg-25mg qHS for insomnia.  He does not remember trying this last year.  Continue Cymbalta 60mg daily.  Discussed with patient informed consent, risks versus benefits, alternative treatments, side effect profile and the inherent unpredictability of individual responses to these treatments.  The patient expresses understanding of the above and displays the capacity to agree with this current plan.    RETURN TO CLINIC:  Follow up in about 3 months (around 12/25/2024).

## 2024-10-03 ENCOUNTER — OFFICE VISIT (OUTPATIENT)
Dept: PSYCHIATRY | Facility: CLINIC | Age: 69
End: 2024-10-03
Payer: MEDICARE

## 2024-10-03 DIAGNOSIS — F41.1 GENERALIZED ANXIETY DISORDER: ICD-10-CM

## 2024-10-03 DIAGNOSIS — F33.42 RECURRENT MAJOR DEPRESSIVE DISORDER, IN FULL REMISSION: ICD-10-CM

## 2024-10-03 DIAGNOSIS — F10.21 ALCOHOL USE DISORDER, MODERATE, IN EARLY REMISSION: ICD-10-CM

## 2024-10-03 DIAGNOSIS — G47.00 INSOMNIA, UNSPECIFIED TYPE: Primary | ICD-10-CM

## 2024-10-03 PROCEDURE — 3044F HG A1C LEVEL LT 7.0%: CPT | Mod: CPTII,S$GLB,, | Performed by: INTERNAL MEDICINE

## 2024-10-03 PROCEDURE — 3060F POS MICROALBUMINURIA REV: CPT | Mod: CPTII,S$GLB,, | Performed by: INTERNAL MEDICINE

## 2024-10-03 PROCEDURE — 1160F RVW MEDS BY RX/DR IN RCRD: CPT | Mod: CPTII,S$GLB,, | Performed by: INTERNAL MEDICINE

## 2024-10-03 PROCEDURE — 99999 PR PBB SHADOW E&M-EST. PATIENT-LVL I: CPT | Mod: PBBFAC,,, | Performed by: INTERNAL MEDICINE

## 2024-10-03 PROCEDURE — 99214 OFFICE O/P EST MOD 30 MIN: CPT | Mod: S$GLB,,, | Performed by: INTERNAL MEDICINE

## 2024-10-03 PROCEDURE — 3066F NEPHROPATHY DOC TX: CPT | Mod: CPTII,S$GLB,, | Performed by: INTERNAL MEDICINE

## 2024-10-03 PROCEDURE — 1159F MED LIST DOCD IN RCRD: CPT | Mod: CPTII,S$GLB,, | Performed by: INTERNAL MEDICINE

## 2024-10-03 RX ORDER — ZOLPIDEM TARTRATE 10 MG/1
10 TABLET ORAL NIGHTLY
Qty: 30 TABLET | Refills: 0 | Status: SHIPPED | OUTPATIENT
Start: 2024-10-03 | End: 2025-04-03

## 2024-10-03 NOTE — PROGRESS NOTES
OUTPATIENT PSYCHIATRY RETURN VISIT    ENCOUNTER DATE:  10/9/2024  SITE:  [facility], [facility]  LENGTH OF SESSION:  20 minutes      CHIEF COMPLAINT:  Insomnia      HISTORY OF PRESENTING ILLNESS:  Yosef Rowe is a 69 y.o. male with history of MDD, SOLITARIO, and insomnia who presents for follow up appointment.      Plan at last appointment on 9/25/2024:  Start Elavil 12.5mg-25mg qHS for insomnia and chronic pain.    Continue Cymbalta 60mg daily.  Sleep hygiene information sent to patient's portal.  Discussed with patient informed consent, risks versus benefits, alternative treatments, side effect profile and the inherent unpredictability of individual responses to these treatments.  The patient expresses understanding of the above and displays the capacity to agree with this current plan.    History as told by patient:  Remains upset with wife.  Has never forgiven him for when he left in 2017 when they were planning to divorce - but she asked him to leave.  She wants money from the 3 months he made a lot of money when they were .  She gets onto him for every little thing.  Sleep remains very bad.  Will have several nights in a row where he only gets about 2 hours of sleep.  Lies there and can't fall asleep.  The the 3rd night he will finally sleep for about 5-6 hours and he feels so much better the next day.  Ambien helpful in the past but then stopped helping.  Denies depression.      Medication side effects:  No  Medication compliance:  Yes    PSYCHIATRIC REVIEW OF SYSTEMS:  Trouble with sleep:  Yes  Appetite changes:  Denies  Weight changes:  Denies  Lack of energy:  Denies  Anhedonia:  Denies  Somatic symptoms:  Denies  Libido:  Not discussed  Anxiety/panic:  Denies  Guilty/hopeless:  Denies  Self-injurious behavior/risky behavior:  Denies  Any drugs:  Denies  Alcohol:  Occasional beer    MEDICAL REVIEW OF SYSTEMS:  Complete review of systems performed covering Constitutional,  "Musculoskeletal, Neurologic.  All systems negative except for that covered in HPI.    PAST PSYCHIATRIC, MEDICAL, AND SOCIAL HISTORY REVIEWED  The patient's past medical, family and social history have been reviewed and updated as appropriate within the electronic medical record - see encounter notes.    MEDICATIONS:    Current Outpatient Medications:     atorvastatin (LIPITOR) 20 MG tablet, Take 1 tablet (20 mg total) by mouth once daily., Disp: 90 tablet, Rfl: 3    calcium carbonate/vitamin D3 (VITAMIN D-3 ORAL), Take by mouth., Disp: , Rfl:     DULoxetine (CYMBALTA) 60 MG capsule, Take 1 capsule (60 mg total) by mouth once daily., Disp: 30 capsule, Rfl: 11    erythromycin (ROMYCIN) ophthalmic ointment, APPLY 1/4 INCH IN BOTH EYES EVERY NIGHT AT BEDTIME (Patient not taking: Reported on 9/16/2024), Disp: , Rfl:     sildenafiL (VIAGRA) 100 MG tablet, Take 1 tablet (100 mg total) by mouth daily as needed for Erectile Dysfunction. Dispense generic, Disp: 10 tablet, Rfl: 11    triamcinolone acetonide 0.1% (KENALOG) 0.1 % cream, Apply topically 2 (two) times daily. Rash (Patient not taking: Reported on 9/16/2024), Disp: , Rfl:     zolpidem (AMBIEN) 10 mg Tab, Take 1 tablet (10 mg total) by mouth every evening., Disp: 30 tablet, Rfl: 0    ALLERGIES:  Review of patient's allergies indicates:   Allergen Reactions    No known drug allergies      PSYCHIATRIC EXAM:  There were no vitals filed for this visit.   Appearance:  Well groomed, appearing healthy and of stated age  Behavior:  Cooperative, pleasant, no psychomotor agitation or retardation  Speech:  Normal rate, rhythm, prosody, and volume  Mood:  "Ok"  Affect:  Euthymic, irritable when discussing wife  Thought Process:  Linear, logical, goal directed  Thought Content:  Negative for suicidal ideation, homicidal ideation, delusions or hallucinations.  Associations:  Intact  Memory:  Grossly Intact  Level of Consciousness/Orientation:  Grossly intact  Fund of Knowledge:  " Good  Attention:  Good  Language:  Fluent, able to name abstract and concrete objects  Insight:  Fair  Judgment:  Intact  Psychomotor signs:  No involuntary movements or tremor  Gait:  Normal      RELEVANT LABS/STUDIES:    Lab Results   Component Value Date    WBC 10.04 08/21/2024    HGB 16.2 08/21/2024    HCT 49.1 08/21/2024     (H) 08/21/2024     08/21/2024     BMP  Lab Results   Component Value Date     08/21/2024    K 5.3 (H) 08/21/2024     08/21/2024    CO2 28 08/21/2024    BUN 12 08/21/2024    CREATININE 0.9 08/21/2024    CALCIUM 9.1 08/21/2024    ANIONGAP 10 08/21/2024    ESTGFRAFRICA >60.0 04/08/2022    EGFRNONAA >60.0 04/08/2022     Lab Results   Component Value Date    ALT 45 (H) 08/21/2024    AST 42 (H) 08/21/2024    ALKPHOS 65 08/21/2024    BILITOT 1.1 (H) 08/21/2024     Lab Results   Component Value Date    TSH 1.360 08/21/2024       IMPRESSION:    Yosef Rowe is a 69 y.o. male with history of anxiety, depression, and insomnia who presents for follow up appointment.    Status/Progress:  Based on the examination today, the patient's problem(s) is/are inadequately controlled.  New problems have not been presented today.    Risk Parameters:  Patient reports no suicidal ideation  Patient reports no homicidal ideation  Patient reports no self-injurious behavior  Patient reports no violent behavior      DIAGNOSES:    ICD-10-CM ICD-9-CM   1. Insomnia, unspecified type  G47.00 780.52   2. Recurrent major depressive disorder, in full remission  F33.42 296.36   3. Generalized anxiety disorder  F41.1 300.02   4. Alcohol use disorder, in early remission  F10.21 303.93         PLAN:  Restart Ambien at higher dose of 10mg qHS since 5mg was minimally effective.   Continue Cymbalta 60mg daily.  Discussed with patient informed consent, risks versus benefits, alternative treatments, side effect profile and the inherent unpredictability of individual responses to these treatments.  The patient  expresses understanding of the above and displays the capacity to agree with this current plan.    RETURN TO CLINIC:  Follow up in about 6 months (around 4/3/2025).

## 2024-10-04 PROBLEM — F10.21 ALCOHOL USE DISORDER, MODERATE, IN EARLY REMISSION: Status: ACTIVE | Noted: 2024-10-04

## 2024-10-09 NOTE — ASSESSMENT & PLAN NOTE
Likely heart failure.  Lasix 40 mg daily  KCl 10 meq daily  Echo.  
Will likely obtain a coronary CTA. Will wait until repeat BMP is obtained  In view of body habitus, I don't expect image quality to be very good, but all that needs to be clearly visible are the proximal coronary vessels and aortic root.  
None known

## 2024-11-06 DIAGNOSIS — G47.00 INSOMNIA, UNSPECIFIED TYPE: Primary | ICD-10-CM

## 2024-11-06 RX ORDER — ZALEPLON 10 MG/1
10 CAPSULE ORAL NIGHTLY
Qty: 30 CAPSULE | Refills: 0 | Status: SHIPPED | OUTPATIENT
Start: 2024-11-06 | End: 2024-12-06

## 2024-12-01 ENCOUNTER — HOSPITAL ENCOUNTER (EMERGENCY)
Facility: HOSPITAL | Age: 69
Discharge: HOME | End: 2024-12-01
Attending: EMERGENCY MEDICINE
Payer: MEDICARE

## 2024-12-01 VITALS
HEART RATE: 89 BPM | RESPIRATION RATE: 22 BRPM | BODY MASS INDEX: 46.3 KG/M2 | DIASTOLIC BLOOD PRESSURE: 79 MMHG | TEMPERATURE: 98 F | WEIGHT: 295 LBS | HEIGHT: 67 IN | OXYGEN SATURATION: 96 % | SYSTOLIC BLOOD PRESSURE: 174 MMHG

## 2024-12-01 DIAGNOSIS — S43.015A ANTERIOR DISLOCATION OF LEFT SHOULDER, INITIAL ENCOUNTER: Primary | ICD-10-CM

## 2024-12-01 DIAGNOSIS — R03.0 ELEVATED BLOOD PRESSURE READING: ICD-10-CM

## 2024-12-01 DIAGNOSIS — S43.432A BANKART LESION OF LEFT SHOULDER, INITIAL ENCOUNTER: ICD-10-CM

## 2024-12-01 PROCEDURE — 96375 TX/PRO/DX INJ NEW DRUG ADDON: CPT

## 2024-12-01 PROCEDURE — 25000003 PHARM REV CODE 250: Performed by: EMERGENCY MEDICINE

## 2024-12-01 PROCEDURE — 25000003 PHARM REV CODE 250

## 2024-12-01 PROCEDURE — 63600175 PHARM REV CODE 636 W HCPCS

## 2024-12-01 PROCEDURE — 99152 MOD SED SAME PHYS/QHP 5/>YRS: CPT

## 2024-12-01 PROCEDURE — 96374 THER/PROPH/DIAG INJ IV PUSH: CPT

## 2024-12-01 PROCEDURE — 96376 TX/PRO/DX INJ SAME DRUG ADON: CPT

## 2024-12-01 PROCEDURE — 63600175 PHARM REV CODE 636 W HCPCS: Performed by: EMERGENCY MEDICINE

## 2024-12-01 PROCEDURE — 99291 CRITICAL CARE FIRST HOUR: CPT

## 2024-12-01 RX ORDER — OXYCODONE AND ACETAMINOPHEN 7.5; 325 MG/1; MG/1
1 TABLET ORAL EVERY 6 HOURS PRN
Qty: 12 TABLET | Refills: 0 | Status: SHIPPED | OUTPATIENT
Start: 2024-12-01

## 2024-12-01 RX ORDER — MORPHINE SULFATE 4 MG/ML
4 INJECTION, SOLUTION INTRAMUSCULAR; INTRAVENOUS
Status: COMPLETED | OUTPATIENT
Start: 2024-12-01 | End: 2024-12-01

## 2024-12-01 RX ORDER — HYDROCODONE BITARTRATE AND ACETAMINOPHEN 5; 325 MG/1; MG/1
1 TABLET ORAL
Status: COMPLETED | OUTPATIENT
Start: 2024-12-01 | End: 2024-12-01

## 2024-12-01 RX ORDER — PROPOFOL 10 MG/ML
1 VIAL (ML) INTRAVENOUS
Status: COMPLETED | OUTPATIENT
Start: 2024-12-01 | End: 2024-12-01

## 2024-12-01 RX ORDER — LIDOCAINE 50 MG/G
1 PATCH TOPICAL
Status: DISCONTINUED | OUTPATIENT
Start: 2024-12-01 | End: 2024-12-01 | Stop reason: HOSPADM

## 2024-12-01 RX ORDER — IBUPROFEN 600 MG/1
600 TABLET ORAL EVERY 6 HOURS PRN
Qty: 20 TABLET | Refills: 0 | Status: SHIPPED | OUTPATIENT
Start: 2024-12-01

## 2024-12-01 RX ORDER — SODIUM CHLORIDE 9 MG/ML
1000 INJECTION, SOLUTION INTRAVENOUS
Status: COMPLETED | OUTPATIENT
Start: 2024-12-01 | End: 2024-12-01

## 2024-12-01 RX ORDER — MIDAZOLAM HYDROCHLORIDE 2 MG/2ML
2 INJECTION, SOLUTION INTRAMUSCULAR; INTRAVENOUS
Status: COMPLETED | OUTPATIENT
Start: 2024-12-01 | End: 2024-12-01

## 2024-12-01 RX ORDER — HYDROMORPHONE HYDROCHLORIDE 1 MG/ML
1 INJECTION, SOLUTION INTRAMUSCULAR; INTRAVENOUS; SUBCUTANEOUS
Status: COMPLETED | OUTPATIENT
Start: 2024-12-01 | End: 2024-12-01

## 2024-12-01 RX ORDER — PROPOFOL 10 MG/ML
100 VIAL (ML) INTRAVENOUS ONCE
Status: COMPLETED | OUTPATIENT
Start: 2024-12-01 | End: 2024-12-01

## 2024-12-01 RX ORDER — ONDANSETRON HYDROCHLORIDE 2 MG/ML
4 INJECTION, SOLUTION INTRAVENOUS
Status: COMPLETED | OUTPATIENT
Start: 2024-12-01 | End: 2024-12-01

## 2024-12-01 RX ORDER — PROPOFOL 10 MG/ML
0.5 VIAL (ML) INTRAVENOUS
Status: DISCONTINUED | OUTPATIENT
Start: 2024-12-01 | End: 2024-12-01 | Stop reason: HOSPADM

## 2024-12-01 RX ADMIN — LIDOCAINE 1 PATCH: 50 PATCH CUTANEOUS at 08:12

## 2024-12-01 RX ADMIN — PROPOFOL 200 MG: 10 INJECTION, EMULSION INTRAVENOUS at 10:12

## 2024-12-01 RX ADMIN — SODIUM CHLORIDE 1000 ML: 9 INJECTION, SOLUTION INTRAVENOUS at 10:12

## 2024-12-01 RX ADMIN — PROPOFOL 100 MG: 10 INJECTION, EMULSION INTRAVENOUS at 11:12

## 2024-12-01 RX ADMIN — HYDROMORPHONE HYDROCHLORIDE 1 MG: 1 INJECTION, SOLUTION INTRAMUSCULAR; INTRAVENOUS; SUBCUTANEOUS at 10:12

## 2024-12-01 RX ADMIN — MIDAZOLAM HYDROCHLORIDE 2 MG: 1 INJECTION, SOLUTION INTRAMUSCULAR; INTRAVENOUS at 11:12

## 2024-12-01 RX ADMIN — HYDROCODONE BITARTRATE AND ACETAMINOPHEN 1 TABLET: 5; 325 TABLET ORAL at 08:12

## 2024-12-01 RX ADMIN — MORPHINE SULFATE 4 MG: 4 INJECTION INTRAVENOUS at 09:12

## 2024-12-01 RX ADMIN — ONDANSETRON 4 MG: 2 INJECTION INTRAMUSCULAR; INTRAVENOUS at 09:12

## 2024-12-01 NOTE — ED PROVIDER NOTES
"Encounter Date: 12/1/2024       History     Chief Complaint   Patient presents with    Shoulder Injury     Pt reports hyperextending LUE yesterday and heard a "POP" in L shoulder. Pt reports pain and decreased ROM to LUE. PMS intact, increased pain with palpation and manipulation of L shoulder. Pt took alive for pain w/o sx relief.      Patient is a 69-year-old male with past medical history significant for OA, IVORY, lumbar radiculopathy, SOLITARIO, depression who presents for evaluation of left shoulder pain which began 1 day ago.  Patient reports hyperextending his left shoulder when trying to close a car door.  He denies any trauma or injury to the arm.  He denies any history of similar symptoms.  No history of surgeries to the left upper extremity.  No fevers, chills, chest pain, shortness of breath, numbness, weakness, rash    The history is provided by the patient.     Review of patient's allergies indicates:   Allergen Reactions    No known drug allergies      Past Medical History:   Diagnosis Date    Anxiety     Arthritis     Depression     when turned 49-lost job and mother unwell at that time    Ex-cigar smoker 05/29/2018    Cigar twice a week      History of heavy alcohol consumption 11/08/2020    Hyperlipidemia     Lumbar radiculopathy     BRETT- 2011    Osteoarthritis     Sleep apnea     Urinary tract infection      Past Surgical History:   Procedure Laterality Date    IRRIGATION AND DEBRIDEMENT OF LOWER EXTREMITY Left 7/3/2018    Procedure: IRRIGATION AND DEBRIDEMENT, LOWER EXTREMITY, poly exchange left uni knee replacement. Stryler. 9L NS;  Surgeon: Ruperto Olivarez MD;  Location: [address];  Service: Orthopedics;  Laterality: Left;    IRRIGATION AND DEBRIDEMENT OF LOWER EXTREMITY Left 7/7/2018    Procedure: IRRIGATION AND DEBRIDEMENT, LOWER EXTREMITY;  Surgeon: Orlin Jordan MD;  Location: [facility] OR [address] [place] [address];  Service: Orthopedics;  Laterality: Left;    JOINT REPLACEMENT      knee arthroscopicc surgeries "      Right knee- 2007  and Left knee- 2008    KNEE SURGERY      Left wrist Surgery      2000 for a torn ligament from Golf    TOTAL KNEE REPLACEMENT USING COMPUTER NAVIGATION Left 6/22/2018    Procedure: REPLACEMENT-KNEE WITH NAVIGATION-- unicondylar-- medial;  Surgeon: Orlin Jordan MD;  Location: [facility] OR [address] [place] [address];  Service: Orthopedics;  Laterality: Left;     Family History   Problem Relation Name Age of Onset    Parkinsonism Mother      Heart attack Neg Hx      Heart disease Neg Hx      Heart failure Neg Hx      Hyperlipidemia Neg Hx      Hypertension Neg Hx      Stroke Neg Hx       Social History     Tobacco Use    Smoking status: Former     Types: Cigars     Passive exposure: Past    Smokeless tobacco: Never    Tobacco comments:     maybe one a month   Substance Use Topics    Alcohol use: Yes     Alcohol/week: 5.0 standard drinks of alcohol     Types: 5 Standard drinks or equivalent per week     Comment: Daily    Drug use: No     Review of Systems   Musculoskeletal:         Left shoulder pain.   All other systems reviewed and are negative.      Physical Exam     Initial Vitals   BP Pulse Resp Temp SpO2   12/01/24 0756 12/01/24 0753 12/01/24 0753 12/01/24 0753 12/01/24 0753   (!) 214/102 78 (!) 24 97.9 °F (36.6 °C) 96 %      MAP       --                Physical Exam    Nursing note and vitals reviewed.  Constitutional: He appears well-developed and well-nourished.   Uncomfortable appearing secondary to pain but in no acute distress   HENT:   Head: Normocephalic and atraumatic.   Eyes: EOM are normal. Pupils are equal, round, and reactive to light.   Neck: Neck supple.   Normal range of motion.  Cardiovascular:  Normal rate and regular rhythm.           No murmur heard.  Pulmonary/Chest: Breath sounds normal. No respiratory distress. He has no wheezes. He has no rales.   Abdominal: Abdomen is soft. He exhibits no distension. There is no abdominal tenderness. There is no rebound.   Musculoskeletal:          General: No edema. Normal range of motion.      Cervical back: Normal range of motion and neck supple.      Comments: Left shoulder with tenderness to palpation without overlying ecchymosis, swelling, induration, erythema. Decreased ROM to left shoulder.  Left humerus with mild tenderness to palpation without overlying abnormality.  Sensation intact throughout left upper extremity.  Strength intact to left upper extremity.  Intact distal pulses to left upper extremity.     Neurological: He is alert and oriented to person, place, and time.   Skin: Skin is warm and dry. Capillary refill takes less than 2 seconds.         ED Course   Orthopedic Injury    Date/Time: 12/1/2024 1:04 PM    Performed by: Servando Cannon Jr., MD  Authorized by: Melvin Staton MD    Location procedure was performed:  [facility] EMERGENCY DEPARTMENT  Assisting Provider:  Melvin Staton MD  Pre-operative diagnosis:  Shoudler dislocation  Post-operative diagnosis:  Shoulder dislocation  Injury:     Injury location:  Shoulder    Location details:  Left shoulder    Injury type:  Dislocation    Dislocation type: anterior      Chronicity:  New      Pre-procedure assessment:     Neurovascular status: Neurovascularly intact      Distal perfusion: normal      Neurological function: normal      Range of motion: reduced      Patient sedated?: Yes      ASA Class:  Class 2 - Mild Illness without functional impairment.    Mallampati Score:  Class 3 - Visualization of the soft palate and base of the uvula.    Sedation type: moderate (conscious) sedation      Sedation:  Propofol    Analgesia:  Hydromorphone    Vital signs: Vital signs monitored during sedation        Selections made in this section will also lock the Injury type section above.:     Immobilization:  Sling    Complications: No    Post-procedure assessment:     Neurovascular status: Neurovascularly intact      Distal perfusion: normal      Neurological function: normal      Range of  motion: normal      Patient tolerance:  Patient tolerated the procedure well with no immediate complications  Critical Care    Date/Time: 12/1/2024 3:13 PM    Performed by: Melvin Staton MD  Authorized by: Melvin Staton MD  Direct patient critical care time: 90 minutes  Ordering / reviewing critical care time: 15 minutes  Documentation critical care time: 15 minutes  Total critical care time (exclusive of procedural time) : 120 minutes  Critical care was necessary to treat or prevent imminent or life-threatening deterioration of the following conditions: trauma.  Critical care was time spent personally by me on the following activities: examination of patient, obtaining history from patient or surrogate, ordering and performing treatments and interventions, ordering and review of radiographic studies, pulse oximetry and re-evaluation of patient's condition.        Labs Reviewed - No data to display       Imaging Results              CT Shoulder Without Contrast Left (Final result)  Result time 12/01/24 13:29:20      Final result by Bonita Koehler MD (12/01/24 13:29:20)                   Impression:      Significant soft tissue swelling about the left shoulder with a large joint effusion and stranding of the fat adjacent to the shoulder girdle muscles.  The humeral head is appropriately articulating with the glenoid fossa.  A small bony density adjacent to the glenoid rim inferiorly suggestive for a bony Bankart lesion.  Calcific/ossific densities adjacent to the humeral head felt to represent calcific tendinitis rather than a small avulsion fractures of the humeral head although not entirely excluded.      Electronically signed by: Bonita Koehler MD  Date:    12/01/2024  Time:    13:29               Narrative:    EXAMINATION:  CT SHOULDER WITHOUT CONTRAST LEFT    CLINICAL HISTORY:  s/p shoulder reduction;    TECHNIQUE:  Axial CT images of the left shoulder were obtained without contrast.   Coronal and sagittal reformats from the axial acquisition.    COMPARISON:  12/01/2024    FINDINGS:  There is significant soft tissue swelling surrounding left shoulder along with a left shoulder joint effusion and soft tissue swelling extending into the supraclavicular fract.  The humeral head articulates with the glenoid fossa.  There is a bony Bankart lesion along the inferior margin of the glenoid rim as best seen on the sagittal reformatted images.  Small calcifications versus ossification along the margin of the humeral head felt to represent calcific tendinitis rather than fracture fragments although not entirely excluded.                                       X-Ray Shoulder 2 or More Views Left (Final result)  Result time 12/01/24 11:43:22   Procedure changed from X-Ray Shoulder Trauma Left     Final result by Bonita Koehler MD (12/01/24 11:43:22)                   Impression:      As above.      Electronically signed by: Bonita Koehler MD  Date:    12/01/2024  Time:    11:43               Narrative:    EXAMINATION:  XR SHOULDER COMPLETE 2 OR MORE VIEWS LEFT    CLINICAL HISTORY:  dislocation attempts;dislocation attempts;Unspecified dislocation of unspecified shoulder joint, initial encounter    TECHNIQUE:  Two frontal images of the left shoulder    COMPARISON:  None.    FINDINGS:  On the 1st relocation attempt, the humeral head appears to be continually dislocated; however on the 2nd radiograph on the 3rd relocation attempt, the humeral head appears to be articulating with the glenoid rim however abdominal images are recommended to confirm this.  There is some deformity along the inferior margin of the left thyroid rim concerning for fracture.                                       X-Ray Shoulder 1 View Left (Final result)  Result time 12/01/24 10:43:29   Procedure changed from X-Ray Shoulder 2 or More Views Left     Final result by Bonita Koehler MD (12/01/24 10:43:29)                   Impression:       As above.      Electronically signed by: Bonita Koehlre MD  Date:    12/01/2024  Time:    10:43               Narrative:    EXAMINATION:  XR SHOULDER 1 VIEW LEFT    CLINICAL HISTORY:  first dislocation attempt;Left shoulder dislocation;    TECHNIQUE:  One view of the left shoulder    COMPARISON:  12/01/2024    FINDINGS:  The humeral head is still dislocated anteriorly and inferiorly with respect to the glenoid fossa.  Deformity along the inferior margin of the glenoid rim concerning for a fracture.                                       X-Ray Shoulder Trauma Left (Final result)  Result time 12/01/24 09:29:58      Final result by Bonita Koehler MD (12/01/24 09:29:58)                   Impression:      As above.      Electronically signed by: Bonita Koehler MD  Date:    12/01/2024  Time:    09:29               Narrative:    EXAMINATION:  XR SHOULDER TRAUMA 3 VIEW LEFT; XR HUMERUS 2 VIEW LEFT    CLINICAL HISTORY:  Pain in unspecified shoulder    TECHNIQUE:  Three views of the left shoulder with two views of the left humerus    COMPARISON:  None.    FINDINGS:  The humeral head is dislocated anteriorly in respect to the glenoid fossa.  There may be a small associated flexure of the inferior glenoid rim, difficult to determine on the study..                                       X-Ray Humerus 2 View Left (Final result)  Result time 12/01/24 09:29:58      Final result by Bonita Koehler MD (12/01/24 09:29:58)                   Impression:      As above.      Electronically signed by: Bonita Koehler MD  Date:    12/01/2024  Time:    09:29               Narrative:    EXAMINATION:  XR SHOULDER TRAUMA 3 VIEW LEFT; XR HUMERUS 2 VIEW LEFT    CLINICAL HISTORY:  Pain in unspecified shoulder    TECHNIQUE:  Three views of the left shoulder with two views of the left humerus    COMPARISON:  None.    FINDINGS:  The humeral head is dislocated anteriorly in respect to the glenoid fossa.  There may be a small associated  "flexure of the inferior glenoid rim, difficult to determine on the study..                                       Medications   LIDOcaine 5 % patch 1 patch (1 patch Transdermal Patch Applied 12/1/24 0844)   propofol (DIPRIVAN) 10 mg/mL IVP (200 mg Intravenous Given 12/1/24 1032)     And   propofol (DIPRIVAN) 10 mg/mL IVP (200 mg Intravenous Given 12/1/24 1038)   HYDROcodone-acetaminophen 5-325 mg per tablet 1 tablet (1 tablet Oral Given 12/1/24 0815)   morphine injection 4 mg (4 mg Intravenous Given 12/1/24 0950)   0.9% NaCl infusion (0 mLs Intravenous Stopped 12/1/24 1335)   ondansetron injection 4 mg (4 mg Intravenous Given 12/1/24 0955)   HYDROmorphone injection 1 mg (1 mg Intravenous Given 12/1/24 1043)   midazolam (PF) (VERSED) 1 mg/mL injection 2 mg (2 mg Intravenous Given 12/1/24 1100)   propofol (DIPRIVAN) 10 mg/mL IVP (100 mg Intravenous Given 12/1/24 1106)     Medical Decision Making  Patient presents for left shoulder pain.  Differential diagnosis includes but is not limited to: Dislocation, fracture, rotator cuff tear, muscular strain. X-ray concerning for anterior shoulder dislocation. Shoulder reduction performed in the ED. follow-up x-ray with likely reduction, radiology recommend follow-up imaging.  CT shoulder obtained which was read as "humeral head is appropriately articulating with the glenoid fossa.  A small bony density adjacent to the glenoid rim inferiorly suggestive for a bony Bankart lesion".  Patient placed in sling.  Plan to discharge to home with orthopedic follow-up and pain medication regimen.  Urgent ambulatory referral to orthopedics has been placed.  Of note patient with hypertension throughout ED course.  Minimal improvement with pain medication.  Patient with history of alcohol consumption and high salt diet.  Concern for underlying chronic hypertension that has not been treated.  Patient has also been urged to closely follow up with his primary physician for blood pressure recheck " and treatment at that point if warranted.    Amount and/or Complexity of Data Reviewed  Radiology: ordered.     Details: Initial shoulder x-ray show anterior inferior dislocation of the humeral head respective to the glenoid fossa.    Risk  Prescription drug management.    Of note patient with                                  Clinical Impression:  Final diagnoses:  [S43.015A] Anterior dislocation of left shoulder, initial encounter (Primary)  [S43.432A] Bankart lesion of left shoulder, initial encounter  [R03.0] Elevated blood pressure reading          ED Disposition Condition    Discharge Stable          ED Prescriptions       Medication Sig Dispense Start Date End Date Auth. Provider    ibuprofen (ADVIL,MOTRIN) 600 MG tablet Take 1 tablet (600 mg total) by mouth every 6 (six) hours as needed for Pain. 20 tablet 12/1/2024 -- Melvin Staton MD    oxyCODONE-acetaminophen (PERCOCET) 7.5-325 mg per tablet Take 1 tablet by mouth every 6 (six) hours as needed for Pain. 12 tablet 12/1/2024 -- Melvin Staton MD          Follow-up Information       Follow up With Specialties Details Why Contact Info    Orthopedic doctor  Schedule an appointment as soon as possible for a visit       [facility] Emergency Dept Emergency Medicine  As needed [address] [address] [phone]             Melvin Staton MD  12/01/24 1591

## 2024-12-01 NOTE — ED NOTES
Patient returned from CT, connected back to monitor, all vitals stable, denies needs, states pain is a lot better, states sometime I don't feel any pain.

## 2024-12-01 NOTE — ED NOTES
Procedure completed, x-ray at bedside to confirm the placement. MD confirm the shoulder is in place.

## 2024-12-01 NOTE — ED NOTES
Patient laying down in bed talking to his wife, patient is alert oriented *4, states pain is better, 3/10, denies any other symptoms, all vitals stable, denies needs at this time.

## 2024-12-01 NOTE — ED NOTES
Procedure ended, X-ray at bedside to confirm the placement of dislocated shoulder.   [FreeTextEntry3] : I, Santy Joshua, acted solely as a scribe for Dr. Al Wu on this date 10/07/2020.

## 2024-12-01 NOTE — ED NOTES
PT DAILY TREATMENT NOTE - [facility] 2-15    Patient Name: Mckenzie Pinzon  Date:2021  : 1989  [x]  Patient  Verified  Payor: Racheal Randolph / Plan: [address] [place] [address] / Product Type: PPO /    In time:9:00 AM Out time: 1005 AM  Total Treatment Time (min): 65  Total Timed Codes (min): 50  1:1 Treatment Time ( only): 40  Visit #:  5    Treatment Area: Headache with orthostatic component, not elsewhere classified [R51.0]    SUBJECTIVE  Pain Level (0-10 scale): 5/10  Any medication changes, allergies to medications, adverse drug reactions, diagnosis change, or new procedure performed?: [x] No    [] Yes (see summary sheet for update)  Subjective functional status/changes:   [] No changes reported  Pt reports she has been noticing, friday/ saturday and  she has been having R sided suboccipital headaches.   \"I'm I'm sitting or standing up for any length of time there is a cost to that\"  \"I have not been able to get to my exercises every day\"    OBJECTIVE    Modality rationale: decrease pain and increase tissue extensibility to improve the patients ability to sit, stand, transfer, ambulate, lift, carry, reach, complete ADLs   Min Type Additional Details       [] Estim: []Att   []Unatt    []TENS instruct                  []IFC  []Premod   []NMES                     []Other:  []w/US   []w/ice   []w/heat  Position:  Location:       []  Traction: [] Cervical       []Lumbar                       [] Prone          []Supine                       []Intermittent   []Continuous Lbs:  [] before manual  [] after manual  []w/heat    []  Ultrasound: []Continuous   [] Pulsed                       at: []1MHz   []3MHz Location:  W/cm2:    [] Paraffin         Location:   []w/heat   15 []  Ice     [x]  Heat  []  Ice massage Position: supine  Location: neck, back and chest    []  Laser  []  Other: Position:  Location:      []  Vasopneumatic Device Pressure:       [] lo [] med [] hi   Temperature:      [x] Skin assessment Patient opened up his eyes, breathing normally, all vitals stable, MD nurses, respiratory at bedside.    post-treatment:  [x]intact []redness- no adverse reaction    []redness  adverse reaction:     50 min Neuromuscular Re-education:  [x]  See flow sheet :   Rationale: improve coordination, improve balance and increase proprioception  to improve the patients ability to sit, stand, transfer, ambulate, lift, carry, reach, complete ADLs    0 min Manual Therapy: 2 person rib pumping, infraclavicular pumping    Rationale: decrease pain, increase ROM, increase tissue extensibility and decrease trigger points to improve the patients ability to sit, stand, transfer, ambulate, lift, carry, reach, complete ADLs         With   [] TE   [] TA   [x] Neuro   [] SC   [] other: Patient Education: [x] Review HEP    [] Progressed/Changed HEP based on:   [x] positioning   [x] body mechanics   [] transfers   [x] heat/ice application    [] other:      Other Objective/Functional Measures: Added balloon breathing with emphasis on slow, easy inhale   Focus on L IO/TA, L heel and R great toe sense    Pain Level (0-10 scale) post treatment: 0    ASSESSMENT/Changes in Function:     Patient will continue to benefit from skilled PT services to modify and progress therapeutic interventions, address functional mobility deficits, address ROM deficits, address strength deficits, analyze and address soft tissue restrictions, analyze and cue movement patterns, analyze and modify body mechanics/ergonomics, assess and modify postural abnormalities, address imbalance/dizziness and instruct in home and community integration to attain remaining goals. []  See Plan of Care  [x]  See progress note/recertification  []  See Discharge Summary         Progress towards goals / Updated goals: Added post med expansion activities and addressed form with current HEP.     PLAN  [x]  Upgrade activities as tolerated     [x]  Continue plan of care  [x]  Update interventions per flow sheet       []  Discharge due to:_  []  Other:_      Isreal Andre, PT 5/25/2021

## 2024-12-01 NOTE — ED NOTES
Pt. Presents to the ED with complaints of left shoulder pain started yesterday evening, states his car door opened wide, he tried to grab the door and he herd something popped in his left shoulder, pain started and he could not move his left arm, reports shoulder pain 10/10 with movement and at rest, decreased range of motion, he took some aleve with little relief, but pain has been getting worse, denies other symptoms, denies needs, wife is at bedside.

## 2024-12-01 NOTE — ED NOTES
Patient opened up his eyes, breathing normally, all vitals stable at this time. Md nurses at bedside.

## 2024-12-01 NOTE — ED NOTES
Assisted patient use urinal, patient stood up used urinal with assistance, no acute distress noticed, all vitals stable, nasal cannula off, patient on room air saturation above 95%, wife at bedside, denies needs at this time.

## 2024-12-02 ENCOUNTER — HOSPITAL ENCOUNTER (EMERGENCY)
Facility: HOSPITAL | Age: 69
Discharge: HOME | End: 2024-12-02
Attending: STUDENT IN AN ORGANIZED HEALTH CARE EDUCATION/TRAINING PROGRAM
Payer: MEDICARE

## 2024-12-02 VITALS
DIASTOLIC BLOOD PRESSURE: 92 MMHG | HEIGHT: 67 IN | RESPIRATION RATE: 26 BRPM | WEIGHT: 295 LBS | SYSTOLIC BLOOD PRESSURE: 199 MMHG | TEMPERATURE: 98 F | HEART RATE: 82 BPM | OXYGEN SATURATION: 94 % | BODY MASS INDEX: 46.3 KG/M2

## 2024-12-02 DIAGNOSIS — S43.005D SHOULDER DISLOCATION, LEFT, SUBSEQUENT ENCOUNTER: Primary | ICD-10-CM

## 2024-12-02 PROCEDURE — 99285 EMERGENCY DEPT VISIT HI MDM: CPT | Mod: 25

## 2024-12-02 PROCEDURE — 96374 THER/PROPH/DIAG INJ IV PUSH: CPT

## 2024-12-02 PROCEDURE — 99152 MOD SED SAME PHYS/QHP 5/>YRS: CPT

## 2024-12-02 PROCEDURE — 25000003 PHARM REV CODE 250: Performed by: STUDENT IN AN ORGANIZED HEALTH CARE EDUCATION/TRAINING PROGRAM

## 2024-12-02 PROCEDURE — 96376 TX/PRO/DX INJ SAME DRUG ADON: CPT

## 2024-12-02 PROCEDURE — 63600175 PHARM REV CODE 636 W HCPCS: Performed by: STUDENT IN AN ORGANIZED HEALTH CARE EDUCATION/TRAINING PROGRAM

## 2024-12-02 PROCEDURE — 23650 CLTX SHO DSLC W/MNPJ WO ANES: CPT | Mod: LT

## 2024-12-02 PROCEDURE — 99900035 HC TECH TIME PER 15 MIN (STAT)

## 2024-12-02 PROCEDURE — 96375 TX/PRO/DX INJ NEW DRUG ADDON: CPT

## 2024-12-02 RX ORDER — HYDROMORPHONE HYDROCHLORIDE 1 MG/ML
1 INJECTION, SOLUTION INTRAMUSCULAR; INTRAVENOUS; SUBCUTANEOUS
Status: COMPLETED | OUTPATIENT
Start: 2024-12-02 | End: 2024-12-02

## 2024-12-02 RX ORDER — PROPOFOL 10 MG/ML
0.5 VIAL (ML) INTRAVENOUS
Status: COMPLETED | OUTPATIENT
Start: 2024-12-02 | End: 2024-12-02

## 2024-12-02 RX ORDER — PROPOFOL 10 MG/ML
0.5 VIAL (ML) INTRAVENOUS ONCE
Status: COMPLETED | OUTPATIENT
Start: 2024-12-02 | End: 2024-12-02

## 2024-12-02 RX ORDER — FENTANYL CITRATE 50 UG/ML
100 INJECTION, SOLUTION INTRAMUSCULAR; INTRAVENOUS
Status: DISCONTINUED | OUTPATIENT
Start: 2024-12-02 | End: 2024-12-02

## 2024-12-02 RX ORDER — FENTANYL CITRATE 50 UG/ML
100 INJECTION, SOLUTION INTRAMUSCULAR; INTRAVENOUS EVERY 5 MIN PRN
Status: COMPLETED | OUTPATIENT
Start: 2024-12-02 | End: 2024-12-02

## 2024-12-02 RX ORDER — OXYCODONE AND ACETAMINOPHEN 5; 325 MG/1; MG/1
2 TABLET ORAL
Status: COMPLETED | OUTPATIENT
Start: 2024-12-02 | End: 2024-12-02

## 2024-12-02 RX ORDER — KETAMINE HYDROCHLORIDE 100 MG/ML
0.5 INJECTION, SOLUTION INTRAMUSCULAR; INTRAVENOUS ONCE
Status: COMPLETED | OUTPATIENT
Start: 2024-12-02 | End: 2024-12-02

## 2024-12-02 RX ADMIN — PROPOFOL 67 MG: 10 INJECTION, EMULSION INTRAVENOUS at 06:12

## 2024-12-02 RX ADMIN — OXYCODONE HYDROCHLORIDE AND ACETAMINOPHEN 2 TABLET: 5; 325 TABLET ORAL at 07:12

## 2024-12-02 RX ADMIN — HYDROMORPHONE HYDROCHLORIDE 1 MG: 1 INJECTION, SOLUTION INTRAMUSCULAR; INTRAVENOUS; SUBCUTANEOUS at 06:12

## 2024-12-02 RX ADMIN — KETAMINE HYDROCHLORIDE 70 MG: 100 INJECTION, SOLUTION, CONCENTRATE INTRAMUSCULAR; INTRAVENOUS at 06:12

## 2024-12-02 RX ADMIN — FENTANYL CITRATE 100 MCG: 50 INJECTION INTRAMUSCULAR; INTRAVENOUS at 05:12

## 2024-12-02 NOTE — FIRST PROVIDER EVALUATION
" Emergency Department TeleTriage Encounter Note      CHIEF COMPLAINT    Chief Complaint   Patient presents with    Dislocation     Patient reports to the ED for a left shoulder dislocation. Patient seen yesterday, shoulder was popped back into place. Patient states that he was in the shower today when he heard a "pop" and pain restarted. Pulse, motor, sensory intact in the injured extremity. Ambulatory, appears uncomfortable.       VITAL SIGNS   Initial Vitals [12/02/24 1447]   BP Pulse Resp Temp SpO2   (!) 191/90 86 20 97.8 °F (36.6 °C) 95 %      MAP       --            ALLERGIES    Review of patient's allergies indicates:   Allergen Reactions    No known drug allergies        PROVIDER TRIAGE NOTE  Verbal consent for the teletriage evaluation was given by the patient at the start of the evaluation.  All efforts will be made to maintain patient's privacy during the evaluation.      This is a teletriage evaluation of a 69 y.o. male presenting to the ED with c/o seen 12/1/2024 and had a shoulder dislocation; thinks it popped back out of placement.  Ibuprofen taken at 1 pm. Limited physical exam via telehealth: The patient is awake, alert, answering questions appropriately and is not in respiratory distress.  As the Teletriage provider, I performed an initial assessment and ordered appropriate labs and imaging studies, if any, to facilitate the patient's care once placed in the ED. Once a room is available, care and a full evaluation will be completed by an alternate ED provider.  Any additional orders and the final disposition will be determined by that provider.  All imaging and labs will not be followed-up by the Teletriage Team, including myself.          ORDERS  Labs Reviewed - No data to display    ED Orders (720h ago, onward)      Start Ordered     Status Ordering Provider    Unscheduled 12/02/24 1521  X-Ray Shoulder Trauma Left  1 time imaging         Ordered DINAH CONKLIN              Virtual Visit Note: The " provider triage portion of this emergency department evaluation and documentation was performed via [employer], a HIPAA-compliant telemedicine application, in concert with a tele-presenter in the room. A face to face patient evaluation with one of my colleagues will occur once the patient is placed in an emergency department room.      DISCLAIMER: This note was prepared with See[employer] voice recognition transcription software. Garbled syntax, mangled pronouns, and other bizarre constructions may be attributed to that software system.

## 2024-12-02 NOTE — Clinical Note
"Yosef"Wild Rowe was seen and treated in our emergency department on 12/2/2024.  He may return to work on 12/04/2024.       If you have any questions or concerns, please don't hesitate to call.      Dm Faust MD"

## 2024-12-03 ENCOUNTER — OFFICE VISIT (OUTPATIENT)
Dept: ORTHOPEDICS | Facility: CLINIC | Age: 69
End: 2024-12-03
Payer: MEDICARE

## 2024-12-03 ENCOUNTER — TELEPHONE (OUTPATIENT)
Dept: ORTHOPEDICS | Facility: CLINIC | Age: 69
End: 2024-12-03
Payer: MEDICARE

## 2024-12-03 VITALS — HEIGHT: 67 IN | BODY MASS INDEX: 46.2 KG/M2

## 2024-12-03 DIAGNOSIS — M25.311 SHOULDER INSTABILITY, RIGHT: Primary | ICD-10-CM

## 2024-12-03 DIAGNOSIS — M25.512 ACUTE PAIN OF LEFT SHOULDER: ICD-10-CM

## 2024-12-03 DIAGNOSIS — S43.015A ANTERIOR DISLOCATION OF LEFT SHOULDER, INITIAL ENCOUNTER: ICD-10-CM

## 2024-12-03 PROCEDURE — 3066F NEPHROPATHY DOC TX: CPT | Mod: CPTII,S$GLB,, | Performed by: SURGERY

## 2024-12-03 PROCEDURE — 99214 OFFICE O/P EST MOD 30 MIN: CPT | Mod: S$GLB,,, | Performed by: SURGERY

## 2024-12-03 PROCEDURE — 3288F FALL RISK ASSESSMENT DOCD: CPT | Mod: CPTII,S$GLB,, | Performed by: SURGERY

## 2024-12-03 PROCEDURE — 1125F AMNT PAIN NOTED PAIN PRSNT: CPT | Mod: CPTII,S$GLB,, | Performed by: SURGERY

## 2024-12-03 PROCEDURE — 3060F POS MICROALBUMINURIA REV: CPT | Mod: CPTII,S$GLB,, | Performed by: SURGERY

## 2024-12-03 PROCEDURE — 1159F MED LIST DOCD IN RCRD: CPT | Mod: CPTII,S$GLB,, | Performed by: SURGERY

## 2024-12-03 PROCEDURE — 3044F HG A1C LEVEL LT 7.0%: CPT | Mod: CPTII,S$GLB,, | Performed by: SURGERY

## 2024-12-03 PROCEDURE — 1101F PT FALLS ASSESS-DOCD LE1/YR: CPT | Mod: CPTII,S$GLB,, | Performed by: SURGERY

## 2024-12-03 PROCEDURE — 99999 PR PBB SHADOW E&M-EST. PATIENT-LVL III: CPT | Mod: PBBFAC,,, | Performed by: SURGERY

## 2024-12-03 PROCEDURE — 3008F BODY MASS INDEX DOCD: CPT | Mod: CPTII,S$GLB,, | Performed by: SURGERY

## 2024-12-03 NOTE — TELEPHONE ENCOUNTER
Pt's wife states needs an appointment with Dr. Liam NATH for shoulder. Advised pt I will reach out to staff to get pt scheduled with MD and someone will reach out with a time. Wife verbalized understanding.

## 2024-12-03 NOTE — PROGRESS NOTES
Subjective:     Yosef Rowe     Chief Complaint   Patient presents with    Consult     Left shoulder dislocation       HPI    Yosef is a 69 y.o. male coming in today for left shoulder pain. Has sustained 2 dislocations in the last week. Both closed reduced in ER.  Presents for further management.  Unsure how 2nd dislocation occurred.  First dislocation was reaching back.  Both closed reduced in the emergency room under conscious sedation.    Joint instability?  Mechanical locking/clicking?   Affecting ADL's?  Affecting sleep?    Occupation:     ROS    PAST MEDICAL HISTORY:   Past Medical History:   Diagnosis Date    Anxiety     Arthritis     Depression     when turned 49-lost job and mother unwell at that time    Ex-cigar smoker 05/29/2018    Cigar twice a week      History of heavy alcohol consumption 11/08/2020    Hyperlipidemia     Lumbar radiculopathy     BRETT- 2011    Osteoarthritis     Sleep apnea     Urinary tract infection      PAST SURGICAL HISTORY:   Past Surgical History:   Procedure Laterality Date    IRRIGATION AND DEBRIDEMENT OF LOWER EXTREMITY Left 7/3/2018    Procedure: IRRIGATION AND DEBRIDEMENT, LOWER EXTREMITY, poly exchange left uni knee replacement. Stryler. 9L NS;  Surgeon: Ruperto Olivarez MD;  [facility];  Service: Orthopedics;  Laterality: Left;    IRRIGATION AND DEBRIDEMENT OF LOWER EXTREMITY Left 7/7/2018    Procedure: IRRIGATION AND DEBRIDEMENT, LOWER EXTREMITY;  Surgeon: Orlin Jordan MD;  [facility];  Service: Orthopedics;  Laterality: Left;    JOINT REPLACEMENT      knee arthroscopicc surgeries      Right knee- 2007  and Left knee- 2008    KNEE SURGERY      Left wrist Surgery      2000 for a torn ligament from [place]    TOTAL KNEE REPLACEMENT USING COMPUTER NAVIGATION Left 6/22/2018    Procedure: REPLACEMENT-KNEE WITH NAVIGATION-- unicondylar-- medial;  Surgeon: Orlin Jordan MD;  [facility];  Service: Orthopedics;  Laterality: Left;      FAMILY HISTORY:   Family History   Problem Relation Name Age of Onset    Parkinsonism Mother      Heart attack Neg Hx      Heart disease Neg Hx      Heart failure Neg Hx      Hyperlipidemia Neg Hx      Hypertension Neg Hx      Stroke Neg Hx       SOCIAL HISTORY:   Social History     Socioeconomic History    Marital status:    Tobacco Use    Smoking status: Former     Types: Cigars     Passive exposure: Past    Smokeless tobacco: Never    Tobacco comments:     maybe one a month   Substance and Sexual Activity    Alcohol use: Yes     Alcohol/week: 5.0 standard drinks of alcohol     Types: 5 Standard drinks or equivalent per week     Comment: Daily    Drug use: No    Sexual activity: Yes     Partners: Female     Social Drivers of Health     Financial Resource Strain: High Risk (8/19/2024)    Overall Financial Resource Strain (CARDIA)     Difficulty of Paying Living Expenses: Hard   Food Insecurity: Food Insecurity Present (8/19/2024)    Hunger Vital Sign     Worried About Running Out of Food in the Last Year: Sometimes true     Ran Out of Food in the Last Year: Often true   Transportation Needs: No Transportation Needs (9/28/2023)    PRAPARE - Transportation     Lack of Transportation (Medical): No     Lack of Transportation (Non-Medical): No   Physical Activity: Unknown (8/19/2024)    Exercise Vital Sign     Days of Exercise per Week: 1 day   Stress: Stress Concern Present (8/19/2024)    [employer] [place] [employer] - Occupational Stress Questionnaire     Feeling of Stress : Very much   Housing Stability: Unknown (9/28/2023)    Housing Stability Vital Sign     Unable to Pay for Housing in the Last Year: No     Unstable Housing in the Last Year: No       MEDICATIONS:   Current Outpatient Medications:     atorvastatin (LIPITOR) 20 MG tablet, Take 1 tablet (20 mg total) by mouth once daily., Disp: 90 tablet, Rfl: 3    calcium carbonate/vitamin D3 (VITAMIN D-3 ORAL), Take by mouth., Disp: , Rfl:      "DULoxetine (CYMBALTA) 60 MG capsule, Take 1 capsule (60 mg total) by mouth once daily., Disp: 30 capsule, Rfl: 11    erythromycin (ROMYCIN) ophthalmic ointment, , Disp: , Rfl:     ibuprofen (ADVIL,MOTRIN) 600 MG tablet, Take 1 tablet (600 mg total) by mouth every 6 (six) hours as needed for Pain., Disp: 20 tablet, Rfl: 0    oxyCODONE-acetaminophen (PERCOCET) 7.5-325 mg per tablet, Take 1 tablet by mouth every 6 (six) hours as needed for Pain., Disp: 12 tablet, Rfl: 0    triamcinolone acetonide 0.1% (KENALOG) 0.1 % cream, Apply topically 2 (two) times daily. Rash, Disp: , Rfl:     zaleplon (SONATA) 10 MG capsule, Take 1 capsule (10 mg total) by mouth every evening., Disp: 30 capsule, Rfl: 0    sildenafiL (VIAGRA) 100 MG tablet, Take 1 tablet (100 mg total) by mouth daily as needed for Erectile Dysfunction. Dispense generic, Disp: 10 tablet, Rfl: 11  No current facility-administered medications for this visit.  ALLERGIES:   Review of patient's allergies indicates:   Allergen Reactions    No known drug allergies        Objective:     VITAL SIGNS: Ht 5' 7" (1.702 m)   BMI 46.20 kg/m²    Ortho/SPM Exam    MUSCULOSKELETAL EXAM  Shoulder: left SHOULDER  The affected shoulder is compared to the contralateral shoulder.  Positive findings otherwise noted at the bottom of the exam.    Observation:      SHOULDER  No ecchymosis, edema, or erythema throughout the shoulder girdle.  No sternal, clavicular, or acromial deformities bilaterally.  No atrophy of the pectorals, deltoids, supraspinatus, infraspinatus, or biceps bilaterally.  No asymmetry of shoulders bilaterally.    ROM (* = with pain):  CERVICAL SPINE  Full AROM in flexion, extension, sidebending, and rotation.    SHOULDER  Active flexion to 180° on left and 180° on right.  Active abduction to 180° on left and 180° on right.  Active internal rotation to T7 on left and T7 on right.    Active external rotation to T4 on left and T4 on right.    No scapular dyskinesia or " winging.    Tenderness:  No tenderness at the SC or AC joint  No tenderness over the clavicle   No tenderness over biceps tendon or bicipital groove  No tenderness over subacromial space    Strength Testing (* = with pain):  Deltoid - 5/5 on left and 5/5 on right  Biceps - 5/5 on left and 5/5 on right  Triceps - 5/5 on left and 5/5 on right  Wrist extension - 5/5 on left and 5/5 on right  Wrist flexion - 5/5 on left and 5/5 on right   - 5/5 on left and 5/5 on right  Finger extension - 5/5 on left and 5/5 on right  Finger abduction - 5/5 on left and 5/5 on right    Special Tests:  Empty can test - negative  Full can test - negative  Bear hug test - negative  Belly press test - negative  Resisted internal rotation - negative  Resisted external rotation - negative    Neer's test - negative  Hawkin's-Emanuel test - negative  Cross-arm test- negative    OIsaks test - negative  Biceps load 1 test - negative  Biceps load 2 test - negative  [place] sheer test - negative    Sulcus sign - none  AP load and shift laxity - none  Anterior apprehension test - negative  Posterior apprehension test - negative    Speed's test - negative  Yergason's test - negative    Neurovascular Exam:  Spurlings test - negative bialterally  Lhermittes test - negative  2+ radial pulses bilaterally  Sensation intact to light touch in the distal median, radial, and ulnar nerve distributions bilaterally.  Negative Tinel's at carpal tunnel  Negative Tinel's at cubital tunnel  2+/4 reflexes at triceps, biceps, and brachioradialis  Capillary refill intact <2 seconds in all digits bilaterally    Full range of motion of the cervical/thoracic spine in all planes without pain    Positive findings:  Reduced range of motion, notable swelling, tenderness to palpation.  No pain at rest currently    IMAGING:    X- rays of the shoulder (AP, Scapular Y, Bernageau, and axillary views) taken 12/02/2024.  X-ray images were reviewed personally by me and then  directly with patient.  Left shoulder with Bankart lesion status post closed reduction.  CT notable for soft tissue swelling and possible Bankart lesion of the glenoid.    Assessment:      Encounter Diagnoses   Name Primary?    Anterior dislocation of left shoulder, initial encounter     Acute pain of left shoulder Yes        Anterior shoulder instability  Glenoid fracture    Plan:   We had a long discussion of the risks and benefits of different operative and non-operative options for rotator cuff pathology.  She is previously tried physical therapy and injections.  I explained the injury using pictures, models, and the patient's x-ray images as well as MRI. I explained the risks of surgery which include but are not limited to continued pain, deformity, bleeding, infection, damage to surrounding structures, re-tear of the rotator cuff, arthritis, and in rare cases loss of limb. I explained the risks of co-morbidities which influences the rate of complications. I explained the risks of non-operative management, including continued pain, long term immobilization, tear progression, rapid arthritis progression, and difficulty with activities daily living. I discussed all of these risks using non-medical terms and confirmed understanding. The patient elected for left shoulder arthroscopic versus open Bankart repair, rotator cuff repair possible remplissage, possible glenoid fracture stabilization, biceps surgery, subacromial decompression distal clavicle excision and any other indicated procedures including coracoid transfer.  Abduction pillow sling placed today.  Follow up 12 20 for surgery.  Consent signed.  MRI ordered of the left shoulder to evaluate for rotator cuff tears.  Discussed the prevalence of rotator cuff tears and multiple dislocations.  Plan for procedure 12/20/2024.  Patient agreeable to today's plan and all questions were answered    This note is dictated using the M*Modal Fluency Direct word  recognition program. There are word recognition mistakes that are occasionally missed on review.

## 2024-12-03 NOTE — DISCHARGE INSTRUCTIONS
"Go to [email or web address]  and choose "Schedule now in find a doctor". Following this, filter by the specialty of choice and location.     "

## 2024-12-04 NOTE — ED PROVIDER NOTES
"ED Provider Note - 12/2/2024    History     Chief Complaint   Patient presents with    Dislocation     Patient reports to the ED for a left shoulder dislocation. Patient seen yesterday, shoulder was popped back into place. Patient states that he was in the shower today when he heard a "pop" and pain restarted. Pulse, motor, sensory intact in the injured extremity. Ambulatory, appears uncomfortable.       ZANDRA Rowe is a 69 y.o. year old male with past medical and surgical history as seen below, presenting with chief complaint of shoulder dislocation.  Seen yesterday for similar complaint.  Reduced with the degree of difficulty via procedural sedation in the ED on that occurrence.  Took his splint off to take a shower this evening and shoulder again dislocated.  Came to emergency department for further management.        Past Medical History:   Diagnosis Date    Anxiety     Arthritis     Depression     when turned 49-lost job and mother unwell at that time    Ex-cigar smoker 05/29/2018    Cigar twice a week      History of heavy alcohol consumption 11/08/2020    Hyperlipidemia     Lumbar radiculopathy     BRETT- 2011    Osteoarthritis     Sleep apnea     Urinary tract infection      Past Surgical History:   Procedure Laterality Date    IRRIGATION AND DEBRIDEMENT OF LOWER EXTREMITY Left 7/3/2018    Procedure: IRRIGATION AND DEBRIDEMENT, LOWER EXTREMITY, poly exchange left uni knee replacement. Stryler. 9L NS;  Surgeon: Ruperto Olivarez MD;  Location: [facility] OR [address] [place] [address];  Service: Orthopedics;  Laterality: Left;    IRRIGATION AND DEBRIDEMENT OF LOWER EXTREMITY Left 7/7/2018    Procedure: IRRIGATION AND DEBRIDEMENT, LOWER EXTREMITY;  Surgeon: Orlin Jordan MD;  Location: [facility] OR [address] [place] [address];  Service: Orthopedics;  Laterality: Left;    JOINT REPLACEMENT      knee arthroscopicc surgeries      Right knee- 2007  and Left knee- 2008    KNEE SURGERY      Left wrist Surgery      2000 for a torn ligament from [place]    TOTAL " KNEE REPLACEMENT USING COMPUTER NAVIGATION Left 6/22/2018    Procedure: REPLACEMENT-KNEE WITH NAVIGATION-- unicondylar-- medial;  Surgeon: Orlin Jordan MD;  Location: [facility] OR [address] [place] [address];  Service: Orthopedics;  Laterality: Left;         Family History   Problem Relation Name Age of Onset    Parkinsonism Mother      Heart attack Neg Hx      Heart disease Neg Hx      Heart failure Neg Hx      Hyperlipidemia Neg Hx      Hypertension Neg Hx      Stroke Neg Hx       Social History     Tobacco Use    Smoking status: Former     Types: Cigars     Passive exposure: Past    Smokeless tobacco: Never    Tobacco comments:     maybe one a month   Substance Use Topics    Alcohol use: Yes     Alcohol/week: 5.0 standard drinks of alcohol     Types: 5 Standard drinks or equivalent per week     Comment: Daily    Drug use: No     Social Drivers of Health with Concerns     Tobacco Use: Medium Risk (12/2/2024)    Patient History     Smoking Tobacco Use: Former     Smokeless Tobacco Use: Never     Passive Exposure: Past   Alcohol Use: Alcohol Misuse (8/19/2024)    AUDIT-C     Frequency of Alcohol Consumption: 4 or more times a week     Average Number of Drinks: 1 or 2     Frequency of Binge Drinking: Monthly   Financial Resource Strain: High Risk (8/19/2024)    Overall Financial Resource Strain (CARDIA)     Difficulty of Paying Living Expenses: Hard   Food Insecurity: Food Insecurity Present (8/19/2024)    Hunger Vital Sign     Worried About Running Out of Food in the Last Year: Sometimes true     Ran Out of Food in the Last Year: Often true   Physical Activity: Unknown (8/19/2024)    Exercise Vital Sign     Days of Exercise per Week: 1 day     Minutes of Exercise per Session: Not on file   Stress: Stress Concern Present (8/19/2024)    [employer] - Occupational Stress Questionnaire     Feeling of Stress : Very much   Housing Stability: Unknown (9/28/2023)    Housing Stability Vital Sign     Unable to Pay for  Housing in the Last Year: No     Number of Places Lived in the Last Year: Not on file     Unstable Housing in the Last Year: No   Social Isolation: Not on file      Review of patient's allergies indicates:   Allergen Reactions    No known drug allergies        Review of Systems     A full Review of Systems (ROS) was performed and was negative unless otherwise stated in the HPI.      Physical Exam     Vitals:    12/02/24 1909 12/02/24 1917 12/02/24 1932 12/02/24 2002   BP:  (!) 177/83 (!) 198/91 (!) 199/92   Pulse:  86 83 82   Resp: (!) 22  (!) 23 (!) 26   Temp:       SpO2:  96% 95% (!) 94%   Weight:       Height:            Physical Exam    Nursing note and vitals reviewed.  Constitutional: He appears well-developed and well-nourished. He is Obese . No distress.   HENT:   Head: Normocephalic and atraumatic.   Right Ear: External ear normal.   Left Ear: External ear normal.   Nose: Nose normal.   Eyes: Conjunctivae and EOM are normal. Pupils are equal, round, and reactive to light.   Neck: Neck supple.   Normal range of motion.  Cardiovascular:  Normal rate and regular rhythm.           Pulmonary/Chest: Breath sounds normal. No respiratory distress.   Musculoskeletal:      Cervical back: Normal range of motion and neck supple.      Comments: Deformity of left shoulder with significantly decreased range of motion.  Neurovascularly intact distal to deformity.     Neurological: He is alert and oriented to person, place, and time. He has normal strength. No cranial nerve deficit or sensory deficit.   Skin: Skin is warm and dry. No rash noted.   Psychiatric: He has a normal mood and affect. Thought content normal.         Lab Results- Independently reviewed by myself      Labs Reviewed - No data to display        Imaging     Imaging Results              X-Ray Shoulder Complete 2 View Right (Final result)  Result time 12/02/24 19:11:34      Final result by Tyler Patel DO (12/02/24 19:11:34)                    Impression:      Postreduction radiographs demonstrate normal, anatomic alignment of the glenohumeral joint.      Electronically signed by: Tyler Patel  Date:    12/02/2024  Time:    19:11               Narrative:    EXAMINATION:  XR SHOULDER COMPLETE 2 OR MORE VIEWS RIGHT    CLINICAL HISTORY:  Other injury of unspecified body region, initial encounter    TECHNIQUE:  Two or three views of the right shoulder were performed.    COMPARISON:  12/02/2022.    FINDINGS:  Post reduction radiographs demonstrate normal, anatomic alignment of the glenohumeral joint.  There are several osseous densities adjacent to the humeral head, stable from prior studies, may represent avulsion fractures versus calcific tendinosis.  There is no definite evidence of an osseous Bankart or evidence of a Hill-Sachs fracture.                                        X-Ray Shoulder Trauma Left (Final result)  Result time 12/02/24 17:09:22      Final result by Dat Lopez DO (12/02/24 17:09:22)                   Impression:      Please see above      Electronically signed by: Dat Lopez DO  Date:    12/02/2024  Time:    17:09               Narrative:    EXAMINATION:  XR SHOULDER TRAUMA 3 VIEW LEFT    CLINICAL HISTORY:  Pain, unspecified    TECHNIQUE:  Three views of the left shoulder were performed.    COMPARISON  12/01/2024    FINDINGS:  Recurrent anterior inferior left shoulder dislocation with punctate questionable bony fracture fragments concerning for bony Bankart.  Clinical correlation and orthopedic evaluation recommended. This report was flagged in Epic as abnormal.                                    X-Rays:   Independently Interpreted Readings:   Other Readings:  Initial shoulder x-ray showing dislocation anteriorly  Repeat shoulder x-ray showing successful reduction.              ED Course         Orthopedic Injury    Date/Time: 12/2/2024 6:29 PM    Performed by: Dm Faust MD  Authorized by: Dm Faust MD    Location  procedure was performed:  [facility] EMERGENCY DEPARTMENT  Consent Done?:  Yes  Universal Protocol:     Verbal consent obtained?: Yes      Written consent obtained?: Yes      Risks and benefits: Risks, benefits and alternatives were discussed      Consent given by:  Patient    Patient states understanding of procedure being performed: Yes      Patient's understanding of procedure matches consent: Yes      Procedure consent matches procedure scheduled: Yes      Relevant documents present and verified: Yes      Imaging studies available: Yes      Required items: Required blood products, implants, devices and special equipment avialable      Patient identity confirmed:   and name    Time Out: Immediately prior to the procedure a time out was called        Pre-procedure assessment:     Neurovascular status: Neurovascularly intact      Range of motion: reduced      Patient sedated?: Yes      Sedation:  Propofol    Analgesia:  Ketamine and see MAR for details    Vital signs: Vital signs monitored during sedation        Selections made in this section will also lock the Injury type section above.:     Immobilization:  Sling    Complications: No    Post-procedure assessment:     Neurovascular status: Neurovascularly intact      Distal perfusion: normal      Neurological function: normal      Range of motion: improved      Patient tolerance:  Patient tolerated the procedure well with no immediate complications  Procedural Sedation        Date/Time: 2024 6:25 PM    Performed by: Dm Faust MD  Authorized by: Dm Faust MD  Consent Done: Yes  Consent: Verbal consent obtained. Written consent obtained.  Risks and benefits: risks, benefits and alternatives were discussed  Consent given by: patient  Patient identity confirmed:  and name  ASA Class: Class 3 - Systemic Illness with functional impairment.  Mallampati Score: Class 2 - Visualization of the soft palate, fauces, and uvula.     Equipment: on cardiac  monitor., on supplemental oxygen., suction available., on CO2 monitor., on BP monitor. and airway equipment available.     Sedation type: moderate (conscious) sedation    Sedatives: propofol  Analgesia: ketamine  Total Sedation Time (min): 15  Vitals: Vital signs were monitored during sedation.  Complications: No complications.   Patient/Family history of anesthesia or sedation complications: No             Orders Placed This Encounter    ORTHOPEDIC INJURY TREATMENT    PROCEDURAL SEDATION ED    X-Ray Shoulder Trauma Left    X-Ray Shoulder Complete 2 View Right    ketamine injection 70 mg    AND Linked Order Group     propofol (DIPRIVAN) 10 mg/mL IVP     propofol (DIPRIVAN) 10 mg/mL IVP    fentaNYL injection 100 mcg    HYDROmorphone injection 1 mg    oxyCODONE-acetaminophen 5-325 mg per tablet 2 tablet                      Medical Decision Making       The patient's list of active medical problems, social history, medications, and allergies as documented per RN staff has been reviewed.           Medical Decision Making  Amount and/or Complexity of Data Reviewed  External Data Reviewed: radiology and notes.     Details: Reviewed records from recent department stay including notes and radiology.  Radiology: ordered and independent interpretation performed.  Discussion of management or test interpretation with external provider(s): Discussed with Dr. Vaughan, orthopedics regarding this 69-year-old male with recurrent shoulder dislocations.  Through Dr. Vaughan, was able to arrange close follow up in his clinic the following morning.    Risk  Prescription drug management.      Medical Decision Making:   Differential Diagnosis:   Muscular strain, contusion, fracture, dislocation                 Clinical Impression       Follow-up Information       Follow up With Specialties Details Why Contact David Toussaint MD Orthopedic Surgery Schedule an appointment as soon as possible for a visit in 1 day schedule at 9:30 am  for follow-up appt tomorrow [address]  [place] [address]  [phone]      [place] - Emergency Dept Emergency Medicine Go to  As needed, If symptoms worsen [address]  [place] [id number] [phone]            Referrals:  No orders of the defined types were placed in this encounter.      Disposition   ED Disposition Condition    Discharge Stable              Final diagnoses:  [S43.005D] Shoulder dislocation, left, subsequent encounter (Primary)        Dm Faust MD        12/04/2024          DISCLAIMER: This note was prepared with [employer] voice recognition transcription software. Garbled syntax, mangled pronouns, and other bizarre constructions may be attributed to that software system.       Dm Faust MD  12/04/24 0119

## 2024-12-05 DIAGNOSIS — S43.015A ANTERIOR DISLOCATION OF LEFT SHOULDER, INITIAL ENCOUNTER: Primary | ICD-10-CM

## 2024-12-11 ENCOUNTER — PATIENT MESSAGE (OUTPATIENT)
Dept: PREADMISSION TESTING | Facility: HOSPITAL | Age: 69
End: 2024-12-11
Payer: MEDICARE

## 2024-12-11 NOTE — ANESTHESIA PAT ROS NOTE
12/11/2024  Yosef Rowe is a 69 y.o., male.      Pre-op Assessment    I have reviewed the Patient Summary Reports.       I have reviewed the Medications.     Review of Systems  Anesthesia Hx:  No problems with previous Anesthesia   History of prior surgery of interest to airway management or planning:  Previous anesthesia: General, Nerve Block, Spinal 7/7/2018  Replacement of Polyethylene Liner Left TKA with general anesthesia.  Procedure performed at an [facility].  6/22/2018  Left Knee Repalcement (Unicondylar) with navigation with spinal.  Procedure performed at an [facility].    for 7/7/2018  Replacement of Polyethylene Liner Left TKA.   Airway issues documented on chart review include mask, easy, GETA, videolaryngoscope used  , view on video-laryngoscopy Grade I      Denies Personal Hx of Anesthesia complications.                    Social:  Former Smoker, Alcohol Use Former cigar smoker,  5 standard drinks of alcohol per week,  H/O heavy alcohol consumption      Hematology/Oncology:    Oncology Normal    -- Denies Anemia:               Hematology Comments: Vitamin D insufficiency, taking supplement                    EENT/Dental:   Dry eye syndrome          Cardiovascular:  Exercise tolerance: good   Hypertension   Denies MI. CAD  asymptomatic  Denies CABG/stent.  Denies Dysrhythmias.   Denies Angina.     hyperlipidemia  Denies BINGHAM.  ECG has been reviewed. Coronary atherosclerosis due to calcified coronary lesion,  Aortic aneurysm without rupture, unspecified portion of aorta,  Agatston score equals 23   Patient not on beta blockers                          Pulmonary:    Denies COPD.  Denies Asthma.   Denies Shortness of breath.  Sleep Apnea, CPAP                Renal/:   Denies Chronic Renal Disease.  BPH              Hepatic/GI:  Hepatic/GI Normal     Denies GERD. Denies Liver Disease.    Not Taking GLP-1 Agonists            Musculoskeletal:  Arthritis   Anterior dislocation of left shoulder,  H/O Osteoarthritis,   S/P Left TKA,   Bilateral knee Arthroscopies,   I&D LLE,  H/O Achilles tendinitis, left leg            Neurological:    Denies CVA. Neuromuscular Disease,   Denies Headaches. Denies Seizures.    Lumbar Radiculopathy                             Endocrine:   Denies Hypothyroidism.  IFG (impaired fasting glucose), HA1C = 5.5 on 8/21/2024      Morbid Obesity / BMI > 40  Dermatological:  Psoriasis   Psych:  Psychiatric History anxiety depression               Past Medical History:   Diagnosis Date    Anxiety     Arthritis     Depression     when turned 49-lost job and mother unwell at that time    Ex-cigar smoker 05/29/2018    Cigar twice a week      History of heavy alcohol consumption 11/08/2020    Hyperlipidemia     Lumbar radiculopathy     BRETT- 2011    Osteoarthritis     Sleep apnea     Urinary tract infection      Past Surgical History:   Procedure Laterality Date    IRRIGATION AND DEBRIDEMENT OF LOWER EXTREMITY Left 7/3/2018    Procedure: IRRIGATION AND DEBRIDEMENT, LOWER EXTREMITY, poly exchange left uni knee replacement. Stryler. 9L NS;  Surgeon: Ruperto Olivarez MD;  Location: [address];  Service: Orthopedics;  Laterality: Left;    IRRIGATION AND DEBRIDEMENT OF LOWER EXTREMITY Left 7/7/2018    Procedure: IRRIGATION AND DEBRIDEMENT, LOWER EXTREMITY;  Surgeon: Orlin Jordan MD;  Location: [facility] OR [address] [place] [address];  Service: Orthopedics;  Laterality: Left;    JOINT REPLACEMENT      knee arthroscopicc surgeries      Right knee- 2007  and Left knee- 2008    KNEE SURGERY      Left wrist Surgery      2000 for a torn ligament from [place]    TOTAL KNEE REPLACEMENT USING COMPUTER NAVIGATION Left 6/22/2018    Procedure: REPLACEMENT-KNEE WITH NAVIGATION-- unicondylar-- medial;  Surgeon: Orlin Jordan MD;  Location: [address];  Service: Orthopedics;  Laterality: Left;       Anesthesia  Assessment: Preoperative EQUATION    Planned Procedure: Procedure(s) (LRB):  ARTHROSCOPY, SHOULDER, WITH BANKART REPAIR (Right)  ARTHROSCOPY,SHOULDER,WITH BICEPS TENODESIS (Right)  Requested Anesthesia Type:General/Regional  Surgeon: David Vaughan MD  Service: Orthopedics  Known or anticipated Date of Surgery:12/20/2024    Surgeon notes: reviewed    Electronic QUestionnaire Assessment completed via nurse interview with patient.        Triage considerations:     The patient has no apparent active cardiac condition (No unstable coronary Syndrome such as severe unstable angina or recent [<1 month] myocardial infarction, decompensated CHF, severe valvular   disease or significant arrhythmia)    Previous anesthesia records:GETA, CSE, Nerve block for post-op pain, Easy airway, Easy intubation, and No problems, Glidescope; Grade I; Complicating Factors Small mouth, Oropharyngeal edema or fat, Obesity; 2 attempts      Last PCP note: 3-6 months ago , within [facility]   Subspecialty notes: Urology, Cardiology    Other important co-morbidities: IVORY, CAD, HLD, HTN, and Morbid Obesity       EKG 11/9/2020:  Vent. Rate : 062 BPM     Atrial Rate : 062 BPM      P-R Int : 184 ms          QRS Dur : 100 ms       QT Int : 384 ms       P-R-T Axes : 064 032 076 degrees      QTc Int : 389 ms   Baseline wander   Normal sinus rhythm   Normal ECG   When compared with ECG of 28-SEP-2017 10:30,   Premature atrial complexes No longer present   Confirmed by Orlin Hoffmann MD ([id number]) on 11/11/2020 2:36:17 PM       Echo 9/13/2018:  CONCLUSIONS     1 - Normal left ventricular systolic function (EF 60-65%).     2 - Normal right ventricular systolic function .     3 - Normal left ventricular diastolic function.       PET Stress Scan 9/28/2017:  EKG Conclusions:   1. The EKG portion of this study is negative for ischemia at a peak heart rate of 55 bpm (35% of predicted).   2. Blood pressure remained stable throughout the protocol  (Presenting BP: 120/87  Peak BP: 111/74).   3. No significant arrhythmias were present.   4. There were no symptoms of chest discomfort or significant dyspnea throughout the protocol.      CONCLUSIONS:   NO CLINICALLY SIGNIFICANT STRESS INDUCED PERFUSION DEFECTS as assessed with PET perfusion imaging.   1. There is no evidence of a discrete hemodynamically significant coronary stenosis.   2. Although no clinically significant stress defect is seen, there is reduced coronary flow reserve. These results suggest mild endothelial dysfunction due to mild, diffuse, non-obstructive coronary atherosclerosis without clinically significant,   localized perfusion defects.   3. Resting wall motion is physiologic. Stress wall motion is physiologic.   4. LV function is normal at rest and stress.  (normal is >= 51%)   5. The ventricular volumes are normal at rest and stress.   6. The extracardiac distribution of radioactivity is normal.   7. There was no previous study available to compare.       Tests already available:  Results have been reviewed.             Instructions given. (See in Nurse's note) Preop medication instructions sent via portal message.     Optimization:  Anesthesia Preop Clinic Assessment Not Indicated    Medical Opinion Indicated: No       Sub-specialist consult indicated: No      Plan: Consultation: medical clearance is not requested.              Patient  has previously scheduled Medical Appointment:    Navigation:  Straight Line to surgery.               No tests, anesthesia preop clinic visit, or consult required.                        Patient is OK to proceed with surgery at [facility].       Ht: 5'7  Wt: 138.8 kg (295 lb)  BMI: 46.20

## 2024-12-12 ENCOUNTER — HOSPITAL ENCOUNTER (EMERGENCY)
Facility: HOSPITAL | Age: 69
Discharge: HOME | End: 2024-12-12
Attending: STUDENT IN AN ORGANIZED HEALTH CARE EDUCATION/TRAINING PROGRAM
Payer: MEDICARE

## 2024-12-12 VITALS
SYSTOLIC BLOOD PRESSURE: 224 MMHG | RESPIRATION RATE: 20 BRPM | DIASTOLIC BLOOD PRESSURE: 122 MMHG | OXYGEN SATURATION: 100 % | HEART RATE: 75 BPM

## 2024-12-12 DIAGNOSIS — S43.005A DISLOCATION OF LEFT SHOULDER JOINT, INITIAL ENCOUNTER: Primary | ICD-10-CM

## 2024-12-12 DIAGNOSIS — M25.519 SHOULDER PAIN, UNSPECIFIED CHRONICITY, UNSPECIFIED LATERALITY: ICD-10-CM

## 2024-12-12 PROCEDURE — 96375 TX/PRO/DX INJ NEW DRUG ADDON: CPT

## 2024-12-12 PROCEDURE — 63600175 PHARM REV CODE 636 W HCPCS: Performed by: STUDENT IN AN ORGANIZED HEALTH CARE EDUCATION/TRAINING PROGRAM

## 2024-12-12 PROCEDURE — 96374 THER/PROPH/DIAG INJ IV PUSH: CPT

## 2024-12-12 PROCEDURE — 99285 EMERGENCY DEPT VISIT HI MDM: CPT | Mod: 25

## 2024-12-12 PROCEDURE — 23650 CLTX SHO DSLC W/MNPJ WO ANES: CPT | Mod: LT

## 2024-12-12 PROCEDURE — 25000003 PHARM REV CODE 250: Performed by: STUDENT IN AN ORGANIZED HEALTH CARE EDUCATION/TRAINING PROGRAM

## 2024-12-12 RX ORDER — SODIUM CHLORIDE 9 MG/ML
1000 INJECTION, SOLUTION INTRAVENOUS
Status: COMPLETED | OUTPATIENT
Start: 2024-12-12 | End: 2024-12-12

## 2024-12-12 RX ORDER — HYDROMORPHONE HYDROCHLORIDE 1 MG/ML
1 INJECTION, SOLUTION INTRAMUSCULAR; INTRAVENOUS; SUBCUTANEOUS
Status: COMPLETED | OUTPATIENT
Start: 2024-12-12 | End: 2024-12-12

## 2024-12-12 RX ORDER — ETOMIDATE 2 MG/ML
10 INJECTION INTRAVENOUS
Status: COMPLETED | OUTPATIENT
Start: 2024-12-12 | End: 2024-12-12

## 2024-12-12 RX ORDER — ETOMIDATE 2 MG/ML
10 INJECTION INTRAVENOUS ONCE AS NEEDED
Status: DISCONTINUED | OUTPATIENT
Start: 2024-12-12 | End: 2024-12-13 | Stop reason: HOSPADM

## 2024-12-12 RX ORDER — ONDANSETRON HYDROCHLORIDE 2 MG/ML
8 INJECTION, SOLUTION INTRAVENOUS ONCE
Status: COMPLETED | OUTPATIENT
Start: 2024-12-12 | End: 2024-12-12

## 2024-12-12 RX ORDER — NAPROXEN 500 MG/1
500 TABLET ORAL 2 TIMES DAILY
Qty: 10 TABLET | Refills: 0 | Status: SHIPPED | OUTPATIENT
Start: 2024-12-12 | End: 2024-12-17

## 2024-12-12 RX ORDER — OXYCODONE HYDROCHLORIDE 10 MG/1
10 TABLET ORAL EVERY 8 HOURS PRN
Qty: 9 TABLET | Refills: 0 | Status: SHIPPED | OUTPATIENT
Start: 2024-12-12 | End: 2024-12-15

## 2024-12-12 RX ORDER — KETOROLAC TROMETHAMINE 30 MG/ML
15 INJECTION, SOLUTION INTRAMUSCULAR; INTRAVENOUS
Status: COMPLETED | OUTPATIENT
Start: 2024-12-12 | End: 2024-12-12

## 2024-12-12 RX ADMIN — HYDROMORPHONE HYDROCHLORIDE 1 MG: 1 INJECTION, SOLUTION INTRAMUSCULAR; INTRAVENOUS; SUBCUTANEOUS at 09:12

## 2024-12-12 RX ADMIN — ONDANSETRON 8 MG: 2 INJECTION INTRAMUSCULAR; INTRAVENOUS at 09:12

## 2024-12-12 RX ADMIN — ETOMIDATE 10 MG: 2 INJECTION INTRAVENOUS at 09:12

## 2024-12-12 RX ADMIN — KETOROLAC TROMETHAMINE 15 MG: 30 INJECTION, SOLUTION INTRAMUSCULAR; INTRAVENOUS at 10:12

## 2024-12-12 RX ADMIN — SODIUM CHLORIDE 1000 ML: 9 INJECTION, SOLUTION INTRAVENOUS at 09:12

## 2024-12-13 NOTE — ED PROVIDER NOTES
Encounter Date: 12/12/2024       History     Chief Complaint   Patient presents with    Arm Injury     Dislocated left shoulder today by minimal movement, denies falling or trauma. Hx of same problem, has received conscious sedation. Surgery scheduled 12/20. Did not take meds prior to arrival, states wears sling as recommended. Skin without discoloration.     HPI  69-year-old male with history of elevated BMI, hypertension, sleep apnea, and recurrent shoulder dislocations anticipating left shoulder surgery in 8 days presents brought in by self through triage for shoulder pain.  He had taken off his sling today, and his shoulder subsequently popped out.  Denies new trauma.  Denies any other systemic or infectious symptoms or other acute complaints, complains of pain only in that area.  Review of patient's allergies indicates:   Allergen Reactions    No known drug allergies      Past Medical History:   Diagnosis Date    Anxiety     Arthritis     Depression     when turned 49-lost job and mother unwell at that time    Ex-cigar smoker 05/29/2018    Cigar twice a week      History of heavy alcohol consumption 11/08/2020    Hyperlipidemia     Lumbar radiculopathy     BRETT- 2011    Osteoarthritis     Sleep apnea     Urinary tract infection      Past Surgical History:   Procedure Laterality Date    IRRIGATION AND DEBRIDEMENT OF LOWER EXTREMITY Left 7/3/2018    Procedure: IRRIGATION AND DEBRIDEMENT, LOWER EXTREMITY, poly exchange left uni knee replacement. Stryler. 9L NS;  Surgeon: Ruperto Olivarez MD;  Location: [facility] OR [address] [place] [address];  Service: Orthopedics;  Laterality: Left;    IRRIGATION AND DEBRIDEMENT OF LOWER EXTREMITY Left 7/7/2018    Procedure: IRRIGATION AND DEBRIDEMENT, LOWER EXTREMITY;  Surgeon: Orlin Jordan MD;  Location: [facility] OR [address] [place] [address];  Service: Orthopedics;  Laterality: Left;    JOINT REPLACEMENT      knee arthroscopicc surgeries      Right knee- 2007  and Left knee- 2008    KNEE SURGERY      Left wrist Surgery       2000 for a torn ligament from [place]    TOTAL KNEE REPLACEMENT USING COMPUTER NAVIGATION Left 6/22/2018    Procedure: REPLACEMENT-KNEE WITH NAVIGATION-- unicondylar-- medial;  Surgeon: Orlin Jordan MD;  Location: [facility] OR [address] [place] [address];  Service: Orthopedics;  Laterality: Left;     Family History   Problem Relation Name Age of Onset    Parkinsonism Mother      Heart attack Neg Hx      Heart disease Neg Hx      Heart failure Neg Hx      Hyperlipidemia Neg Hx      Hypertension Neg Hx      Stroke Neg Hx       Social History     Tobacco Use    Smoking status: Former     Types: Cigars     Passive exposure: Past    Smokeless tobacco: Never    Tobacco comments:     maybe one a month   Substance Use Topics    Alcohol use: Yes     Alcohol/week: 5.0 standard drinks of alcohol     Types: 5 Standard drinks or equivalent per week     Comment: Daily    Drug use: No   Medical surgical family social history reviewed  Review of Systems  Complete review of systems was conducted and was negative except as per HPI and as marked  Physical Exam     Initial Vitals [12/12/24 1944]   BP Pulse Resp Temp SpO2   (!) 190/100 80 20 -- (!) 94 %      MAP       --         Physical Exam  Physical  General: Awake, alert, no acute distress  Head: Normocephalic, atraumatic  Neck: Trachea midline, full range of motion, no meningismus  ENT: Atraumatic, Airway Patent  Cardio: Regular Rate, Regular Rhythm, Heart Sounds Normal, Capillary refill normal  Chest: Atraumatic, No respiratory distress no use of accessory muscles to breath, normal rate, sounds even and clear to auscultation  Abdomen: Soft, Nontender, no involuntary guarding, rigidity, or rebound.  Upper Ext:  Left shoulder with characteristic location related deformity, guarding against any range of motion.  Limb is warm well perfused and neurovascularly intact.  Otherwise Atraumatic, Inspection normal, no swelling  Lower Ext: Atraumatic, Inspection normal, no swelling  Neuro: No gross cranial nerve  abnormality, no lateralization, no gross sensory or motor deficits  ED Course   Procedural Sedation        Date/Time: 12/12/2024 9:22 PM    Performed by: Leighton Miguel MD  Authorized by: Leighton Miguel MD  ASA Class: Class 2 - Mild Illness without functional impairment.  Mallampati Score: Class 2 - Visualization of the soft palate, fauces, and uvula.   NPO STATUS:  Date/Time of last solid: 12/12/2024 3:00 PM  Date/Time of last fluid: 12/12/2024 3:00 PM    Equipment: on cardiac monitor., on BP monitor., on CO2 monitor., airway equipment available., suction available. and on supplemental oxygen.     Sedatives: etomidate  Sedation start date/time: 12/12/2024 9:24 PM  Sedation end date/time: 12/12/2024 9:40 PM  Total Sedation Time (min): 16  Vitals: Vital signs were monitored during sedation.  Complications: No complications.   Patient/Family history of anesthesia or sedation complications: No    Procedure, reduction of left shoulder dislocation, risks benefits and alternatives discussed at length and traditional manner, consent obtained to proceed, procedural sedation was performed for this, see separate procedural note, traction counter traction was performed, reduction was palpated, and x-ray confirmed post reduction.  Limb is neurovascularly intact after procedure.  Labs Reviewed - No data to display       Imaging Results              X-Ray Shoulder 2 or More Views Left (Final result)  Result time 12/12/24 21:58:04      Final result by Tyler Patel DO (12/12/24 21:58:04)                   Impression:      Normal alignment of the glenohumeral joint.      Electronically signed by: Tyler Patel  Date:    12/12/2024  Time:    21:58               Narrative:    EXAMINATION:  XR SHOULDER COMPLETE 2 OR MORE VIEWS LEFT    CLINICAL HISTORY:  SHOULDER;    TECHNIQUE:  Two or three views of the left shoulder were performed.    COMPARISON:  Radiograph from earlier today.    FINDINGS:  Post reduction radiographs  demonstrate normal, anatomic alignment of the glenohumeral joint, with the humeral head well seated in the glenoid.  There is no evidence of acute fracture seen.  Again seen is a small bone fragment lateral to the humeral head, stable.  Remaining osseous structures are intact.                                       X-Ray Shoulder 2 or More Views Left (Final result)  Result time 12/12/24 20:59:29      Final result by Tyler Patel DO (12/12/24 20:59:29)                   Impression:      Anterior dislocation of the glenohumeral joint.      Electronically signed by: Tyler Patel  Date:    12/12/2024  Time:    20:59               Narrative:    EXAMINATION:  XR SHOULDER COMPLETE 2 OR MORE VIEWS LEFT    CLINICAL HISTORY:  Shoulder pain;    TECHNIQUE:  Two or three views of the left shoulder were performed.    COMPARISON:  None    FINDINGS:  Limited evaluation on the scapular Y-view due to extensive overlying soft tissue attenuation.  There is anterior dislocation of the left glenohumeral joint.  No definite acute fracture is seen.                                       Medications   etomidate injection 10 mg (has no administration in time range)   etomidate injection 10 mg (10 mg Intravenous Given 12/12/24 2132)   0.9% NaCl infusion (1,000 mLs Intravenous New Bag 12/12/24 2116)   ondansetron injection 8 mg (8 mg Intravenous Given 12/12/24 2151)   HYDROmorphone injection 1 mg (1 mg Intravenous Given 12/12/24 2151)   ketorolac injection 15 mg (15 mg Intravenous Given 12/12/24 2242)     Medical Decision Making  69-year-old male presents for obvious shoulder dislocation.  X-ray had initially been ordered, and this confirmed dislocation.  Risks benefits and alternatives to sedation and reduction discussed at length my traditional manner and verbal consent and written consent was obtained to to proceed and it was performed.  X-ray confirmed post reduction.  Fractures were considered and none were found.  Limb is  neurovascularly intact after procedure.  Patient having ongoing pain which was treated with multimodal analgesics.  He has excellent follow up with the orthopedist in that his surgery for definitive repair of this is already set for a week from today.  No other [facility] by [facility].  He was observed for the standard 1-1/2 hours after procedure time and subsequently discharged in stable condition he is not driving home.           ED Course as of 12/12/24 2351   u Dec 12, 2024   2204 Plan for dc after 2310   [DS]      ED Course User Index  [DS] Leighton Miguel MD                           Clinical Impression:  Final diagnoses:  [S43.005A] Dislocation of left shoulder joint, initial encounter (Primary)  [M25.519] Shoulder pain, unspecified chronicity, unspecified laterality          ED Disposition Condition    Discharge Stable          ED Prescriptions       Medication Sig Dispense Start Date End Date Auth. Provider    naproxen (NAPROSYN) 500 MG tablet Take 1 tablet (500 mg total) by mouth 2 (two) times daily. for 5 days 10 tablet 12/12/2024 12/17/2024 Leighton Miguel MD    oxyCODONE (ROXICODONE) 10 mg Tab immediate release tablet Take 1 tablet (10 mg total) by mouth every 8 (eight) hours as needed for Pain. 9 tablet 12/12/2024 12/15/2024 Leighton Miguel MD          Follow-up Information    None          Leighton Miguel MD  12/12/24 [id number]

## 2024-12-13 NOTE — DISCHARGE INSTRUCTIONS
Follow-up with your orthopedist for your scheduled surgery.  Do not take this sling off until your surgery.  Use medications as prescribed.  Do not drive or operate machinery or mix with alcohol when taking oxycodone.

## 2024-12-13 NOTE — ED NOTES
Pt with recent visit to ER for left shoulder dislocation.  Pt states upcoming surgery to repair shoulder.  Pt states took sling off around 1600 this afternoon and began experiencing pain to left shoulder.  Pt states feels dislocated again.  Pt with +PMS to LUE.  Pt states pain to medial shoulder that radiates down arm.  Pt A/O x 3

## 2024-12-16 ENCOUNTER — TELEPHONE (OUTPATIENT)
Dept: ORTHOPEDICS | Facility: CLINIC | Age: 69
End: 2024-12-16
Payer: MEDICARE

## 2024-12-16 NOTE — TELEPHONE ENCOUNTER
----- Message from Azra sent at 12/16/2024  9:06 AM CST -----  Regarding: MRI  Name of Who is Calling:Pt         What is the request in detail: Requesting should he bring disk In for surgery are wait until after   Please advise           Can the clinic reply by [facility]: no         What [facility] Back [facility]:Telephone Information:  Mobile          [phone]

## 2024-12-19 ENCOUNTER — ANESTHESIA EVENT (OUTPATIENT)
Dept: SURGERY | Facility: HOSPITAL | Age: 69
End: 2024-12-19
Payer: MEDICARE

## 2024-12-19 DIAGNOSIS — S43.015A ANTERIOR DISLOCATION OF LEFT SHOULDER, INITIAL ENCOUNTER: Primary | ICD-10-CM

## 2024-12-19 RX ORDER — IBUPROFEN 600 MG/1
600 TABLET ORAL 3 TIMES DAILY
Qty: 25 TABLET | Refills: 1 | Status: SHIPPED | OUTPATIENT
Start: 2024-12-19

## 2024-12-19 RX ORDER — ASPIRIN 81 MG/1
81 TABLET ORAL DAILY
Qty: 25 TABLET | Refills: 0 | Status: SHIPPED | OUTPATIENT
Start: 2024-12-19 | End: 2025-12-19

## 2024-12-19 RX ORDER — ACETAMINOPHEN 500 MG
500 TABLET ORAL EVERY 6 HOURS PRN
Qty: 25 TABLET | Refills: 0 | Status: SHIPPED | OUTPATIENT
Start: 2024-12-19

## 2024-12-19 RX ORDER — OXYCODONE HYDROCHLORIDE 5 MG/1
5 TABLET ORAL EVERY 4 HOURS PRN
Qty: 25 TABLET | Refills: 0 | Status: SHIPPED | OUTPATIENT
Start: 2024-12-19

## 2024-12-20 ENCOUNTER — ANESTHESIA (OUTPATIENT)
Dept: SURGERY | Facility: HOSPITAL | Age: 69
End: 2024-12-20
Payer: MEDICARE

## 2025-01-02 ENCOUNTER — PATIENT MESSAGE (OUTPATIENT)
Dept: ORTHOPEDICS | Facility: CLINIC | Age: 70
End: 2025-01-02
Payer: MEDICARE

## 2025-01-02 ENCOUNTER — HOSPITAL ENCOUNTER (EMERGENCY)
Facility: HOSPITAL | Age: 70
Discharge: HOME | End: 2025-01-03
Attending: STUDENT IN AN ORGANIZED HEALTH CARE EDUCATION/TRAINING PROGRAM
Payer: MEDICARE

## 2025-01-02 DIAGNOSIS — S43.015A ANTERIOR DISLOCATION OF LEFT SHOULDER, INITIAL ENCOUNTER: Primary | ICD-10-CM

## 2025-01-02 DIAGNOSIS — S43.005A DISLOCATION OF LEFT SHOULDER JOINT, INITIAL ENCOUNTER: Primary | ICD-10-CM

## 2025-01-02 PROCEDURE — 25000003 PHARM REV CODE 250: Performed by: STUDENT IN AN ORGANIZED HEALTH CARE EDUCATION/TRAINING PROGRAM

## 2025-01-02 PROCEDURE — 96375 TX/PRO/DX INJ NEW DRUG ADDON: CPT

## 2025-01-02 PROCEDURE — 99285 EMERGENCY DEPT VISIT HI MDM: CPT | Mod: 25

## 2025-01-02 PROCEDURE — 63600175 PHARM REV CODE 636 W HCPCS: Performed by: STUDENT IN AN ORGANIZED HEALTH CARE EDUCATION/TRAINING PROGRAM

## 2025-01-02 PROCEDURE — 96376 TX/PRO/DX INJ SAME DRUG ADON: CPT

## 2025-01-02 PROCEDURE — 99900035 HC TECH TIME PER 15 MIN (STAT)

## 2025-01-02 PROCEDURE — 96374 THER/PROPH/DIAG INJ IV PUSH: CPT

## 2025-01-02 RX ORDER — ACETAMINOPHEN 500 MG
500 TABLET ORAL EVERY 6 HOURS PRN
Qty: 25 TABLET | Refills: 0 | Status: SHIPPED | OUTPATIENT
Start: 2025-01-02

## 2025-01-02 RX ORDER — ONDANSETRON HYDROCHLORIDE 2 MG/ML
8 INJECTION, SOLUTION INTRAVENOUS ONCE
Status: COMPLETED | OUTPATIENT
Start: 2025-01-02 | End: 2025-01-02

## 2025-01-02 RX ORDER — HYDROMORPHONE HYDROCHLORIDE 1 MG/ML
1 INJECTION, SOLUTION INTRAMUSCULAR; INTRAVENOUS; SUBCUTANEOUS
Status: COMPLETED | OUTPATIENT
Start: 2025-01-02 | End: 2025-01-02

## 2025-01-02 RX ORDER — IBUPROFEN 600 MG/1
600 TABLET ORAL 3 TIMES DAILY
Qty: 25 TABLET | Refills: 1 | Status: ON HOLD | OUTPATIENT
Start: 2025-01-02 | End: 2025-01-03 | Stop reason: HOSPADM

## 2025-01-02 RX ORDER — ASPIRIN 81 MG/1
81 TABLET ORAL DAILY
Qty: 25 TABLET | Refills: 0 | Status: ON HOLD | OUTPATIENT
Start: 2025-01-02 | End: 2025-01-03 | Stop reason: HOSPADM

## 2025-01-02 RX ORDER — OXYCODONE HYDROCHLORIDE 5 MG/1
5 TABLET ORAL EVERY 4 HOURS PRN
Qty: 25 TABLET | Refills: 0 | Status: ON HOLD | OUTPATIENT
Start: 2025-01-02 | End: 2025-01-03 | Stop reason: HOSPADM

## 2025-01-02 RX ORDER — ETOMIDATE 2 MG/ML
10 INJECTION INTRAVENOUS
Status: COMPLETED | OUTPATIENT
Start: 2025-01-02 | End: 2025-01-02

## 2025-01-02 RX ADMIN — ETOMIDATE 10 MG: 2 INJECTION, SOLUTION INTRAVENOUS at 10:01

## 2025-01-02 RX ADMIN — HYDROMORPHONE HYDROCHLORIDE 1 MG: 1 INJECTION, SOLUTION INTRAMUSCULAR; INTRAVENOUS; SUBCUTANEOUS at 11:01

## 2025-01-02 RX ADMIN — ONDANSETRON 8 MG: 2 INJECTION INTRAMUSCULAR; INTRAVENOUS at 10:01

## 2025-01-03 ENCOUNTER — HOSPITAL ENCOUNTER (OUTPATIENT)
Facility: HOSPITAL | Age: 70
Discharge: HOME | End: 2025-01-03
Attending: SURGERY | Admitting: SURGERY
Payer: MEDICARE

## 2025-01-03 VITALS
TEMPERATURE: 99 F | BODY MASS INDEX: 46.3 KG/M2 | HEART RATE: 58 BPM | DIASTOLIC BLOOD PRESSURE: 93 MMHG | SYSTOLIC BLOOD PRESSURE: 203 MMHG | WEIGHT: 295 LBS | OXYGEN SATURATION: 96 % | HEIGHT: 67 IN | RESPIRATION RATE: 30 BRPM

## 2025-01-03 VITALS
OXYGEN SATURATION: 93 % | RESPIRATION RATE: 20 BRPM | WEIGHT: 295 LBS | SYSTOLIC BLOOD PRESSURE: 180 MMHG | TEMPERATURE: 98 F | BODY MASS INDEX: 46.2 KG/M2 | HEART RATE: 75 BPM | DIASTOLIC BLOOD PRESSURE: 81 MMHG

## 2025-01-03 DIAGNOSIS — M25.312 SHOULDER INSTABILITY, LEFT: ICD-10-CM

## 2025-01-03 DIAGNOSIS — M25.311 SHOULDER INSTABILITY, RIGHT: ICD-10-CM

## 2025-01-03 DIAGNOSIS — S43.014S ANTERIOR SHOULDER DISLOCATION, RIGHT, SEQUELA: Primary | ICD-10-CM

## 2025-01-03 PROBLEM — S43.015A ANTERIOR DISLOCATION OF LEFT SHOULDER: Status: ACTIVE | Noted: 2025-01-03

## 2025-01-03 PROCEDURE — 25000003 PHARM REV CODE 250: Performed by: SURGERY

## 2025-01-03 PROCEDURE — 63600175 PHARM REV CODE 636 W HCPCS: Performed by: STUDENT IN AN ORGANIZED HEALTH CARE EDUCATION/TRAINING PROGRAM

## 2025-01-03 PROCEDURE — 37000008 HC ANESTHESIA 1ST 15 MINUTES: Performed by: SURGERY

## 2025-01-03 PROCEDURE — 94761 N-INVAS EAR/PLS OXIMETRY MLT: CPT

## 2025-01-03 PROCEDURE — 25000003 PHARM REV CODE 250: Performed by: NURSE ANESTHETIST, CERTIFIED REGISTERED

## 2025-01-03 PROCEDURE — 29827 SHO ARTHRS SRG RT8TR CUF RPR: CPT | Mod: LT,,, | Performed by: SURGERY

## 2025-01-03 PROCEDURE — 25000003 PHARM REV CODE 250

## 2025-01-03 PROCEDURE — C1713 ANCHOR/SCREW BN/BN,TIS/BN: HCPCS | Performed by: SURGERY

## 2025-01-03 PROCEDURE — 71000015 HC POSTOP RECOV 1ST HR: Performed by: SURGERY

## 2025-01-03 PROCEDURE — 63600175 PHARM REV CODE 636 W HCPCS: Performed by: SURGERY

## 2025-01-03 PROCEDURE — 36000710: Performed by: SURGERY

## 2025-01-03 PROCEDURE — 99900035 HC TECH TIME PER 15 MIN (STAT)

## 2025-01-03 PROCEDURE — 64416 NJX AA&/STRD BRCH PL NFS IMG: CPT | Performed by: STUDENT IN AN ORGANIZED HEALTH CARE EDUCATION/TRAINING PROGRAM

## 2025-01-03 PROCEDURE — 63600175 PHARM REV CODE 636 W HCPCS: Performed by: NURSE ANESTHETIST, CERTIFIED REGISTERED

## 2025-01-03 PROCEDURE — 36000711: Performed by: SURGERY

## 2025-01-03 PROCEDURE — 27100025 HC TUBING, SET FLUID WARMER: Performed by: SURGERY

## 2025-01-03 PROCEDURE — 25000003 PHARM REV CODE 250: Performed by: PHYSICIAN ASSISTANT

## 2025-01-03 PROCEDURE — 63600175 PHARM REV CODE 636 W HCPCS: Performed by: PHYSICIAN ASSISTANT

## 2025-01-03 PROCEDURE — 27201423 OPTIME MED/SURG SUP & DEVICES STERILE SUPPLY: Performed by: SURGERY

## 2025-01-03 PROCEDURE — 29828 SHO ARTHRS SRG BICP TENODSIS: CPT | Mod: 51,LT,, | Performed by: SURGERY

## 2025-01-03 PROCEDURE — 71000033 HC RECOVERY, INTIAL HOUR: Performed by: SURGERY

## 2025-01-03 PROCEDURE — 37000009 HC ANESTHESIA EA ADD 15 MINS: Performed by: SURGERY

## 2025-01-03 PROCEDURE — 23650 CLTX SHO DSLC W/MNPJ WO ANES: CPT | Mod: LT

## 2025-01-03 DEVICE — SWIVELOCK, SP BC KL 5.5MM
Type: IMPLANTABLE DEVICE | Site: SHOULDER | Status: FUNCTIONAL
Brand: ARTHREX®

## 2025-01-03 DEVICE — 5.5MM BC CORKSCREW FT W/ SUTURETAPE
Type: IMPLANTABLE DEVICE | Site: SHOULDER | Status: FUNCTIONAL
Brand: ARTHREX®

## 2025-01-03 DEVICE — KL 1.8 FIBERTAK, SHOULDER
Type: IMPLANTABLE DEVICE | Site: SHOULDER | Status: FUNCTIONAL
Brand: ARTHREX®

## 2025-01-03 RX ORDER — FENTANYL CITRATE 50 UG/ML
INJECTION, SOLUTION INTRAMUSCULAR; INTRAVENOUS
Status: DISCONTINUED | OUTPATIENT
Start: 2025-01-03 | End: 2025-01-03

## 2025-01-03 RX ORDER — PROPOFOL 10 MG/ML
VIAL (ML) INTRAVENOUS
Status: DISCONTINUED | OUTPATIENT
Start: 2025-01-03 | End: 2025-01-03

## 2025-01-03 RX ORDER — ACETAMINOPHEN 500 MG
1000 TABLET ORAL
Status: DISCONTINUED | OUTPATIENT
Start: 2025-01-03 | End: 2025-01-03

## 2025-01-03 RX ORDER — KETAMINE HYDROCHLORIDE 10 MG/ML
INJECTION, SOLUTION INTRAMUSCULAR; INTRAVENOUS
Status: DISCONTINUED | OUTPATIENT
Start: 2025-01-03 | End: 2025-01-03

## 2025-01-03 RX ORDER — HYDROCODONE BITARTRATE AND ACETAMINOPHEN 5; 325 MG/1; MG/1
1 TABLET ORAL EVERY 4 HOURS PRN
Status: DISCONTINUED | OUTPATIENT
Start: 2025-01-03 | End: 2025-01-03 | Stop reason: HOSPADM

## 2025-01-03 RX ORDER — CELECOXIB 200 MG/1
CAPSULE ORAL
Status: COMPLETED
Start: 2025-01-03 | End: 2025-01-03

## 2025-01-03 RX ORDER — PHENYLEPHRINE HYDROCHLORIDE 10 MG/ML
INJECTION INTRAVENOUS
Status: DISCONTINUED | OUTPATIENT
Start: 2025-01-03 | End: 2025-01-03

## 2025-01-03 RX ORDER — MORPHINE SULFATE 2 MG/ML
2 INJECTION, SOLUTION INTRAMUSCULAR; INTRAVENOUS EVERY 10 MIN PRN
Status: DISCONTINUED | OUTPATIENT
Start: 2025-01-03 | End: 2025-01-03 | Stop reason: HOSPADM

## 2025-01-03 RX ORDER — MUPIROCIN 20 MG/G
OINTMENT TOPICAL
Status: DISCONTINUED | OUTPATIENT
Start: 2025-01-03 | End: 2025-01-03 | Stop reason: HOSPADM

## 2025-01-03 RX ORDER — ONDANSETRON HYDROCHLORIDE 2 MG/ML
4 INJECTION, SOLUTION INTRAVENOUS EVERY 12 HOURS PRN
Status: DISCONTINUED | OUTPATIENT
Start: 2025-01-03 | End: 2025-01-03 | Stop reason: HOSPADM

## 2025-01-03 RX ORDER — NEOSTIGMINE METHYLSULFATE 0.5 MG/ML
INJECTION INTRAVENOUS
Status: DISCONTINUED | OUTPATIENT
Start: 2025-01-03 | End: 2025-01-03

## 2025-01-03 RX ORDER — SODIUM CHLORIDE 9 MG/ML
INJECTION, SOLUTION INTRAVENOUS CONTINUOUS
Status: DISCONTINUED | OUTPATIENT
Start: 2025-01-03 | End: 2025-01-03 | Stop reason: HOSPADM

## 2025-01-03 RX ORDER — ACETAMINOPHEN 500 MG
1000 TABLET ORAL
Status: COMPLETED | OUTPATIENT
Start: 2025-01-03 | End: 2025-01-03

## 2025-01-03 RX ORDER — FENTANYL CITRATE 50 UG/ML
100 INJECTION, SOLUTION INTRAMUSCULAR; INTRAVENOUS
Status: DISCONTINUED | OUTPATIENT
Start: 2025-01-03 | End: 2025-01-03 | Stop reason: HOSPADM

## 2025-01-03 RX ORDER — ACETAMINOPHEN 325 MG/1
650 TABLET ORAL EVERY 4 HOURS PRN
Status: DISCONTINUED | OUTPATIENT
Start: 2025-01-03 | End: 2025-01-03 | Stop reason: HOSPADM

## 2025-01-03 RX ORDER — ROCURONIUM BROMIDE 10 MG/ML
INJECTION, SOLUTION INTRAVENOUS
Status: DISCONTINUED | OUTPATIENT
Start: 2025-01-03 | End: 2025-01-03

## 2025-01-03 RX ORDER — OXYCODONE HYDROCHLORIDE 5 MG/1
5 TABLET ORAL
Status: DISCONTINUED | OUTPATIENT
Start: 2025-01-03 | End: 2025-01-03 | Stop reason: HOSPADM

## 2025-01-03 RX ORDER — ROPIVACAINE HYDROCHLORIDE 5 MG/ML
INJECTION, SOLUTION EPIDURAL; INFILTRATION; PERINEURAL
Status: COMPLETED | OUTPATIENT
Start: 2025-01-03 | End: 2025-01-03

## 2025-01-03 RX ORDER — GLUCAGON 1 MG
1 KIT INJECTION
Status: DISCONTINUED | OUTPATIENT
Start: 2025-01-03 | End: 2025-01-03 | Stop reason: HOSPADM

## 2025-01-03 RX ORDER — OXYCODONE HYDROCHLORIDE 5 MG/1
5 TABLET ORAL
Status: DISCONTINUED | OUTPATIENT
Start: 2025-01-03 | End: 2025-01-03

## 2025-01-03 RX ORDER — DEXAMETHASONE SODIUM PHOSPHATE 4 MG/ML
INJECTION, SOLUTION INTRA-ARTICULAR; INTRALESIONAL; INTRAMUSCULAR; INTRAVENOUS; SOFT TISSUE
Status: DISCONTINUED | OUTPATIENT
Start: 2025-01-03 | End: 2025-01-03

## 2025-01-03 RX ORDER — FENTANYL CITRATE 50 UG/ML
25 INJECTION, SOLUTION INTRAMUSCULAR; INTRAVENOUS EVERY 5 MIN PRN
Status: DISCONTINUED | OUTPATIENT
Start: 2025-01-03 | End: 2025-01-03 | Stop reason: HOSPADM

## 2025-01-03 RX ORDER — HALOPERIDOL 5 MG/ML
0.5 INJECTION INTRAMUSCULAR EVERY 10 MIN PRN
Status: DISCONTINUED | OUTPATIENT
Start: 2025-01-03 | End: 2025-01-03 | Stop reason: HOSPADM

## 2025-01-03 RX ORDER — ROPIVACAINE HYDROCHLORIDE 2 MG/ML
INJECTION, SOLUTION EPIDURAL; INFILTRATION; PERINEURAL CONTINUOUS
Status: DISCONTINUED | OUTPATIENT
Start: 2025-01-03 | End: 2025-01-03 | Stop reason: HOSPADM

## 2025-01-03 RX ORDER — LIDOCAINE HYDROCHLORIDE 20 MG/ML
INJECTION INTRAVENOUS
Status: DISCONTINUED | OUTPATIENT
Start: 2025-01-03 | End: 2025-01-03

## 2025-01-03 RX ORDER — TRANEXAMIC ACID 100 MG/ML
INJECTION, SOLUTION INTRAVENOUS
Status: DISCONTINUED | OUTPATIENT
Start: 2025-01-03 | End: 2025-01-03

## 2025-01-03 RX ORDER — SODIUM CHLORIDE 0.9 % (FLUSH) 0.9 %
10 SYRINGE (ML) INJECTION
Status: DISCONTINUED | OUTPATIENT
Start: 2025-01-03 | End: 2025-01-03 | Stop reason: HOSPADM

## 2025-01-03 RX ORDER — DEXMEDETOMIDINE HYDROCHLORIDE 100 UG/ML
INJECTION, SOLUTION INTRAVENOUS
Status: DISCONTINUED | OUTPATIENT
Start: 2025-01-03 | End: 2025-01-03

## 2025-01-03 RX ORDER — NICARDIPINE HYDROCHLORIDE 2.5 MG/ML
INJECTION INTRAVENOUS
Status: DISCONTINUED | OUTPATIENT
Start: 2025-01-03 | End: 2025-01-03

## 2025-01-03 RX ORDER — CELECOXIB 200 MG/1
400 CAPSULE ORAL
Status: COMPLETED | OUTPATIENT
Start: 2025-01-03 | End: 2025-01-03

## 2025-01-03 RX ORDER — ONDANSETRON HYDROCHLORIDE 2 MG/ML
INJECTION, SOLUTION INTRAVENOUS
Status: DISCONTINUED | OUTPATIENT
Start: 2025-01-03 | End: 2025-01-03

## 2025-01-03 RX ORDER — EPINEPHRINE 1 MG/ML
INJECTION, SOLUTION, CONCENTRATE INTRAVENOUS
Status: DISCONTINUED | OUTPATIENT
Start: 2025-01-03 | End: 2025-01-03 | Stop reason: HOSPADM

## 2025-01-03 RX ORDER — MIDAZOLAM HYDROCHLORIDE 1 MG/ML
1 INJECTION, SOLUTION INTRAMUSCULAR; INTRAVENOUS
Status: DISCONTINUED | OUTPATIENT
Start: 2025-01-03 | End: 2025-01-03 | Stop reason: HOSPADM

## 2025-01-03 RX ORDER — FAMOTIDINE 10 MG/ML
INJECTION INTRAVENOUS
Status: DISCONTINUED | OUTPATIENT
Start: 2025-01-03 | End: 2025-01-03

## 2025-01-03 RX ADMIN — Medication: at 04:01

## 2025-01-03 RX ADMIN — LIDOCAINE HYDROCHLORIDE 100 MG: 20 INJECTION INTRAVENOUS at 12:01

## 2025-01-03 RX ADMIN — GLYCOPYRROLATE 0.4 MG: 0.2 INJECTION, SOLUTION INTRAMUSCULAR; INTRAVENOUS at 03:01

## 2025-01-03 RX ADMIN — MUPIROCIN: 20 OINTMENT TOPICAL at 08:01

## 2025-01-03 RX ADMIN — DEXAMETHASONE SODIUM PHOSPHATE 8 MG: 4 INJECTION, SOLUTION INTRAMUSCULAR; INTRAVENOUS at 12:01

## 2025-01-03 RX ADMIN — FENTANYL CITRATE 50 MCG: 50 INJECTION INTRAMUSCULAR; INTRAVENOUS at 10:01

## 2025-01-03 RX ADMIN — CELECOXIB 400 MG: 200 CAPSULE ORAL at 08:01

## 2025-01-03 RX ADMIN — PROPOFOL 200 MG: 10 INJECTION, EMULSION INTRAVENOUS at 12:01

## 2025-01-03 RX ADMIN — KETAMINE HYDROCHLORIDE 20 MG: 10 INJECTION INTRAMUSCULAR; INTRAVENOUS at 12:01

## 2025-01-03 RX ADMIN — ONDANSETRON 4 MG: 2 INJECTION INTRAMUSCULAR; INTRAVENOUS at 03:01

## 2025-01-03 RX ADMIN — ROCURONIUM BROMIDE 100 MG: 10 INJECTION, SOLUTION INTRAVENOUS at 12:01

## 2025-01-03 RX ADMIN — PHENYLEPHRINE HYDROCHLORIDE 50 MCG: 10 INJECTION INTRAVENOUS at 01:01

## 2025-01-03 RX ADMIN — KETAMINE HYDROCHLORIDE 30 MG: 10 INJECTION INTRAMUSCULAR; INTRAVENOUS at 12:01

## 2025-01-03 RX ADMIN — NICARDIPINE HYDROCHLORIDE 200 MCG: 25 INJECTION INTRAVENOUS at 02:01

## 2025-01-03 RX ADMIN — TRANEXAMIC ACID 1000 MG: 100 INJECTION INTRAVENOUS at 01:01

## 2025-01-03 RX ADMIN — GLYCOPYRROLATE 0.2 MG: 0.2 INJECTION, SOLUTION INTRAMUSCULAR; INTRAVENOUS at 12:01

## 2025-01-03 RX ADMIN — NEOSTIGMINE METHYLSULFATE 4 MG: 0.5 INJECTION INTRAVENOUS at 03:01

## 2025-01-03 RX ADMIN — ROPIVACAINE HYDROCHLORIDE 10 ML: 5 INJECTION EPIDURAL; INFILTRATION; PERINEURAL at 11:01

## 2025-01-03 RX ADMIN — FENTANYL CITRATE 50 MCG: 50 INJECTION, SOLUTION INTRAMUSCULAR; INTRAVENOUS at 12:01

## 2025-01-03 RX ADMIN — TRANEXAMIC ACID 1000 MG: 100 INJECTION INTRAVENOUS at 12:01

## 2025-01-03 RX ADMIN — MIDAZOLAM 2 MG: 1 INJECTION INTRAMUSCULAR; INTRAVENOUS at 10:01

## 2025-01-03 RX ADMIN — SODIUM CHLORIDE, SODIUM GLUCONATE, SODIUM ACETATE, POTASSIUM CHLORIDE, MAGNESIUM CHLORIDE, SODIUM PHOSPHATE, DIBASIC, AND POTASSIUM PHOSPHATE: .53; .5; .37; .037; .03; .012; .00082 INJECTION, SOLUTION INTRAVENOUS at 12:01

## 2025-01-03 RX ADMIN — SODIUM CHLORIDE 3 G: 9 INJECTION, SOLUTION INTRAVENOUS at 12:01

## 2025-01-03 RX ADMIN — SODIUM CHLORIDE: 0.9 INJECTION, SOLUTION INTRAVENOUS at 10:01

## 2025-01-03 RX ADMIN — ACETAMINOPHEN 1000 MG: 500 TABLET ORAL at 08:01

## 2025-01-03 RX ADMIN — FAMOTIDINE 20 MG: 10 INJECTION, SOLUTION INTRAVENOUS at 12:01

## 2025-01-03 RX ADMIN — DEXMEDETOMIDINE 20 MCG: 100 INJECTION, SOLUTION, CONCENTRATE INTRAVENOUS at 12:01

## 2025-01-03 RX ADMIN — PROPOFOL 50 MG: 10 INJECTION, EMULSION INTRAVENOUS at 12:01

## 2025-01-03 NOTE — ED PROVIDER NOTES
Encounter Date: 1/2/2025       History     Chief Complaint   Patient presents with    Shoulder Pain     L shoulder dislocated. Obviously deformed. Was getting out of shower, stuck arm out to get out and popped shoulder out of place. 4th time this year. Ortho shoulder surgery scheduled for 1000 tomorrow. + L radial pulses. Cap refill < 3 seconds.      HPI  69-year-old male presents brought in by self through triage for left shoulder pain.  He has a history of recurrent shoulder dislocation is supposed to have surgery to fix it tomorrow morning.  He moved his arm the exact same way I did last time that led to previous dislocation, and reports it dislocated again, complains of severe pain there.  Denies any other systemic or infectious symptoms or other acute complaints.  Review of patient's allergies indicates:   Allergen Reactions    No known drug allergies      Past Medical History:   Diagnosis Date    Anxiety     Arthritis     Depression     when turned 49-lost job and mother unwell at that time    Ex-cigar smoker 05/29/2018    Cigar twice a week      History of heavy alcohol consumption 11/08/2020    Hyperlipidemia     Lumbar radiculopathy     BRETT- 2011    Osteoarthritis     Sleep apnea     Urinary tract infection      Past Surgical History:   Procedure Laterality Date    IRRIGATION AND DEBRIDEMENT OF LOWER EXTREMITY Left 7/3/2018    Procedure: IRRIGATION AND DEBRIDEMENT, LOWER EXTREMITY, poly exchange left uni knee replacement. Stryler. 9L NS;  Surgeon: Ruperto Olivarez MD;  Location: [facility] OR [address] [place] [address];  Service: Orthopedics;  Laterality: Left;    IRRIGATION AND DEBRIDEMENT OF LOWER EXTREMITY Left 7/7/2018    Procedure: IRRIGATION AND DEBRIDEMENT, LOWER EXTREMITY;  Surgeon: Orlin Jordan MD;  Location: [facility] OR [address] [place] [address];  Service: Orthopedics;  Laterality: Left;    JOINT REPLACEMENT      knee arthroscopicc surgeries      Right knee- 2007  and Left knee- 2008    KNEE SURGERY      Left wrist Surgery      2000 for a  torn ligament from Golf    TOTAL KNEE REPLACEMENT USING COMPUTER NAVIGATION Left 6/22/2018    Procedure: REPLACEMENT-KNEE WITH NAVIGATION-- unicondylar-- medial;  Surgeon: Orlin Jordan MD;  Location: [facility] OR [address] [place] [address];  Service: Orthopedics;  Laterality: Left;     Family History   Problem Relation Name Age of Onset    Parkinsonism Mother      Heart attack Neg Hx      Heart disease Neg Hx      Heart failure Neg Hx      Hyperlipidemia Neg Hx      Hypertension Neg Hx      Stroke Neg Hx       Social History     Tobacco Use    Smoking status: Former     Types: Cigars     Passive exposure: Past    Smokeless tobacco: Never    Tobacco comments:     maybe one a month   Substance Use Topics    Alcohol use: Yes     Alcohol/week: 5.0 standard drinks of alcohol     Types: 5 Standard drinks or equivalent per week     Comment: Daily    Drug use: No   Medical surgical family and social history reviewed   Review of Systems  Complete review of systems was conducted and was negative except as per HPI and as marked  Physical Exam     Initial Vitals [01/02/25 2102]   BP Pulse Resp Temp SpO2   (!) 227/103 82 18 98.1 °F (36.7 °C) 95 %      MAP       --         Physical Exam  Physical  General: Awake, alert, no acute distress but in obvious discomfort  Head: Normocephalic, atraumatic  Neck: Trachea midline, full range of motion, no meningismus  ENT: Atraumatic, Airway Patent  Cardio: Regular Rate, Regular Rhythm, Heart Sounds Normal, Capillary refill normal  Chest: Atraumatic, No respiratory distress no use of accessory muscles to breath, normal rate, sounds even and clear to auscultation  Abdomen: Soft, Nontender, no involuntary guarding, rigidity, or rebound.  Small reducible umbilical hernia.  Upper Ext:  Obvious deformity with a characteristic step-off at the left shoulder, limb is otherwise warm well perfused and neurovascularly intact.    Lower Ext: Atraumatic, Inspection normal, no swelling  Neuro: No gross cranial nerve  abnormality, no lateralization, no gross sensory or motor deficits  ED Course   Procedural Sedation        Date/Time: 1/3/2025 4:02 AM    Performed by: Leighton Miguel MD  Authorized by: Leighton Miguel MD  ASA Class: Class 3 - Systemic Illness with functional impairment.  Mallampati Score: Class 2 - Visualization of the soft palate, fauces, and uvula.   NPO STATUS:  Date/Time of last solid: 1/2/2025 6:00 PM  Date/Time of last fluid: 1/2/2025 6:00 PM    Equipment: on cardiac monitor., on supplemental oxygen., suction available. and on BP monitor.     Sedation type: moderate (conscious) sedation    Sedatives: etomidate  Vitals: Vital signs were monitored during sedation.  Complications: bag-mask-valve used to ventilate patient   Comments: Transient episode apnea in the setting of known IVORY, Ambu bag use briefly.  Some clonus after etomidate  Patient/Family history of anesthesia or sedation complications: Yes      Procedure, shoulder reduction, location is left, done under procedural sedation, see above.  Verbal and signed consent obtained.  Reduced with manual traction counter traction, return to sling after procedure.  Reduction current confirmed on radiography.  Patient tolerated procedure without complication was neurovascularly intact after procedure.  Labs Reviewed - No data to display       Imaging Results              X-Ray Shoulder 1 View Left (Final result)  Result time 01/03/25 00:38:51   Procedure changed from X-Ray Shoulder 2 or More Views Left     Final result by Rudi Sandoval MD (01/03/25 00:38:51)                   Impression:      As above.      Electronically signed by: Rudi Sandoval MD  Date:    01/03/2025  Time:    00:38               Narrative:    EXAMINATION:  XR SHOULDER 1 VIEW LEFT    CLINICAL HISTORY:  shoudler dislocation;    TECHNIQUE:  Single-view left shoulder.    COMPARISON:  12/12/2024.    FINDINGS:  Glenohumeral alignment appears normal on limited single view examination.   Remainder the study appears similar to prior.                                       Medications   etomidate injection 10 mg (has no administration in time range)   ondansetron injection 8 mg (8 mg Intravenous Given 1/2/25 6616)   HYDROmorphone injection 1 mg (1 mg Intravenous Given 1/2/25 1596)     Medical Decision Making  Amount and/or Complexity of Data Reviewed  Radiology: ordered.    Risk  Prescription drug management.                     69-year-old male presented for shoulder dislocation, clinically obvious by exam in the history of extensive recurrent episodes of it.  Risks benefits and alternatives were all discussed in my traditional manner, they are known to the patient from the last time that we did this as well, verbalized understanding all questions were answered informed consent was obtained to proceed.  Procedural sedation was performed without complication in the shoulder was easily reduced, and confirmed reduction on x-ray, and returned to its sling.  He is supposed to get it surgically repaired tomorrow morning, (this morning at this time), and we will be following up for this.  No other EMC by [facility].  Limb is warm well perfused and neurovascularly intact at time of discharge.                 Clinical Impression:  Final diagnoses:  [S43.005A] Dislocation of left shoulder joint, initial encounter (Primary)          ED Disposition Condition    Discharge Stable          ED Prescriptions    None       Follow-up Information    None          Leighton Miguel MD  01/04/25 0883

## 2025-01-03 NOTE — H&P
HPI update:  No changes to the H and P except for the following:  Patient has sustained additional dislocations since previous visit.  MRI reveals Bankart lesion as well as rotator cuff tear.  Plan for procedure as scheduled.  Discussed risks and benefits of surgery and that should patient fail to maintain strict protocols may re dislocate and necessitate further procedures.  Patient expressed understanding.  He says his sling was off at the time of his last dose location and he was reaching up in the shower.  Discussed proper showering protocol including leaving arm at the side.    Risks, benefits, and alternatives to surgery reviewed with the patient.  Plan for procedure as scheduled and as per above.         Expand All Collapse All    Subjective:      Yosef Rowe           Chief Complaint   Patient presents with    Consult       Left shoulder dislocation         HPI     Yosef is a 69 y.o. male coming in today for left shoulder pain. Has sustained 2 dislocations in the last week. Both closed reduced in ER.  Presents for further management.  Unsure how 2nd dislocation occurred.  First dislocation was reaching back.  Both closed reduced in the emergency room under conscious sedation.     Joint instability? yes  Mechanical locking/clicking? no  Affecting ADL's? yes  Affecting sleep? yes           ROS     PAST MEDICAL HISTORY:        Past Medical History:   Diagnosis Date    Anxiety      Arthritis      Depression       when turned 49-lost job and mother unwell at that time    Ex-cigar smoker 05/29/2018     Cigar twice a week      History of heavy alcohol consumption 11/08/2020    Hyperlipidemia      Lumbar radiculopathy       BRETT- 2011    Osteoarthritis      Sleep apnea      Urinary tract infection        PAST SURGICAL HISTORY:         Past Surgical History:   Procedure Laterality Date    IRRIGATION AND DEBRIDEMENT OF LOWER EXTREMITY Left 7/3/2018     Procedure: IRRIGATION AND DEBRIDEMENT, LOWER EXTREMITY, poly  exchange left uni knee replacement. Stryler. 9L NS;  Surgeon: Ruperto Olivarez MD;  Location: [facility] OR [facility];  Service: Orthopedics;  Laterality: Left;    IRRIGATION AND DEBRIDEMENT OF LOWER EXTREMITY Left 7/7/2018     Procedure: IRRIGATION AND DEBRIDEMENT, LOWER EXTREMITY;  Surgeon: Orlin Jordan MD;  Location: [facility] OR 2ND FLR;  Service: Orthopedics;  Laterality: Left;    JOINT REPLACEMENT        knee arthroscopicc surgeries         Right knee- 2007  and Left knee- 2008    KNEE SURGERY        Left wrist Surgery         2000 for a torn ligament from Golf    TOTAL KNEE REPLACEMENT USING COMPUTER NAVIGATION Left 6/22/2018     Procedure: REPLACEMENT-KNEE WITH NAVIGATION-- unicondylar-- medial;  Surgeon: Orlin Jordan MD;  [facility];  Service: Orthopedics;  Laterality: Left;      FAMILY HISTORY:          Family History   Problem Relation Name Age of Onset    Parkinsonism Mother        Heart attack Neg Hx        Heart disease Neg Hx        Heart failure Neg Hx        Hyperlipidemia Neg Hx        Hypertension Neg Hx        Stroke Neg Hx          SOCIAL HISTORY:   Social History            Socioeconomic History    Marital status:    Tobacco Use    Smoking status: Former       Types: Cigars       Passive exposure: Past    Smokeless tobacco: Never    Tobacco comments:       maybe one a month   Substance and Sexual Activity    Alcohol use: Yes       Alcohol/week: 5.0 standard drinks of alcohol       Types: 5 Standard drinks or equivalent per week       Comment: Daily    Drug use: No    Sexual activity: Yes       Partners: Female      Social Drivers of Health           Financial Resource Strain: High Risk (8/19/2024)     Overall Financial Resource Strain (CARDIA)      Difficulty of Paying Living Expenses: Hard   Food Insecurity: Food Insecurity Present (8/19/2024)     Hunger Vital Sign      Worried About Running Out of Food in the Last Year: Sometimes true      Ran Out of Food in the Last Year: Often  true   Transportation Needs: No Transportation Needs (9/28/2023)     PRAPARE - Transportation      Lack of Transportation (Medical): No      Lack of Transportation (Non-Medical): No   Physical Activity: Unknown (8/19/2024)     Exercise Vital Sign      Days of Exercise per Week: 1 day   Stress: Stress Concern Present (8/19/2024)     [employer] - Occupational Stress Questionnaire      Feeling of Stress : Very much   Housing Stability: Unknown (9/28/2023)     Housing Stability Vital Sign      Unable to Pay for Housing in the Last Year: No      Unstable Housing in the Last Year: No         MEDICATIONS:   Current Medications   Current Outpatient Medications:     atorvastatin (LIPITOR) 20 MG tablet, Take 1 tablet (20 mg total) by mouth once daily., Disp: 90 tablet, Rfl: 3    calcium carbonate/vitamin D3 (VITAMIN D-3 ORAL), Take by mouth., Disp: , Rfl:     DULoxetine (CYMBALTA) 60 MG capsule, Take 1 capsule (60 mg total) by mouth once daily., Disp: 30 capsule, Rfl: 11    erythromycin (ROMYCIN) ophthalmic ointment, , Disp: , Rfl:     ibuprofen (ADVIL,MOTRIN) 600 MG tablet, Take 1 tablet (600 mg total) by mouth every 6 (six) hours as needed for Pain., Disp: 20 tablet, Rfl: 0    oxyCODONE-acetaminophen (PERCOCET) 7.5-325 mg per tablet, Take 1 tablet by mouth every 6 (six) hours as needed for Pain., Disp: 12 tablet, Rfl: 0    triamcinolone acetonide 0.1% (KENALOG) 0.1 % cream, Apply topically 2 (two) times daily. Rash, Disp: , Rfl:     zaleplon (SONATA) 10 MG capsule, Take 1 capsule (10 mg total) by mouth every evening., Disp: 30 capsule, Rfl: 0    sildenafiL (VIAGRA) 100 MG tablet, Take 1 tablet (100 mg total) by mouth daily as needed for Erectile Dysfunction. Dispense generic, Disp: 10 tablet, Rfl: 11  No current facility-administered medications for this visit.     ALLERGIES:        Review of patient's allergies indicates:   Allergen Reactions    No known drug allergies           Objective:     "  VITAL SIGNS: Ht 5' 7" (1.702 m)   BMI 46.20 kg/m²    Ortho/SPM Exam     MUSCULOSKELETAL EXAM  Shoulder: left SHOULDER  The affected shoulder is compared to the contralateral shoulder.  Positive findings otherwise noted at the bottom of the exam.     Observation:       SHOULDER  No ecchymosis, edema, or erythema throughout the shoulder girdle.  No sternal, clavicular, or acromial deformities bilaterally.  No atrophy of the pectorals, deltoids, supraspinatus, infraspinatus, or biceps bilaterally.  No asymmetry of shoulders bilaterally.     ROM (* = with pain):  CERVICAL SPINE  Full AROM in flexion, extension, sidebending, and rotation.     SHOULDER  Active flexion to 180° on left and 180° on right.  Active abduction to 180° on left and 180° on right.  Active internal rotation to T7 on left and T7 on right.    Active external rotation to T4 on left and T4 on right.    No scapular dyskinesia or winging.     Tenderness:  No tenderness at the SC or AC joint  No tenderness over the clavicle   No tenderness over biceps tendon or bicipital groove  No tenderness over subacromial space     Strength Testing (* = with pain):  Deltoid - 5/5 on left and 5/5 on right  Biceps - 5/5 on left and 5/5 on right  Triceps - 5/5 on left and 5/5 on right  Wrist extension - 5/5 on left and 5/5 on right  Wrist flexion - 5/5 on left and 5/5 on right   - 5/5 on left and 5/5 on right  Finger extension - 5/5 on left and 5/5 on right  Finger abduction - 5/5 on left and 5/5 on right     Special Tests:  Empty can test - negative  Full can test - negative  Bear hug test - negative  Belly press test - negative  Resisted internal rotation - negative  Resisted external rotation - negative     Neer's test - negative  Hawkin's-Emanuel test - negative  Cross-arm test- negative     OIsaks test - negative  Biceps load 1 test - negative  Biceps load 2 test - negative  [place] sheer test - negative     Sulcus sign - none  AP load and shift laxity - " none  Anterior apprehension test - negative  Posterior apprehension test - negative     Speed's test - negative  Yergason's test - negative     Neurovascular Exam:  Spurlings test - negative bialterally  Lhermittes test - negative  2+ radial pulses bilaterally  Sensation intact to light touch in the distal median, radial, and ulnar nerve distributions bilaterally.  Negative Tinel's at carpal tunnel  Negative Tinel's at cubital tunnel  2+/4 reflexes at triceps, biceps, and brachioradialis  Capillary refill intact <2 seconds in all digits bilaterally     Full range of motion of the cervical/thoracic spine in all planes without pain     Positive findings:  Reduced range of motion, notable swelling, tenderness to palpation.  No pain at rest currently     IMAGING:     X- rays of the shoulder (AP, Scapular Y, [place], and axillary views) taken 12/02/2024.  X-ray images were reviewed personally by me and then directly with patient.  Left shoulder with Bankart lesion status post closed reduction.  CT notable for soft tissue swelling and possible Bankart lesion of the glenoid.     Assessment:      Assessment       Encounter Diagnoses   Name Primary?    Anterior dislocation of left shoulder, initial encounter      Acute pain of left shoulder Yes            Anterior shoulder instability  Glenoid fracture     Plan:   We had a long discussion of the risks and benefits of different operative and non-operative options for rotator cuff pathology.  She is previously tried physical therapy and injections.  I explained the injury using pictures, models, and the patient's x-ray images as well as MRI. I explained the risks of surgery which include but are not limited to continued pain, deformity, bleeding, infection, damage to surrounding structures, re-tear of the rotator cuff, arthritis, and in rare cases loss of limb. I explained the risks of co-morbidities which influences the rate of complications. I explained the risks of  non-operative management, including continued pain, long term immobilization, tear progression, rapid arthritis progression, and difficulty with activities daily living. I discussed all of these risks using non-medical terms and confirmed understanding. The patient elected for left shoulder arthroscopic versus open Bankart repair, rotator cuff repair possible remplissage, possible glenoid fracture stabilization, biceps surgery, subacromial decompression distal clavicle excision and any other indicated procedures including coracoid transfer.  Abduction pillow sling placed today.  Patient agreeable to today's plan and all questions were answered

## 2025-01-03 NOTE — ANESTHESIA PREPROCEDURE EVALUATION
01/03/2025  [facility]  Anesthesia Pre-Operative Evaluation     Patient Name: oYsef Rowe  YOB: 1955  MRN: [id number]  [facility]: [id number]       Admit Date: 1/3/2025   Admit Team: Networked reference to record PCT   Hospital Day: 1  Date of Procedure: 1/3/2025  Anesthesia: General/Regional Procedure: Procedure(s) (LRB):  ARTHROSCOPY, SHOULDER, WITH BANKART REPAIR (Right)  ARTHROSCOPY,SHOULDER,WITH BICEPS TENODESIS (Right)  REPAIR, ROTATOR CUFF, ARTHROSCOPIC (Right)  Pre-Operative Diagnosis: Anterior dislocation of left shoulder, initial encounter [S43.015A]  Proceduralist:Surgeons and Role:     * David Vaughan MD - Primary  Code Status: Full Code   Advanced Directive: <no information>  Isolation Precautions: No active isolations  Capacity: Full capacity     SUBJECTIVE:   Yosef Rowe is a 69 y.o. male who  has a past medical history of Anxiety, Arthritis, Depression, Ex-cigar smoker (05/29/2018), History of heavy alcohol consumption (11/08/2020), Hyperlipidemia, Lumbar radiculopathy, Osteoarthritis, Sleep apnea, and Urinary tract infection.  No notes on file     0.9% NaCl   Intravenous Continuous        ropivacaine 0.2% Perineural Pump infusion 500 ML   Perineural Continuous         Hospital LOS: 0 days  ICU LOS: Patient does not have an ICU stay during this admission.    he has a current medication list which includes the following long-term medication(s): atorvastatin, duloxetine, aspirin, aspirin, and sildenafil.     ALLERGIES:     Review of patient's allergies indicates:   Allergen Reactions    No known drug allergies      LDA:   AIRWAY:         [unfilled]     Lines/Drains/Airways       Drain  Duration                  Closed/Suction Drain 07/07/18 1323 Left;Lateral Knee Accordion 10 Fr. 2371 days                   Anesthesia Evaluation      Airway   Mallampati: II  TM  distance: Normal  Neck ROM: Normal ROM  Dental      Pulmonary    (+) shortness of breath, sleep apnea  Cardiovascular   (+) hypertension, CAD    Neuro/Psych    (+) neuromuscular disease, psychiatric history    GI/Hepatic/Renal      Endo/Other    (+) arthritis  Abdominal                    MEDICATIONS:     Current Outpatient Medications on File Prior to Encounter   Medication Sig Dispense Refill Last Dose/Taking    atorvastatin (LIPITOR) 20 MG tablet Take 1 tablet (20 mg total) by mouth once daily. 90 tablet 3 1/2/2025 Morning    calcium carbonate/vitamin D3 (VITAMIN D-3 ORAL) Take by mouth.   Past Month    DULoxetine (CYMBALTA) 60 MG capsule Take 1 capsule (60 mg total) by mouth once daily. 30 capsule 11 1/2/2025 Morning    erythromycin (ROMYCIN) ophthalmic ointment    Past Month    oxyCODONE-acetaminophen (PERCOCET) 7.5-325 mg per tablet Take 1 tablet by mouth every 6 (six) hours as needed for Pain. 12 tablet 0 Past Month    triamcinolone acetonide 0.1% (KENALOG) 0.1 % cream Apply topically 2 (two) times daily. Rash   1/2/2025 Morning    ibuprofen (ADVIL,MOTRIN) 600 MG tablet Take 1 tablet (600 mg total) by mouth every 6 (six) hours as needed for Pain. 20 tablet 0     sildenafiL (VIAGRA) 100 MG tablet Take 1 tablet (100 mg total) by mouth daily as needed for Erectile Dysfunction. Dispense generic 10 tablet 11       Inpatient Medications:  Antibiotics (From admission, onward)      Start     Stop Route Frequency Ordered    01/03/25 0829  mupirocin 2 % ointment         -- Nasl On Call Procedure 01/03/25 0829 01/03/25 0829  ceFAZolin (Ancef) 3 g in 0.9% NaCl 100 mL IVPB         -- IV On Call Procedure 01/03/25 0829          VTE Risk Mitigation (From admission, onward)           Ordered     IP VTE HIGH RISK PATIENT  Once         01/03/25 0829     Place sequential compression device  Until discontinued         01/03/25 0829                      Current Facility-Administered Medications   Medication Dose Route  "Frequency Provider Last Rate Last Admin    0.9% NaCl infusion   Intravenous Continuous David Vaughan MD        ceFAZolin (Ancef) 3 g in 0.9% NaCl 100 mL IVPB  3 g Intravenous On Call Procedure David Vaughan MD        fentaNYL 50 mcg/mL injection 100 mcg  100 mcg Intravenous PRN Renetta Thurston PA-C        midazolam injection 1 mg  1 mg Intravenous PRN Renetta Thurston PA-C        mupirocin 2 % ointment   Nasal On Call Procedure David Vaughan MD   Given at 01/03/25 0859    ropivacaine 0.2% Perineural Pump infusion 500 ML   Perineural Continuous Renetta Thurston PA-C              History:   There are no hospital problems to display for this patient.    Surgical History:    has a past surgical history that includes knee arthroscopicc surgeries; Left wrist Surgery; Knee surgery; Joint replacement; Total knee replacement using computer navigation (Left, 6/22/2018); Irrigation and debridement of lower extremity (Left, 7/3/2018); and Irrigation and debridement of lower extremity (Left, 7/7/2018).   Social History:    reports being sexually active and has had partner(s) who are female.  reports that he has quit smoking. His smoking use included cigars. He has been exposed to tobacco smoke. He has never used smokeless tobacco. He reports current alcohol use of about 5.0 standard drinks of alcohol per week. He reports that he does not use drugs.    Vitals:    01/03/25 0854   BP: (!) 177/71   BP Location: Right arm   Patient Position: Lying   Pulse: (!) 57   Resp: 18   Temp: 36.7 °C (98.1 °F)   TempSrc: Oral   SpO2: 97%   Weight: 133.8 kg (295 lb)   Height: 5' 7" (1.702 m)     Vital Signs Range (Last 24H):  Temp:  [36.7 °C (98.1 °F)-36.8 °C (98.3 °F)]   Pulse:  [57-82]   Resp:  [18-31]   BP: (168-227)/()   SpO2:  [91 %-99 %]     Body mass index is 46.2 kg/m².  Wt Readings from Last 4 Encounters:   01/03/25 133.8 kg (295 lb)   01/02/25 133.8 kg (295 lb)   12/02/24 133.8 kg " "(295 lb)   12/01/24 133.8 kg (295 lb)        Intake/Output - Last 3 Shifts       None          Lab Results   Component Value Date    WBC 10.04 08/21/2024    HGB 16.2 08/21/2024    HCT 49.1 08/21/2024     08/21/2024     08/21/2024    K 5.3 (H) 08/21/2024     08/21/2024    CREATININE 0.9 08/21/2024    BUN 12 08/21/2024    CO2 28 08/21/2024     08/21/2024    CALCIUM 9.1 08/21/2024    MG 1.8 07/03/2018    PHOS 2.9 07/03/2018    ALKPHOS 65 08/21/2024    ALT 45 (H) 08/21/2024    AST 42 (H) 08/21/2024    ALBUMIN 3.7 08/21/2024    INR 0.9 06/14/2018    HGBA1C 5.5 08/21/2024    CPK 37 09/10/2018    TROPONINI <0.006 07/13/2017     No results found for this or any previous visit (from the past 12 hours).  No results for input(s): "WBC", "HGB", "HCT", "PLT", "NA", "K", "CREATININE", "GLU", "INR" in the last 168 hours.  No LMP for male patient.    EKG:   Results for orders placed or performed in visit on 11/09/20   IN OFFICE EKG 12-LEAD (to [place])    Collection Time: 11/09/20  9:22 AM    Narrative    Test Reason : I10,R69,    Vent. Rate : 062 BPM     Atrial Rate : 062 BPM     P-R Int : 184 ms          QRS Dur : 100 ms      QT Int : 384 ms       P-R-T Axes : 064 032 076 degrees     QTc Int : 389 ms    Baseline wander  Normal sinus rhythm  Normal ECG  When compared with ECG of 28-SEP-2017 10:30,  Premature atrial complexes No longer present  Confirmed by Orlin Hoffmann MD ([id number]) on 11/11/2020 2:36:17 PM    Referred By: ARIS GRIMES           Confirmed By:Orlin Hoffmann MD     TTE:  No results found for this or any previous visit.  Results for orders placed or performed in visit on 09/13/18   2D Echo w/ Color Flow Doppler    Collection Time: 09/13/18  2:45 PM   Result Value Ref Range    EF + QEF 60 55 - 65    Diastolic Dysfunction No     Est. PA Systolic Pressure 18.84     Tricuspid Valve Regurgitation TRIVIAL      MELLISA:  No results found for this or any previous visit.  Stress Test:  No results found for this or " "any previous visit.     LHC:  No results found for this or any previous visit.     PFT:  No results found for: "FEV1", "FVC", "OVH0OHC", "TLC", "DLCO"           Pre-op Assessment    I have reviewed the Patient Summary Reports.     I have reviewed the Nursing Notes. I have reviewed the NPO Status.   I have reviewed the Medications.     Review of Systems  Anesthesia Hx:               Denies Personal Hx of Anesthesia complications.                    Cardiovascular:     Hypertension   CAD                                          Pulmonary:      Shortness of breath  Sleep Apnea                Musculoskeletal:  Arthritis               Neurological:    Neuromuscular Disease,                                   Psych:  Psychiatric History                  Physical Exam  General: Alert, Cooperative and Oriented    Airway:  Mallampati: II   Mouth Opening: Normal  TM Distance: Normal  Tongue: Normal  Neck ROM: Normal ROM        Anesthesia Plan  Type of Anesthesia, risks & benefits discussed:    Anesthesia Type: Gen ETT, Regional  Intra-op Monitoring Plan: Standard ASA Monitors  Post Op Pain Control Plan: multimodal analgesia  Induction:  IV  Airway Plan: Direct  Informed Consent: Informed consent signed with the Patient and all parties understand the risks and agree with anesthesia plan.  All questions answered.   ASA Score: 3  Day of Surgery Review of History & Physical: H&P Update referred to the surgeon/provider.    Ready For Surgery From Anesthesia Perspective.     .      "

## 2025-01-03 NOTE — ANESTHESIA PROCEDURE NOTES
Peripheral Block    Patient location during procedure: pre-op   Block not for primary anesthetic.  Reason for block: at surgeon's request and post-op pain management   Post-op Pain Location: left shoulder      Staffing  Authorizing Provider: Shon Wilkerson MD  Performing Provider: Shon Wilkerson MD    Staffing  Performed by: Shon Wilkerson MD  Authorized by: Shon Wilkerson MD    Preanesthetic Checklist  Completed: patient identified, IV checked, site marked, risks and benefits discussed, surgical consent, monitors and equipment checked, pre-op evaluation and timeout performed  Peripheral Block  Patient position: sitting  Prep: ChloraPrep and site prepped and draped  Patient monitoring: heart rate, cardiac monitor, continuous pulse ox, continuous capnometry and frequent blood pressure checks  Block type: interscalene  Laterality: left  Injection technique: continuous  Needle  Needle type: Tuohy   Needle gauge: 18 G  Needle length: 2 in  Needle localization: anatomical landmarks and ultrasound guidance  Catheter type: non-stimulating  Catheter size: 20 G  Test dose: lidocaine 1.5% with Epi 1-to-200,000 and negative   -ultrasound image captured on disc.  Assessment  Injection assessment: negative aspiration, negative parasthesia and local visualized surrounding nerve  Paresthesia pain: none  Heart rate change: no  Slow fractionated injection: yes    Medications:    Medications: ropivacaine (NAROPIN) injection 0.5% - Perineural   10 mL - 1/3/2025 11:00:00 AM    Additional Notes  VSS.  [facility] RN monitoring vitals throughout procedure.  Patient tolerated procedure well.     Ropivacaine 0.25% 20ml given

## 2025-01-03 NOTE — ED NOTES
Etomidate administered by physician. Physician noticed when trying to flush IV that he was meeting resistance. Upon further inspection IV appeared to be infiltrated. Patient was not experiencing expected sedative effect.

## 2025-01-03 NOTE — ANESTHESIA POSTPROCEDURE EVALUATION
Anesthesia Post Evaluation    Patient: Yosef Rowe    Procedure(s) Performed: Procedure(s) (LRB):  ARTHROSCOPY, SHOULDER, WITH  ANTERIOR/POSTERIOR BANKART REPAIR (Left)  ARTHROSCOPY,SHOULDER,WITH BICEPS TENODESIS (Left)  REPAIR, ROTATOR CUFF, ARTHROSCOPIC (Left)    Final Anesthesia Type: general      Patient location during evaluation: PACU  Patient participation: Yes- Able to Participate  Level of consciousness: awake and alert and oriented  Post-procedure vital signs: reviewed and stable  Pain management: adequate  Airway patency: patent  IVORY mitigation strategies: Multimodal analgesia  PONV status at discharge: No PONV  Anesthetic complications: no      Cardiovascular status: blood pressure returned to baseline, hemodynamically stable and hypertensive (Patient hypertensive but at baseline.  Recommended following up with PCP for blood pressure management.)  Respiratory status: unassisted, spontaneous ventilation and room air  Hydration status: euvolemic  Follow-up not needed.              Vitals Value Taken Time   /84 01/03/25 1704   Temp  01/03/25 1740   Pulse 56 01/03/25 1719   Resp 28 01/03/25 1717   SpO2 96 % 01/03/25 1719   Vitals shown include unfiled device data.      Event Time   Out of Recovery 16:45:00         Pain/Ha Score: Pain Rating Prior to Med Admin: 0 (1/3/2025  4:01 PM)  Ha Score: 10 (1/3/2025  4:50 PM)

## 2025-01-03 NOTE — DISCHARGE INSTRUCTIONS
Follow-up at your surgery tomorrow morning.  Do not eat or drink anything at this time until the surgery, and do not take your arm out of the sling.

## 2025-01-03 NOTE — OP NOTE
DATE OF PROCEDURE: 1/3/25     PREOPERATIVE DIAGNOSIS:  Large rotator cuff tear.    POSTOPERATIVE DIAGNOSIS:  1.  Left tear rotator cuff.  2. Left shoulder instability  3.  Subacromial impingement.  4.  Severe biceps sprain with biceps tendinopathy.  5.  Rotator cuff tear.    PROCEDURE:    1. Rotator cuff repair, left shoulder  2.  Arthroscopic labral repair anterior, inferior, and posterior labrum  2.  Biceps tenodesis  3.  Intraarticular debridement glenohumeral joint.     SURGEON:  David Vaughan MD     ASSISTANT: Brunilda Santizo    PROCEDURE IN DETAIL:  The patient was brought to the Operating Suite after a   continuous scalene nerve block was administered. The patient was placed in lateral position, all   bony prominences were very well padded.  The arm was prepped and draped   in the standard fashion. A standard posterior inferior mid   acromial portal was established and the intraarticular space was entered. A diagnostic tour of the shoulder was undertaken, noting the following:  Anterior, inferior, and posterior labral tear.  Biceps with significant tearing.  Large rotator cuff tear.  There was noted to be a large tear of the rotator cuff with a distinct articular   component and a distinct bursal component, almost as if it were two tears.  The   biceps was noted to be torn as it entered into the bicipital groove, and a biceps tenodesis was performed. Using the arthroscopic scissors the biceps was taken back.  It was then tied in with the labrum to help create a bumper about the glenoid.  We then created a bony bed about the glenoid 1st using an elevator and then using a shaver and bur.  Next we passed and applied 4 knotless FiberTak sutures about the anterior inferior and posterior glenoid.  These were passed through our anterior portal, collected through our anterior lateral portal, and then deployed and converted.  Once the knotless mechanisms were found to be applied appropriately, the sutures were then  Subjective:      Kacy Yin is a 67 y.o. female who presents to the office today for a preoperative consultation at the request of surgeon Dr. Eligio Mackay who plans on performing carpal tunnel release surgery on left hand. Planned anesthesia is Local, IV sedation and MAC.        Past Medical History:   Diagnosis Date    Anemia     thallasemia minor    Arthritis     osteo-arthritis    Asthma     Back pain     Back pain     Bronchitis chronic     Chronic obstructive pulmonary disease (HCC) 11/16/2017    Diabetes mellitus (HCC)     GERD (gastroesophageal reflux disease)     Gout     Hypertension     Melanoma of scalp or neck (HCC)     Osteoarthritis     Osteoporosis     Squamous cell carcinoma, arm      Patient Active Problem List    Diagnosis Date Noted    Chronic obstructive pulmonary disease ([facility] [address] 11/16/2017    Carpal tunnel syndrome of left wrist 03/03/2017    Essential hypertension 10/19/2016    Type 2 diabetes mellitus without complication, without long-term current use of insulin ([facility] [address] 10/19/2016    Gastroesophageal reflux disease without esophagitis 10/19/2016    Chronic gout of multiple sites 10/19/2016    Depression 10/19/2016    Spinal stenosis of lumbar region 10/19/2016    Anemia 10/19/2016    Knee pain 07/28/2014    Chronic urinary tract infection 07/28/2014    Osteoarthritis of both knees 07/28/2014     Past Surgical History:   Procedure Laterality Date    APPENDECTOMY      COLONOSCOPY  12/2014    KNEE ARTHROSCOPY Bilateral     PARATHYROIDECTOMY      PARTIAL HYSTERECTOMY      UPPER GASTROINTESTINAL ENDOSCOPY       Family History   Problem Relation Age of Onset    Heart Failure Mother     Emphysema Mother     Stroke Mother     Heart Failure Father     Hypertension Father     Heart Disease Father     High Blood Pressure Father     Cancer Maternal Aunt     Cancer Paternal Aunt     Cancer Maternal Grandmother     Asthma Neg Hx     Diabetes Neg Hx Social History     Social History    Marital status:      Spouse name: N/A    Number of children: N/A    Years of education: N/A     Social History Main Topics    Smoking status: Never Smoker    Smokeless tobacco: Never Used      Comment: exposed to passive smoke for 18 yrs    Alcohol use No    Drug use: No    Sexual activity: No     Other Topics Concern    None     Social History Narrative    None     Current Outpatient Prescriptions   Medication Sig Dispense Refill    carvedilol (COREG) 6.25 MG tablet TAKE ONE TABLET BY MOUTH TWICE A  tablet 0    spironolactone (ALDACTONE) 50 MG tablet TAKE ONE TABLET BY MOUTH TWICE A DAY, TAKE A SECOND ONE IF NEEDED 60 tablet 2    omeprazole (PRILOSEC) 20 MG delayed release capsule TAKE ONE CAPSULE BY MOUTH TWICE A DAY 60 capsule 4    atorvastatin (LIPITOR) 10 MG tablet TAKE ONE TABLET BY MOUTH ONCE NIGHTLY 90 tablet 1    glipiZIDE (GLUCOTROL) 10 MG tablet TAKE ONE TABLET BY MOUTH TWICE A DAY BEFORE MEALS 60 tablet 11    metFORMIN (GLUCOPHAGE) 1000 MG tablet TAKE ONE TABLET BY MOUTH TWICE A DAY WITH MEALS 60 tablet 11    verapamil (CALAN) 120 MG tablet TAKE ONE TABLET BY MOUTH TWICE A DAY 60 tablet 11    allopurinol (ZYLOPRIM) 300 MG tablet TAKE ONE TABLET BY MOUTH DAILY 30 tablet 2    PARoxetine (PAXIL) 10 MG tablet TAKE ONE TABLET BY MOUTH EVERY MORNING 30 tablet 2    losartan (COZAAR) 100 MG tablet TAKE ONE TABLET BY MOUTH DAILY 30 tablet 2    verapamil (CALAN) 120 MG tablet Take 1 tablet by mouth 2 times daily 60 tablet 2    albuterol sulfate HFA (PROVENTIL HFA) 108 (90 Base) MCG/ACT inhaler Inhale 2 puffs into the lungs every 6 hours as needed for Wheezing 1 Inhaler 2    Fluticasone Furoate-Vilanterol (BREO ELLIPTA) 200-25 MCG/INH AEPB Inhale into the lungs      Umeclidinium Bromide (INCRUSE ELLIPTA) 62.5 MCG/INH AEPB Inhale into the lungs      SPIRIVA HANDIHALER 18 MCG inhalation capsule INHALE THE ENTIRE CONTENTS OF 1 CAPSULE cut.  Tension was confirmed about the labrum.  The humeral head sat appropriately in the glenoid.  Next we turned our attention to the rotator cuff.    The attention was then turned to the rotator cuff.  A lateral subacromial and accessory lateral portal, as well as anterosuperior portal were established and the rotator cuff tear was carefully delineated from anterior to posterior. Margin convergence sutures were used for the articular portion of the tear and then 3 sutures passed in a mattress configuration. The rotator cuff was fixed using a single row construct with a speed fix device.There was noted to be excellent motion with the cuff moving as a unit and the bare area of the humeral head covered. The subacromial spur was   fully delineated from anterior to posterior and subacromial decompression was not performed. The distal clavicle was examined and no distal clavicle excision performed. The procedure complete, the arthroscopic instrumentation was removed.  The incisions were closed with monocryl suture and steri strips.  The patient was placed in a sterile compressive dressing, sling and swathe, awakened, and transferred to the Recovery Room in good condition.    POSTOPERATIVE COURSE:  We will discharge the patient home from the hospital today  Nonweight bearing to the operative extremity  Sling at all times until follow up  Oxycodone PRN q6h for pain  Motrin Q6H standing  Gabapentin PRN for nerve pain * 3 days   ONCE A DAY USING HANDIHALER DEVICE 90 capsule 0    gabapentin (NEURONTIN) 300 MG capsule Take 1 capsule po tid and 1 po qhs 120 capsule 2    Loratadine (CLARITIN) 10 MG CAPS Take by mouth      Cyanocobalamin (VITAMIN B 12 PO) Take 1,000 mcg by mouth daily.  Ascorbic Acid (VITAMIN C) 500 MG CAPS Take  by mouth daily.  vitamin E 400 UNIT capsule Take 400 Units by mouth daily.  Calcium Carbonate-Vitamin D (CALCIUM + D PO) Take  by mouth daily. No current facility-administered medications for this visit. Allergies   Allergen Reactions    Hctz [Hydrochlorothiazide]     Lisinopril Other (See Comments)     cough     Review of Systems  A comprehensive review of systems was negative. Except left wrist pain. Planned anesthesia: Local, IV Sedation, MAC  Known anesthesia problems: No  Bleeding risk:  No  Personal or FH of DVT/PE:  No  Patient objection to receiving blood products: No     Objective:      BP (!) 144/70 (Site: Right Arm, Position: Sitting)   Pulse 70   Resp 14   Ht 5' 4\" (1.626 m)   Wt 250 lb (113.4 kg)   BMI 42.91 kg/m²      General appearance: alert, appears stated age and cooperative  Head: Normocephalic, without obvious abnormality, atraumatic  Eyes: negative findings: lids and lashes normal, conjunctivae and sclerae normal, corneas clear and pupils equal, round, reactive to light and accomodation  Neck: no adenopathy, no carotid bruit, no JVD, supple, symmetrical, trachea midline and thyroid not enlarged, symmetric, no tenderness/mass/nodules  Lungs: clear to auscultation bilaterally  Heart: regular rate and rhythm, S1, S2 normal, no murmur, click, rub or gallop  Abdomen: soft, non-tender; bowel sounds normal; no masses,  no organomegaly  Extremities: extremities normal, atraumatic, no cyanosis or edema  Pulses: 2+ and symmetric  Skin: Skin color, texture, turgor normal. No rashes or lesions  Neurologic: Grossly normal       Assessment:      1.  Carpal tunnel syndrome

## 2025-01-03 NOTE — BRIEF OP NOTE
[place] - Surgery ([facility])  Brief Operative Note    Surgery Date: 1/3/2025     Surgeons and Role:     * David Vaughan MD - Primary    Assisting Surgeon: None    Pre-op Diagnosis:  Anterior dislocation of left shoulder, initial encounter [S43.015A]    Post-op Diagnosis:  Post-Op Diagnosis Codes:     * Anterior dislocation of left shoulder, initial encounter [S43.015A]    Procedure(s) (LRB):  ARTHROSCOPY, SHOULDER, WITH  ANTERIOR/POSTERIOR BANKART REPAIR (Left)  ARTHROSCOPY,SHOULDER,WITH BICEPS TENODESIS (Left)  REPAIR, ROTATOR CUFF, ARTHROSCOPIC (Left)    Anesthesia: General/Regional    Operative Findings: The above procedures were performed.  No intra-operative complications were noted.  Skin was in good condition and well-approximated at closure.       Estimated Blood Loss: * No values recorded between 1/3/2025 12:31 PM and 1/3/2025  3:52 PM *         Specimens:   Specimen (24h ago, onward)      None              Discharge Note    OUTCOME: Patient tolerated treatment/procedure well without complication and is now ready for discharge.    DISPOSITION: Home or Self Care    FINAL DIAGNOSIS:  Anterior dislocation of left shoulder    FOLLOWUP: In clinic    DISCHARGE INSTRUCTIONS:    Discharge Procedure Orders   Diet general     Call MD for:  temperature >100.4     Call MD for:  persistent nausea and vomiting     Call MD for:  severe uncontrolled pain     Call MD for:  difficulty breathing, headache or visual disturbances     Call MD for:  redness, tenderness, or signs of infection (pain, swelling, redness, odor or green/yellow discharge around incision site)     Call MD for:  hives     Call MD for:  persistent dizziness or light-headedness     Call MD for:  extreme fatigue     Change dressing (specify)   Order Comments: Okay to remove dressings at 3 days postoperatively     Non weight bearing   Order Comments: No weightbearing or range of motion to the left upper extremity            Medication List        CHANGE how  you take these medications      aspirin 81 MG EC tablet  Commonly known as: ECOTRIN  Take 1 tablet (81 mg total) by mouth once daily.  What changed: Another medication with the same name was removed. Continue taking this medication, and follow the directions you see here.     ibuprofen 600 MG tablet  Commonly known as: ADVIL,MOTRIN  Take 1 tablet (600 mg total) by mouth 3 (three) times daily.  What changed: Another medication with the same name was removed. Continue taking this medication, and follow the directions you see here.     oxyCODONE 5 MG immediate release tablet  Commonly known as: ROXICODONE  Take 1 tablet (5 mg total) by mouth every 4 (four) hours as needed for Pain.  What changed: Another medication with the same name was removed. Continue taking this medication, and follow the directions you see here.            CONTINUE taking these medications      * acetaminophen 500 MG tablet  Commonly known as: TYLENOL  Take 1 tablet (500 mg total) by mouth every 6 (six) hours as needed for Pain.     * acetaminophen 500 MG tablet  Commonly known as: TYLENOL  Take 1 tablet (500 mg total) by mouth every 6 (six) hours as needed for Pain.     atorvastatin 20 MG tablet  Commonly known as: LIPITOR  Take 1 tablet (20 mg total) by mouth once daily.     DULoxetine 60 MG capsule  Commonly known as: CYMBALTA  Take 1 capsule (60 mg total) by mouth once daily.     erythromycin ophthalmic ointment  Commonly known as: ROMYCIN     oxyCODONE-acetaminophen 7.5-325 mg per tablet  Commonly known as: PERCOCET  Take 1 tablet by mouth every 6 (six) hours as needed for Pain.     sildenafiL 100 MG tablet  Commonly known as: VIAGRA  Take 1 tablet (100 mg total) by mouth daily as needed for Erectile Dysfunction. Dispense generic     triamcinolone acetonide 0.1% 0.1 % cream  Commonly known as: KENALOG     VITAMIN D-3 ORAL           * This list has 2 medication(s) that are the same as other medications prescribed for you. Read the directions  carefully, and ask your doctor or other care provider to review them with you.

## 2025-01-03 NOTE — ED NOTES
Patient given discharge instructions and paperwork at this time. Patient and wife verbalized understanding with no further questions at this time. Patient respirations even and unlabored. Patient appears to be in no acute distress at this time. Patient AAOX4 at this time.

## 2025-01-03 NOTE — ANESTHESIA PROCEDURE NOTES
Intubation    Date/Time: 1/3/2025 12:12 PM    Performed by: Mimi Hooker CRNA  Authorized by: Helio Moralez MD    Intubation:     Induction:  Intravenous    Intubated:  Postinduction    Mask Ventilation:  Moderately difficult with oral airway    Attempts:  1    Attempted By:  CRNA    Method of Intubation:  Video laryngoscopy    Blade:  Goldberg 4    Laryngeal View Grade: Grade I - full view of cords      Difficult Airway Encountered?: No      Complications:  None    Airway Device:  Oral endotracheal tube    Airway Device Size:  7.5    Style/Cuff Inflation:  Cuffed    Inflation Amount (mL):  8    Tube secured:  23    Secured at:  The lips    Placement Verified By:  Capnometry    Complicating Factors:  Obesity, short neck and poor neck/head extension    Findings Post-Intubation:  BS equal bilateral       Quality 226: Preventive Care And Screening: Tobacco Use: Screening And Cessation Intervention: Patient screened for tobacco use and is an ex/non-smoker Detail Level: Detailed

## 2025-01-03 NOTE — ED NOTES
Patient ambulatory to ED room 14 at this time. Patient to ED room 14 with CC of shoulder pain. Patient reports he was in the shower when he stretched his arm out for balance and he heard it pop. Patient reports this is the 4th time hard has done this. Patient has surgery schedule for this issues tomorrow morning at [facility]. Patient AAOx4 at this time. Respirations even and unlabored. Patient connected to cardiac monitoring at this time. Arm placed in sling at this time. Patient appears to be in pain at this time.

## 2025-01-03 NOTE — TRANSFER OF CARE
"Anesthesia Transfer of Care Note    Patient: Yosef Rowe    Procedure(s) Performed: Procedure(s) (LRB):  ARTHROSCOPY, SHOULDER, WITH  ANTERIOR/POSTERIOR BANKART REPAIR (Left)  ARTHROSCOPY,SHOULDER,WITH BICEPS TENODESIS (Left)  REPAIR, ROTATOR CUFF, ARTHROSCOPIC (Left)    Patient location: PACU    Anesthesia Type: general    Transport from OR: Transported from OR on 6-10 L/min O2 by face mask with adequate spontaneous ventilation    Post pain: adequate analgesia    Post assessment: tolerated procedure well and no apparent anesthetic complications    Post vital signs: stable    Level of consciousness: awake, alert and oriented    Nausea/Vomiting: no nausea/vomiting    Complications: none    Transfer of care protocol was followed      Last vitals: Visit Vitals  BP (!) 171/81 (BP Location: Right arm, Patient Position: Lying)   Pulse 93   Temp 36.7 °C (98.1 °F) (Oral)   Resp 20   Ht 5' 7" (1.702 m)   Wt 133.8 kg (295 lb)   SpO2 96%   BMI 46.20 kg/m²     "

## 2025-01-10 ENCOUNTER — TELEPHONE (OUTPATIENT)
Dept: ORTHOPEDICS | Facility: CLINIC | Age: 70
End: 2025-01-10
Payer: MEDICARE

## 2025-01-10 NOTE — TELEPHONE ENCOUNTER
----- Message from Joel sent at 1/10/2025  1:57 PM CST -----  Contact: RON  Type:  Needs Medical Advice    Who Called: ron  Symptoms (please be specific): surgery post op   Would the patient rather a call back or a response via [facility]? Call back  Best Call Back Number: [phone]  Additional Information: REGARDING NERVE BLOCK

## 2025-01-13 ENCOUNTER — OFFICE VISIT (OUTPATIENT)
Dept: ORTHOPEDICS | Facility: CLINIC | Age: 70
End: 2025-01-13
Payer: MEDICARE

## 2025-01-13 VITALS — WEIGHT: 295 LBS | BODY MASS INDEX: 46.3 KG/M2 | HEIGHT: 67 IN

## 2025-01-13 DIAGNOSIS — S43.015A ANTERIOR DISLOCATION OF LEFT SHOULDER, INITIAL ENCOUNTER: Primary | ICD-10-CM

## 2025-01-13 DIAGNOSIS — M25.311 SHOULDER INSTABILITY, RIGHT: ICD-10-CM

## 2025-01-13 PROCEDURE — 1159F MED LIST DOCD IN RCRD: CPT | Mod: CPTII,S$GLB,, | Performed by: SURGERY

## 2025-01-13 PROCEDURE — 99999 PR PBB SHADOW E&M-EST. PATIENT-LVL III: CPT | Mod: PBBFAC,,, | Performed by: SURGERY

## 2025-01-13 PROCEDURE — 99024 POSTOP FOLLOW-UP VISIT: CPT | Mod: S$GLB,,, | Performed by: SURGERY

## 2025-01-13 PROCEDURE — 1101F PT FALLS ASSESS-DOCD LE1/YR: CPT | Mod: CPTII,S$GLB,, | Performed by: SURGERY

## 2025-01-13 PROCEDURE — 3288F FALL RISK ASSESSMENT DOCD: CPT | Mod: CPTII,S$GLB,, | Performed by: SURGERY

## 2025-01-13 NOTE — PROGRESS NOTES
SUBJECTIVE:    Yosef Rowe is here today for a follow up visit.  Status post Arthroscopy, Shoulder, With  Anterior/posterior Bankart Repair - Left, Arthroscopy,shoulder,with Biceps Tenodesis - Left, and Repair, Rotator Cuff, Arthroscopic - Left  Date of surgery 1/3/2025  Days since surgery 10  Doing well.  Per patient report: no fevers, chills, shortness of breath, chest pain, or increased extremity pain.  Presents for further follow up.  Doing well.  He has not started physical therapy.    OBJECTIVE:    There were no vitals filed for this visit.      Extremity Exam  Incisions clean dry and intact, no erythema, drainage, exudate, minimal ecchymosis.  Sutures in place.  Fires FHL, EHL, TA, GSC.  Sensation intact to light touch throughout foot.  Neurovascularly intact.     DIAGNOSTIC STUDIES:  No new imaging today    History of Present Illness               ASSESSMENT:   1. Status post procedure above      PLAN:     Continue ASA for DVT PPX.   Sutures removed, NWB Operative extremity  PT ordered  Follow up in 4 weeks with standing weight bearing x-rays operative extremity   Discussed precautions for Steri-Strips.  Remove strips if they become dirty, can cover with small Band-Aid.  Report any yellow, green, or white drainage to our office.  Report to the emergency room for increased swelling, fevers, chills, or exudative discharge to the area.   External referral for PT placed an operative note given.             David Vaughan MD  [facility]  Orthopedic Surgery      This note was done with voice recognition software. Please excuse any errors missed in proof reading.     This note was generated with the assistance of ambient listening technology. Verbal consent was obtained by the patient and accompanying visitor(s) for the recording of patient appointment to facilitate this note. I attest to having reviewed and edited the generated note for accuracy, though some syntax or spelling errors may persist.  Please contact the author of this note for any clarification.

## 2025-01-28 NOTE — SUBJECTIVE & OBJECTIVE
Past Medical History:   Diagnosis Date    Anxiety     Arthritis     Depression     when turned 49-lost job and mother unwell at that time    Hyperlipidemia     Lumbar radiculopathy     BRETT- 2011    Osteoarthritis     Sleep apnea     Urinary tract infection        Past Surgical History:   Procedure Laterality Date    IRRIGATION AND DEBRIDEMENT OF LOWER EXTREMITY Left 7/3/2018    Procedure: IRRIGATION AND DEBRIDEMENT, LOWER EXTREMITY, poly exchange left uni knee replacement. Stryler. 9L NS;  Surgeon: Ruperto Olivarez MD;  Location: [facility] OR [address] [place] [address];  Service: Orthopedics;  Laterality: Left;    JOINT REPLACEMENT      knee arthroscopicc surgeries      Right knee- 2007  and Left knee- 2008    KNEE SURGERY      Left wrist Surgery      2000 for a torn ligament from Golf    TOTAL KNEE REPLACEMENT USING COMPUTER NAVIGATION Left 6/22/2018    Procedure: REPLACEMENT-KNEE WITH NAVIGATION-- unicondylar-- medial;  Surgeon: Orlin Jordan MD;  Location: [facility] OR [address] [place] [address];  Service: Orthopedics;  Laterality: Left;       Review of patient's allergies indicates:   Allergen Reactions    No known drug allergies        Current Facility-Administered Medications   Medication    acetaminophen tablet 650 mg    ampicillin-sulbactam 3 g in sodium chloride 0.9 % 100 mL IVPB (ready to mix system)    aspirin EC tablet 81 mg    atorvastatin tablet 20 mg    celecoxib capsule 200 mg    diphenhydrAMINE capsule 25 mg    docusate sodium capsule 100 mg    DULoxetine DR capsule 60 mg    losartan tablet 25 mg    morphine injection 2 mg    ondansetron tablet 8 mg    oxyCODONE immediate release tablet 10 mg    oxyCODONE immediate release tablet 5 mg    pantoprazole EC tablet 40 mg    polyethylene glycol packet 17 g    pregabalin capsule 75 mg    promethazine tablet 12.5 mg    ramelteon tablet 8 mg    sodium chloride 0.9% flush 3 mL    vancomycin (VANCOCIN) 1,500 mg in dextrose 5 % 250 mL IVPB    zolpidem tablet 5 mg  What Type Of Note Output Would You Prefer (Optional)?: Bullet Format "    Family History     Problem Relation (Age of Onset)    Parkinsonism Mother        Social History Main Topics    Smoking status: Current Every Day Smoker     Types: Cigars    Smokeless tobacco: Never Used      Comment: occasional    Alcohol use 6.0 oz/week     10 Glasses of wine per week      Comment: Daily, last use yesterday    Drug use: No    Sexual activity: Yes     Partners: Female     ROS   Constitutional: negative for fevers  Eyes: no visual changes  ENT: negative for hearing loss  Respiratory: negative for dyspnea  Cardiovascular: negative for chest pain  Gastrointestinal: negative for abdominal pain  Genitourinary: negative for dysuria  Neurological: negative for headaches  Behavioral/Psych: negative for hallucinations  Endocrine: negative for temperature intolerance      Objective:     Vital Signs (Most Recent):  Temp: 97.2 °F (36.2 °C) (07/09/18 0401)  Pulse: 78 (07/09/18 0401)  Resp: 18 (07/09/18 0401)  BP: (!) 117/55 (07/09/18 0401)  SpO2: (!) 94 % (07/09/18 0401) Vital Signs (24h Range):  Temp:  [96 °F (35.6 °C)-97.3 °F (36.3 °C)] 97.2 °F (36.2 °C)  Pulse:  [60-78] 78  Resp:  [16-18] 18  SpO2:  [93 %-97 %] 94 %  BP: (117-148)/(55-72) 117/55     Weight: 125.6 kg (277 lb)  Height: 5' 7" (170.2 cm)  Body mass index is 43.38 kg/m².      Intake/Output Summary (Last 24 hours) at 07/09/18 0553  Last data filed at 07/09/18 0200   Gross per 24 hour   Intake             1395 ml   Output              100 ml   Net             1295 ml       Ortho/SPM Exam    AAOx4  NAD  RRR  No increased WOB  Wound vac c/d/i  Knee immobilizer locked in extension  SILT and motor intact T/SP/DP  WWP extremities  FCDs in place and functioning    Significant Labs: All pertinent labs within the past 24 hours have been reviewed.    Significant Imaging: I have reviewed and interpreted all pertinent imaging results/findings.  " How Severe Are Your Spot(S)?: mild Have Your Spot(S) Been Treated In The Past?: has not been treated Hpi Title: Evaluation of Skin Lesions

## 2025-02-07 DIAGNOSIS — M25.311 SHOULDER INSTABILITY, RIGHT: Primary | ICD-10-CM

## 2025-02-10 ENCOUNTER — OFFICE VISIT (OUTPATIENT)
Dept: ORTHOPEDICS | Facility: CLINIC | Age: 70
End: 2025-02-10
Payer: MEDICARE

## 2025-02-10 ENCOUNTER — HOSPITAL ENCOUNTER (OUTPATIENT)
Dept: RADIOLOGY | Facility: HOSPITAL | Age: 70
Discharge: HOME | End: 2025-02-10
Attending: SURGERY
Payer: MEDICARE

## 2025-02-10 DIAGNOSIS — M25.311 SHOULDER INSTABILITY, RIGHT: Primary | ICD-10-CM

## 2025-02-10 DIAGNOSIS — M25.311 SHOULDER INSTABILITY, RIGHT: ICD-10-CM

## 2025-02-10 PROCEDURE — 73030 X-RAY EXAM OF SHOULDER: CPT | Mod: 26,LT,, | Performed by: RADIOLOGY

## 2025-02-10 PROCEDURE — 3288F FALL RISK ASSESSMENT DOCD: CPT | Mod: CPTII,S$GLB,, | Performed by: SURGERY

## 2025-02-10 PROCEDURE — 1101F PT FALLS ASSESS-DOCD LE1/YR: CPT | Mod: CPTII,S$GLB,, | Performed by: SURGERY

## 2025-02-10 PROCEDURE — 73030 X-RAY EXAM OF SHOULDER: CPT | Mod: TC,FY,LT

## 2025-02-10 PROCEDURE — 99999 PR PBB SHADOW E&M-EST. PATIENT-LVL II: CPT | Mod: PBBFAC,,, | Performed by: SURGERY

## 2025-02-10 PROCEDURE — 99024 POSTOP FOLLOW-UP VISIT: CPT | Mod: S$GLB,,, | Performed by: SURGERY

## 2025-02-10 PROCEDURE — 1159F MED LIST DOCD IN RCRD: CPT | Mod: CPTII,S$GLB,, | Performed by: SURGERY

## 2025-02-10 NOTE — PROGRESS NOTES
SUBJECTIVE:    Yosef Rowe is here today for a follow up visit.  Status post Arthroscopy, Shoulder, With  Anterior/posterior Bankart Repair - Left, Arthroscopy,shoulder,with Biceps Tenodesis - Left, and Repair, Rotator Cuff, Arthroscopic - Left  Date of surgery 1/3/2025  Days since surgery 47  Doing well.  Per patient report: no fevers, chills, shortness of breath, chest pain, or increased extremity pain.  Presents for further follow up.  Doing well.  He has not started physical therapy.    02/10/2025:  Overall the patient states he is doing well.  He has been compliant with all postoperative protocol.  He has continued to work in physical therapy to improve range of motion and strength.  Patient states his pain is well-controlled and does not require medication at this time.  Patient has questions about discharging sling.    OBJECTIVE:      ORTHOPEDIC EXAM:  Incisions clean dry and intact, no erythema, drainage, exudate, minimal ecchymosis.  Wrist and elbow full range of motions, Neurovascularly intact.      DIAGNOSTIC STUDIES: Mild degenerative change, no acute process seen.     ASSESSMENT:   1. Status post procedure above      PLAN:  Continue physical therapy to improve range of motion and strength.  Discussed he is allowed to discharge a sling in 1 week at the 6 week status post procedure.  Patient will follow up in 4-6 weeks.  All questions were answered at today's visit.  Patient is in understanding.    David Vaughan MD  [facility]  Orthopedic Surgery      This note was done with voice recognition software. Please excuse any errors missed in proof reading.

## 2025-02-17 ENCOUNTER — PATIENT OUTREACH (OUTPATIENT)
Dept: ADMINISTRATIVE | Facility: HOSPITAL | Age: 70
End: 2025-02-17
Payer: MEDICARE

## 2025-03-17 ENCOUNTER — TELEPHONE (OUTPATIENT)
Dept: ORTHOPEDICS | Facility: CLINIC | Age: 70
End: 2025-03-17
Payer: MEDICARE

## 2025-03-17 NOTE — TELEPHONE ENCOUNTER
----- Message from Fletcher sent at 3/17/2025 10:00 AM CDT -----  Pt Requesting to Cancel an AppointmentPt is requesting to cancel an appointment that our department is not allowed to cancel.Who Called: Darrion is appt: 3/17/25Reason for canceling: pt said he was called in to workBest Call Back #:  [phone] notes: pt said he will call back to reschedule

## 2025-03-26 ENCOUNTER — PATIENT MESSAGE (OUTPATIENT)
Dept: INTERNAL MEDICINE | Facility: CLINIC | Age: 70
End: 2025-03-26
Payer: MEDICARE

## 2025-06-13 ENCOUNTER — PATIENT MESSAGE (OUTPATIENT)
Dept: INTERNAL MEDICINE | Facility: CLINIC | Age: 70
End: 2025-06-13
Payer: MEDICARE

## 2025-08-18 ENCOUNTER — NURSE TRIAGE (OUTPATIENT)
Dept: ADMINISTRATIVE | Facility: CLINIC | Age: 70
End: 2025-08-18
Payer: MEDICARE

## 2025-08-18 ENCOUNTER — OFFICE VISIT (OUTPATIENT)
Dept: INTERNAL MEDICINE | Facility: CLINIC | Age: 70
End: 2025-08-18
Payer: MEDICARE

## 2025-08-18 VITALS
HEIGHT: 68 IN | DIASTOLIC BLOOD PRESSURE: 78 MMHG | SYSTOLIC BLOOD PRESSURE: 130 MMHG | TEMPERATURE: 99 F | HEART RATE: 64 BPM | BODY MASS INDEX: 45.7 KG/M2 | WEIGHT: 301.56 LBS | RESPIRATION RATE: 18 BRPM | OXYGEN SATURATION: 96 %

## 2025-08-18 DIAGNOSIS — R20.0 NUMBNESS AND TINGLING IN RIGHT HAND: Primary | ICD-10-CM

## 2025-08-18 DIAGNOSIS — M25.511 RIGHT SHOULDER PAIN, UNSPECIFIED CHRONICITY: ICD-10-CM

## 2025-08-18 DIAGNOSIS — E78.2 MIXED HYPERLIPIDEMIA: ICD-10-CM

## 2025-08-18 DIAGNOSIS — R20.2 NUMBNESS AND TINGLING IN RIGHT HAND: Primary | ICD-10-CM

## 2025-08-18 PROCEDURE — 3288F FALL RISK ASSESSMENT DOCD: CPT | Mod: CPTII,GC,S$GLB, | Performed by: INTERNAL MEDICINE

## 2025-08-18 PROCEDURE — 99999 PR PBB SHADOW E&M-EST. PATIENT-LVL IV: CPT | Mod: PBBFAC,GC,,

## 2025-08-18 PROCEDURE — 1159F MED LIST DOCD IN RCRD: CPT | Mod: CPTII,GC,S$GLB, | Performed by: INTERNAL MEDICINE

## 2025-08-18 PROCEDURE — 3075F SYST BP GE 130 - 139MM HG: CPT | Mod: CPTII,GC,S$GLB, | Performed by: INTERNAL MEDICINE

## 2025-08-18 PROCEDURE — 3008F BODY MASS INDEX DOCD: CPT | Mod: CPTII,GC,S$GLB, | Performed by: INTERNAL MEDICINE

## 2025-08-18 PROCEDURE — 1126F AMNT PAIN NOTED NONE PRSNT: CPT | Mod: CPTII,GC,S$GLB, | Performed by: INTERNAL MEDICINE

## 2025-08-18 PROCEDURE — 99214 OFFICE O/P EST MOD 30 MIN: CPT | Mod: GC,S$GLB,, | Performed by: INTERNAL MEDICINE

## 2025-08-18 PROCEDURE — 3078F DIAST BP <80 MM HG: CPT | Mod: CPTII,GC,S$GLB, | Performed by: INTERNAL MEDICINE

## 2025-08-18 PROCEDURE — 1101F PT FALLS ASSESS-DOCD LE1/YR: CPT | Mod: CPTII,GC,S$GLB, | Performed by: INTERNAL MEDICINE

## 2025-08-18 RX ORDER — NAPROXEN 500 MG/1
500 TABLET ORAL 2 TIMES DAILY WITH MEALS
Qty: 10 TABLET | Refills: 0 | Status: SHIPPED | OUTPATIENT
Start: 2025-08-18 | End: 2025-08-23

## 2025-08-18 RX ORDER — ASPIRIN 81 MG/1
81 TABLET ORAL DAILY
Qty: 60 TABLET | Refills: 1 | Status: SHIPPED | OUTPATIENT
Start: 2025-08-18 | End: 2025-12-16

## 2025-08-19 ENCOUNTER — HOSPITAL ENCOUNTER (OUTPATIENT)
Dept: RADIOLOGY | Facility: HOSPITAL | Age: 70
Discharge: HOME | End: 2025-08-19
Payer: MEDICARE

## 2025-08-19 DIAGNOSIS — R20.0 NUMBNESS AND TINGLING IN RIGHT HAND: ICD-10-CM

## 2025-08-19 DIAGNOSIS — R20.2 NUMBNESS AND TINGLING IN RIGHT HAND: ICD-10-CM

## 2025-08-19 DIAGNOSIS — M25.511 RIGHT SHOULDER PAIN, UNSPECIFIED CHRONICITY: ICD-10-CM

## 2025-08-19 PROCEDURE — 72040 X-RAY EXAM NECK SPINE 2-3 VW: CPT | Mod: TC

## 2025-08-19 PROCEDURE — 73030 X-RAY EXAM OF SHOULDER: CPT | Mod: TC,RT

## 2025-08-19 PROCEDURE — 72040 X-RAY EXAM NECK SPINE 2-3 VW: CPT | Mod: 26,,, | Performed by: RADIOLOGY

## 2025-08-19 PROCEDURE — 73030 X-RAY EXAM OF SHOULDER: CPT | Mod: 26,RT,, | Performed by: RADIOLOGY

## 2025-08-24 ENCOUNTER — RESULTS FOLLOW-UP (OUTPATIENT)
Dept: HEPATOLOGY | Facility: HOSPITAL | Age: 70
End: 2025-08-24
Payer: MEDICARE

## 2025-08-24 DIAGNOSIS — M47.892 OTHER OSTEOARTHRITIS OF SPINE, CERVICAL REGION: Primary | ICD-10-CM

## 2025-08-26 ENCOUNTER — TELEPHONE (OUTPATIENT)
Dept: INTERNAL MEDICINE | Facility: CLINIC | Age: 70
End: 2025-08-26
Payer: MEDICARE

## 2025-08-27 DIAGNOSIS — I25.10 CORONARY ATHEROSCLEROSIS DUE TO CALCIFIED CORONARY LESION: ICD-10-CM

## 2025-08-27 DIAGNOSIS — E78.5 HYPERLIPIDEMIA, UNSPECIFIED HYPERLIPIDEMIA TYPE: ICD-10-CM

## 2025-08-27 DIAGNOSIS — I25.84 CORONARY ATHEROSCLEROSIS DUE TO CALCIFIED CORONARY LESION: ICD-10-CM

## 2025-08-28 RX ORDER — ATORVASTATIN CALCIUM 20 MG/1
20 TABLET, FILM COATED ORAL DAILY
Qty: 90 TABLET | Refills: 3 | Status: SHIPPED | OUTPATIENT
Start: 2025-08-28

## (undated) DEVICE — SEE MEDLINE ITEM 146298

## (undated) DEVICE — PADDING CAST SPECIALIST 6X4YD

## (undated) DEVICE — SUT TAPE .5 CIRCLE 36.6X1.3MM

## (undated) DEVICE — KIT IRR SUCTION HND PIECE

## (undated) DEVICE — SEE MEDLINE ITEM 154981

## (undated) DEVICE — GAUZE SPONGE 4X4 12PLY

## (undated) DEVICE — SEE MEDLINE ITEM 157117

## (undated) DEVICE — CANNULA ARTHRO NON THREAD

## (undated) DEVICE — TOURNIQUET SB QC DP 34X4IN

## (undated) DEVICE — HOOD T-5 TEAR AWAY STERILE

## (undated) DEVICE — TUBING IRRIGATION RIO

## (undated) DEVICE — SUT 2-0 VICRYL / SH (J417)

## (undated) DEVICE — TAPE SURG DURAPORE 2 X10YD

## (undated) DEVICE — KIT DRAPE RIO ONE PIECE W/POCK

## (undated) DEVICE — ELECTRODE REM PLYHSV RETURN 9

## (undated) DEVICE — SEE MEDLINE ITEM 152622

## (undated) DEVICE — CONTAINER SPECIMEN STR 3 0Z

## (undated) DEVICE — DUAL SPIKE ADAPTER

## (undated) DEVICE — SWAB CULTURETTE SINGLE

## (undated) DEVICE — COVER LIGHT HANDLE 80/CA

## (undated) DEVICE — APPLICATOR CHLORAPREP ORN 26ML

## (undated) DEVICE — SUT 0 VICRYL / CT-1

## (undated) DEVICE — Device

## (undated) DEVICE — DRESSING XEROFORM NONADH 1X8IN

## (undated) DEVICE — SUT VICRYL 3-0 27 SH

## (undated) DEVICE — PULSAVAC ZIMMER

## (undated) DEVICE — PAD CAST SPECIALIST STRL 6

## (undated) DEVICE — SPONGE COTTON TRAY 4X4IN

## (undated) DEVICE — PAD COLD THERAPY KNEE WRAP ON

## (undated) DEVICE — IMPLANTABLE DEVICE
Type: IMPLANTABLE DEVICE | Site: KNEE | Status: NON-FUNCTIONAL
Removed: 2018-06-22

## (undated) DEVICE — BLADE SURG CARBON STEEL SZ11

## (undated) DEVICE — BRACE KNEE T SCOPE PREMIER

## (undated) DEVICE — BNDG COFLEX FOAM LF2 ST 4X5YD

## (undated) DEVICE — SUT VICRYL BR 1 GEN 27 CT-1

## (undated) DEVICE — SUT ETHILON 3-0 PS2 18 BLK

## (undated) DEVICE — KIT TRACTION SHLDR ST DISP LF

## (undated) DEVICE — TUBING SUC UNIV W/CONN 12FT

## (undated) DEVICE — DRAPE PLASTIC U 60X72

## (undated) DEVICE — PASSER TIGHT CRV QUICKPASS R

## (undated) DEVICE — CANISTER INFOV.A.C WOUND 500ML

## (undated) DEVICE — CONTAINER SPECIMEN STRL 4OZ

## (undated) DEVICE — PAD PREP CUFFED NS 24X48IN

## (undated) DEVICE — SHOULDER SUSPENSION KIT

## (undated) DEVICE — GLOVE SENSICARE PI SURG 8

## (undated) DEVICE — KIT COLLECTION E SWAB REGULAR

## (undated) DEVICE — BANDAGE ACE ELASTIC 6"

## (undated) DEVICE — KIT PULSAVAC PLUS FAN

## (undated) DEVICE — BOWL CEMENT

## (undated) DEVICE — SYS LABEL CORRECT MED

## (undated) DEVICE — DRESSING AQUACEL AG 3.5X10IN

## (undated) DEVICE — SUT FIBERLINK TAPE BLU WHT 1.3

## (undated) DEVICE — KIT CHECKPOINT MAKO

## (undated) DEVICE — GEMINI ARTHREX SR8

## (undated) DEVICE — SEE MEDLINE ITEM 146271

## (undated) DEVICE — KIT VIZADISC KNEE TRACKING

## (undated) DEVICE — PIN BONE 4 X 140MM STERILE
Type: IMPLANTABLE DEVICE | Site: KNEE | Status: NON-FUNCTIONAL
Removed: 2018-06-22

## (undated) DEVICE — STOCKINET TUBULAR 1 PLY 6X60IN

## (undated) DEVICE — SOL IRR NACL .9% 3000ML

## (undated) DEVICE — PUMP COLD THERAPY

## (undated) DEVICE — TOWEL OR DISP STRL BLUE 4/PK

## (undated) DEVICE — GLOVE SENSICARE PI GRN 8.5

## (undated) DEVICE — TRAY MINOR ORTHO

## (undated) DEVICE — KIT FIBERTAK PERC INSRT 1.8MM

## (undated) DEVICE — GLOVE SENSICARE PI GRN 7.5

## (undated) DEVICE — TIBIAL ONLAY INSRT MCK SZ5 8MM
Type: IMPLANTABLE DEVICE | Site: KNEE | Status: NON-FUNCTIONAL
Removed: 2018-06-22

## (undated) DEVICE — MASK FLYTE HOOD PEEL AWAY

## (undated) DEVICE — PAD ABDOMINAL STERILE 8X10IN

## (undated) DEVICE — WRAP DEMAYO LEG STERILE

## (undated) DEVICE — SHAVER ULTRAFFR 4.2MM

## (undated) DEVICE — NDL SCORPION HD MEGALOADER

## (undated) DEVICE — DRESSING XEROFORM FOIL PK 1X8

## (undated) DEVICE — SEE MEDLINE ITEM 152515

## (undated) DEVICE — KIT EVACUATOR 3-SPRING 1/8 DRN

## (undated) DEVICE — GOWN ECLIPSE REINF L4 XLNG XXL

## (undated) DEVICE — KIT FIBERTAK CURVED SPEAR 1.8M

## (undated) DEVICE — SPONGE LAP 18X18 PREWASHED

## (undated) DEVICE — MARKER SKIN RULER STERILE

## (undated) DEVICE — GLOVE SENSICARE PI SURG 7.5

## (undated) DEVICE — LAVAGE WOUND BACTISURE 1L BAG

## (undated) DEVICE — SEE MEDLINE ITEM 157150

## (undated) DEVICE — SUT VICRYL PLUS 2-0 CT1 18

## (undated) DEVICE — TAPE SURG MEDIPORE 6X72IN

## (undated) DEVICE — SOL IRR NACL .9% 1000CC

## (undated) DEVICE — BLADE VORTEX SHAVER 4.5MMX13CM

## (undated) DEVICE — KIT TOTAL KNEE TKOFG

## (undated) DEVICE — SUT ETHILON 3/0 18IN PS-1

## (undated) DEVICE — SUT TAPE 2.5 CIRCLE 36.6X1.3MM

## (undated) DEVICE — DRAPE STERI U-SHAPED 47X51IN

## (undated) DEVICE — PROBE ARTHO ENERGY 90 DEG

## (undated) DEVICE — DRESSING COVER AQUACEL AG SURG

## (undated) DEVICE — CONNECTOR TUBING STR 5 IN 1

## (undated) DEVICE — DRAPE THREE-QTR REINF 53X77IN

## (undated) DEVICE — SEE MEDLINE ITEM 152487

## (undated) DEVICE — KIT PREVENA PLUS

## (undated) DEVICE — SEE MEDLINE ITEM 146417

## (undated) DEVICE — DRESSING AQUACEL AG FOAM 4X4

## (undated) DEVICE — PASSER TIGHT CRV QUICKPASS L

## (undated) DEVICE — CLIP IRRIGATION MAKO

## (undated) DEVICE — SUT MONOCRYL 3-0 PS-2 UND

## (undated) DEVICE — DRAPE INCISE IOBAN 2 23X17IN

## (undated) DEVICE — CANNULA ARTHRO 8.25MM X 7CM

## (undated) DEVICE — WRAP SHLDR HIP ACCU THRM PACK

## (undated) DEVICE — GOWN POLY REINF X-LONG XL

## (undated) DEVICE — ADHESIVE DERMABOND ADVANCED